# Patient Record
Sex: MALE | Race: WHITE | NOT HISPANIC OR LATINO
[De-identification: names, ages, dates, MRNs, and addresses within clinical notes are randomized per-mention and may not be internally consistent; named-entity substitution may affect disease eponyms.]

---

## 2017-01-26 ENCOUNTER — CHART COPY (OUTPATIENT)
Age: 42
End: 2017-01-26

## 2017-02-14 ENCOUNTER — CHART COPY (OUTPATIENT)
Age: 42
End: 2017-02-14

## 2017-02-23 ENCOUNTER — APPOINTMENT (OUTPATIENT)
Dept: PULMONOLOGY | Facility: CLINIC | Age: 42
End: 2017-02-23

## 2017-02-27 ENCOUNTER — CHART COPY (OUTPATIENT)
Age: 42
End: 2017-02-27

## 2017-04-04 ENCOUNTER — OTHER (OUTPATIENT)
Age: 42
End: 2017-04-04

## 2017-04-05 ENCOUNTER — MESSAGE (OUTPATIENT)
Age: 42
End: 2017-04-05

## 2017-04-10 ENCOUNTER — APPOINTMENT (OUTPATIENT)
Dept: PULMONOLOGY | Facility: CLINIC | Age: 42
End: 2017-04-10

## 2019-03-29 ENCOUNTER — TRANSCRIPTION ENCOUNTER (OUTPATIENT)
Age: 44
End: 2019-03-29

## 2019-03-29 ENCOUNTER — INPATIENT (INPATIENT)
Facility: HOSPITAL | Age: 44
LOS: 3 days | Discharge: HOME | DRG: 728 | End: 2019-04-02
Attending: INTERNAL MEDICINE | Admitting: INTERNAL MEDICINE
Payer: COMMERCIAL

## 2019-03-29 VITALS
HEART RATE: 100 BPM | HEIGHT: 70 IN | RESPIRATION RATE: 20 BRPM | WEIGHT: 175.05 LBS | DIASTOLIC BLOOD PRESSURE: 95 MMHG | TEMPERATURE: 99 F | SYSTOLIC BLOOD PRESSURE: 165 MMHG | OXYGEN SATURATION: 98 %

## 2019-03-29 LAB
ALBUMIN SERPL ELPH-MCNC: 4.5 G/DL — SIGNIFICANT CHANGE UP (ref 3.3–5.2)
ALP SERPL-CCNC: 88 U/L — SIGNIFICANT CHANGE UP (ref 40–120)
ALT FLD-CCNC: 39 U/L — SIGNIFICANT CHANGE UP
ANION GAP SERPL CALC-SCNC: 13 MMOL/L — SIGNIFICANT CHANGE UP (ref 5–17)
APPEARANCE UR: CLEAR — SIGNIFICANT CHANGE UP
AST SERPL-CCNC: 24 U/L — SIGNIFICANT CHANGE UP
BILIRUB SERPL-MCNC: 1.1 MG/DL — SIGNIFICANT CHANGE UP (ref 0.4–2)
BILIRUB UR-MCNC: NEGATIVE — SIGNIFICANT CHANGE UP
BUN SERPL-MCNC: 9 MG/DL — SIGNIFICANT CHANGE UP (ref 8–20)
CALCIUM SERPL-MCNC: 9.7 MG/DL — SIGNIFICANT CHANGE UP (ref 8.6–10.2)
CHLORIDE SERPL-SCNC: 97 MMOL/L — LOW (ref 98–107)
CO2 SERPL-SCNC: 28 MMOL/L — SIGNIFICANT CHANGE UP (ref 22–29)
COLOR SPEC: YELLOW — SIGNIFICANT CHANGE UP
CREAT SERPL-MCNC: 0.75 MG/DL — SIGNIFICANT CHANGE UP (ref 0.5–1.3)
DIFF PNL FLD: ABNORMAL
EPI CELLS # UR: SIGNIFICANT CHANGE UP
GLUCOSE SERPL-MCNC: 99 MG/DL — SIGNIFICANT CHANGE UP (ref 70–115)
GLUCOSE UR QL: NEGATIVE MG/DL — SIGNIFICANT CHANGE UP
HCT VFR BLD CALC: 47.1 % — SIGNIFICANT CHANGE UP (ref 42–52)
HGB BLD-MCNC: 16.8 G/DL — SIGNIFICANT CHANGE UP (ref 14–18)
KETONES UR-MCNC: ABNORMAL
LEUKOCYTE ESTERASE UR-ACNC: ABNORMAL
MCHC RBC-ENTMCNC: 34.4 PG — HIGH (ref 27–31)
MCHC RBC-ENTMCNC: 35.7 G/DL — SIGNIFICANT CHANGE UP (ref 32–36)
MCV RBC AUTO: 96.3 FL — HIGH (ref 80–94)
NITRITE UR-MCNC: NEGATIVE — SIGNIFICANT CHANGE UP
PH UR: 6 — SIGNIFICANT CHANGE UP (ref 5–8)
PLATELET # BLD AUTO: 206 K/UL — SIGNIFICANT CHANGE UP (ref 150–400)
POTASSIUM SERPL-MCNC: 3.1 MMOL/L — LOW (ref 3.5–5.3)
POTASSIUM SERPL-SCNC: 3.1 MMOL/L — LOW (ref 3.5–5.3)
PROT SERPL-MCNC: 7.9 G/DL — SIGNIFICANT CHANGE UP (ref 6.6–8.7)
PROT UR-MCNC: 15 MG/DL
RBC # BLD: 4.89 M/UL — SIGNIFICANT CHANGE UP (ref 4.6–6.2)
RBC # FLD: 13.1 % — SIGNIFICANT CHANGE UP (ref 11–15.6)
RBC CASTS # UR COMP ASSIST: SIGNIFICANT CHANGE UP /HPF (ref 0–4)
SODIUM SERPL-SCNC: 138 MMOL/L — SIGNIFICANT CHANGE UP (ref 135–145)
SP GR SPEC: 1.02 — SIGNIFICANT CHANGE UP (ref 1.01–1.02)
UROBILINOGEN FLD QL: 1 MG/DL
WBC # BLD: 16 K/UL — HIGH (ref 4.8–10.8)
WBC # FLD AUTO: 16 K/UL — HIGH (ref 4.8–10.8)
WBC UR QL: SIGNIFICANT CHANGE UP

## 2019-03-29 PROCEDURE — 76870 US EXAM SCROTUM: CPT | Mod: 26

## 2019-03-29 PROCEDURE — 99285 EMERGENCY DEPT VISIT HI MDM: CPT

## 2019-03-29 PROCEDURE — 74177 CT ABD & PELVIS W/CONTRAST: CPT | Mod: 26

## 2019-03-29 RX ORDER — POTASSIUM CHLORIDE 20 MEQ
40 PACKET (EA) ORAL ONCE
Qty: 0 | Refills: 0 | Status: COMPLETED | OUTPATIENT
Start: 2019-03-29 | End: 2019-03-29

## 2019-03-29 RX ORDER — MORPHINE SULFATE 50 MG/1
4 CAPSULE, EXTENDED RELEASE ORAL ONCE
Qty: 0 | Refills: 0 | Status: DISCONTINUED | OUTPATIENT
Start: 2019-03-29 | End: 2019-03-29

## 2019-03-29 RX ORDER — KETOROLAC TROMETHAMINE 30 MG/ML
15 SYRINGE (ML) INJECTION ONCE
Qty: 0 | Refills: 0 | Status: DISCONTINUED | OUTPATIENT
Start: 2019-03-29 | End: 2019-03-29

## 2019-03-29 RX ADMIN — Medication 40 MILLIEQUIVALENT(S): at 23:17

## 2019-03-29 RX ADMIN — MORPHINE SULFATE 4 MILLIGRAM(S): 50 CAPSULE, EXTENDED RELEASE ORAL at 22:43

## 2019-03-29 RX ADMIN — Medication 15 MILLIGRAM(S): at 20:14

## 2019-03-29 RX ADMIN — Medication 15 MILLIGRAM(S): at 22:25

## 2019-03-29 NOTE — ED ADULT NURSE NOTE - OBJECTIVE STATEMENT
pt a_+ox3, sent to ED from urgent care for abscess to testicle. pt reports noticing a bump on testicle yesterday, felt a pop but felt nothing come out. pt reports increase in pain and swelling today with fever.

## 2019-03-30 DIAGNOSIS — N49.2 INFLAMMATORY DISORDERS OF SCROTUM: ICD-10-CM

## 2019-03-30 LAB
ALBUMIN SERPL ELPH-MCNC: 3.7 G/DL — SIGNIFICANT CHANGE UP (ref 3.3–5.2)
ALP SERPL-CCNC: 92 U/L — SIGNIFICANT CHANGE UP (ref 40–120)
ALT FLD-CCNC: 59 U/L — HIGH
ANION GAP SERPL CALC-SCNC: 13 MMOL/L — SIGNIFICANT CHANGE UP (ref 5–17)
AST SERPL-CCNC: 88 U/L — HIGH
BILIRUB DIRECT SERPL-MCNC: 1.2 MG/DL — HIGH (ref 0–0.3)
BILIRUB INDIRECT FLD-MCNC: 1.3 MG/DL — HIGH (ref 0.2–1)
BILIRUB SERPL-MCNC: 2.5 MG/DL — HIGH (ref 0.4–2)
BUN SERPL-MCNC: 7 MG/DL — LOW (ref 8–20)
CALCIUM SERPL-MCNC: 8.5 MG/DL — LOW (ref 8.6–10.2)
CHLORIDE SERPL-SCNC: 99 MMOL/L — SIGNIFICANT CHANGE UP (ref 98–107)
CO2 SERPL-SCNC: 26 MMOL/L — SIGNIFICANT CHANGE UP (ref 22–29)
CREAT SERPL-MCNC: 0.75 MG/DL — SIGNIFICANT CHANGE UP (ref 0.5–1.3)
CRP SERPL-MCNC: 3.32 MG/DL — HIGH (ref 0–0.4)
GLUCOSE SERPL-MCNC: 109 MG/DL — SIGNIFICANT CHANGE UP (ref 70–115)
MAGNESIUM SERPL-MCNC: 1.3 MG/DL — LOW (ref 1.6–2.6)
PHOSPHATE SERPL-MCNC: 2.8 MG/DL — SIGNIFICANT CHANGE UP (ref 2.4–4.7)
POTASSIUM SERPL-MCNC: 3.1 MMOL/L — LOW (ref 3.5–5.3)
POTASSIUM SERPL-SCNC: 3.1 MMOL/L — LOW (ref 3.5–5.3)
PROT SERPL-MCNC: 6.6 G/DL — SIGNIFICANT CHANGE UP (ref 6.6–8.7)
SODIUM SERPL-SCNC: 138 MMOL/L — SIGNIFICANT CHANGE UP (ref 135–145)

## 2019-03-30 PROCEDURE — 99222 1ST HOSP IP/OBS MODERATE 55: CPT

## 2019-03-30 PROCEDURE — 99251: CPT

## 2019-03-30 PROCEDURE — 99223 1ST HOSP IP/OBS HIGH 75: CPT

## 2019-03-30 PROCEDURE — 12345: CPT | Mod: NC

## 2019-03-30 RX ORDER — NICOTINE POLACRILEX 2 MG
4 GUM BUCCAL
Qty: 0 | Refills: 0 | Status: DISCONTINUED | OUTPATIENT
Start: 2019-03-30 | End: 2019-04-02

## 2019-03-30 RX ORDER — INFLUENZA VIRUS VACCINE 15; 15; 15; 15 UG/.5ML; UG/.5ML; UG/.5ML; UG/.5ML
0.5 SUSPENSION INTRAMUSCULAR ONCE
Qty: 0 | Refills: 0 | Status: DISCONTINUED | OUTPATIENT
Start: 2019-03-30 | End: 2019-04-02

## 2019-03-30 RX ORDER — ACETAMINOPHEN 500 MG
650 TABLET ORAL EVERY 6 HOURS
Qty: 0 | Refills: 0 | Status: DISCONTINUED | OUTPATIENT
Start: 2019-03-30 | End: 2019-04-02

## 2019-03-30 RX ORDER — NICOTINE POLACRILEX 2 MG
1 GUM BUCCAL DAILY
Qty: 0 | Refills: 0 | Status: DISCONTINUED | OUTPATIENT
Start: 2019-03-30 | End: 2019-04-02

## 2019-03-30 RX ORDER — POTASSIUM CHLORIDE 20 MEQ
40 PACKET (EA) ORAL ONCE
Qty: 0 | Refills: 0 | Status: COMPLETED | OUTPATIENT
Start: 2019-03-30 | End: 2019-03-30

## 2019-03-30 RX ORDER — THIAMINE MONONITRATE (VIT B1) 100 MG
100 TABLET ORAL DAILY
Qty: 0 | Refills: 0 | Status: COMPLETED | OUTPATIENT
Start: 2019-03-30 | End: 2019-04-01

## 2019-03-30 RX ORDER — PIPERACILLIN AND TAZOBACTAM 4; .5 G/20ML; G/20ML
3.38 INJECTION, POWDER, LYOPHILIZED, FOR SOLUTION INTRAVENOUS EVERY 8 HOURS
Qty: 0 | Refills: 0 | Status: DISCONTINUED | OUTPATIENT
Start: 2019-03-30 | End: 2019-03-31

## 2019-03-30 RX ORDER — HEPARIN SODIUM 5000 [USP'U]/ML
5000 INJECTION INTRAVENOUS; SUBCUTANEOUS EVERY 8 HOURS
Qty: 0 | Refills: 0 | Status: DISCONTINUED | OUTPATIENT
Start: 2019-03-30 | End: 2019-04-02

## 2019-03-30 RX ORDER — FOLIC ACID 0.8 MG
1 TABLET ORAL DAILY
Qty: 0 | Refills: 0 | Status: DISCONTINUED | OUTPATIENT
Start: 2019-03-30 | End: 2019-04-02

## 2019-03-30 RX ORDER — MORPHINE SULFATE 50 MG/1
2 CAPSULE, EXTENDED RELEASE ORAL ONCE
Qty: 0 | Refills: 0 | Status: DISCONTINUED | OUTPATIENT
Start: 2019-03-30 | End: 2019-03-30

## 2019-03-30 RX ORDER — KETOROLAC TROMETHAMINE 30 MG/ML
30 SYRINGE (ML) INJECTION EVERY 4 HOURS
Qty: 0 | Refills: 0 | Status: DISCONTINUED | OUTPATIENT
Start: 2019-03-30 | End: 2019-04-02

## 2019-03-30 RX ORDER — MAGNESIUM SULFATE 500 MG/ML
2 VIAL (ML) INJECTION ONCE
Qty: 0 | Refills: 0 | Status: COMPLETED | OUTPATIENT
Start: 2019-03-30 | End: 2019-03-30

## 2019-03-30 RX ORDER — OXYCODONE HYDROCHLORIDE 5 MG/1
5 TABLET ORAL EVERY 6 HOURS
Qty: 0 | Refills: 0 | Status: DISCONTINUED | OUTPATIENT
Start: 2019-03-30 | End: 2019-04-02

## 2019-03-30 RX ADMIN — Medication 110 MILLIGRAM(S): at 14:53

## 2019-03-30 RX ADMIN — PIPERACILLIN AND TAZOBACTAM 25 GRAM(S): 4; .5 INJECTION, POWDER, LYOPHILIZED, FOR SOLUTION INTRAVENOUS at 16:43

## 2019-03-30 RX ADMIN — Medication 1 PATCH: at 11:23

## 2019-03-30 RX ADMIN — Medication 1 TABLET(S): at 11:23

## 2019-03-30 RX ADMIN — Medication 40 MILLIEQUIVALENT(S): at 16:42

## 2019-03-30 RX ADMIN — Medication 100 MILLIGRAM(S): at 01:47

## 2019-03-30 RX ADMIN — Medication 30 MILLIGRAM(S): at 15:31

## 2019-03-30 RX ADMIN — MORPHINE SULFATE 2 MILLIGRAM(S): 50 CAPSULE, EXTENDED RELEASE ORAL at 03:30

## 2019-03-30 RX ADMIN — OXYCODONE HYDROCHLORIDE 5 MILLIGRAM(S): 5 TABLET ORAL at 21:40

## 2019-03-30 RX ADMIN — OXYCODONE HYDROCHLORIDE 5 MILLIGRAM(S): 5 TABLET ORAL at 07:36

## 2019-03-30 RX ADMIN — Medication 50 GRAM(S): at 11:23

## 2019-03-30 RX ADMIN — PIPERACILLIN AND TAZOBACTAM 25 GRAM(S): 4; .5 INJECTION, POWDER, LYOPHILIZED, FOR SOLUTION INTRAVENOUS at 23:32

## 2019-03-30 RX ADMIN — Medication 1 MILLIGRAM(S): at 11:23

## 2019-03-30 RX ADMIN — Medication 100 MILLIGRAM(S): at 11:23

## 2019-03-30 RX ADMIN — PIPERACILLIN AND TAZOBACTAM 25 GRAM(S): 4; .5 INJECTION, POWDER, LYOPHILIZED, FOR SOLUTION INTRAVENOUS at 03:19

## 2019-03-30 RX ADMIN — Medication 30 MILLIGRAM(S): at 16:01

## 2019-03-30 RX ADMIN — Medication 900 MILLIGRAM(S): at 02:22

## 2019-03-30 RX ADMIN — MORPHINE SULFATE 2 MILLIGRAM(S): 50 CAPSULE, EXTENDED RELEASE ORAL at 03:18

## 2019-03-30 RX ADMIN — Medication 30 MILLIGRAM(S): at 23:32

## 2019-03-30 RX ADMIN — OXYCODONE HYDROCHLORIDE 5 MILLIGRAM(S): 5 TABLET ORAL at 22:40

## 2019-03-30 NOTE — CONSULT NOTE ADULT - ASSESSMENT
44 y/o M w/ PMH of MS (currently off treatment) and PSH of cholecystectomy presenting with a 1 day hx of scrotal swelling and pain 2/2 scrotal cellulitis and phlegmon.  -No drainable abscess at this time  -Continue IV Abx  -PRN Pain control  -Surgery will continue to monitor area, I&D if necessary  -Continued care per primary    Patient evaluated and plan discussed with attending physician Dr. Duenas

## 2019-03-30 NOTE — PROGRESS NOTE ADULT - SUBJECTIVE AND OBJECTIVE BOX
cc: scrotal cellulitis     interval hx: patient seen and eval. comfortable. in no acute distress. c/o mild chills at home. denies n/v/abd pain or diarrhea denies dysuria or hematuria     Vital Signs Last 24 Hrs  T(C): 37 (30 Mar 2019 15:54), Max: 37.2 (29 Mar 2019 18:22)  T(F): 98.6 (30 Mar 2019 15:54), Max: 99 (30 Mar 2019 03:01)  HR: 81 (30 Mar 2019 15:54) (81 - 100)  BP: 161/82 (30 Mar 2019 15:54) (144/89 - 165/95)  BP(mean): --  RR: 18 (30 Mar 2019 15:54) (18 - 20)  SpO2: 96% (30 Mar 2019 15:54) (94% - 98%)    Physical Exam:  · Constitutional Details	well-developed; well-groomed; well-nourished; distress due to pain  · Neck Details	supple; no JVD; normal thyroid gland  · Respiratory Details	airway patent; breath sounds equal; good air movement;   respirations non-labored; clear to auscultation bilaterally; no chest wall tenderness;   no intercostal retractions; no rales; no rhonchi; no wheezes  · Cardiovascular Details	regular rate and rhythm  no rub  no murmur  normal PMI   · GI Normal	soft; nontender; no distention; no masses palpable; bowel sounds normal; no bruit; no rebound tenderness; no guarding; no rigidity; no organomegaly  · Genitourinary Details Maleno hernia; no discharge; testicular mass L; Generalized tenderness; warm; with diffuse erythema  · Extremities Details	no clubbing; no cyanosis; no pedal edema  · Neurological	Alert & oriented; no sensory, motor or coordination deficits, normal reflexes  · Skin	No lesions; no rash  · Lymphatic Details	inguinal L; inguinal R  · Inguinal L	tender  · Inguinal R	tender  · Musculoskeletal	No joint pain, swelling or deformity; no limitation of movement                          16.8   16.0  )-----------( 206      ( 29 Mar 2019 20:32 )             47.1   03-30    138  |  99  |  7.0<L>  ----------------------------<  109  3.1<L>   |  26.0  |  0.75    Ca    8.5<L>      30 Mar 2019 07:33  Phos  2.8     03-30  Mg     1.3     03-30    TPro  6.6  /  Alb  3.7  /  TBili  2.5<H>  /  DBili  1.2<H>  /  AST  88<H>  /  ALT  59<H>  /  AlkPhos  92  03-30

## 2019-03-30 NOTE — ED PROVIDER NOTE - OBJECTIVE STATEMENT
43y male no pmhx presents to ED for testicular pain and swelling x 2 days. Pt states he first noticed a "pimple" or ingrown hair on the left side of his testicle yesterday morning. He states he picked at the area and that it "popped" but popped internally. Pt notes that when he awoke this AM the area was diffusely erythematous and swollen. Pt notes significant pain overlying the left testicle in the area of the ingrown hair. He also notes diffuse swelling to the penis and foreskin. Pt was seen at  this AM and instructed to go to ED as he would likely need IV abx. Pt had fever of 102 at . Pt denies n/v/d, abdominal pain, urethral discharge, trauma, drainage, bleeding, CP, SOB.

## 2019-03-30 NOTE — CONSULT NOTE ADULT - ASSESSMENT
This 43 y.o. man  here for fevers, WBC elevation  scrotal cellulitis    small fluid collection in scrotum on CT scan likely abscess    - suggest to continue zosyn  - add doxycycline for empiric [place]-MRSA coverage    - follow up on cultures    - will need serial Ultrasound of scrotum; if collection is larger, consider drainage.

## 2019-03-30 NOTE — CONSULT NOTE ADULT - REASON FOR ADMISSION
Scrotal cellulitis
Scrotal pain secondary to scrotal cellulitis with abscess
Scrotal pain secondary to scrotal cellulitis with abscess

## 2019-03-30 NOTE — CONSULT NOTE ADULT - SUBJECTIVE AND OBJECTIVE BOX
ACUTE CARE SURGERY CONSULT    HPI:  44 y/o M w/ PMH of MS (currently off treatment) and PSH of cholecystectomy presenting with a 1 day hx of scrotal swelling and pain. Patient states that he had an ingrown hair which he started to pick, which then awoke him from sleep last night with burning pain and swelling of his scrotum. The pain became severe, patient then presented to [facility] ED. +subjective chills and fever. Denies N/V/CP/SOB. Denies problems urinating, increased urinary frequency, or malodorous urine.       PAST MEDICAL HISTORY:  Multiple sclerosis - electively stopped receiving treatments due to side effects    PAST SURGICAL HISTORY:  Cholecystectomy    ALLERGIES:  No Known Allergies    FAMILY HISTORY: Noncontributory    SOCIAL HISTORY:  2 packs per day cigarette use w/ a 80 pack year hx, Daily EtOH use - vodka. Occasional marijuana use, denies injection drugs.     HOME MEDICATIONS: Denies    MEDICATIONS  (STANDING):  folic acid 1 milliGRAM(s) Oral daily  influenza   Vaccine 0.5 milliLiter(s) IntraMuscular once  multivitamin 1 Tablet(s) Oral daily  nicotine - 21 mG/24Hr(s) Patch 1 patch Transdermal daily  piperacillin/tazobactam IVPB. 3.375 Gram(s) IV Intermittent every 8 hours  thiamine 100 milliGRAM(s) Oral daily    MEDICATIONS  (PRN):  acetaminophen   Tablet .. 650 milliGRAM(s) Oral every 6 hours PRN Temp greater or equal to 38C (100.4F), Mild Pain (1 - 3)  ketorolac   Injectable 30 milliGRAM(s) IV Push every 4 hours PRN Moderate Pain (4 - 6)  LORazepam     Tablet 2 milliGRAM(s) Oral every 2 hours PRN Symptom-triggered 2 point increase in CIWA-Ar  LORazepam   Injectable 2 milliGRAM(s) IV Push every 1 hour PRN Symptom-triggered: each CIWA -Ar score 8 or GREATER  nicotine  Polacrilex Gum 4 milliGRAM(s) Oral every 1 hour PRN nicotine withdrawal  oxyCODONE    IR 5 milliGRAM(s) Oral every 6 hours PRN Severe Pain (7 - 10)      VITALS & I/Os:  Vital Signs Last 24 Hrs  T(C): 37.2 (30 Mar 2019 03:01), Max: 37.2 (29 Mar 2019 18:22)  T(F): 99 (30 Mar 2019 03:01), Max: 99 (30 Mar 2019 03:01)  HR: 91 (30 Mar 2019 03:01) (89 - 100)  BP: 146/82 (30 Mar 2019 03:01) (146/82 - 165/95)  BP(mean): --  RR: 18 (30 Mar 2019 03:01) (18 - 20)  SpO2: 95% (30 Mar 2019 03:01) (95% - 98%)  CAPILLARY BLOOD GLUCOSE          I&O's Summary      GENERAL: Alert, well developed, in no acute distress.  MENTAL STATUS: AAOx3. Appropriate affect.  HEENT: PERRLA. EOMI. MMM.  Trachea midline. No lymph node swelling or tenderness.  RESPIRATORY: CTAB. No wheezing, rales or rhonchi.  CARDIOVASCULAR: RRR. No audible murmurs, rubs or gallops.   GASTROINTESTINAL: Abdomen soft, +suprapubic TTP, ND, -R/-G.  No pulsatile mass, no flank tenderness or suprapubic tenderness. No hepatosplenomegaly.  GENITOURINARY: Diffuse scrotal edema with area of induration on left side at base of penis. No palpable fluctuance. +TTP.   NEUROLOGIC: Cranial nerves II-XII grossly intact. No focal neurological deficits. Moves all extremities spontaneously. Sensation intact bilaterally.  INTEGUMENTARY: No overt rashes or lesions, petechia or purpura. Good turgor. No edema.  MUSCULOSKELETAL: No cyanosis or clubbing. No gross deformities.   LYMPHATIC: Palpation of neck reveals no swelling or tenderness of neck nodes. Palpation of groin reveals no swelling or tenderness of groin nodes.    LABS:                        16.8   16.0  )-----------( 206      ( 29 Mar 2019 20:32 )             47.1     03    138  |  97<L>  |  9.0  ----------------------------<  99  3.1<L>   |  28.0  |  0.75    Ca    9.7      29 Mar 2019 20:32    TPro  7.9  /  Alb  4.5  /  TBili  1.1  /  DBili  x   /  AST  24  /  ALT  39  /  AlkPhos  88      Lactate: acetaminophen   Tablet .. 650 milliGRAM(s) Oral every 6 hours PRN  folic acid 1 milliGRAM(s) Oral daily  influenza   Vaccine 0.5 milliLiter(s) IntraMuscular once  ketorolac   Injectable 30 milliGRAM(s) IV Push every 4 hours PRN  LORazepam     Tablet 2 milliGRAM(s) Oral every 2 hours PRN  LORazepam   Injectable 2 milliGRAM(s) IV Push every 1 hour PRN  multivitamin 1 Tablet(s) Oral daily  nicotine  Polacrilex Gum 4 milliGRAM(s) Oral every 1 hour PRN  nicotine - 21 mG/24Hr(s) Patch 1 patch Transdermal daily  oxyCODONE    IR 5 milliGRAM(s) Oral every 6 hours PRN  piperacillin/tazobactam IVPB. 3.375 Gram(s) IV Intermittent every 8 hours  thiamine 100 milliGRAM(s) Oral daily       Urinalysis Basic - ( 29 Mar 2019 20:08 )    Color: Yellow / Appearance: Clear / S.020 / pH: x  Gluc: x / Ketone: Trace  / Bili: Negative / Urobili: 1 mg/dL   Blood: x / Protein: 15 mg/dL / Nitrite: Negative   Leuk Esterase: Trace / RBC: 0-2 /HPF / WBC 0-2   Sq Epi: x / Non Sq Epi: Occasional / Bacteria: x      IMAGING:     EXAM:  US SCROTUM AND CONTENTS                          PROCEDURE DATE:  2019          INTERPRETATION:  CLINICAL INFORMATION: Scrotal pain and swelling    COMPARISON: None available.    TECHNIQUE: Testicular ultrasound utilizing color and spectral Doppler.    FINDINGS:      RIGHT:    Right testis: 5.3 x 2.8 x 2.9 cm. Normal echogenicity and echotexture   with no masses or areas of architectural distortion. Normal blood flow   pattern.    Right epididymis: 4 mm epididymal head cyst.    Right hydrocele: None.    Right varicocele: None.      LEFT:     Left testis: 4.9 x 2.9 x 2.9 cm. Normal echogenicity and echotexture with   no masses or areas of architectural distortion. Normal blood flow pattern.    Left epididymis: Within normal limits.    Left hydrocele: None.    Left varicocele: None.    IMPRESSION:     Normal scrotal sonogram.           EXAM:  CT ABDOMEN AND PELVIS IC                          PROCEDURE DATE:  2019          INTERPRETATION:  CT ABDOMEN AND PELVIS WITH CONTRAST    INDICATION: Testicular and inguinal pain with associated cellulitis.     TECHNIQUE: Contrast enhanced CT of the abdomen and pelvis. Images are   reformatted in the sagittal and coronal planes.    96 mL of Omnipaque 300 contrast material was injected IV.    COMPARISON: Testicular ultrasound performed earlier today.    FINDINGS:    Lower Thorax: No consolidation or effusion.        Liver: Too small to characterize hepatic hypodensity near the dome.  Biliary: No significant dilatation. Status post cholecystectomy.  Spleen: Incompletely characterized splenic hypodensities measuring up to   2.4 cm.  Pancreas: No inflammatory changes or ductal dilatation.      Adrenals: Normal.      Kidneys: No hydronephrosis or solid mass.      Vessels: Normal caliber.        GI tract: There is wall thickening of colonic loops extending to the   rectum without significant pericolonic stranding. The findings are   nonspecific, this may be on the basis of inadequate distention or   represent pancolitis in appropriate clinical setting. Consider   nonemergent endoscopic evaluation if there is concern for neoplasm. No   evidence of small bowel obstruction. Normal appendix.    Peritoneum/retroperitoneum and mesentery: No free air. No organized fluid   collection. Mild prominent bilateral groin lymph nodes, likely reactive   in nature.    Pelvic organs/Bladder: No pelvic masses. Inadequately distended.    Abdominal wall: There is fluid induration and stranding within the   scrotal sac extending to the inguinal region, likely in keeping with   reported cellulitis. No air identified to suggest necrotizing fasciitis.   Recommend clinical correlation. There is diffuse scrotal wall thickening   with suggestion of small fluid collection measuring 1.4 x 0.7 cm in the   left medial scrotal sac image 161 of series 3, likely phlegmon or   developing abscess. There is bilateral hydroceles, left greater the   right.   Bones and soft tissues: Mild multilevel degenerative changes of the spine   are noted.        IMPRESSION:    Fluid induration and stranding within the scrotal sac extending to the   inguinal region, likely in keeping with reported cellulitis. No air   identified to suggest necrotizing fasciitis. Recommend clinical   correlation.  Diffuse scrotal wall thickening with suggestion of small   fluid collection measuring 1.4 x 0.7 cm in the left medial scrotal sac   (see key image), likely phlegmon or developing abscess. Bilateral   hydroceles, left greater the right.     Wall thickening of colonic loops extending to the rectum without   significant pericolonic stranding. The findings are nonspecific, this may   be on the basis of inadequate distention or represent pancolitis in   appropriate clinical setting. Consider nonemergent endoscopic evaluation   if there is concern for neoplasm. No evidence of small bowel obstruction.   No intraperitoneal fluid collection. Additional findings as mentioned   above.    These results were discussed via telephone at 3/30/2019 12:14 AM by Dr. Espinoza of radiology with ER AYAN Kang, institution read-back   verification policy was followed.

## 2019-03-30 NOTE — H&P ADULT - NSHPPHYSICALEXAM_GEN_ALL_CORE
Vital Signs Last 24 Hrs  T(C): 37.2 (30 Mar 2019 03:01), Max: 37.2 (29 Mar 2019 18:22)  T(F): 99 (30 Mar 2019 03:01), Max: 99 (30 Mar 2019 03:01)  HR: 91 (30 Mar 2019 03:01) (89 - 100)  BP: 146/82 (30 Mar 2019 03:01) (146/82 - 165/95)  BP(mean): --  RR: 18 (30 Mar 2019 03:01) (18 - 20)  SpO2: 95% (30 Mar 2019 03:01) (95% - 98%)

## 2019-03-30 NOTE — H&P ADULT - HISTORY OF PRESENT ILLNESS
ED HPI: This a 42 y/o M w/ PMH of MS, not currently on medication. Presenting with a 1 day hx of scrotal swelling and pain. Patient states that he had an ingrown hair which he started to pick, which then awoke him from sleep last night with burning pain and swelling of his scrotum. The pain became severe, patient then presented to [facility] ED. with associated subjective chills and fever. Denies N/V/CP/SOB. Denies problems urinating, increased urinary frequency, or malodorous urine.     Above HPI reviewed and noted: patient was seen and evaluated, found having rigors while at bedside. patient confirms above complaint and reports worsening pain and swelling of foreskin.

## 2019-03-30 NOTE — H&P ADULT - ASSESSMENT
Acute scrotal abscess with diffuse cellulitis, area demarcated   admit to medical unit  f/u blood culture  ID consult  Zosyn IV  Tylenol PRN for fever  Urology consulted by ED  - No clinical maddie's at this time  Oxycodone, ketorolac for optimization of pain control  Scrotal support    H/o MS - not currently on medication  H/o chronic diarrhea, ~20 episodes per day, intermittent, since childhood, last colonoscopy 10 yrs ago, no abn findings recalled.   - recommend repeat Colonoscopy in view of abnormal CT scan findings, patient agree with out patient follow up with Dr. Melendrez group.     H/o Active Nicotine use,   - nicotine patch, with PRN Gum    h/o ETOH use, no currently in withdrawal.   - [facility] protocol   - Folic, Thiamine and MVI    DVT prophylaxis amb at [place]

## 2019-03-30 NOTE — H&P ADULT - GASTROINTESTINAL DETAILS
no rebound tenderness/no rigidity/bowel sounds normal/no bruit/nontender/no organomegaly/no masses palpable/soft/no distention/no guarding

## 2019-03-30 NOTE — ED PROVIDER NOTE - CLINICAL SUMMARY MEDICAL DECISION MAKING FREE TEXT BOX
Pt with rapidly progressing cellulitis of scrotum. Will obtain labs, blood cultures, Testicular sono to eval flow, CT to assess for fourniers/ gas and likely obs/admit for IV abx

## 2019-03-30 NOTE — CONSULT NOTE ADULT - SUBJECTIVE AND OBJECTIVE BOX
[facility]  INFECTIOUS DISEASES AND INTERNAL MEDICINE at [place]  =======================================================  Rl Tapia MD  Diplomates American Board of Internal Medicine and Infectious Diseases  =======================================================    [id number]  AMARI FERREIRA   This a 44 y/o M w/ PMH of MS, not currently on medication. Presenting with a 1 day hx of scrotal swelling and pain. Patient states that he had an ingrown hair which he started to pick, which then awoke him from sleep last night with burning pain and swelling of his scrotum. The pain became severe, patient then presented to [facility] ED. with associated subjective chills and fever. Denies N/V/CP/SOB. Denies problems urinating, increased urinary frequency, or malodorous urine.     patient was placed on zosyn by admitting team  blood culture sent.   CT scan showed:  Fluid induration and stranding within the scrotal sac extending to the inguinal region, likely in keeping with reported cellulitis. No air identified to suggest necrotizing fasciitis. Recommend clinical correlation.  Diffuse scrotal wall thickening with suggestion of small fluid collection measuring 1.4 x 0.7 cm in the left medial scrotal sac (see key image), likely phlegmon or developing abscess. Bilateral hydroceles, left greater the right.     patient not recently hospitalized.  last hospitalization date back 5 years ago for a knee condition.         =======================================================  Past Medical & Surgical Hx:  =====================  PAST MEDICAL & SURGICAL HISTORY:  Chronic diarrhea  Multiple sclerosis  No significant past surgical history      Problem List:  ==========  HEALTH ISSUES - PROBLEM Dx:  Cellulitis of scrotum: Cellulitis of scrotum        Social Hx:  =======  no toxic habits currently    FAMILY HISTORY:  FHx: diabetes mellitus: father  no significant family history of immunosuppressive disorders in mother or father   =======================================================  REVIEW OF SYSTEMS:  as above  all other ROS negative  =======================================================  Allergies    No Known Allergies    Intolerances        Antibiotics:  doxycycline IVPB      piperacillin/tazobactam IVPB. 3.375 Gram(s) IV Intermittent every 8 hours    Other medications:  folic acid 1 milliGRAM(s) Oral daily  influenza   Vaccine 0.5 milliLiter(s) IntraMuscular once  multivitamin 1 Tablet(s) Oral daily  nicotine - 21 mG/24Hr(s) Patch 1 patch Transdermal daily  thiamine 100 milliGRAM(s) Oral daily     clindamycin IVPB   100 mL/Hr IV Intermittent (03-30-19 @ 01:47)    piperacillin/tazobactam IVPB.   25 mL/Hr IV Intermittent (03-30-19 @ 03:19)      ======================================================  Physical Exam:  ============  T(F): 97.4 (30 Mar 2019 11:31), Max: 99 (30 Mar 2019 03:01)  HR: 87 (30 Mar 2019 11:31)  BP: 155/87 (30 Mar 2019 11:31)  RR: 18 (30 Mar 2019 07:58)  SpO2: 94% (30 Mar 2019 07:58) (94% - 98%)  Height (cm): 177.8 (03-29-19 @ 18:22)  Weight (kg): 79.4 (03-29-19 @ 18:22)  BMI (kg/m2): 25.1 (03-29-19 @ 18:22)  BSA (m2): 1.97 (03-29-19 @ 18:22)  General:  No acute distress.  Eye: Pupils are equal, round and reactive to light, Extraocular movements are intact, Normal conjunctiva.  HENT: Normocephalic, Oral mucosa is moist, No pharyngeal erythema, No sinus tenderness.  Neck: Supple, No lymphadenopathy.  Respiratory: Lungs are clear to auscultation, Respirations are non-labored.  Cardiovascular: Normal rate, Regular rhythm, No murmur, Good pulses equal in all extremities, No edema.  Gastrointestinal: Soft, Non-tender, Non-distended, Normal bowel sounds.  Genitourinary: No costovertebral angle tenderness.  Lymphatics: No lymphadenopathy neck,   Musculoskeletal: Normal range of motion, Normal strength.  Integumentary: ERYTHEMA and induration of scrotal skin including penile base anteriorly.   Neurologic: Alert, Oriented, No focal deficits, Cranial Nerves II-XII are grossly intact.  Psychiatric: Appropriate mood & affect.    =======================================================  Labs:                        16.8   16.0  )-----------( 206      ( 29 Mar 2019 20:32 )             47.1     03-30    138  |  99  |  7.0<L>  ----------------------------<  109  3.1<L>   |  26.0  |  0.75    Ca    8.5<L>      30 Mar 2019 07:33  Phos  2.8     03-30  Mg     1.3     03-30    TPro  6.6  /  Alb  3.7  /  TBili  2.5<H>  /  DBili  1.2<H>  /  AST  88<H>  /  ALT  59<H>  /  AlkPhos  92  03-30      < from: CT Abdomen and Pelvis w/ IV Cont (03.29.19 @ 22:56) >     EXAM:  CT ABDOMEN AND PELVIS IC                          PROCEDURE DATE:  03/29/2019          INTERPRETATION:  CT ABDOMEN AND PELVIS WITH CONTRAST    INDICATION: Testicular and inguinal pain with associated cellulitis.     TECHNIQUE: Contrast enhanced CT of the abdomen and pelvis. Images are   reformatted in the sagittal and coronal planes.    96 mL of Omnipaque 300 contrast material was injected IV.    COMPARISON: Testicular ultrasound performed earlier today.    FINDINGS:    Lower Thorax: Noconsolidation or effusion.        Liver: Too small to characterize hepatic hypodensity near the dome.  Biliary: No significant dilatation. Status post cholecystectomy.  Spleen: Incompletely characterized splenic hypodensities measuring up to   2.4 cm.  Pancreas: No inflammatory changes or ductal dilatation.      Adrenals: Normal.      Kidneys: No hydronephrosis or solid mass.      Vessels: Normal caliber.        GI tract: There is wall thickening of colonic loops extending to the   rectum withoutsignificant pericolonic stranding. The findings are   nonspecific, this may be on the basis of inadequate distention or   represent pancolitis in appropriate clinical setting. Consider   nonemergent endoscopic evaluation if there is concern for neoplasm. No   evidence of small bowel obstruction. Normal appendix.    Peritoneum/retroperitoneum and mesentery: No free air. No organized fluid   collection. Mild prominent bilateral groin lymph nodes, likely reactive   in nature.    Pelvic organs/Bladder: No pelvic masses. Inadequately distended.    Abdominal wall: There is fluid induration and stranding within the   scrotal sac extending to the inguinal region, likely in keeping with   reported cellulitis. No air identified to suggest necrotizing fasciitis.   Recommend clinical correlation. There is diffuse scrotal wall thickening   with suggestion of small fluid collection measuring 1.4 x 0.7 cm in the   left medial scrotal sac image 161 of series 3, likely phlegmon or   developing abscess. There is bilateral hydroceles, left greater the   right.   Bones and soft tissues: Mild multilevel degenerative changes of the spine   are noted.        IMPRESSION:    Fluid induration and stranding within the scrotal sac extending to the inguinal region, likely in keeping with reported cellulitis. No air identified to suggest necrotizing fasciitis. Recommend clinical correlation.  Diffuse scrotal wall thickening with suggestion of small fluid collection measuring 1.4 x 0.7 cm in the left medial scrotal sac (see key image), likely phlegmon or developing abscess. Bilateral hydroceles, left greater the right.     Wall thickening of colonic loops extending to the rectum without significant pericolonic stranding. The findings are nonspecific, this may be on the basis of inadequate distention or represent pancolitis in appropriate clinical setting. Consider nonemergent endoscopic evaluation if there is concern for neoplasm. No evidence of small bowel obstruction. No intraperitoneal fluid collection. Additional findings as mentioned   above.    These results were discussed via telephone at 3/30/2019 12:14 AM by Dr. Joshi of radiology with ER AYAN Kang, institution read-back verification policy was followed.        DREAD JOSHI M.D., ATTENDING RADIOLOGIST  This document has been electronically signed. Mar 30 2019 12:19AM                  < end of copied text >

## 2019-03-30 NOTE — CONSULT NOTE ADULT - ASSESSMENT
43 year old male w/scrotal cellulitis and developing abscess .   -Recommend continue zosyn , unless concern for pesudomonas then unasyn  -Recommend repeat scrotal ultrasound Monday   -Continue pain control, recommend toradol and narcotics prn.   -Pt examined with and  plan of care discussed w/ Dr. Siegel

## 2019-03-30 NOTE — H&P ADULT - RS GEN PE MLT RESP DETAILS PC
breath sounds equal/no rales/no wheezes/respirations non-labored/good air movement/airway patent/no chest wall tenderness/no intercostal retractions/clear to auscultation bilaterally/no rhonchi

## 2019-03-30 NOTE — ED PROVIDER NOTE - SKIN, MLM
Skin normal color for race, warm, dry and intact. Diffusely erythematous discoloration of testicles and penis.

## 2019-03-30 NOTE — CONSULT NOTE ADULT - SUBJECTIVE AND OBJECTIVE BOX
43 year old male w/scrotal pain and swelling after noticing an ingrown hair last night and trying to remove the hair. Pt states that he tried to remove the hair and he thought it got smaller but then when he woke up today, it was swollen and painful.  Scrotal ultrasound normal.  CT shows -  diffuse scrotal wall thickening with  suggestion of small fluid collection measuring 1.4 x 0.7 cm in the left   medial scrotal sac image 161 of series 3, likely phlegmon or developing   abscess. There is bilateral hydroceles, left greater the right.   Urology service consulted for management .     PMH: MS  PSH: spine surgeries   Meds: none   Alleriges: NKDA  SH: current smoker 2ppd, social ETOH       Vital Signs Last 24 Hrs  T(C): 36.3 (30 Mar 2019 11:31), Max: 37.2 (29 Mar 2019 18:22)  T(F): 97.4 (30 Mar 2019 11:31), Max: 99 (30 Mar 2019 03:01)  HR: 87 (30 Mar 2019 11:31) (86 - 100)  BP: 155/87 (30 Mar 2019 11:31) (144/89 - 165/95)  BP(mean): --  RR: 18 (30 Mar 2019 07:58) (18 - 20)  SpO2: 94% (30 Mar 2019 07:58) (94% - 98%)    Gen: NAD, A&Ox3  CVS: S1, S2  Chest: BS Bl  Abd: non tender  : scrotal swelling, tenderness over inguinal rings  Ext; no edema 43 year old male w/scrotal pain and swelling after noticing an ingrown hair last night and trying to remove the hair. Pt states that he tried to remove the hair and he thought it got smaller but then when he woke up today, it was swollen and painful.  Scrotal ultrasound normal.  CT shows -  diffuse scrotal wall thickening with  suggestion of small fluid collection measuring 1.4 x 0.7 cm in the left   medial scrotal sac image 161 of series 3, likely phlegmon or developing   abscess. There is bilateral hydroceles, left greater the right.   Urology service consulted for management .     PMH: MS  PSH: spine surgeries   Meds: none   Alleriges: NKDA  SH: current smoker 2ppd, social ETOH       Vital Signs Last 24 Hrs  T(C): 36.3 (30 Mar 2019 11:31), Max: 37.2 (29 Mar 2019 18:22)  T(F): 97.4 (30 Mar 2019 11:31), Max: 99 (30 Mar 2019 03:01)  HR: 87 (30 Mar 2019 11:31) (86 - 100)  BP: 155/87 (30 Mar 2019 11:31) (144/89 - 165/95)  BP(mean): --  RR: 18 (30 Mar 2019 07:58) (18 - 20)  SpO2: 94% (30 Mar 2019 07:58) (94% - 98%)    Gen: NAD,   CVS: S1, S2  Chest: BS Bl  Abd: non tender. no plapable masses.  : scrotal wall swelling, tenderness over inguinal rings phallus normal. testes normal. urethral meatus normal. no crepitus.   Ext; no edema   alert and oriendted X 3  neck good ROM.

## 2019-03-30 NOTE — ED PROVIDER NOTE - ATTENDING CONTRIBUTION TO CARE
Radha: I performed a face to face bedside interview with patient regarding history of present illness, review of symptoms and past medical history. I completed an independent physical exam.  I have discussed patient's plan of care with advanced care provider.   I agree with note as stated above including HISTORY OF PRESENT ILLNESS, HIV, PAST MEDICAL/SURGICAL/FAMILY/SOCIAL HISTORY, ALLERGIES AND HOME MEDICATIONS, REVIEW OF SYSTEMS, PHYSICAL EXAM, MEDICAL DECISION MAKING and any PROGRESS NOTES during the time I functioned as the attending physician for this patient  unless otherwise noted. My brief assessment is as follows: "pimple" left testicle that popped several days before, with fever to 101 today, some redness/swelling, no rapid change, no skin blistering, no other systemic symptoms. minor pain/swelling to area, no sign nec fasc/maddie's but u/s and ct of area for further eval, labs, abx, admit.

## 2019-03-30 NOTE — ED ADULT NURSE REASSESSMENT NOTE - NS ED NURSE REASSESS COMMENT FT1
report received from off going RN, patient alert and oriented x4 resting comfortably on stretcher, does not appear to be in any apparent respiratory distress at this time. awaiting dispo will continue to monitor patient

## 2019-03-30 NOTE — ED PROVIDER NOTE - PHYSICAL EXAMINATION
: External genitalia appears erythematous and edematous. Small area of induration of overlying left testicle in area of ingrown hair. TTP over testicle L worse than R. Normal lie, Cremasteric reflex intact. No urethral discharge. Foreskin appears edematous. TTP inguinal b/l. No enlarged nodes noted

## 2019-03-30 NOTE — CONSULT NOTE ADULT - ATTENDING COMMENTS
scrotal induration and inflammation , started as infected hair follicle , no fever at home ,but tm in hospital  exam: scrotum has two testicles, erythema , +small area of induration at scrotal/penis junction,  Penis: +edema  labs / imaging reviewed  plan  scrotal infection, no clinical maddie's at this time  crp pending to give LRINEC score    will follow  primary team for abx coverage.
Agree with above. Note edited as needed.

## 2019-03-31 LAB
ANION GAP SERPL CALC-SCNC: 14 MMOL/L — SIGNIFICANT CHANGE UP (ref 5–17)
BUN SERPL-MCNC: 6 MG/DL — LOW (ref 8–20)
CALCIUM SERPL-MCNC: 9 MG/DL — SIGNIFICANT CHANGE UP (ref 8.6–10.2)
CHLORIDE SERPL-SCNC: 96 MMOL/L — LOW (ref 98–107)
CO2 SERPL-SCNC: 24 MMOL/L — SIGNIFICANT CHANGE UP (ref 22–29)
CREAT SERPL-MCNC: 0.67 MG/DL — SIGNIFICANT CHANGE UP (ref 0.5–1.3)
GLUCOSE SERPL-MCNC: 144 MG/DL — HIGH (ref 70–115)
HBA1C BLD-MCNC: 5 % — SIGNIFICANT CHANGE UP (ref 4–5.6)
HCT VFR BLD CALC: 43.9 % — SIGNIFICANT CHANGE UP (ref 42–52)
HGB BLD-MCNC: 15.7 G/DL — SIGNIFICANT CHANGE UP (ref 14–18)
MAGNESIUM SERPL-MCNC: 1.8 MG/DL — SIGNIFICANT CHANGE UP (ref 1.6–2.6)
MCHC RBC-ENTMCNC: 33.9 PG — HIGH (ref 27–31)
MCHC RBC-ENTMCNC: 35.8 G/DL — SIGNIFICANT CHANGE UP (ref 32–36)
MCV RBC AUTO: 94.8 FL — HIGH (ref 80–94)
PHOSPHATE SERPL-MCNC: 2.5 MG/DL — SIGNIFICANT CHANGE UP (ref 2.4–4.7)
PLATELET # BLD AUTO: 196 K/UL — SIGNIFICANT CHANGE UP (ref 150–400)
POTASSIUM SERPL-MCNC: 3.2 MMOL/L — LOW (ref 3.5–5.3)
POTASSIUM SERPL-SCNC: 3.2 MMOL/L — LOW (ref 3.5–5.3)
RBC # BLD: 4.63 M/UL — SIGNIFICANT CHANGE UP (ref 4.6–6.2)
RBC # FLD: 12.6 % — SIGNIFICANT CHANGE UP (ref 11–15.6)
SODIUM SERPL-SCNC: 134 MMOL/L — LOW (ref 135–145)
WBC # BLD: 9.9 K/UL — SIGNIFICANT CHANGE UP (ref 4.8–10.8)
WBC # FLD AUTO: 9.9 K/UL — SIGNIFICANT CHANGE UP (ref 4.8–10.8)

## 2019-03-31 PROCEDURE — 99232 SBSQ HOSP IP/OBS MODERATE 35: CPT

## 2019-03-31 RX ORDER — MORPHINE SULFATE 50 MG/1
2 CAPSULE, EXTENDED RELEASE ORAL ONCE
Qty: 0 | Refills: 0 | Status: DISCONTINUED | OUTPATIENT
Start: 2019-03-31 | End: 2019-03-31

## 2019-03-31 RX ORDER — POTASSIUM CHLORIDE 20 MEQ
40 PACKET (EA) ORAL ONCE
Qty: 0 | Refills: 0 | Status: COMPLETED | OUTPATIENT
Start: 2019-03-31 | End: 2019-03-31

## 2019-03-31 RX ADMIN — Medication 1 PATCH: at 11:27

## 2019-03-31 RX ADMIN — Medication 1 PATCH: at 17:28

## 2019-03-31 RX ADMIN — PIPERACILLIN AND TAZOBACTAM 25 GRAM(S): 4; .5 INJECTION, POWDER, LYOPHILIZED, FOR SOLUTION INTRAVENOUS at 06:21

## 2019-03-31 RX ADMIN — PIPERACILLIN AND TAZOBACTAM 25 GRAM(S): 4; .5 INJECTION, POWDER, LYOPHILIZED, FOR SOLUTION INTRAVENOUS at 11:17

## 2019-03-31 RX ADMIN — Medication 40 MILLIEQUIVALENT(S): at 17:31

## 2019-03-31 RX ADMIN — Medication 1 TABLET(S): at 17:31

## 2019-03-31 RX ADMIN — Medication 30 MILLIGRAM(S): at 01:00

## 2019-03-31 RX ADMIN — MORPHINE SULFATE 2 MILLIGRAM(S): 50 CAPSULE, EXTENDED RELEASE ORAL at 21:18

## 2019-03-31 RX ADMIN — Medication 110 MILLIGRAM(S): at 05:17

## 2019-03-31 RX ADMIN — Medication 1 PATCH: at 11:16

## 2019-03-31 RX ADMIN — Medication 1 MILLIGRAM(S): at 11:16

## 2019-03-31 RX ADMIN — Medication 1 TABLET(S): at 11:16

## 2019-03-31 RX ADMIN — MORPHINE SULFATE 2 MILLIGRAM(S): 50 CAPSULE, EXTENDED RELEASE ORAL at 22:00

## 2019-03-31 RX ADMIN — Medication 110 MILLIGRAM(S): at 17:31

## 2019-03-31 RX ADMIN — Medication 100 MILLIGRAM(S): at 11:16

## 2019-03-31 NOTE — PROGRESS NOTE ADULT - SUBJECTIVE AND OBJECTIVE BOX
cc: scrotal cellulitis     interval hx: patient seen and eval. no fever or chills.  denies n/v/abd pain or diarrhea,  denies dysuria or hematuria     Vital Signs Last 24 Hrs  T(C): 36.9 (31 Mar 2019 15:29), Max: 37.4 (30 Mar 2019 20:27)  T(F): 98.5 (31 Mar 2019 15:29), Max: 99.4 (30 Mar 2019 20:27)  HR: 74 (31 Mar 2019 15:29) (70 - 81)  BP: 152/89 (31 Mar 2019 15:29) (147/86 - 168/94)  BP(mean): --  RR: 18 (31 Mar 2019 15:29) (17 - 18)  SpO2: 96% (31 Mar 2019 15:29) (95% - 98%)    Physical Exam:  · Constitutional Details	well-developed; well-groomed; well-nourished; distress due to pain  · Respiratory Details no rales; no rhonchi; no wheezes  · Cardiovascular Details	regular rate and rhythm  no rub  no murmur  normal PMI   · GI Normal	soft; nontender; no distention; no masses palpable; bowel sounds normal; no bruit; no rebound tenderness; no guarding; no rigidity; no organomegaly  · Genitourinary Details Maleno hernia; no discharge; testicular mass L; Generalized tenderness; warm; with diffuse erythema  · Extremities Details	no clubbing; no cyanosis; no pedal edema  · Neurological	Alert & oriented; no sensory, motor or coordination deficits, normal reflexes  · Skin	No lesions; no rash  · · Musculoskeletal	No joint pain, swelling or deformity; no limitation of movement                                     15.7   9.9   )-----------( 196      ( 31 Mar 2019 09:54 )             43.9   03-31    134<L>  |  96<L>  |  6.0<L>  ----------------------------<  144<H>  3.2<L>   |  24.0  |  0.67    Ca    9.0      31 Mar 2019 09:49  Phos  2.5     03-31  Mg     1.8     03-31    TPro  6.6  /  Alb  3.7  /  TBili  2.5<H>  /  DBili  1.2<H>  /  AST  88<H>  /  ALT  59<H>  /  AlkPhos  92  03-30

## 2019-03-31 NOTE — PROGRESS NOTE ADULT - SUBJECTIVE AND OBJECTIVE BOX
[facility]  INFECTIOUS DISEASES AND INTERNAL MEDICINE at [place]  =======================================================  Rl Tapia MD  Diplomates American Board of Internal Medicine and Infectious Diseases  =======================================================    [facility]-[id number]  [facility]   follow up for:  scrotal cellulitis  patient seen and examined.     less pain today  no fevers    ===================================================  REVIEW OF SYSTEMS:  as above  all other ROS negative    =======================================================  Allergies    No Known Allergies         Antibiotics:  amoxicillin  875 milliGRAM(s)/clavulanate 1 Tablet(s) Oral every 12 hours  doxycycline IVPB      doxycycline IVPB 100 milliGRAM(s) IV Intermittent every 12 hours    Other medications:  folic acid 1 milliGRAM(s) Oral daily  heparin  Injectable 5000 Unit(s) SubCutaneous every 8 hours  influenza   Vaccine 0.5 milliLiter(s) IntraMuscular once  multivitamin 1 Tablet(s) Oral daily  nicotine - 21 mG/24Hr(s) Patch 1 patch Transdermal daily  thiamine 100 milliGRAM(s) Oral daily        ======================================================  Physical Exam:  ============  T(F): 98 (31 Mar 2019 12:28), Max: 99.4 (30 Mar 2019 20:27)  HR: 75 (31 Mar 2019 12:28)  BP: 168/94 (31 Mar 2019 12:28)  RR: 18 (31 Mar 2019 12:28)  SpO2: 98% (31 Mar 2019 12:28) (94% - 98%)    General:  No acute distress.  Eye: Pupils are equal, round and reactive to light, Extraocular movements are intact, Normal conjunctiva.  HENT: Normocephalic, Oral mucosa is moist, No pharyngeal erythema, No sinus tenderness.  Neck: Supple, No lymphadenopathy.  Respiratory: Lungs are clear to auscultation, Respirations are non-labored.  Cardiovascular: Normal rate, Regular rhythm, No murmur, Good pulses equal in all extremities, No edema.  Gastrointestinal: Soft, Non-tender, Non-distended, Normal bowel sounds.  Genitourinary: No costovertebral angle tenderness.  Lymphatics: No lymphadenopathy neck,   Musculoskeletal: Normal range of motion, Normal strength.  Integumentary: decreased ERYTHEMA and induration of scrotal skin; STILL with RESIDUAL induration at skin on penile base and ANTERIOR scrotum   Neurologic: Alert, Oriented, No focal deficits, Cranial Nerves II-XII are grossly intact.  Psychiatric: Appropriate mood & affect.      =======================================================  Labs:                        15.7   9.9   )-----------( 196      ( 31 Mar 2019 09:54 )             43.9     WBC Count: 9.9 K/uL (03-31-19 @ 09:54)  WBC Count: 16.0 K/uL (03-29-19 @ 20:32)      03-31    134<L>  |  96<L>  |  6.0<L>  ----------------------------<  144<H>  3.2<L>   |  24.0  |  0.67    Ca    9.0      31 Mar 2019 09:49  Phos  2.5     03-31  Mg     1.8     03-31    TPro  6.6  /  Alb  3.7  /  TBili  2.5<H>  /  DBili  1.2<H>  /  AST  88<H>  /  ALT  59<H>  /  AlkPhos  92  03-30

## 2019-03-31 NOTE — PROGRESS NOTE ADULT - SUBJECTIVE AND OBJECTIVE BOX
SURGICAL PA NOTE:     STATUS POST:      POST OPERATIVE DAY #:     Vital Signs Last 24 Hrs  T(C): 36.8 (31 Mar 2019 07:24), Max: 37.4 (30 Mar 2019 20:27)  T(F): 98.2 (31 Mar 2019 07:24), Max: 99.4 (30 Mar 2019 20:27)  HR: 75 (31 Mar 2019 07:24) (70 - 87)  BP: 147/86 (31 Mar 2019 07:24) (147/86 - 161/82)  BP(mean): --  RR: 18 (31 Mar 2019 07:24) (17 - 18)  SpO2: 97% (31 Mar 2019 07:24) (95% - 97%)    HPI:  ED HPI: This a 42 y/o M w/ PMH of MS, not currently on medication. Presenting with a 1 day hx of scrotal swelling and pain. Patient states that he had an ingrown hair which he started to pick, which then awoke him from sleep last night with burning pain and swelling of his scrotum. The pain became severe, patient then presented to [facility] ED. with associated subjective chills and fever. Denies N/V/CP/SOB. Denies problems urinating, increased urinary frequency, or malodorous urine.     Above HPI reviewed and noted: patient was seen and evaluated, found having rigors while at bedside. patient confirms above complaint and reports worsening pain and swelling of foreskin. (30 Mar 2019 02:33)      Inflammation of scrotum  FHx: diabetes mellitus  No pertinent family history in first degree relatives  MEWS Score  Chronic diarrhea  Multiple sclerosis  No pertinent past medical history  Cellulitis of scrotum  Cellulitis of scrotum  No significant past surgical history  SENT BY MD/HIGH BLOOD PRESSURE      SUBJECTIVE: Pt seen lying supine with HOB on ER stretcher, states less scrotal pain and swelling this am, denies fever/chills, + void    Diet: reg    Activity:     Fevers: [ ]Yes [ ]NO  Chills: [ ] Yes [ ] NO  SOB:  [ ] YES [ ] NO  Dyspnea: [ ]YES [ ]NO  Chest Discomfort: [ ] YES [ ] NO    Nausea: [ ] YES [ ] NO           Vomiting: [ ] YES [ ] NO  Flatus: [ ] YES [ ] NO             Bowel Movement: [ ] YES [ ] NO  Diarrhea: [ ] YES [ ] NO         Void: [ ]YES [ ]No  Constipation: [ ] YES [ ] NO       Pain (0-10):              Pain Control Adequate: [ ] YES [ ] NO    Dahl:    NGT:      I&O's Detail    I&O's Summary        MEDICATIONS  (STANDING):  doxycycline IVPB      doxycycline IVPB 100 milliGRAM(s) IV Intermittent every 12 hours  folic acid 1 milliGRAM(s) Oral daily  heparin  Injectable 5000 Unit(s) SubCutaneous every 8 hours  influenza   Vaccine 0.5 milliLiter(s) IntraMuscular once  multivitamin 1 Tablet(s) Oral daily  nicotine - 21 mG/24Hr(s) Patch 1 patch Transdermal daily  piperacillin/tazobactam IVPB. 3.375 Gram(s) IV Intermittent every 8 hours  thiamine 100 milliGRAM(s) Oral daily    MEDICATIONS  (PRN):  acetaminophen   Tablet .. 650 milliGRAM(s) Oral every 6 hours PRN Temp greater or equal to 38C (100.4F), Mild Pain (1 - 3)  ketorolac   Injectable 30 milliGRAM(s) IV Push every 4 hours PRN Moderate Pain (4 - 6)  LORazepam     Tablet 2 milliGRAM(s) Oral every 2 hours PRN Symptom-triggered 2 point increase in CIWA-Ar  LORazepam   Injectable 2 milliGRAM(s) IV Push every 1 hour PRN Symptom-triggered: each CIWA -Ar score 8 or GREATER  nicotine  Polacrilex Gum 4 milliGRAM(s) Oral every 1 hour PRN nicotine withdrawal  oxyCODONE    IR 5 milliGRAM(s) Oral every 6 hours PRN Severe Pain (7 - 10)      LABS:                        15.7   9.9   )-----------( 196      ( 31 Mar 2019 09:54 )             43.9         134<L>  |  96<L>  |  6.0<L>  ----------------------------<  144<H>  3.2<L>   |  24.0  |  0.67    Ca    9.0      31 Mar 2019 09:49  Phos  2.5       Mg     1.8         TPro  6.6  /  Alb  3.7  /  TBili  2.5<H>  /  DBili  1.2<H>  /  AST  88<H>  /  ALT  59<H>  /  AlkPhos  92        Urinalysis Basic - ( 29 Mar 2019 20:08 )    Color: Yellow / Appearance: Clear / S.020 / pH: x  Gluc: x / Ketone: Trace  / Bili: Negative / Urobili: 1 mg/dL   Blood: x / Protein: 15 mg/dL / Nitrite: Negative   Leuk Esterase: Trace / RBC: 0-2 /HPF / WBC 0-2   Sq Epi: x / Non Sq Epi: Occasional / Bacteria: x          RADIOLOGY & ADDITIONAL STUDIES:     EXAM:  US SCROTUM AND CONTENTS                          PROCEDURE DATE:  2019          INTERPRETATION:  CLINICAL INFORMATION: Scrotal pain and swelling    COMPARISON: None available.    TECHNIQUE: Testicular ultrasound utilizing color and spectral Doppler.    FINDINGS:      RIGHT:    Right testis: 5.3 x 2.8 x 2.9 cm. Normal echogenicity and echotexture   with no masses or areas of architectural distortion. Normal blood flow   pattern.    Right epididymis: 4 mm epididymal head cyst.    Right hydrocele: None.    Right varicocele: None.      LEFT:     Left testis: 4.9 x 2.9 x 2.9 cm. Normal echogenicity and echotexture with   no masses or areas of architectural distortion. Normal blood flow pattern.    Left epididymis: Within normal limits.    Left hydrocele: None.    Left varicocele: None.    IMPRESSION:     Normal scrotal sonogram.       EXAM:  CT ABDOMEN AND PELVIS IC                          PROCEDURE DATE:  2019          INTERPRETATION:  CT ABDOMEN AND PELVIS WITH CONTRAST    INDICATION: Testicular and inguinal pain with associated cellulitis.     TECHNIQUE: Contrast enhanced CT of the abdomen and pelvis. Images are   reformatted in the sagittal and coronal planes.    96 mL of Omnipaque 300 contrast material was injected IV.    COMPARISON: Testicular ultrasound performed earlier today.    FINDINGS:    Lower Thorax: Noconsolidation or effusion.        Liver: Too small to characterize hepatic hypodensity near the dome.  Biliary: No significant dilatation. Status post cholecystectomy.  Spleen: Incompletely characterized splenic hypodensities measuring up to   2.4 cm.  Pancreas: No inflammatory changes or ductal dilatation.      Adrenals: Normal.      Kidneys: No hydronephrosis or solid mass.      Vessels: Normal caliber.        GI tract: There is wall thickening of colonic loops extending to the   rectum withoutsignificant pericolonic stranding. The findings are   nonspecific, this may be on the basis of inadequate distention or   represent pancolitis in appropriate clinical setting. Consider   nonemergent endoscopic evaluation if there is concern for neoplasm. No   evidence of small bowel obstruction. Normal appendix.    Peritoneum/retroperitoneum and mesentery: No free air. No organized fluid   collection. Mild prominent bilateral groin lymph nodes, likely reactive   in nature.    Pelvic organs/Bladder: No pelvic masses. Inadequately distended.    Abdominal wall: There is fluid induration and stranding within the   scrotal sac extending to the inguinal region, likely in keeping with   reported cellulitis. No air identified to suggest necrotizing fasciitis.   Recommend clinical correlation. There is diffuse scrotal wall thickening   with suggestion of small fluid collection measuring 1.4 x 0.7 cm in the   left medial scrotal sac image 161 of series 3, likely phlegmon or   developing abscess. There is bilateral hydroceles, left greater the   right.   Bones and soft tissues: Mild multilevel degenerative changes of the spine   are noted.        IMPRESSION:    Fluid induration and stranding within the scrotal sac extending to the   inguinal region, likely in keeping with reported cellulitis. No air   identified to suggest necrotizing fasciitis. Recommend clinical   correlation.  Diffuse scrotal wall thickening with suggestion of small   fluid collection measuring 1.4 x 0.7 cm in the left medial scrotal sac   (see key image), likely phlegmon or developing abscess. Bilateral   hydroceles, left greater the right.     Wall thickening of colonic loops extending to the rectum without   significant pericolonic stranding. The findings are nonspecific, this may   be on the basis of inadequate distention or represent pancolitis in   appropriate clinical setting. Consider nonemergent endoscopic evaluation   if there is concern for neoplasm. No evidence of small bowel obstruction.   No intraperitoneal fluid collection. Additional findings as mentioned   above.    These results were discussed via telephone at 3/30/2019 12:14 AM by Dr. Espinoza of radiology with TRINO Kang, [facility] read-back   verification policy was followed.

## 2019-04-01 DIAGNOSIS — D72.823 LEUKEMOID REACTION: ICD-10-CM

## 2019-04-01 DIAGNOSIS — R50.9 FEVER, UNSPECIFIED: ICD-10-CM

## 2019-04-01 LAB
ANION GAP SERPL CALC-SCNC: 16 MMOL/L — SIGNIFICANT CHANGE UP (ref 5–17)
BUN SERPL-MCNC: 5 MG/DL — LOW (ref 8–20)
CALCIUM SERPL-MCNC: 8.8 MG/DL — SIGNIFICANT CHANGE UP (ref 8.6–10.2)
CHLORIDE SERPL-SCNC: 101 MMOL/L — SIGNIFICANT CHANGE UP (ref 98–107)
CO2 SERPL-SCNC: 23 MMOL/L — SIGNIFICANT CHANGE UP (ref 22–29)
CREAT SERPL-MCNC: 0.58 MG/DL — SIGNIFICANT CHANGE UP (ref 0.5–1.3)
GLUCOSE SERPL-MCNC: 91 MG/DL — SIGNIFICANT CHANGE UP (ref 70–115)
HCT VFR BLD CALC: 44.8 % — SIGNIFICANT CHANGE UP (ref 42–52)
HGB BLD-MCNC: 15.9 G/DL — SIGNIFICANT CHANGE UP (ref 14–18)
MCHC RBC-ENTMCNC: 34 PG — HIGH (ref 27–31)
MCHC RBC-ENTMCNC: 35.5 G/DL — SIGNIFICANT CHANGE UP (ref 32–36)
MCV RBC AUTO: 95.9 FL — HIGH (ref 80–94)
PLATELET # BLD AUTO: 199 K/UL — SIGNIFICANT CHANGE UP (ref 150–400)
POTASSIUM SERPL-MCNC: 2.9 MMOL/L — CRITICAL LOW (ref 3.5–5.3)
POTASSIUM SERPL-SCNC: 2.9 MMOL/L — CRITICAL LOW (ref 3.5–5.3)
RBC # BLD: 4.67 M/UL — SIGNIFICANT CHANGE UP (ref 4.6–6.2)
RBC # FLD: 12.6 % — SIGNIFICANT CHANGE UP (ref 11–15.6)
SODIUM SERPL-SCNC: 140 MMOL/L — SIGNIFICANT CHANGE UP (ref 135–145)
WBC # BLD: 8.8 K/UL — SIGNIFICANT CHANGE UP (ref 4.8–10.8)
WBC # FLD AUTO: 8.8 K/UL — SIGNIFICANT CHANGE UP (ref 4.8–10.8)

## 2019-04-01 PROCEDURE — 99233 SBSQ HOSP IP/OBS HIGH 50: CPT

## 2019-04-01 PROCEDURE — 76870 US EXAM SCROTUM: CPT | Mod: 26

## 2019-04-01 PROCEDURE — 99232 SBSQ HOSP IP/OBS MODERATE 35: CPT

## 2019-04-01 RX ORDER — POTASSIUM CHLORIDE 20 MEQ
10 PACKET (EA) ORAL ONCE
Qty: 0 | Refills: 0 | Status: DISCONTINUED | OUTPATIENT
Start: 2019-04-01 | End: 2019-04-01

## 2019-04-01 RX ORDER — SACCHAROMYCES BOULARDII 250 MG
250 POWDER IN PACKET (EA) ORAL
Qty: 0 | Refills: 0 | Status: DISCONTINUED | OUTPATIENT
Start: 2019-04-01 | End: 2019-04-02

## 2019-04-01 RX ORDER — POTASSIUM CHLORIDE 20 MEQ
40 PACKET (EA) ORAL EVERY 4 HOURS
Qty: 0 | Refills: 0 | Status: COMPLETED | OUTPATIENT
Start: 2019-04-01 | End: 2019-04-01

## 2019-04-01 RX ORDER — POTASSIUM CHLORIDE 20 MEQ
10 PACKET (EA) ORAL
Qty: 0 | Refills: 0 | Status: COMPLETED | OUTPATIENT
Start: 2019-04-01 | End: 2019-04-01

## 2019-04-01 RX ADMIN — Medication 110 MILLIGRAM(S): at 05:23

## 2019-04-01 RX ADMIN — Medication 1 MILLIGRAM(S): at 11:35

## 2019-04-01 RX ADMIN — Medication 1 PATCH: at 11:39

## 2019-04-01 RX ADMIN — Medication 1 PATCH: at 06:21

## 2019-04-01 RX ADMIN — Medication 1 TABLET(S): at 17:38

## 2019-04-01 RX ADMIN — Medication 40 MILLIEQUIVALENT(S): at 11:35

## 2019-04-01 RX ADMIN — Medication 250 MILLIGRAM(S): at 17:38

## 2019-04-01 RX ADMIN — Medication 1 TABLET(S): at 05:23

## 2019-04-01 RX ADMIN — Medication 1 MILLIGRAM(S): at 09:12

## 2019-04-01 RX ADMIN — Medication 1 PATCH: at 11:35

## 2019-04-01 RX ADMIN — Medication 40 MILLIEQUIVALENT(S): at 10:27

## 2019-04-01 RX ADMIN — Medication 1 TABLET(S): at 11:35

## 2019-04-01 RX ADMIN — Medication 100 MILLIEQUIVALENT(S): at 10:44

## 2019-04-01 RX ADMIN — Medication 110 MILLIGRAM(S): at 17:41

## 2019-04-01 RX ADMIN — Medication 40 MILLIEQUIVALENT(S): at 17:38

## 2019-04-01 RX ADMIN — Medication 100 MILLIGRAM(S): at 11:35

## 2019-04-01 NOTE — CDI QUERY NOTE - NSCDIOTHERTXTBX3_GEN_ALL_CORE_FT
Can you please provide significant clinical findings based on laboratory results and interventions provided?    A.	Hypomagnesemia, resolved  B.	Other Please specify  C.	Not clinically significant    Supporting Documentations:    Laboratory:   Magnesium, Serum:  3/30/19 @ 0733: 1.3L  3/31/19 @ 0949: 1.8    Medications:   Magnesium Sulfate 2grams IV X1 dose. Ordered 3/30/19

## 2019-04-01 NOTE — PROGRESS NOTE ADULT - GENITOURINARY MALE
left testicular pain, improved, small area of induration at base of penis left side, no erythema, no fluctuance, mild pain L epididymis

## 2019-04-01 NOTE — CDI QUERY NOTE - NSCDIOTHERTXTBX2_GEN_ALL_CORE_FT
Sodium level is being monitored daily, can you please provide significant clinical findings based on laboratory results and interventions provided?    A.	Hyponatremia, resolved.  B.	Other please specify  C.	Not clinically significant    Supporting Documentations:    Laboratory:  Sodium, Serum:  3/29/19 @ 2032: 138  3/30/19 @ 0733: 138  3/31/19 @ 0949: 134L  4/01/19 @ 0801: 140

## 2019-04-01 NOTE — CDI QUERY NOTE - NSCDIOTHERTXTBX_GEN_ALL_CORE_HH
Can you please provide significant clinical findings based on laboratory results and interventions provided?    A.	Hypokalemia  B.	Other, please specify  C.	Not clinically significant    Supporting Documentations:    Serum, Potassium:   3/29/19 @ 2032: 3.1L  3/30/19 @ 0733: 3.1L  3/31/19 @ 0949:  3.2L  4/1/19 @ 0801: 2.9L    Medications:   Potassium Chloride Tablet ER 40 meq oral, x1 dose. Ordered 3/29/19   Potassium Chloride Powder (KLOR-CON POWDER) 40 meq Oral, x1 dose. Ordered 3/30/19  Potassium Chloride Powder (KLOR-CON POWDER) 40 meq Oral, x1 dose. Ordered 3/31/19  Potassium Chloride Tablet ER 40 meq oral, x3 doses. Ordered 4/1/19  Potassium Chloride 10 meq/100 ml IVPB x3 doses. Ordered 4/1/19

## 2019-04-01 NOTE — PROGRESS NOTE ADULT - SUBJECTIVE AND OBJECTIVE BOX
Subjective:43yMale presented with scrotal swelling and pain.  CT showed small collection and induration.  pt states he feels better this am, swelling and pain have improved.  Pt had received 1 day of zosyn and doxy. Pt now on doxycycline and augmentin, Pt remains afebrile, hypertensive. No dysuria, no hematuria, no frequency.    Vital Signs Last 24 Hrs  T(C): 36.8 (01 Apr 2019 08:10), Max: 36.9 (31 Mar 2019 15:29)  T(F): 98.2 (01 Apr 2019 08:10), Max: 98.5 (31 Mar 2019 15:29)  HR: 92 (01 Apr 2019 08:10) (74 - 96)  BP: 192/110 (01 Apr 2019 08:10) (152/89 - 192/110)  BP(mean): 137 (01 Apr 2019 08:10) (137 - 137)  RR: 18 (01 Apr 2019 08:10) (18 - 18)  SpO2: 98% (01 Apr 2019 08:10) (95% - 98%)  I&O's Detail      Labs:                        15.9   8.8   )-----------( 199      ( 01 Apr 2019 08:01 )             44.8     04-01    140  |  101  |  5.0<L>  ----------------------------<  91  2.9<LL>   |  23.0  |  0.58    Ca    8.8      01 Apr 2019 08:01  Phos  2.5     03-31  Mg     1.8     03-31            Culture - Blood (collected 29 Mar 2019 20:56)  Source: .Blood  Preliminary Report (31 Mar 2019 21:00):    No growth at 48 hours    Culture - Blood (collected 29 Mar 2019 20:55)  Source: .Blood  Preliminary Report (31 Mar 2019 21:00):    No growth at 48 hours

## 2019-04-01 NOTE — PROGRESS NOTE ADULT - SUBJECTIVE AND OBJECTIVE BOX
cc: scrotal cellulitis     interval hx: patient seen and eval. patient was feeling littile agitated this am . no fever or chills.  denies n/v/abd pain or diarrhea,  denies dysuria or hematuria     Vital Signs Last 24 Hrs  T(C): 36.8 (01 Apr 2019 15:41), Max: 36.8 (31 Mar 2019 19:26)  T(F): 98.3 (01 Apr 2019 15:41), Max: 98.3 (01 Apr 2019 15:41)  HR: 91 (01 Apr 2019 15:41) (63 - 96)  BP: 153/95 (01 Apr 2019 15:41) (150/95 - 192/110)  BP(mean): 137 (01 Apr 2019 08:10) (137 - 137)  RR: 18 (01 Apr 2019 15:41) (18 - 18)  SpO2: 97% (01 Apr 2019 15:41) (95% - 98%)    Physical Exam:  · Constitutional Details	well-developed; well-groomed; well-nourished; distress due to pain  · Respiratory Details no rales; no rhonchi; no wheezes  · Cardiovascular Details	regular rate and rhythm  no rub  no murmur  normal PMI   · GI Normal	soft; nontender; no distention; no masses palpable; bowel sounds normal; no bruit; no rebound tenderness; no guarding; no rigidity; no organomegaly  · Genitourinary Details Maleno hernia; no discharge; testicular mass L; Generalized tenderness; warm; with diffuse erythema improving   · Extremities Details	no clubbing; no cyanosis; no pedal edema  · Neurological	Alert & oriented; no sensory, motor or coordination deficits, normal reflexes  · · Musculoskeletal	No joint pain, swelling or deformity; no limitation of movement                                      15.9   8.8   )-----------( 199      ( 01 Apr 2019 08:01 )             44.8   04-01    140  |  101  |  5.0<L>  ----------------------------<  91  2.9<LL>   |  23.0  |  0.58    Ca    8.8      01 Apr 2019 08:01  Phos  2.5     03-31  Mg     1.8     03-31

## 2019-04-01 NOTE — PROGRESS NOTE ADULT - SUBJECTIVE AND OBJECTIVE BOX
[facility]  INFECTIOUS DISEASES AND INTERNAL MEDICINE at [place]  =======================================================  Rl Colindres MD [facility] Center[facility]ALE Tapia MD  Diplomates American Board of Internal Medicine and Infectious Diseases  =======================================================    [facility]-[id number]  [facility]   follow up for:  scrotal cellulitis  patient seen and examined.     improvement in pain  no fevers noted.    reports of ETOH withdrawal per medical team,.     ===================================================  REVIEW OF SYSTEMS:  as above  all other ROS negative    =======================================================  Allergies    No Known Allergies     Antibiotics:  amoxicillin  875 milliGRAM(s)/clavulanate 1 Tablet(s) Oral every 12 hours  doxycycline IVPB      doxycycline IVPB 100 milliGRAM(s) IV Intermittent every 12 hours      ======================================================  Physical Exam:  ============  T(F): 98.2 (01 Apr 2019 08:10), Max: 99.4 (30 Mar 2019 20:27)  HR: 63 (01 Apr 2019 10:28)  BP: 150/95 (01 Apr 2019 10:28)  RR: 18 (01 Apr 2019 08:10)  SpO2: 98% (01 Apr 2019 08:10) (95% - 98%)    General:  No acute distress.  Eye: Pupils are equal, round and reactive to light, Extraocular movements are intact, Normal conjunctiva.  HENT: Normocephalic, Oral mucosa is moist, No pharyngeal erythema, No sinus tenderness.  Neck: Supple, No lymphadenopathy.  Respiratory: Lungs are clear to auscultation, Respirations are non-labored.  Cardiovascular: Normal rate, Regular rhythm, No murmur, Good pulses equal in all extremities, No edema.  Gastrointestinal: Soft, Non-tender, Non-distended, Normal bowel sounds.  Genitourinary: No costovertebral angle tenderness.  Lymphatics: No lymphadenopathy neck,   Musculoskeletal: Normal range of motion, Normal strength.  Integumentary: decreased ERYTHEMA and induration of scrotal skin; STILL with RESIDUAL induration at skin on penile base and ANTERIOR scrotum; OVERALL less tender  Neurologic: Alert, Oriented, No focal deficits, Cranial Nerves II-XII are grossly intact.  Psychiatric: Appropriate mood & affect.      =======================================================  Labs:                        15.9   8.8   )-----------( 199      ( 01 Apr 2019 08:01 )             44.8     04-01    140  |  101  |  5.0<L>  ----------------------------<  91  2.9<LL>   |  23.0  |  0.58    Ca    8.8      01 Apr 2019 08:01  Phos  2.5     03-31  Mg     1.8     03-31        Culture - Blood (collected 03-29-19 @ 20:56)  Source: .Blood    Culture - Blood (collected 03-29-19 @ 20:55)  Source: .Blood [facility]  INFECTIOUS DISEASES AND INTERNAL MEDICINE at [place]  =======================================================  Rl Tapia MD  Diplomates American Board of Internal Medicine and Infectious Diseases  =======================================================    [facility]-[id number]  [facility]   follow up for:  scrotal cellulitis  patient seen and examined.     improvement in pain  no fevers noted.    reports of ETOH withdrawal per medical team,.     ===================================================  REVIEW OF SYSTEMS:  as above  all other ROS negative    =======================================================  Allergies    No Known Allergies        Antibiotics:  amoxicillin  875 milliGRAM(s)/clavulanate 1 Tablet(s) Oral every 12 hours  doxycycline IVPB      doxycycline IVPB 100 milliGRAM(s) IV Intermittent every 12 hours    Other medications:  folic acid 1 milliGRAM(s) Oral daily  heparin  Injectable 5000 Unit(s) SubCutaneous every 8 hours  influenza   Vaccine 0.5 milliLiter(s) IntraMuscular once  multivitamin 1 Tablet(s) Oral daily  nicotine - 21 mG/24Hr(s) Patch 1 patch Transdermal daily  potassium chloride    Tablet ER 40 milliEquivalent(s) Oral every 4 hours  saccharomyces boulardii 250 milliGRAM(s) Oral two times a day      ======================================================  Physical Exam:  ============  T(F): 98.2 (01 Apr 2019 08:10), Max: 99.4 (30 Mar 2019 20:27)  HR: 63 (01 Apr 2019 10:28)  BP: 150/95 (01 Apr 2019 10:28)  RR: 18 (01 Apr 2019 08:10)  SpO2: 98% (01 Apr 2019 08:10) (95% - 98%)    General:  No acute distress.  Eye: Pupils are equal, round and reactive to light, Extraocular movements are intact, Normal conjunctiva.  HENT: Normocephalic, Oral mucosa is moist, No pharyngeal erythema, No sinus tenderness.  Neck: Supple, No lymphadenopathy.  Respiratory: Lungs are clear to auscultation, Respirations are non-labored.  Cardiovascular: Normal rate, Regular rhythm, No murmur, Good pulses equal in all extremities, No edema.  Gastrointestinal: Soft, Non-tender, Non-distended, Normal bowel sounds.  Genitourinary: No costovertebral angle tenderness.  Lymphatics: No lymphadenopathy neck,   Musculoskeletal: Normal range of motion, Normal strength.  Integumentary: decreased ERYTHEMA and induration of scrotal skin; STILL with RESIDUAL induration at skin on penile base and ANTERIOR scrotum; OVERALL less tender  Neurologic: Alert, Oriented, No focal deficits, Cranial Nerves II-XII are grossly intact.  Psychiatric: Appropriate mood & affect.      =======================================================  Labs:                        15.9   8.8   )-----------( 199      ( 01 Apr 2019 08:01 )             44.8     04-01    140  |  101  |  5.0<L>  ----------------------------<  91  2.9<LL>   |  23.0  |  0.58    Ca    8.8      01 Apr 2019 08:01  Phos  2.5     03-31  Mg     1.8     03-31        Culture - Blood (collected 03-29-19 @ 20:56)  Source: .Blood    Culture - Blood (collected 03-29-19 @ 20:55)  Source: .Blood

## 2019-04-02 ENCOUNTER — TRANSCRIPTION ENCOUNTER (OUTPATIENT)
Age: 44
End: 2019-04-02

## 2019-04-02 VITALS
SYSTOLIC BLOOD PRESSURE: 147 MMHG | DIASTOLIC BLOOD PRESSURE: 92 MMHG | OXYGEN SATURATION: 97 % | RESPIRATION RATE: 18 BRPM | HEART RATE: 82 BPM | TEMPERATURE: 98 F

## 2019-04-02 LAB
ANION GAP SERPL CALC-SCNC: 14 MMOL/L — SIGNIFICANT CHANGE UP (ref 5–17)
BUN SERPL-MCNC: 6 MG/DL — LOW (ref 8–20)
CALCIUM SERPL-MCNC: 9 MG/DL — SIGNIFICANT CHANGE UP (ref 8.6–10.2)
CHLORIDE SERPL-SCNC: 104 MMOL/L — SIGNIFICANT CHANGE UP (ref 98–107)
CO2 SERPL-SCNC: 21 MMOL/L — LOW (ref 22–29)
CREAT SERPL-MCNC: 0.6 MG/DL — SIGNIFICANT CHANGE UP (ref 0.5–1.3)
GLUCOSE SERPL-MCNC: 97 MG/DL — SIGNIFICANT CHANGE UP (ref 70–115)
HCT VFR BLD CALC: 44.4 % — SIGNIFICANT CHANGE UP (ref 42–52)
HGB BLD-MCNC: 15.6 G/DL — SIGNIFICANT CHANGE UP (ref 14–18)
MCHC RBC-ENTMCNC: 33.8 PG — HIGH (ref 27–31)
MCHC RBC-ENTMCNC: 35.1 G/DL — SIGNIFICANT CHANGE UP (ref 32–36)
MCV RBC AUTO: 96.1 FL — HIGH (ref 80–94)
PLATELET # BLD AUTO: 216 K/UL — SIGNIFICANT CHANGE UP (ref 150–400)
POTASSIUM SERPL-MCNC: 3.1 MMOL/L — LOW (ref 3.5–5.3)
POTASSIUM SERPL-SCNC: 3.1 MMOL/L — LOW (ref 3.5–5.3)
RBC # BLD: 4.62 M/UL — SIGNIFICANT CHANGE UP (ref 4.6–6.2)
RBC # FLD: 12.7 % — SIGNIFICANT CHANGE UP (ref 11–15.6)
SODIUM SERPL-SCNC: 139 MMOL/L — SIGNIFICANT CHANGE UP (ref 135–145)
WBC # BLD: 7.6 K/UL — SIGNIFICANT CHANGE UP (ref 4.8–10.8)
WBC # FLD AUTO: 7.6 K/UL — SIGNIFICANT CHANGE UP (ref 4.8–10.8)

## 2019-04-02 PROCEDURE — 86140 C-REACTIVE PROTEIN: CPT

## 2019-04-02 PROCEDURE — 76870 US EXAM SCROTUM: CPT

## 2019-04-02 PROCEDURE — 80048 BASIC METABOLIC PNL TOTAL CA: CPT

## 2019-04-02 PROCEDURE — 99239 HOSP IP/OBS DSCHRG MGMT >30: CPT

## 2019-04-02 PROCEDURE — 85027 COMPLETE CBC AUTOMATED: CPT

## 2019-04-02 PROCEDURE — 83036 HEMOGLOBIN GLYCOSYLATED A1C: CPT

## 2019-04-02 PROCEDURE — 81001 URINALYSIS AUTO W/SCOPE: CPT

## 2019-04-02 PROCEDURE — 96375 TX/PRO/DX INJ NEW DRUG ADDON: CPT

## 2019-04-02 PROCEDURE — 84100 ASSAY OF PHOSPHORUS: CPT

## 2019-04-02 PROCEDURE — 80053 COMPREHEN METABOLIC PANEL: CPT

## 2019-04-02 PROCEDURE — 36415 COLL VENOUS BLD VENIPUNCTURE: CPT

## 2019-04-02 PROCEDURE — 83735 ASSAY OF MAGNESIUM: CPT

## 2019-04-02 PROCEDURE — 74177 CT ABD & PELVIS W/CONTRAST: CPT

## 2019-04-02 PROCEDURE — 80076 HEPATIC FUNCTION PANEL: CPT

## 2019-04-02 PROCEDURE — 96365 THER/PROPH/DIAG IV INF INIT: CPT | Mod: XU

## 2019-04-02 PROCEDURE — 99285 EMERGENCY DEPT VISIT HI MDM: CPT | Mod: 25

## 2019-04-02 PROCEDURE — 87040 BLOOD CULTURE FOR BACTERIA: CPT

## 2019-04-02 RX ORDER — POTASSIUM CHLORIDE 20 MEQ
60 PACKET (EA) ORAL ONCE
Qty: 0 | Refills: 0 | Status: DISCONTINUED | OUTPATIENT
Start: 2019-04-02 | End: 2019-04-02

## 2019-04-02 RX ORDER — ACETAMINOPHEN 500 MG
2 TABLET ORAL
Qty: 0 | Refills: 0 | DISCHARGE
Start: 2019-04-02

## 2019-04-02 RX ORDER — POTASSIUM CHLORIDE 20 MEQ
1 PACKET (EA) ORAL
Qty: 4 | Refills: 0
Start: 2019-04-02 | End: 2019-04-03

## 2019-04-02 RX ORDER — SACCHAROMYCES BOULARDII 250 MG
1 POWDER IN PACKET (EA) ORAL
Qty: 14 | Refills: 0
Start: 2019-04-02 | End: 2019-04-08

## 2019-04-02 RX ORDER — POTASSIUM CHLORIDE 20 MEQ
40 PACKET (EA) ORAL ONCE
Qty: 0 | Refills: 0 | Status: COMPLETED | OUTPATIENT
Start: 2019-04-02 | End: 2019-04-02

## 2019-04-02 RX ADMIN — Medication 1 TABLET(S): at 09:09

## 2019-04-02 RX ADMIN — Medication 1 MILLIGRAM(S): at 09:08

## 2019-04-02 RX ADMIN — Medication 1 TABLET(S): at 05:08

## 2019-04-02 RX ADMIN — Medication 250 MILLIGRAM(S): at 05:08

## 2019-04-02 RX ADMIN — Medication 1 MILLIGRAM(S): at 05:09

## 2019-04-02 RX ADMIN — Medication 100 MILLIGRAM(S): at 05:08

## 2019-04-02 RX ADMIN — Medication 1 PATCH: at 09:09

## 2019-04-02 RX ADMIN — Medication 1 PATCH: at 09:04

## 2019-04-02 RX ADMIN — Medication 40 MILLIEQUIVALENT(S): at 09:08

## 2019-04-02 NOTE — DISCHARGE NOTE PROVIDER - NSDCCPCAREPLAN_GEN_ALL_CORE_FT
PRINCIPAL DISCHARGE DIAGNOSIS  Diagnosis: Cellulitis of scrotum  Assessment and Plan of Treatment: complete antibiotics course.   follow up with urology in 1 week.      SECONDARY DISCHARGE DIAGNOSES  Diagnosis: Hypokalemia  Assessment and Plan of Treatment: take po supplements as advised.   follow up with primary care in 1 week to have follwo up blood work.   eat banans, potatoes and tomatoes    Diagnosis: Chronic diarrhea  Assessment and Plan of Treatment: follwo up with doctor santana to have colonoscopy and further work up done as out patient,

## 2019-04-02 NOTE — PROGRESS NOTE ADULT - SUBJECTIVE AND OBJECTIVE BOX
Subjective:43yMale with scrotal swelling, pain.  Pt improved, resting comfortably this am. Pt voiding.      Vital Signs Last 24 Hrs  T(C): 36.8 (02 Apr 2019 08:01), Max: 37.2 (02 Apr 2019 00:09)  T(F): 98.3 (02 Apr 2019 08:01), Max: 99 (02 Apr 2019 00:09)  HR: 82 (02 Apr 2019 08:01) (63 - 91)  BP: 147/92 (02 Apr 2019 08:01) (147/92 - 159/82)  BP(mean): --  RR: 18 (02 Apr 2019 08:01) (18 - 18)  SpO2: 97% (02 Apr 2019 08:01) (95% - 97%)  I&O's Detail      Labs:                        15.6   7.6   )-----------( 216      ( 02 Apr 2019 07:43 )             44.4     04-02    139  |  104  |  6.0<L>  ----------------------------<  97  3.1<L>   |  21.0<L>  |  0.60    Ca    9.0      02 Apr 2019 07:43  Phos  2.5     03-31  Mg     1.8     03-31      Radiology:  < from: US Testicles (04.01.19 @ 12:26) >   EXAM:  US SCROTUM AND CONTENTS                          PROCEDURE DATE:  04/01/2019          INTERPRETATION:  CLINICAL INFORMATION: Scrotal cellulitis.    COMPARISON: Scrotal ultrasound 3/29/2019 and CT abdomen/pelvis    TECHNIQUE: Testicular ultrasound utilizing color and spectral Doppler.    FINDINGS:      RIGHT:    Right testis:     cm. Normal echogenicity and echotexture with no masses   or areas of architectural distortion. Normal blood flow pattern.    Right epididymis: Right epididymal head cyst measuring 4 mm.    Right hydrocele: Physiologic fluid with no significant hydrocele.    Right varicocele: None.      LEFT:     Left testis:     cm. Normal echogenicity and echotexture with no masses   or areas of architectural distortion. Normal blood flow pattern.    Left epididymis: Within normal limits.    Left hydrocele: Physiologic fluid with no significant hydrocele.    Left varicocele: None.    Other: The scrotal wall thickening with increased vascularity consistent   with cellulitis given the history. There is a heterogeneous collection   adjacent to the base of the penis measuring 1.9 x 1.9 x 1.6 cm. There is   heterogeneity of the adjacent soft tissues which are increased in   vascularity consistent with underlying inflammation.    IMPRESSION:     Unremarkable testicles.    Scrotal wall thickening with increased vascularity consistent with   cellulitis.    Heterogeneous collection adjacent to the base of the penis measuring 1.9   cm, likely an abscess and likely corresponds with the collection on the   prior CT with increased vascularity/inflammatory change in the adjacent   soft tissues.

## 2019-04-02 NOTE — DISCHARGE NOTE PROVIDER - CARE PROVIDERS DIRECT ADDRESSES
[email or web address],DirectAddress_Unknown,DirectAddress_Unknown [email or web address],DirectAddress_Unknown,DirectAddress_Unknown,[email or web address]

## 2019-04-02 NOTE — DISCHARGE NOTE PROVIDER - CARE PROVIDER_API CALL
Reymundo Siegel)  Urology  [address], [address]  [place] [address]  Phone: [phone]  Fax: [phone]  Follow Up Time: 1 week    Srikanth Disla)  Infectious Disease; Internal Medicine  [address], [address]  [place] [address]  Phone: [phone]  Fax: [phone]  Follow Up Time: 1 week    primary care,   Phone: (   )    -  Fax: (   )    -  Follow Up Time: 1-3 days Reymundo Siegel)  Urology  [address], [address]  [place] [address]  Phone: [phone]  Fax: [phone]  Follow Up Time: 1 week    Srikanth Disla)  Infectious Disease; Internal Medicine  [address], [address]  [place] [address]  Phone: [phone]  Fax: [phone]  Follow Up Time: 1 week    primary care,   Phone: (   )    -  Fax: (   )    -  Follow Up Time: 1-3 days    Lida Melendrez (DO)  Gastroenterology  [address], [address]  [place] [address]  Phone: [phone]  Fax: [phone]  Follow Up Time: 1 week

## 2019-04-02 NOTE — DISCHARGE NOTE PROVIDER - HOSPITAL COURSE
This a 44 y/o M w/ PMH of MS, not currently on medication. Presenting with a 1 day hx of scrotal swelling and pain. Patient states that he had an ingrown hair which he started to pick, which then awoke him from sleep last night with burning pain and swelling of his scrotum.  found to have L scrotal diffuse cellulitis, started on iv abx s initially now switched to po to complete course. blood cxs neg . seen by ID, urology and sx. repeat scrotal US shows small collection on , but urology recommend to cont PO abx and f/u as OP with Dr. Siegel in 1 week [phone]. patient to wear Scrotal support. Patient was also started on ciwa protocol for etoh use but has not had any significant withdrawal sxs. Electrolytes repleted.     Patient also has H/o chronic diarrhea, ~20 episodes per day, intermittent, since childhood, last colonoscopy 10 yrs ago, no abn findings recalled.     - recommend repeat Colonoscopy in view of abnormal CT scan findings, patient agree with out patient follow up with Dr. Melendrez group.         today patient is doing well. in no acute distress no fever or chills.         Vital Signs Last 24 Hrs    T(C): 36.8 (02 Apr 2019 08:01), Max: 37.2 (02 Apr 2019 00:09)    T(F): 98.3 (02 Apr 2019 08:01), Max: 99 (02 Apr 2019 00:09)    HR: 82 (02 Apr 2019 08:01) (63 - 91)    BP: 147/92 (02 Apr 2019 08:01) (147/92 - 159/82)    BP(mean): --    RR: 18 (02 Apr 2019 08:01) (18 - 18)    SpO2: 97% (02 Apr 2019 08:01) (95% - 97%)        Physical Exam:    · Constitutional Details	well-developed; well-groomed; well-nourished; distress due to pain    · Respiratory Details no rales; no rhonchi; no wheezes    · Cardiovascular Details	regular rate and rhythm  no rub  no murmur  normal PMI     · GI Normal	soft; nontender; no distention; no masses palpable; bowel sounds normal; no bruit; no rebound tenderness; no guarding; no rigidity; no organomegaly    · Genitourinary Details Maleno hernia; no discharge; testicular mass L; Generalized tenderness; warm; with diffuse erythema improving     · Extremities Details	no clubbing; no cyanosis; no pedal edema    · Neurological	Alert & oriented; no sensory, motor or coordination deficits, normal reflexes    · · Musculoskeletal	No joint pain, swelling or deformity; no limitation of movement            time spent for this dc 38 mins. Rx sent to [facility] . patient aware

## 2019-04-02 NOTE — PROGRESS NOTE ADULT - REASON FOR ADMISSION
Scrotal pain secondary to scrotal cellulitis with abscess

## 2019-04-02 NOTE — DISCHARGE NOTE PROVIDER - PROVIDER TOKENS
PROVIDER:[TOKEN:[[id number]],FOLLOWUP:[1 week]],PROVIDER:[TOKEN:[[id number]],FOLLOWUP:[1 week]],FREE:[LAST:[primary care],PHONE:[(   )    -],FAX:[(   )    -],FOLLOWUP:[1-3 days]] PROVIDER:[TOKEN:[[id number]],FOLLOWUP:[1 week]],PROVIDER:[TOKEN:[[id number]],FOLLOWUP:[1 week]],FREE:[LAST:[primary care],PHONE:[(   )    -],FAX:[(   )    -],FOLLOWUP:[1-3 days]],PROVIDER:[TOKEN:[[id number]],FOLLOWUP:[1 week]]

## 2019-04-02 NOTE — PROGRESS NOTE ADULT - ASSESSMENT
>L scrotal abscess with diffuse cellulitis,   - Scrotal support  -ID and urology on baord   -f/u cxs   -adjust abxs as per ID   -repeat scrotal us in am     >H/o MS - not currently on medication    >H/o chronic diarrhea, ~20 episodes per day, intermittent, since childhood, last colonoscopy 10 yrs ago, no abn findings recalled.   - recommend repeat Colonoscopy in view of abnormal CT scan findings, patient agree with out patient follow up with Dr. Melendrez group.     >H/o Active Nicotine use,   - nicotine patch, with PRN Gum    >h/o ETOH use, without withdrawal   - Folic, Thiamine and MVI    >dvt ppx: sq heparin
>L scrotal abscess with diffuse cellulitis,   - Scrotal support  -ID and urology on baord   -adjust abxs as per ID , switched to po to complete 7 more days   -repeat scrotal us in am shows small collection / reviewed with urology team. No indication for drainage/ I&d at this tiem as per urology     >H/o MS - not currently on medication    >H/o chronic diarrhea, ~20 episodes per day, intermittent, since childhood, last colonoscopy 10 yrs ago, no abn findings recalled.   - recommend repeat Colonoscopy in view of abnormal CT scan findings, patient agree with out patient follow up with Dr. Melendrez group.     >H/o Active Nicotine use,   - nicotine patch, with PRN Gum    >h/o ETOH use, without withdrawal   - Folic, Thiamine and MVI    >electrolyte abnormalities, hypomagnesemia hypokalemia / hyponatremia  likely hypovolemic   -replete / monitor     >dvt ppx: sq heparin
>L scrotal abscess with diffuse cellulitis,   -c/w Zosyn IV  - Scrotal support  -ID and sx consult appreciated.   -monitor for any fluid collection/ will need I&D   -f/u cxs     >H/o MS - not currently on medication    >H/o chronic diarrhea, ~20 episodes per day, intermittent, since childhood, last colonoscopy 10 yrs ago, no abn findings recalled.   - recommend repeat Colonoscopy in view of abnormal CT scan findings, patient agree with out patient follow up with Dr. Melendrez group.     >H/o Active Nicotine use,   - nicotine patch, with PRN Gum    >h/o ETOH use, no currently in withdrawal.   - CIWA protocol   - Folic, Thiamine and MVI    >dvt ppx: sq heparin
This 43 y.o. man  here for fevers, WBC elevation  scrotal cellulitis    small fluid collection in scrotum on CT scan likely early abscess; not clinically worsening    improving   - continue Augmentin.   - continue doxycycline for empiric CA-MRSA coverage    - can change both to oral regimen for 7 more days.
This 43 y.o. man  here for fevers, WBC elevation  scrotal cellulitis    small fluid collection in scrotum on CT scan likely abscess    - on Zosyn  - will stop zoyn, start Augmentin.     - continue doxycycline for empiric CA-MRSA coverage  - should watch and determine if indurated area  becomes abscess, which then may need to be drained.       - follow up on cultures    - will need serial Ultrasound of scrotum; if collection is larger, consider drainage.
stable
42 yo male with scrotal cellulitis, small collection on US, hypokalemia  - cont PO abx  - pt may be d/c'd with f/u as OP with Dr. Siegel in 1 week [phone]
44yo male with left scrotal cellulitis, small collection, improving  - cont abx  - repeat scrotal US this am

## 2019-04-02 NOTE — PROGRESS NOTE ADULT - GENITOURINARY COMMENTS
mild swelling and erythema of upper left scrotal sac, nontender, no fluctuance/abscess palpated
pain at base of left side of penis/scrotum, no erythema

## 2019-04-03 LAB
CULTURE RESULTS: SIGNIFICANT CHANGE UP
CULTURE RESULTS: SIGNIFICANT CHANGE UP
SPECIMEN SOURCE: SIGNIFICANT CHANGE UP
SPECIMEN SOURCE: SIGNIFICANT CHANGE UP

## 2019-04-15 PROBLEM — G35 MULTIPLE SCLEROSIS: Chronic | Status: ACTIVE | Noted: 2019-03-30

## 2019-04-15 PROBLEM — K52.9 NONINFECTIVE GASTROENTERITIS AND COLITIS, UNSPECIFIED: Chronic | Status: ACTIVE | Noted: 2019-03-30

## 2019-04-22 ENCOUNTER — APPOINTMENT (OUTPATIENT)
Dept: UROLOGY | Facility: CLINIC | Age: 44
End: 2019-04-22
Payer: COMMERCIAL

## 2019-04-22 VITALS
DIASTOLIC BLOOD PRESSURE: 97 MMHG | WEIGHT: 175 LBS | HEART RATE: 78 BPM | SYSTOLIC BLOOD PRESSURE: 172 MMHG | BODY MASS INDEX: 25.05 KG/M2 | HEIGHT: 70 IN

## 2019-04-22 DIAGNOSIS — Z82.49 FAMILY HISTORY OF ISCHEMIC HEART DISEASE AND OTHER DISEASES OF THE CIRCULATORY SYSTEM: ICD-10-CM

## 2019-04-22 LAB
BILIRUB UR QL STRIP: NORMAL
CLARITY UR: CLEAR
COLLECTION METHOD: NORMAL
GLUCOSE UR-MCNC: NORMAL
HCG UR QL: 0.2 EU/DL
HGB UR QL STRIP.AUTO: NORMAL
KETONES UR-MCNC: NORMAL
LEUKOCYTE ESTERASE UR QL STRIP: NORMAL
NITRITE UR QL STRIP: NORMAL
PH UR STRIP: 6
PROT UR STRIP-MCNC: NORMAL
SP GR UR STRIP: 1.02

## 2019-04-22 PROCEDURE — 99212 OFFICE O/P EST SF 10 MIN: CPT | Mod: 25

## 2019-04-22 PROCEDURE — 81003 URINALYSIS AUTO W/O SCOPE: CPT | Mod: NC,QW

## 2019-04-22 NOTE — ASSESSMENT
[FreeTextEntry1] : Impression:\par infected sebaceous, drained spontaneously.\par \par Plan:  \par \par follow up PRN

## 2019-04-22 NOTE — HISTORY OF PRESENT ILLNESS
[FreeTextEntry1] : spontaneous drainage of ingrown hair. no dysuria or urgency. no feeling of fullness. no swelling for the most part. no fevers or chills .

## 2019-04-22 NOTE — PHYSICAL EXAM
[General Appearance - Well Nourished] : well nourished [General Appearance - Well Developed] : well developed [Normal Appearance] : normal appearance [Well Groomed] : well groomed [Edema] : no peripheral edema [General Appearance - In No Acute Distress] : no acute distress [] : no respiratory distress [Respiration, Rhythm And Depth] : normal respiratory rhythm and effort [Exaggerated Use Of Accessory Muscles For Inspiration] : no accessory muscle use [Scrotum] : the scrotum was normal [Penis Abnormality] : normal circumcised penis [Rectal Exam - Seminal Vesicles] : the seminal vesicles were normal [Normal Station and Gait] : the gait and station were normal for the patient's age [No Focal Deficits] : no focal deficits [Oriented To Time, Place, And Person] : oriented to person, place, and time [Affect] : the affect was normal [Mood] : the mood was normal [Not Anxious] : not anxious

## 2019-12-30 ENCOUNTER — EMERGENCY (EMERGENCY)
Facility: HOSPITAL | Age: 44
LOS: 1 days | Discharge: HOME | End: 2019-12-30
Attending: EMERGENCY MEDICINE
Payer: COMMERCIAL

## 2019-12-30 VITALS — WEIGHT: 149.91 LBS | HEIGHT: 71 IN

## 2019-12-30 PROCEDURE — 96372 THER/PROPH/DIAG INJ SC/IM: CPT

## 2019-12-30 PROCEDURE — 99285 EMERGENCY DEPT VISIT HI MDM: CPT | Mod: 25

## 2019-12-30 PROCEDURE — 99284 EMERGENCY DEPT VISIT MOD MDM: CPT

## 2019-12-30 PROCEDURE — 82962 GLUCOSE BLOOD TEST: CPT

## 2019-12-30 PROCEDURE — 70450 CT HEAD/BRAIN W/O DYE: CPT

## 2019-12-30 RX ORDER — HALOPERIDOL DECANOATE 100 MG/ML
5 INJECTION INTRAMUSCULAR ONCE
Refills: 0 | Status: COMPLETED | OUTPATIENT
Start: 2019-12-30 | End: 2019-12-30

## 2019-12-30 RX ADMIN — HALOPERIDOL DECANOATE 5 MILLIGRAM(S): 100 INJECTION INTRAMUSCULAR at 23:33

## 2019-12-30 RX ADMIN — Medication 2 MILLIGRAM(S): at 23:33

## 2019-12-30 NOTE — ED PROVIDER NOTE - PATIENT PORTAL LINK FT
You can access the FollowMyHealth Patient Portal offered by [facility] by registering at the following website: [email or web address]. By joining [facility]’s FollowMyHealth portal, you will also be able to view your health information using other applications (apps) compatible with our system.

## 2019-12-30 NOTE — ED PROVIDER NOTE - PHYSICAL EXAMINATION
Gen: Well appearing in NAD  Head: NC/AT. Small amount of dried blood below R nare, no nasal tenderness, no orbital tenderness. EOMI.  Neck: trachea midline  Resp:  No distress. Clear to ascultation b/l.  Cardiac: Regular rate and rhythm.   Ext: no deformities  Neuro:  A&O appears non focal  Skin:  Warm and dry as visualized  Psych:  Normal affect and mood

## 2019-12-30 NOTE — ED PROVIDER NOTE - CLINICAL SUMMARY MEDICAL DECISION MAKING FREE TEXT BOX
Patient appears intoxicated.   - Dried blood to DANIELLE olivares.  - Will clear when clinically sober.

## 2019-12-30 NOTE — ED PROVIDER NOTE - OBJECTIVE STATEMENT
45 y/o pt presents to the ED c/o intoxication.  Pt admits to EtOH intake today.  Pt is sleeping comfortably in bed, but easily arousable.  Affect consistent with alcohol intoxication.  No signs of respiratory distress.  Pt is unable to provide a reliable history at this time.  Denies chest pain, SOB, drug use.  No further acute complaints at this time.

## 2019-12-30 NOTE — ED ADULT TRIAGE NOTE - CHIEF COMPLAINT QUOTE
Patient A&Ox4, denies any pain, cursing at nurse during triage, stated "I had a few drinks, I just want to fucking go home." Patient placed in yellow gown, belongings secured.

## 2019-12-30 NOTE — ED PROVIDER NOTE - CARE PROVIDER_API CALL
John Polanco (MD)  Surgery  [facility] [facility], [address] [place] [address]  Phone: [phone]  Fax: [phone]  Follow Up Time: 4-6 Days

## 2019-12-30 NOTE — ED PROVIDER NOTE - NSFOLLOWUPINSTRUCTIONS_ED_ALL_ED_FT
- Follow up with your doctor within 2-3 days.   - Follow up with an ENT, see information above.   - Bring results with you to the appointment.   - Return to the ED for any new or worsening symptoms.    Alcohol Abuse    Alcohol intoxication occurs when the amount of alcohol that a person has consumed impairs his or her ability to mentally and physically function. Chronic alcohol consumption can also lead to a variety of health issues including neurological disease, stomach disease, heart disease, liver disease, etc. Do not drive after drinking alcohol. Drinking enough alcohol to end up in an Emergency Room suggests you may have an alcohol abuse problem. Seek help at a drug addiction center.    SEEK IMMEDIATE MEDICAL CARE IF YOU HAVE ANY OF THE FOLLOWING SYMPTOMS: seizures, vomiting blood, blood in your stool, lightheadedness/dizziness, or becoming shaky to tremulous when you stop drinking.

## 2019-12-30 NOTE — ED PROVIDER NOTE - PROGRESS NOTE DETAILS
Clover SHETTY: Patient reassesed--no complaints.  Vital signs normal.  Resting comfortably.  A&Ox3.  Speech clear. Gait normal.  Clinically sober. Discussed CT findings, will follow with ENT

## 2019-12-31 VITALS
SYSTOLIC BLOOD PRESSURE: 124 MMHG | OXYGEN SATURATION: 96 % | DIASTOLIC BLOOD PRESSURE: 69 MMHG | RESPIRATION RATE: 16 BRPM | HEART RATE: 72 BPM | TEMPERATURE: 99 F

## 2019-12-31 PROCEDURE — 70450 CT HEAD/BRAIN W/O DYE: CPT | Mod: 26

## 2019-12-31 NOTE — ED ADULT NURSE NOTE - NSIMPLEMENTINTERV_GEN_ALL_ED
Implemented All Fall with Harm Risk Interventions:  Damon to call system. Call bell, personal items and telephone within reach. Instruct patient to call for assistance. Room bathroom lighting operational. Non-slip footwear when patient is off stretcher. Physically safe environment: no spills, clutter or unnecessary equipment. Stretcher in lowest position, wheels locked, appropriate side rails in place. Provide visual cue, wrist band, yellow gown, etc. Monitor gait and stability. Monitor for mental status changes and reorient to person, place, and time. Review medications for side effects contributing to fall risk. Reinforce activity limits and safety measures with patient and family. Provide visual clues: red socks.

## 2019-12-31 NOTE — ED ADULT NURSE REASSESSMENT NOTE - NS ED NURSE REASSESS COMMENT FT1
Assumed pt care at 0000 from off-going RN. Pt had become combative and started cursing at staff stating "why am I here". Pt taking off his gown and walking around naked, MD made aware. Pt medicated now, and sleeping on portable monitor. Vitals taken, o2 90% - placed pt on 2 L NC - now 02 sat at 92%. Pt in yellow gown and red socks. Awaiting CT results. Plan for pt to MTF.

## 2019-12-31 NOTE — ED ADULT NURSE NOTE - OBJECTIVE STATEMENT
Patient A&Ox4, denies any pain, cursing at nurse during triage, stated "I had a few drinks, I just want to fucking go home." Patient placed in yellow gown, belongings secured.     Assumed pt care at 0000. Pt BIBA for ETOH intox. A/Ox4 but combative to staff. Pt states "I don't know how I got here" RN explained to him that he was brought in for drinking too much and possible hitting his head on something due to some ecchymosis around nose area. MD aware of pt behavior towards staff, medicated and placed on portable monitor. Yellow gown and red socks applied.

## 2020-06-05 NOTE — ED PROVIDER NOTE - FAMILY HISTORY
claritin D was sent in at last appointment.  Can't use both!   No pertinent family history in first degree relatives

## 2020-09-11 NOTE — LETTER BODY
Addended by: YAMIL REYES on: 9/11/2020 02:43 PM     Modules accepted: Orders     [Dear  ___] : Dear  [unfilled], [Courtesy Letter:] : I had the pleasure of seeing your patient, [unfilled], in my office today. [Sincerely,] : Sincerely, [Please see my note below.] : Please see my note below. [FreeTextEntry3] : Ed\par \par Reymundo Siegel MD\par [facility]\par  of Urology\par [employer] [facility]\par

## 2020-11-01 ENCOUNTER — INPATIENT (INPATIENT)
Facility: HOSPITAL | Age: 45
LOS: 4 days | Discharge: HOME | DRG: 439 | End: 2020-11-06
Attending: HOSPITALIST | Admitting: HOSPITALIST
Payer: MEDICAID

## 2020-11-01 VITALS
WEIGHT: 169.98 LBS | SYSTOLIC BLOOD PRESSURE: 157 MMHG | OXYGEN SATURATION: 96 % | HEIGHT: 71 IN | DIASTOLIC BLOOD PRESSURE: 98 MMHG | RESPIRATION RATE: 18 BRPM | TEMPERATURE: 98 F | HEART RATE: 84 BPM

## 2020-11-01 PROCEDURE — 99285 EMERGENCY DEPT VISIT HI MDM: CPT

## 2020-11-01 RX ORDER — KETOROLAC TROMETHAMINE 30 MG/ML
15 SYRINGE (ML) INJECTION ONCE
Refills: 0 | Status: DISCONTINUED | OUTPATIENT
Start: 2020-11-01 | End: 2020-11-01

## 2020-11-01 RX ORDER — ONDANSETRON 8 MG/1
4 TABLET, FILM COATED ORAL ONCE
Refills: 0 | Status: COMPLETED | OUTPATIENT
Start: 2020-11-01 | End: 2020-11-01

## 2020-11-01 RX ORDER — SODIUM CHLORIDE 9 MG/ML
1000 INJECTION INTRAMUSCULAR; INTRAVENOUS; SUBCUTANEOUS ONCE
Refills: 0 | Status: COMPLETED | OUTPATIENT
Start: 2020-11-01 | End: 2020-11-01

## 2020-11-01 RX ADMIN — ONDANSETRON 4 MILLIGRAM(S): 8 TABLET, FILM COATED ORAL at 23:54

## 2020-11-01 RX ADMIN — SODIUM CHLORIDE 1000 MILLILITER(S): 9 INJECTION INTRAMUSCULAR; INTRAVENOUS; SUBCUTANEOUS at 23:54

## 2020-11-01 RX ADMIN — Medication 15 MILLIGRAM(S): at 23:54

## 2020-11-01 NOTE — ED PROVIDER NOTE - ATTENDING CONTRIBUTION TO CARE
46 yo male with recent hx of alcohol abuse presents for evaluation of acute epigastric abdominal pain. I personally saw the patient with the PA, and completed the key components of the history and physical exam. I then discussed the management plan with the PA.

## 2020-11-01 NOTE — ED ADULT TRIAGE NOTE - CHIEF COMPLAINT QUOTE
c/o LUQ abdominal pain x3 hours, no radiation, states diarreha, PMHx gastroenteritis. Pt denies any n/v/c. Pt denies any sob, cough, fever, or chest pain.

## 2020-11-01 NOTE — ED PROVIDER NOTE - OBJECTIVE STATEMENT
pt is a 44 y/o male presenting to the ed for LUQ abdominal pain that started this morning. pt with every day alcohol use since covid, lost his job due to covid. pt with nausea, no vomiting. pt with gallbladder out. pt denies cp, sob, fever cough diarrhea rectal bleeding melena back pain hematemesis

## 2020-11-01 NOTE — ED PROVIDER NOTE - PHYSICAL EXAMINATION
Const: Awake, alert and oriented. In no acute distress. Well appearing.  HEENT: NC/AT. Moist mucous membranes.  Eyes: No scleral icterus. EOMI.  Neck:. Soft and supple. Full ROM without pain.  Cardiac:  +S1/S2. No murmurs. Peripheral pulses 2+ and symmetric. No LE edema.  Resp: Speaking in full sentences. No evidence of respiratory distress. No wheezes, rales or rhonchi.  Abd: Soft, LUQ tenderness on palpation, non-distended. Normal bowel sounds in all 4 quadrants. No guarding or rebound.  Back: Spine midline and non-tender. No CVAT.  Skin: No rashes, abrasions or lacerations.  Lymph: No cervical lymphadenopathy.  Neuro: Awake, alert & oriented x 3. Moves all extremities symmetrically.

## 2020-11-02 DIAGNOSIS — K85.20 ALCOHOL INDUCED ACUTE PANCREATITIS WITHOUT NECROSIS OR INFECTION: ICD-10-CM

## 2020-11-02 DIAGNOSIS — Z90.49 ACQUIRED ABSENCE OF OTHER SPECIFIED PARTS OF DIGESTIVE TRACT: Chronic | ICD-10-CM

## 2020-11-02 DIAGNOSIS — K52.9 NONINFECTIVE GASTROENTERITIS AND COLITIS, UNSPECIFIED: ICD-10-CM

## 2020-11-02 LAB
ALBUMIN SERPL ELPH-MCNC: 3.6 G/DL — SIGNIFICANT CHANGE UP (ref 3.3–5.2)
ALBUMIN SERPL ELPH-MCNC: 3.6 G/DL — SIGNIFICANT CHANGE UP (ref 3.3–5.2)
ALBUMIN SERPL ELPH-MCNC: 4.2 G/DL — SIGNIFICANT CHANGE UP (ref 3.3–5.2)
ALP SERPL-CCNC: 189 U/L — HIGH (ref 40–120)
ALP SERPL-CCNC: 189 U/L — HIGH (ref 40–120)
ALP SERPL-CCNC: 200 U/L — HIGH (ref 40–120)
ALT FLD-CCNC: 164 U/L — HIGH
ALT FLD-CCNC: 164 U/L — HIGH
ALT FLD-CCNC: 191 U/L — HIGH
AMYLASE P1 CFR SERPL: 40 U/L — SIGNIFICANT CHANGE UP (ref 36–128)
ANION GAP SERPL CALC-SCNC: 19 MMOL/L — HIGH (ref 5–17)
ANION GAP SERPL CALC-SCNC: 19 MMOL/L — HIGH (ref 5–17)
APPEARANCE UR: CLEAR — SIGNIFICANT CHANGE UP
AST SERPL-CCNC: 386 U/L — HIGH
AST SERPL-CCNC: 386 U/L — HIGH
AST SERPL-CCNC: 545 U/L — HIGH
BASOPHILS # BLD AUTO: 0.04 K/UL — SIGNIFICANT CHANGE UP (ref 0–0.2)
BASOPHILS # BLD AUTO: 0.05 K/UL — SIGNIFICANT CHANGE UP (ref 0–0.2)
BASOPHILS NFR BLD AUTO: 0.5 % — SIGNIFICANT CHANGE UP (ref 0–2)
BASOPHILS NFR BLD AUTO: 0.7 % — SIGNIFICANT CHANGE UP (ref 0–2)
BILIRUB DIRECT SERPL-MCNC: 1.1 MG/DL — HIGH (ref 0–0.3)
BILIRUB INDIRECT FLD-MCNC: 1.2 MG/DL — HIGH (ref 0.2–1)
BILIRUB SERPL-MCNC: 2 MG/DL — SIGNIFICANT CHANGE UP (ref 0.4–2)
BILIRUB SERPL-MCNC: 2.3 MG/DL — HIGH (ref 0.4–2)
BILIRUB SERPL-MCNC: 2.3 MG/DL — HIGH (ref 0.4–2)
BILIRUB UR-MCNC: NEGATIVE — SIGNIFICANT CHANGE UP
BUN SERPL-MCNC: 6 MG/DL — LOW (ref 8–20)
BUN SERPL-MCNC: 7 MG/DL — LOW (ref 8–20)
CALCIUM SERPL-MCNC: 8 MG/DL — LOW (ref 8.6–10.2)
CALCIUM SERPL-MCNC: 8.6 MG/DL — SIGNIFICANT CHANGE UP (ref 8.6–10.2)
CHLORIDE SERPL-SCNC: 100 MMOL/L — SIGNIFICANT CHANGE UP (ref 98–107)
CHLORIDE SERPL-SCNC: 94 MMOL/L — LOW (ref 98–107)
CO2 SERPL-SCNC: 22 MMOL/L — SIGNIFICANT CHANGE UP (ref 22–29)
CO2 SERPL-SCNC: 24 MMOL/L — SIGNIFICANT CHANGE UP (ref 22–29)
COLOR SPEC: YELLOW — SIGNIFICANT CHANGE UP
CREAT SERPL-MCNC: 0.45 MG/DL — LOW (ref 0.5–1.3)
CREAT SERPL-MCNC: 0.46 MG/DL — LOW (ref 0.5–1.3)
DIFF PNL FLD: NEGATIVE — SIGNIFICANT CHANGE UP
EOSINOPHIL # BLD AUTO: 0.01 K/UL — SIGNIFICANT CHANGE UP (ref 0–0.5)
EOSINOPHIL # BLD AUTO: 0.02 K/UL — SIGNIFICANT CHANGE UP (ref 0–0.5)
EOSINOPHIL NFR BLD AUTO: 0.1 % — SIGNIFICANT CHANGE UP (ref 0–6)
EOSINOPHIL NFR BLD AUTO: 0.3 % — SIGNIFICANT CHANGE UP (ref 0–6)
ETHANOL SERPL-MCNC: 246 MG/DL — HIGH (ref 0–9)
GLUCOSE SERPL-MCNC: 111 MG/DL — HIGH (ref 70–99)
GLUCOSE SERPL-MCNC: 84 MG/DL — SIGNIFICANT CHANGE UP (ref 70–99)
GLUCOSE UR QL: NEGATIVE MG/DL — SIGNIFICANT CHANGE UP
HAV IGM SER-ACNC: SIGNIFICANT CHANGE UP
HBV CORE IGM SER-ACNC: SIGNIFICANT CHANGE UP
HBV SURFACE AG SER-ACNC: SIGNIFICANT CHANGE UP
HCT VFR BLD CALC: 43.7 % — SIGNIFICANT CHANGE UP (ref 39–50)
HCT VFR BLD CALC: 47.2 % — SIGNIFICANT CHANGE UP (ref 39–50)
HCV AB S/CO SERPL IA: 0.09 S/CO — SIGNIFICANT CHANGE UP (ref 0–0.99)
HCV AB SERPL-IMP: SIGNIFICANT CHANGE UP
HGB BLD-MCNC: 15.9 G/DL — SIGNIFICANT CHANGE UP (ref 13–17)
HGB BLD-MCNC: 17.2 G/DL — HIGH (ref 13–17)
IMM GRANULOCYTES NFR BLD AUTO: 0.1 % — SIGNIFICANT CHANGE UP (ref 0–1.5)
IMM GRANULOCYTES NFR BLD AUTO: 0.3 % — SIGNIFICANT CHANGE UP (ref 0–1.5)
KETONES UR-MCNC: NEGATIVE — SIGNIFICANT CHANGE UP
LDH SERPL L TO P-CCNC: 317 U/L — HIGH (ref 98–192)
LDH SERPL L TO P-CCNC: 346 U/L — HIGH (ref 98–192)
LEUKOCYTE ESTERASE UR-ACNC: NEGATIVE — SIGNIFICANT CHANGE UP
LIDOCAIN IGE QN: 24 U/L — SIGNIFICANT CHANGE UP (ref 22–51)
LIDOCAIN IGE QN: 30 U/L — SIGNIFICANT CHANGE UP (ref 22–51)
LYMPHOCYTES # BLD AUTO: 2.1 K/UL — SIGNIFICANT CHANGE UP (ref 1–3.3)
LYMPHOCYTES # BLD AUTO: 2.54 K/UL — SIGNIFICANT CHANGE UP (ref 1–3.3)
LYMPHOCYTES # BLD AUTO: 27.5 % — SIGNIFICANT CHANGE UP (ref 13–44)
LYMPHOCYTES # BLD AUTO: 31.6 % — SIGNIFICANT CHANGE UP (ref 13–44)
MAGNESIUM SERPL-MCNC: 1.6 MG/DL — SIGNIFICANT CHANGE UP (ref 1.6–2.6)
MCHC RBC-ENTMCNC: 33.7 PG — SIGNIFICANT CHANGE UP (ref 27–34)
MCHC RBC-ENTMCNC: 34 PG — SIGNIFICANT CHANGE UP (ref 27–34)
MCHC RBC-ENTMCNC: 36.4 GM/DL — HIGH (ref 32–36)
MCHC RBC-ENTMCNC: 36.4 GM/DL — HIGH (ref 32–36)
MCV RBC AUTO: 92.4 FL — SIGNIFICANT CHANGE UP (ref 80–100)
MCV RBC AUTO: 93.4 FL — SIGNIFICANT CHANGE UP (ref 80–100)
MONOCYTES # BLD AUTO: 0.41 K/UL — SIGNIFICANT CHANGE UP (ref 0–0.9)
MONOCYTES # BLD AUTO: 0.53 K/UL — SIGNIFICANT CHANGE UP (ref 0–0.9)
MONOCYTES NFR BLD AUTO: 5.1 % — SIGNIFICANT CHANGE UP (ref 2–14)
MONOCYTES NFR BLD AUTO: 6.9 % — SIGNIFICANT CHANGE UP (ref 2–14)
NEUTROPHILS # BLD AUTO: 4.92 K/UL — SIGNIFICANT CHANGE UP (ref 1.8–7.4)
NEUTROPHILS # BLD AUTO: 5.02 K/UL — SIGNIFICANT CHANGE UP (ref 1.8–7.4)
NEUTROPHILS NFR BLD AUTO: 62.6 % — SIGNIFICANT CHANGE UP (ref 43–77)
NEUTROPHILS NFR BLD AUTO: 64.3 % — SIGNIFICANT CHANGE UP (ref 43–77)
NITRITE UR-MCNC: NEGATIVE — SIGNIFICANT CHANGE UP
PH UR: 6.5 — SIGNIFICANT CHANGE UP (ref 5–8)
PHOSPHATE SERPL-MCNC: 2.5 MG/DL — SIGNIFICANT CHANGE UP (ref 2.4–4.7)
PLATELET # BLD AUTO: 124 K/UL — LOW (ref 150–400)
PLATELET # BLD AUTO: 162 K/UL — SIGNIFICANT CHANGE UP (ref 150–400)
POTASSIUM SERPL-MCNC: 3.2 MMOL/L — LOW (ref 3.5–5.3)
POTASSIUM SERPL-MCNC: 3.2 MMOL/L — LOW (ref 3.5–5.3)
POTASSIUM SERPL-SCNC: 3.2 MMOL/L — LOW (ref 3.5–5.3)
POTASSIUM SERPL-SCNC: 3.2 MMOL/L — LOW (ref 3.5–5.3)
PROT SERPL-MCNC: 6.5 G/DL — LOW (ref 6.6–8.7)
PROT SERPL-MCNC: 6.5 G/DL — LOW (ref 6.6–8.7)
PROT SERPL-MCNC: 7.3 G/DL — SIGNIFICANT CHANGE UP (ref 6.6–8.7)
PROT UR-MCNC: NEGATIVE MG/DL — SIGNIFICANT CHANGE UP
RBC # BLD: 4.68 M/UL — SIGNIFICANT CHANGE UP (ref 4.2–5.8)
RBC # BLD: 5.11 M/UL — SIGNIFICANT CHANGE UP (ref 4.2–5.8)
RBC # FLD: 13.2 % — SIGNIFICANT CHANGE UP (ref 10.3–14.5)
RBC # FLD: 13.4 % — SIGNIFICANT CHANGE UP (ref 10.3–14.5)
SARS-COV-2 RNA SPEC QL NAA+PROBE: SIGNIFICANT CHANGE UP
SODIUM SERPL-SCNC: 136 MMOL/L — SIGNIFICANT CHANGE UP (ref 135–145)
SODIUM SERPL-SCNC: 140 MMOL/L — SIGNIFICANT CHANGE UP (ref 135–145)
SP GR SPEC: 1.01 — SIGNIFICANT CHANGE UP (ref 1.01–1.02)
TROPONIN T SERPL-MCNC: <0.01 NG/ML — SIGNIFICANT CHANGE UP (ref 0–0.06)
UROBILINOGEN FLD QL: 4 MG/DL
WBC # BLD: 7.64 K/UL — SIGNIFICANT CHANGE UP (ref 3.8–10.5)
WBC # BLD: 8.03 K/UL — SIGNIFICANT CHANGE UP (ref 3.8–10.5)
WBC # FLD AUTO: 7.64 K/UL — SIGNIFICANT CHANGE UP (ref 3.8–10.5)
WBC # FLD AUTO: 8.03 K/UL — SIGNIFICANT CHANGE UP (ref 3.8–10.5)

## 2020-11-02 PROCEDURE — 74177 CT ABD & PELVIS W/CONTRAST: CPT | Mod: 26

## 2020-11-02 PROCEDURE — 99223 1ST HOSP IP/OBS HIGH 75: CPT

## 2020-11-02 PROCEDURE — 99218: CPT

## 2020-11-02 RX ORDER — LACTOBACILLUS ACIDOPHILUS 100MM CELL
1 CAPSULE ORAL
Refills: 0 | Status: DISCONTINUED | OUTPATIENT
Start: 2020-11-02 | End: 2020-11-06

## 2020-11-02 RX ORDER — FAMOTIDINE 10 MG/ML
20 INJECTION INTRAVENOUS ONCE
Refills: 0 | Status: COMPLETED | OUTPATIENT
Start: 2020-11-02 | End: 2020-11-02

## 2020-11-02 RX ORDER — MAGNESIUM SULFATE 500 MG/ML
2 VIAL (ML) INJECTION ONCE
Refills: 0 | Status: COMPLETED | OUTPATIENT
Start: 2020-11-02 | End: 2020-11-02

## 2020-11-02 RX ORDER — HEPARIN SODIUM 5000 [USP'U]/ML
5000 INJECTION INTRAVENOUS; SUBCUTANEOUS EVERY 12 HOURS
Refills: 0 | Status: DISCONTINUED | OUTPATIENT
Start: 2020-11-02 | End: 2020-11-06

## 2020-11-02 RX ORDER — THIAMINE MONONITRATE (VIT B1) 100 MG
100 TABLET ORAL DAILY
Refills: 0 | Status: COMPLETED | OUTPATIENT
Start: 2020-11-02 | End: 2020-11-04

## 2020-11-02 RX ORDER — MORPHINE SULFATE 50 MG/1
2 CAPSULE, EXTENDED RELEASE ORAL ONCE
Refills: 0 | Status: DISCONTINUED | OUTPATIENT
Start: 2020-11-02 | End: 2020-11-02

## 2020-11-02 RX ORDER — PANTOPRAZOLE SODIUM 20 MG/1
40 TABLET, DELAYED RELEASE ORAL DAILY
Refills: 0 | Status: DISCONTINUED | OUTPATIENT
Start: 2020-11-02 | End: 2020-11-04

## 2020-11-02 RX ORDER — POTASSIUM CHLORIDE 20 MEQ
40 PACKET (EA) ORAL ONCE
Refills: 0 | Status: COMPLETED | OUTPATIENT
Start: 2020-11-02 | End: 2020-11-02

## 2020-11-02 RX ORDER — SODIUM CHLORIDE 9 MG/ML
1000 INJECTION INTRAMUSCULAR; INTRAVENOUS; SUBCUTANEOUS ONCE
Refills: 0 | Status: COMPLETED | OUTPATIENT
Start: 2020-11-02 | End: 2020-11-02

## 2020-11-02 RX ORDER — MORPHINE SULFATE 50 MG/1
6 CAPSULE, EXTENDED RELEASE ORAL EVERY 6 HOURS
Refills: 0 | Status: DISCONTINUED | OUTPATIENT
Start: 2020-11-02 | End: 2020-11-03

## 2020-11-02 RX ORDER — SODIUM CHLORIDE 9 MG/ML
1000 INJECTION INTRAMUSCULAR; INTRAVENOUS; SUBCUTANEOUS
Refills: 0 | Status: DISCONTINUED | OUTPATIENT
Start: 2020-11-02 | End: 2020-11-02

## 2020-11-02 RX ORDER — NICOTINE POLACRILEX 2 MG
1 GUM BUCCAL DAILY
Refills: 0 | Status: DISCONTINUED | OUTPATIENT
Start: 2020-11-02 | End: 2020-11-06

## 2020-11-02 RX ORDER — FOLIC ACID 0.8 MG
1 TABLET ORAL DAILY
Refills: 0 | Status: DISCONTINUED | OUTPATIENT
Start: 2020-11-02 | End: 2020-11-06

## 2020-11-02 RX ORDER — SODIUM CHLORIDE 9 MG/ML
1000 INJECTION, SOLUTION INTRAVENOUS
Refills: 0 | Status: DISCONTINUED | OUTPATIENT
Start: 2020-11-02 | End: 2020-11-03

## 2020-11-02 RX ORDER — ONDANSETRON 8 MG/1
8 TABLET, FILM COATED ORAL EVERY 8 HOURS
Refills: 0 | Status: DISCONTINUED | OUTPATIENT
Start: 2020-11-02 | End: 2020-11-03

## 2020-11-02 RX ORDER — MORPHINE SULFATE 50 MG/1
2 CAPSULE, EXTENDED RELEASE ORAL EVERY 6 HOURS
Refills: 0 | Status: DISCONTINUED | OUTPATIENT
Start: 2020-11-02 | End: 2020-11-03

## 2020-11-02 RX ADMIN — Medication 2 MILLIGRAM(S): at 18:25

## 2020-11-02 RX ADMIN — SODIUM CHLORIDE 1000 MILLILITER(S): 9 INJECTION INTRAMUSCULAR; INTRAVENOUS; SUBCUTANEOUS at 01:00

## 2020-11-02 RX ADMIN — Medication 15 MILLIGRAM(S): at 00:15

## 2020-11-02 RX ADMIN — MORPHINE SULFATE 6 MILLIGRAM(S): 50 CAPSULE, EXTENDED RELEASE ORAL at 08:30

## 2020-11-02 RX ADMIN — Medication 2 MILLIGRAM(S): at 15:42

## 2020-11-02 RX ADMIN — MORPHINE SULFATE 6 MILLIGRAM(S): 50 CAPSULE, EXTENDED RELEASE ORAL at 20:00

## 2020-11-02 RX ADMIN — FAMOTIDINE 20 MILLIGRAM(S): 10 INJECTION INTRAVENOUS at 00:57

## 2020-11-02 RX ADMIN — MORPHINE SULFATE 2 MILLIGRAM(S): 50 CAPSULE, EXTENDED RELEASE ORAL at 03:04

## 2020-11-02 RX ADMIN — Medication 2 MILLIGRAM(S): at 21:42

## 2020-11-02 RX ADMIN — ONDANSETRON 8 MILLIGRAM(S): 8 TABLET, FILM COATED ORAL at 15:41

## 2020-11-02 RX ADMIN — SODIUM CHLORIDE 1000 MILLILITER(S): 9 INJECTION INTRAMUSCULAR; INTRAVENOUS; SUBCUTANEOUS at 02:00

## 2020-11-02 RX ADMIN — Medication 1 TABLET(S): at 18:25

## 2020-11-02 RX ADMIN — SODIUM CHLORIDE 1000 MILLILITER(S): 9 INJECTION INTRAMUSCULAR; INTRAVENOUS; SUBCUTANEOUS at 08:06

## 2020-11-02 RX ADMIN — ONDANSETRON 8 MILLIGRAM(S): 8 TABLET, FILM COATED ORAL at 21:41

## 2020-11-02 RX ADMIN — Medication 1 PATCH: at 11:18

## 2020-11-02 RX ADMIN — Medication 30 MILLILITER(S): at 00:57

## 2020-11-02 RX ADMIN — ONDANSETRON 8 MILLIGRAM(S): 8 TABLET, FILM COATED ORAL at 08:12

## 2020-11-02 RX ADMIN — MORPHINE SULFATE 2 MILLIGRAM(S): 50 CAPSULE, EXTENDED RELEASE ORAL at 03:45

## 2020-11-02 RX ADMIN — HEPARIN SODIUM 5000 UNIT(S): 5000 INJECTION INTRAVENOUS; SUBCUTANEOUS at 15:42

## 2020-11-02 RX ADMIN — MORPHINE SULFATE 6 MILLIGRAM(S): 50 CAPSULE, EXTENDED RELEASE ORAL at 19:49

## 2020-11-02 RX ADMIN — Medication 1 TABLET(S): at 11:18

## 2020-11-02 RX ADMIN — SODIUM CHLORIDE 150 MILLILITER(S): 9 INJECTION, SOLUTION INTRAVENOUS at 08:11

## 2020-11-02 RX ADMIN — MORPHINE SULFATE 6 MILLIGRAM(S): 50 CAPSULE, EXTENDED RELEASE ORAL at 15:41

## 2020-11-02 RX ADMIN — Medication 1 MILLIGRAM(S): at 11:18

## 2020-11-02 RX ADMIN — MORPHINE SULFATE 6 MILLIGRAM(S): 50 CAPSULE, EXTENDED RELEASE ORAL at 08:11

## 2020-11-02 RX ADMIN — Medication 50 GRAM(S): at 11:17

## 2020-11-02 RX ADMIN — Medication 100 MILLIGRAM(S): at 11:17

## 2020-11-02 RX ADMIN — SODIUM CHLORIDE 100 MILLILITER(S): 9 INJECTION INTRAMUSCULAR; INTRAVENOUS; SUBCUTANEOUS at 03:04

## 2020-11-02 RX ADMIN — Medication 100 MILLIGRAM(S): at 03:04

## 2020-11-02 RX ADMIN — MORPHINE SULFATE 6 MILLIGRAM(S): 50 CAPSULE, EXTENDED RELEASE ORAL at 15:54

## 2020-11-02 RX ADMIN — PANTOPRAZOLE SODIUM 40 MILLIGRAM(S): 20 TABLET, DELAYED RELEASE ORAL at 11:18

## 2020-11-02 RX ADMIN — Medication 2 MILLIGRAM(S): at 09:39

## 2020-11-02 RX ADMIN — Medication 1 PATCH: at 20:00

## 2020-11-02 RX ADMIN — Medication 40 MILLIEQUIVALENT(S): at 00:58

## 2020-11-02 NOTE — ED CDU PROVIDER INITIAL DAY NOTE - DETAILS
Patent with mild pancreatitis but significantly elevated LFT's. Will obtain GI consultation to discuss management.

## 2020-11-02 NOTE — H&P ADULT - NSHPPHYSICALEXAM_GEN_ALL_CORE
Vital Signs Last 24 Hrs  T(C): 36.9 (02 Nov 2020 07:40), Max: 36.9 (02 Nov 2020 07:40)  T(F): 98.5 (02 Nov 2020 07:40), Max: 98.5 (02 Nov 2020 07:40)  HR: 75 (02 Nov 2020 07:40) (72 - 84)  BP: 149/86 (02 Nov 2020 07:40) (130/75 - 157/98)  BP(mean): --  RR: 18 (02 Nov 2020 07:40) (18 - 20)  SpO2: 97% (02 Nov 2020 07:40) (96% - 97%)    Pt was interviewed via tele cart. Pt looks comfortable on examination. No tremors of bilateral hand. Point epigastric area for pain. Answers all questions appropriately

## 2020-11-02 NOTE — H&P ADULT - HISTORY OF PRESENT ILLNESS
Pt is a 46yo M w PMH of EtOH, chronic diarrhea, MS not on medication present to ED with 1 day history of sever epigastric pain. Pt states since Covid pandemic he got laid off from work and has been drinking half bottle of Whiskey almost daily, last drink was yesterday at noon. Pt states he started having 8/10 sharp and dull epigastric pain that occasional radiating to the back. Pt had nausea, no vomiting. No changes in chronic diarrhea. Pt states he also has hx of fatty liver disease since childhood. Pt denies of any fever, chills, recent sickness or travel.     At ED,   CT abd showed Nonspecific peripancreatic stranding near the pancreatic tail, diffuse colon wall thickening correlate to enterocolitis.   s/p 2L NS hydration, Toradol, Morphine, Zofran, Famotidine, KCL

## 2020-11-02 NOTE — H&P ADULT - ATTENDING COMMENTS
46yo M w PMH of EtOH, chronic diarrhea, MS not on medication present to ED with 1 day history of sever epigastric pain. He is admitted for pancreatitis due to alcohol.   Patient seen and examined still with abdominal pain, no n/v or tremors.  PHYSICAL EXAM:  Constitutional: No acute distress, alert and oriented by 3  HEENT: AT/NC, EOMI, PERRLA, Normal conjunctiva, no pharyngeal erythema, moist oral mucosa  Respiratory: CTA BL, equal breath sounds, no crackles or wheezing  Cardiovascular: RRR, no edema  Gastrointestinal: soft, tender in epigastrium, Non-distended + Bowel sounds, no rebound or guarding  Musculoskeletal: No joint edema  Neurological: CN 2-12 grossly intact, no focal deficits  Skin: warm, dry and intact  Psychiatric: normal mood and affect    discussed with Dr. Fernandez and agree with above  will replace magnesium. CLD, pain control, IVFs and CIWA protocol.

## 2020-11-02 NOTE — ED ADULT NURSE REASSESSMENT NOTE - NS ED NURSE REASSESS COMMENT FT1
Patient A&Ox3 in no apparent distress. Remains in yellow gown. Denies any pain or discomfort at this time. No signs of withdrawals noted. CIWA 0. Patient states "I am just tired". Vital signs stable at this time. No signs of difficulty breathing. Respirations even, spontaneous, and unlabored. Skin warm and dry. Awaiting GI consult. Plan of care explained. Verbalized understanding. Safety maintained. Call bell within reach.

## 2020-11-02 NOTE — ED ADULT NURSE REASSESSMENT NOTE - NS ED NURSE REASSESS COMMENT FT1
Patient sleeping comfortably in stretcher. No signs of pain or discomfort noted. Respirations even, spontaneous, and unlabored. Safety maintained. Call bell within reach.

## 2020-11-02 NOTE — ED CDU PROVIDER DISPOSITION NOTE - CLINICAL COURSE
etoh abuse pancreatitis etoh abuse pancreatitis; pt with history of alcohol abuse, pancreatitis  with 1 day history of abdominal pain; pt with continued pain while in observation; pt requiring iv pain meds; ct scan of abd early pancreatits pt to be admitted iv fluids , pain management and serial lipase.

## 2020-11-02 NOTE — SBIRT NOTE ADULT - NSSBIRTALCPASSREFTXDET_GEN_A_CORE
Pt was open about current ETOH since the start of the pandemic. Pt reports drinking half a bottle of Whiskey daily. Pt has no hx of formal substance abuse treatment. Worker offered pt information or referral to substance abuse treatment if qualifies. Pt declined, reported he is not interested in treatment at this time.

## 2020-11-02 NOTE — ED CDU PROVIDER INITIAL DAY NOTE - ATTENDING CONTRIBUTION TO CARE
44 yo male with mild acute pancreatitis secondary to acute alcohol abuse. Will observe given elevated LFT's and obtain GI consultation. I personally saw the patient with the PA, and completed the key components of the history and physical exam. I then discussed the management plan with the PA.

## 2020-11-02 NOTE — ED ADULT NURSE NOTE - OBJECTIVE STATEMENT
Patient is a 45 year old male, A&Ox3 in no apparent distress. c/o abdominal pain. Patient is a 45 year old male, A&Ox3 in no apparent distress. c/o abdominal pain. Patient complaining of LUQ and epigastric pain that started this morning. Denies N/V/D. Abdomen soft, tender. Patient states has been drinking alcohol every day since COVID/ losing job. Patient states last drink was about an hour prior to arrival. States drank "maybe 5 or 6 mixed drinks". Airway patent. No signs of difficulty breathing. Respirations even, spontaneous, and unlabored. Skin warm and dry. Ambulating with steady gait. Safety maintained. Call bell within reach.

## 2020-11-02 NOTE — ED ADULT NURSE REASSESSMENT NOTE - NS ED NURSE REASSESS COMMENT FT1
pt. received from night RN. pt. sleeping, easy to arouse. pt. in yellow gown. pt. states his last drink was not too long before he ended up here, pt. states he drinks every day for the most part, states he drinks a lot since COVID, more than before. pt. c/o stomach pain 8/10. pt. denies chest pain, n/v/d.

## 2020-11-02 NOTE — CONSULT NOTE ADULT - SUBJECTIVE AND OBJECTIVE BOX
Patient is a 45y old  Male who presents with a chief complaint of abdominal pain.    HPI: 44 y/o male presents with sudden onset severe upper abdominal pain that started yesterday w/o N/V. Pt drinks heavily and his last drink was sometime yesterday afternoon. He has had no prior episodes of pancreatitis and has h/o fatty liver disease since childhood but has no known h/o cirrhosis. No associated drug use. He states that he started drinking heavily in April afte being laid off from work secondary to the COVID pandemic. He denies heavy alcohol use prior to April of this year. He had a CT of his abdomen and pelvis yesterday that showed fat stranding aroung the tail of his pancreas with hepatic steatosis and possible enterocolitis. and small bowel. He does c/o chronic diarrhea and takes no medications at home. No precedent foreign travel or antibiotic use.      REVIEW OF SYSTEMS:  Constitutional: No fever, weight loss or fatigue  ENMT:  No difficulty hearing, tinnitus, vertigo; No sinus or throat pain  Respiratory: No cough, wheezing, chills or hemoptysis  Cardiovascular: No chest pain, palpitations, dizziness or leg swelling  Gastrointestinal: As per HPI.  Skin: No itching, burning, rashes or lesions   Musculoskeletal: No joint pain or swelling; No muscle, back or extremity pain  Patient has no cardiopulmonary, peripheral vascular, musculoskeletal, dermatological, neurological, or psychological symptoms or complaints at this time.    PAST MEDICAL & SURGICAL HISTORY:    Chronic diarrhea    Multiple sclerosis    Status post cholecystectomy    ETOH abuse      FAMILY HISTORY:    FHx: diabetes mellitus  father        SOCIAL HISTORY:  Smoking Status: [ x] Current, [ ] Former, [ ] Never  Pack Years: > 50. He smokes 2 to 3 ppd since age 15    MEDICATIONS:  MEDICATIONS  (STANDING):  sodium chloride 0.9%. 1000 milliLiter(s) (100 mL/Hr) IV Continuous <Continuous>    MEDICATIONS  (PRN):      Allergies    No Known Allergies    Intolerances        Vital Signs Last 24 Hrs  T(C): 36.8 (01 Nov 2020 23:09), Max: 36.8 (01 Nov 2020 23:09)  T(F): 98.3 (01 Nov 2020 23:09), Max: 98.3 (01 Nov 2020 23:09)  HR: 73 (02 Nov 2020 06:02) (72 - 84)  BP: 130/75 (02 Nov 2020 06:02) (130/75 - 157/98)  BP(mean): --  RR: 18 (02 Nov 2020 06:02) (18 - 20)  SpO2: 97% (02 Nov 2020 06:02) (96% - 97%)        PHYSICAL EXAM:    General: Well developed; well nourished; in no acute distress  HEENT: MMM, conjunctiva pink and sclera anicteric.  Lungs: Clear bilaterally.  Cor: RRR S1, S2 only  Gastrointestinal: Abdomen: Soft, diffuse upper abdominal tenderness non-distended; Normal bowel sounds; No rebound or guarding or HSM,  YANIV: Pt. refused.  Extremities: Normal range of motion, No clubbing, cyanosis or edema  Neurological: Alert and oriented x3  Skin: Warm and dry. No obvious rash      LABS:                        17.2   8.03  )-----------( 162      ( 01 Nov 2020 23:57 )             47.2     11-01    136  |  94<L>  |  7.0<L>  ----------------------------<  111<H>  3.2<L>   |  24.0  |  0.45<L>    Ca    8.6      01 Nov 2020 23:57    TPro  7.3  /  Alb  4.2  /  TBili  2.0  /  DBili  x   /  AST  545<H>  /  ALT  191<H>  /  AlkPhos  200<H>  11-01          RADIOLOGY & ADDITIONAL STUDIES:   CT results noted above.

## 2020-11-02 NOTE — ED CDU PROVIDER DISPOSITION NOTE - ATTENDING CONTRIBUTION TO CARE
I, John Cox, performed a face to face bedside interview with this patient regarding history of present illness, review of symptoms and relevant past medical, social and family history.  I completed an independent physical examination. I have communicated the patient’s plan of care and disposition with the ACP.      pt with history of alcohol abuse, pancreatitis  with 1 day history of abdominal pain; pt with continued pain while in observation; pt requiring iv pain meds; ct scan of abd early pancreatits pt to be admitted iv fluids , pain management and serial lipase;

## 2020-11-02 NOTE — CONSULT NOTE ADULT - PROBLEM SELECTOR RECOMMENDATION 9
No evidence of necrosis but pt. is significantly symptomatic and should be admitted for IVF and analgesia. CLD. CIWA protocol for ETOH withdrawal. Repeat labs ordered for the AM

## 2020-11-03 LAB
ALBUMIN SERPL ELPH-MCNC: 3.5 G/DL — SIGNIFICANT CHANGE UP (ref 3.3–5.2)
ALP SERPL-CCNC: 179 U/L — HIGH (ref 40–120)
ALT FLD-CCNC: 120 U/L — HIGH
AMYLASE P1 CFR SERPL: 37 U/L — SIGNIFICANT CHANGE UP (ref 36–128)
ANION GAP SERPL CALC-SCNC: 11 MMOL/L — SIGNIFICANT CHANGE UP (ref 5–17)
ANION GAP SERPL CALC-SCNC: 14 MMOL/L — SIGNIFICANT CHANGE UP (ref 5–17)
AST SERPL-CCNC: 193 U/L — HIGH
BASOPHILS # BLD AUTO: 0 K/UL — SIGNIFICANT CHANGE UP (ref 0–0.2)
BASOPHILS NFR BLD AUTO: 0 % — SIGNIFICANT CHANGE UP (ref 0–2)
BILIRUB SERPL-MCNC: 4 MG/DL — HIGH (ref 0.4–2)
BUN SERPL-MCNC: 4 MG/DL — LOW (ref 8–20)
BUN SERPL-MCNC: 5 MG/DL — LOW (ref 8–20)
CALCIUM SERPL-MCNC: 8.6 MG/DL — SIGNIFICANT CHANGE UP (ref 8.6–10.2)
CALCIUM SERPL-MCNC: 9.2 MG/DL — SIGNIFICANT CHANGE UP (ref 8.6–10.2)
CHLORIDE SERPL-SCNC: 93 MMOL/L — LOW (ref 98–107)
CHLORIDE SERPL-SCNC: 96 MMOL/L — LOW (ref 98–107)
CHOLEST SERPL-MCNC: 147 MG/DL — SIGNIFICANT CHANGE UP
CO2 SERPL-SCNC: 29 MMOL/L — SIGNIFICANT CHANGE UP (ref 22–29)
CO2 SERPL-SCNC: 30 MMOL/L — HIGH (ref 22–29)
CREAT SERPL-MCNC: 0.48 MG/DL — LOW (ref 0.5–1.3)
CREAT SERPL-MCNC: 0.5 MG/DL — SIGNIFICANT CHANGE UP (ref 0.5–1.3)
CULTURE RESULTS: NO GROWTH — SIGNIFICANT CHANGE UP
EOSINOPHIL # BLD AUTO: 0 K/UL — SIGNIFICANT CHANGE UP (ref 0–0.5)
EOSINOPHIL NFR BLD AUTO: 0 % — SIGNIFICANT CHANGE UP (ref 0–6)
GLUCOSE SERPL-MCNC: 108 MG/DL — HIGH (ref 70–99)
GLUCOSE SERPL-MCNC: 116 MG/DL — HIGH (ref 70–99)
HAV IGM SER-ACNC: SIGNIFICANT CHANGE UP
HBV CORE IGM SER-ACNC: SIGNIFICANT CHANGE UP
HBV SURFACE AG SER-ACNC: SIGNIFICANT CHANGE UP
HCT VFR BLD CALC: 40.8 % — SIGNIFICANT CHANGE UP (ref 39–50)
HCV AB S/CO SERPL IA: 0.08 S/CO — SIGNIFICANT CHANGE UP (ref 0–0.99)
HCV AB SERPL-IMP: SIGNIFICANT CHANGE UP
HDLC SERPL-MCNC: 36 MG/DL — LOW
HGB BLD-MCNC: 14.6 G/DL — SIGNIFICANT CHANGE UP (ref 13–17)
LIDOCAIN IGE QN: 13 U/L — LOW (ref 22–51)
LIPID PNL WITH DIRECT LDL SERPL: 39 MG/DL — SIGNIFICANT CHANGE UP
LYMPHOCYTES # BLD AUTO: 1 K/UL — SIGNIFICANT CHANGE UP (ref 1–3.3)
LYMPHOCYTES # BLD AUTO: 15.8 % — SIGNIFICANT CHANGE UP (ref 13–44)
MAGNESIUM SERPL-MCNC: 1.7 MG/DL — LOW (ref 1.8–2.6)
MAGNESIUM SERPL-MCNC: 2.1 MG/DL — SIGNIFICANT CHANGE UP (ref 1.6–2.6)
MANUAL SMEAR VERIFICATION: SIGNIFICANT CHANGE UP
MCHC RBC-ENTMCNC: 33.6 PG — SIGNIFICANT CHANGE UP (ref 27–34)
MCHC RBC-ENTMCNC: 35.8 GM/DL — SIGNIFICANT CHANGE UP (ref 32–36)
MCV RBC AUTO: 93.8 FL — SIGNIFICANT CHANGE UP (ref 80–100)
MONOCYTES # BLD AUTO: 0.33 K/UL — SIGNIFICANT CHANGE UP (ref 0–0.9)
MONOCYTES NFR BLD AUTO: 5.3 % — SIGNIFICANT CHANGE UP (ref 2–14)
NEUTROPHILS # BLD AUTO: 4.77 K/UL — SIGNIFICANT CHANGE UP (ref 1.8–7.4)
NEUTROPHILS NFR BLD AUTO: 75.4 % — SIGNIFICANT CHANGE UP (ref 43–77)
NON HDL CHOLESTEROL: 111 MG/DL — SIGNIFICANT CHANGE UP
PHOSPHATE SERPL-MCNC: 1.8 MG/DL — LOW (ref 2.4–4.7)
PHOSPHATE SERPL-MCNC: 2.1 MG/DL — LOW (ref 2.4–4.7)
PLAT MORPH BLD: NORMAL — SIGNIFICANT CHANGE UP
PLATELET # BLD AUTO: 92 K/UL — LOW (ref 150–400)
POTASSIUM SERPL-MCNC: 2.8 MMOL/L — CRITICAL LOW (ref 3.5–5.3)
POTASSIUM SERPL-MCNC: 3.3 MMOL/L — LOW (ref 3.5–5.3)
POTASSIUM SERPL-SCNC: 2.8 MMOL/L — CRITICAL LOW (ref 3.5–5.3)
POTASSIUM SERPL-SCNC: 3.3 MMOL/L — LOW (ref 3.5–5.3)
PROT SERPL-MCNC: 6.7 G/DL — SIGNIFICANT CHANGE UP (ref 6.6–8.7)
RBC # BLD: 4.35 M/UL — SIGNIFICANT CHANGE UP (ref 4.2–5.8)
RBC # FLD: 13.1 % — SIGNIFICANT CHANGE UP (ref 10.3–14.5)
RBC BLD AUTO: NORMAL — SIGNIFICANT CHANGE UP
SARS-COV-2 IGG SERPL QL IA: NEGATIVE — SIGNIFICANT CHANGE UP
SARS-COV-2 IGM SERPL IA-ACNC: 0.08 INDEX — SIGNIFICANT CHANGE UP
SARS-COV-2 RNA SPEC QL NAA+PROBE: SIGNIFICANT CHANGE UP
SMUDGE CELLS # BLD: PRESENT — SIGNIFICANT CHANGE UP
SODIUM SERPL-SCNC: 136 MMOL/L — SIGNIFICANT CHANGE UP (ref 135–145)
SODIUM SERPL-SCNC: 137 MMOL/L — SIGNIFICANT CHANGE UP (ref 135–145)
SPECIMEN SOURCE: SIGNIFICANT CHANGE UP
TRIGL SERPL-MCNC: 361 MG/DL — HIGH
VARIANT LYMPHS # BLD: 3.5 % — SIGNIFICANT CHANGE UP (ref 0–6)
WBC # BLD: 6.32 K/UL — SIGNIFICANT CHANGE UP (ref 3.8–10.5)
WBC # FLD AUTO: 6.32 K/UL — SIGNIFICANT CHANGE UP (ref 3.8–10.5)

## 2020-11-03 PROCEDURE — 99233 SBSQ HOSP IP/OBS HIGH 50: CPT

## 2020-11-03 RX ORDER — MORPHINE SULFATE 50 MG/1
2 CAPSULE, EXTENDED RELEASE ORAL EVERY 6 HOURS
Refills: 0 | Status: DISCONTINUED | OUTPATIENT
Start: 2020-11-03 | End: 2020-11-06

## 2020-11-03 RX ORDER — POTASSIUM PHOSPHATE, MONOBASIC POTASSIUM PHOSPHATE, DIBASIC 236; 224 MG/ML; MG/ML
30 INJECTION, SOLUTION INTRAVENOUS ONCE
Refills: 0 | Status: COMPLETED | OUTPATIENT
Start: 2020-11-03 | End: 2020-11-03

## 2020-11-03 RX ORDER — SODIUM,POTASSIUM PHOSPHATES 278-250MG
1 POWDER IN PACKET (EA) ORAL
Refills: 0 | Status: COMPLETED | OUTPATIENT
Start: 2020-11-03 | End: 2020-11-04

## 2020-11-03 RX ORDER — SODIUM CHLORIDE 9 MG/ML
1000 INJECTION, SOLUTION INTRAVENOUS
Refills: 0 | Status: DISCONTINUED | OUTPATIENT
Start: 2020-11-03 | End: 2020-11-05

## 2020-11-03 RX ORDER — POTASSIUM CHLORIDE 20 MEQ
20 PACKET (EA) ORAL
Refills: 0 | Status: COMPLETED | OUTPATIENT
Start: 2020-11-03 | End: 2020-11-03

## 2020-11-03 RX ORDER — ONDANSETRON 8 MG/1
4 TABLET, FILM COATED ORAL ONCE
Refills: 0 | Status: DISCONTINUED | OUTPATIENT
Start: 2020-11-03 | End: 2020-11-06

## 2020-11-03 RX ORDER — POTASSIUM CHLORIDE 20 MEQ
40 PACKET (EA) ORAL ONCE
Refills: 0 | Status: COMPLETED | OUTPATIENT
Start: 2020-11-03 | End: 2020-11-03

## 2020-11-03 RX ORDER — POTASSIUM CHLORIDE 20 MEQ
20 PACKET (EA) ORAL ONCE
Refills: 0 | Status: COMPLETED | OUTPATIENT
Start: 2020-11-03 | End: 2020-11-03

## 2020-11-03 RX ORDER — MAGNESIUM SULFATE 500 MG/ML
2 VIAL (ML) INJECTION ONCE
Refills: 0 | Status: COMPLETED | OUTPATIENT
Start: 2020-11-03 | End: 2020-11-03

## 2020-11-03 RX ORDER — INFLUENZA VIRUS VACCINE 15; 15; 15; 15 UG/.5ML; UG/.5ML; UG/.5ML; UG/.5ML
0.5 SUSPENSION INTRAMUSCULAR ONCE
Refills: 0 | Status: COMPLETED | OUTPATIENT
Start: 2020-11-03 | End: 2020-11-06

## 2020-11-03 RX ADMIN — ONDANSETRON 8 MILLIGRAM(S): 8 TABLET, FILM COATED ORAL at 05:41

## 2020-11-03 RX ADMIN — Medication 1 PATCH: at 12:43

## 2020-11-03 RX ADMIN — Medication 50 GRAM(S): at 10:44

## 2020-11-03 RX ADMIN — PANTOPRAZOLE SODIUM 40 MILLIGRAM(S): 20 TABLET, DELAYED RELEASE ORAL at 08:29

## 2020-11-03 RX ADMIN — MORPHINE SULFATE 2 MILLIGRAM(S): 50 CAPSULE, EXTENDED RELEASE ORAL at 21:18

## 2020-11-03 RX ADMIN — HEPARIN SODIUM 5000 UNIT(S): 5000 INJECTION INTRAVENOUS; SUBCUTANEOUS at 17:27

## 2020-11-03 RX ADMIN — Medication 2 MILLIGRAM(S): at 01:50

## 2020-11-03 RX ADMIN — Medication 100 MILLIGRAM(S): at 08:29

## 2020-11-03 RX ADMIN — Medication 40 MILLIEQUIVALENT(S): at 17:53

## 2020-11-03 RX ADMIN — Medication 50 MILLIEQUIVALENT(S): at 10:44

## 2020-11-03 RX ADMIN — Medication 1 MILLIGRAM(S): at 08:29

## 2020-11-03 RX ADMIN — Medication 2 MILLIGRAM(S): at 05:42

## 2020-11-03 RX ADMIN — SODIUM CHLORIDE 150 MILLILITER(S): 9 INJECTION, SOLUTION INTRAVENOUS at 00:00

## 2020-11-03 RX ADMIN — Medication 1 PACKET(S): at 17:58

## 2020-11-03 RX ADMIN — Medication 1 TABLET(S): at 17:28

## 2020-11-03 RX ADMIN — Medication 20 MILLIEQUIVALENT(S): at 12:46

## 2020-11-03 RX ADMIN — Medication 1.5 MILLIGRAM(S): at 10:44

## 2020-11-03 RX ADMIN — ONDANSETRON 8 MILLIGRAM(S): 8 TABLET, FILM COATED ORAL at 12:45

## 2020-11-03 RX ADMIN — HEPARIN SODIUM 5000 UNIT(S): 5000 INJECTION INTRAVENOUS; SUBCUTANEOUS at 05:42

## 2020-11-03 RX ADMIN — Medication 1 TABLET(S): at 05:42

## 2020-11-03 RX ADMIN — SODIUM CHLORIDE 75 MILLILITER(S): 9 INJECTION, SOLUTION INTRAVENOUS at 21:47

## 2020-11-03 RX ADMIN — Medication 1.5 MILLIGRAM(S): at 17:28

## 2020-11-03 RX ADMIN — SODIUM CHLORIDE 150 MILLILITER(S): 9 INJECTION, SOLUTION INTRAVENOUS at 05:41

## 2020-11-03 RX ADMIN — Medication 1 TABLET(S): at 08:29

## 2020-11-03 RX ADMIN — POTASSIUM PHOSPHATE, MONOBASIC POTASSIUM PHOSPHATE, DIBASIC 83.33 MILLIMOLE(S): 236; 224 INJECTION, SOLUTION INTRAVENOUS at 17:27

## 2020-11-03 RX ADMIN — Medication 20 MILLIEQUIVALENT(S): at 10:43

## 2020-11-03 RX ADMIN — Medication 1 PATCH: at 08:29

## 2020-11-03 NOTE — PROGRESS NOTE ADULT - ASSESSMENT
Pt is a 44yo M w PMH of EtOH, chronic diarrhea, MS not on medication present to ED with 1 day history of sever epigastric pain. Pt states since Covid pandemic he got laid off from work and has been drinking half bottle of Whiskey almost daily, last drink was yesterday (11/2) at noon. Pt states he started having 8/10 sharp and dull epigastric pain that occasional radiating to the back. Pt had nausea, no vomiting. No changes in chronic diarrhea. Pt states he also has hx of fatty liver disease since childhood. Pt denies of any fever, chills, recent sickness or travel.     *Acute alcohol induced pancreatitis   CT showed stranding around tail of pancrease, no evidence of necrosis  s/p Famotidine, Zofran, Morphine and Toradol at ED   s/p 2L NS at ED  GI consult appreciated   Cont clear liquid diet   Morphine PRN for pain   cont Zofran for nausea  cont  cc/hr    *ETOH  CIWA protocol with ativan felicia  Cont Folic acid, Thiamine, Multivitamin   Seizure and fall precaution     *Elevated LFT   f/u CMP in the am   f/u acute hepatic panel     *Enterocolitis/chronic diarrhea   Cont Protonix   f/u GI stool PCR   acidophilus     *smoking   Nicotine patch     *Hypokalemia  s/p repletion   f/u in the am     *DVT ppx   Heparin SQ     *Code status   Full code        Pt is a 46yo M w PMH of EtOH, chronic diarrhea, MS not on medication present to ED with 1 day history of sever epigastric pain. Pt states since Covid pandemic he got laid off from work and has been drinking half bottle of Whiskey almost daily, last drink was yesterday (11/2) at noon. Pt states he started having 8/10 sharp and dull epigastric pain that occasional radiating to the back. Pt had nausea, no vomiting. No changes in chronic diarrhea. Pt states he also has hx of fatty liver disease since childhood. Pt denies of any fever, chills, recent sickness or travel.     *Acute alcohol induced pancreatitis   CT showed stranding around tail of pancrease, no evidence of necrosis  s/p Famotidine, Zofran, Morphine and Toradol at ED   s/p 2L NS at ED  GI consult appreciated   Cont clear liquid diet   Morphine PRN for pain   cont Zofran for nausea  cont  cc/hr    *ETOH  CIWA protocol with ativan felicia  Cont Folic acid, Thiamine, Multivitamin   Seizure and fall precaution     *Elevated LFTs, improving  negative hep panel   - LFTs daily    *Enterocolitis/chronic diarrhea   Cont Protonix   f/u GI stool PCR   acidophilus     *smoking   Nicotine patch     *Hypokalemia, hypomagnesemia and hypophosphatemia  s/p repletion again today will recheck this afternoon    *DVT ppx   Heparin SQ     *Code status   Full code        Pt is a 44yo M w PMH of EtOH, chronic diarrhea, MS not on medication present to ED with 1 day history of sever epigastric pain. Pt states since Covid pandemic he got laid off from work and has been drinking half bottle of Whiskey almost daily, last drink was yesterday (11/2) at noon. Pt states he started having 8/10 sharp and dull epigastric pain that occasional radiating to the back. Pt had nausea, no vomiting. No changes in chronic diarrhea. Pt states he also has hx of fatty liver disease since childhood. Pt denies of any fever, chills, recent sickness or travel.     *Acute alcohol induced pancreatitis   CT showed stranding around tail of pancrease, no evidence of necrosis  s/p Famotidine, Zofran, Morphine and Toradol at ED   s/p 2L NS at ED  Advance to regular diet  AST/ALT, amylase/lipase improving  Morphine PRN for pain   cont Zofran for nausea  cont LR 75 cc/hr    *ETOH  Latest CIWA score 5  CIWA protocol with ativan taper  Cont Folic acid, Thiamine, Multivitamin   Seizure and fall precaution     *Elevated LFTs, improving  negative hep panel   - LFTs daily, improving    *Enterocolitis/chronic diarrhea   Cont Protonix   f/u GI stool PCR   acidophilus     *smoking   Nicotine patch     *Hypokalemia, hypomagnesemia and hypophosphatemia  s/p repletion again today will recheck this afternoon    *DVT ppx   Heparin SQ     *Code status   Full code

## 2020-11-03 NOTE — PROGRESS NOTE ADULT - SUBJECTIVE AND OBJECTIVE BOX
Chief Complaint: This is a 45y old man patient being seen in follow-up consultation for abdominal pain.    HPI / 24H events:  Patient still complaining of diffuse abdominal pain.      ROS: A 14-point review of systems was reviewed and was otherwise negative save what was reported in the HPI.    PAST MEDICAL/SURGICAL HISTORY:  Chronic diarrhea  Multiple sclerosis  S/P cholecystectomy    MEDICATIONS  (STANDING):  folic acid 1 milliGRAM(s) Oral daily  heparin   Injectable 5000 Unit(s) SubCutaneous every 12 hours  influenza   Vaccine 0.5 milliLiter(s) IntraMuscular once  lactated ringers. 1000 milliLiter(s) (150 mL/Hr) IV Continuous <Continuous>  lactobacillus acidophilus 1 Tablet(s) Oral two times a day  LORazepam     Tablet 1.5 milliGRAM(s) Oral every 4 hours  LORazepam     Tablet   Oral   multivitamin 1 Tablet(s) Oral daily  nicotine -  14 mG/24Hr(s) Patch 1 patch Transdermal daily  pantoprazole  Injectable 40 milliGRAM(s) IV Push daily  potassium phosphate IVPB 30 milliMole(s) IV Intermittent once  thiamine 100 milliGRAM(s) Oral daily    MEDICATIONS  (PRN):  morphine  - Injectable 2 milliGRAM(s) IV Push every 6 hours PRN Severe Pain (7 - 10)  ondansetron Injectable 4 milliGRAM(s) IV Push once PRN Nausea and/or Vomiting    No Known Allergies    T(C): 36.7 (11-03-20 @ 11:15), Max: 37.7 (11-03-20 @ 04:00)  HR: 93 (11-03-20 @ 11:15) (89 - 106)  BP: 125/74 (11-03-20 @ 11:15) (125/74 - 158/90)  RR: 18 (11-03-20 @ 11:15) (18 - 19)  SpO2: 96% (11-03-20 @ 11:15) (95% - 98%)    I&O's Summary    02 Nov 2020 07:01  -  03 Nov 2020 07:00  --------------------------------------------------------  IN: 1680 mL / OUT: 0 mL / NET: 1680 mL    03 Nov 2020 07:01 - 03 Nov 2020 16:01  --------------------------------------------------------  IN: 0 mL / OUT: 700 mL / NET: -700 mL      PHYSICAL EXAM:  Constitutional: Well-developed, well-nourished, in no apparent distress  Eyes: Sclerae icteric, conjunctivae normal  ENMT: Mucus membranes moist, no oropharyngeal thrush noted  Neck: No thyroid nodules appreciated, no significant cervical or supraclavicular lymphadenopathy  Respiratory: Breathing nonlabored; clear to auscultation  Cardiovascular: Regular rate and rhythm  Gastrointestinal: Epigastric tenderness, nondistended, normoactive bowel sounds; no hepatosplenomegaly appreciated; no rebound tenderness or involuntary guarding  Rectal: Perianal exam within normal limits; normal sphincter tone; brown stool on glove  Extremities: No clubbing, cyanosis or edema  Neurological: Alert and oriented to person, place and time; no asterixis  Skin: No jaundice  Lymph Nodes: No significant lymphadenopathy  Musculoskeletal: No significant peripheral atrophy  Psychiatric: Affect and mood appropriate                   14.6   6.32  )-----------( 92       ( 11-03 @ 08:18 )             40.8                15.9   7.64  )-----------( 124      ( 11-02 @ 08:42 )             43.7                17.2   8.03  )-----------( 162      ( 11-01 @ 23:57 )             47.2       11-03    136  |  93<L>  |  5.0<L>  ----------------------------<  116<H>  2.8<LL>   |  29.0  |  0.50    Ca    8.6      03 Nov 2020 08:18  Phos  1.8     11-03  Mg     1.7     11-03    TPro  6.7  /  Alb  3.5  /  TBili  4.0<H>  /  DBili  x   /  AST  193<H>  /  ALT  120<H>  /  AlkPhos  179<H>  11-03    LIVER FUNCTIONS - ( 03 Nov 2020 08:18 )  Alb: 3.5 g/dL / Pro: 6.7 g/dL / ALK PHOS: 179 U/L / ALT: 120 U/L / AST: 193 U/L / GGT: x           Amylase, Serum Total: 37 U/L (11-03-20 @ 08:18)  Amylase, Serum Total: 40 U/L (11-02-20 @ 02:59)    Lipase, Serum: 13 U/L (11-03-20 @ 08:18)  Lipase, Serum: 24 U/L (11-02-20 @ 08:42)  Lipase, Serum: 30 U/L (11-01-20 @ 23:57)        Culture - Urine (collected 02 Nov 2020 06:55)  Source: .Urine Clean Catch (Midstream)  Final Report (03 Nov 2020 10:43):    No growth      IMAGING: I personally reviewed the [XXXXXX], and I agree with the radiologist's interpretation as described below:   Chief Complaint: This is a 45y old man patient being seen in follow-up consultation for abdominal pain.    HPI / 24H events:  Patient still complaining of diffuse abdominal pain.      ROS: A 14-point review of systems was reviewed and was otherwise negative save what was reported in the HPI.    PAST MEDICAL/SURGICAL HISTORY:  Chronic diarrhea  Multiple sclerosis  S/P cholecystectomy    MEDICATIONS  (STANDING):  folic acid 1 milliGRAM(s) Oral daily  heparin   Injectable 5000 Unit(s) SubCutaneous every 12 hours  influenza   Vaccine 0.5 milliLiter(s) IntraMuscular once  lactated ringers. 1000 milliLiter(s) (150 mL/Hr) IV Continuous <Continuous>  lactobacillus acidophilus 1 Tablet(s) Oral two times a day  LORazepam     Tablet 1.5 milliGRAM(s) Oral every 4 hours  LORazepam     Tablet   Oral   multivitamin 1 Tablet(s) Oral daily  nicotine -  14 mG/24Hr(s) Patch 1 patch Transdermal daily  pantoprazole  Injectable 40 milliGRAM(s) IV Push daily  potassium phosphate IVPB 30 milliMole(s) IV Intermittent once  thiamine 100 milliGRAM(s) Oral daily    MEDICATIONS  (PRN):  morphine  - Injectable 2 milliGRAM(s) IV Push every 6 hours PRN Severe Pain (7 - 10)  ondansetron Injectable 4 milliGRAM(s) IV Push once PRN Nausea and/or Vomiting    No Known Allergies    T(C): 36.7 (11-03-20 @ 11:15), Max: 37.7 (11-03-20 @ 04:00)  HR: 93 (11-03-20 @ 11:15) (89 - 106)  BP: 125/74 (11-03-20 @ 11:15) (125/74 - 158/90)  RR: 18 (11-03-20 @ 11:15) (18 - 19)  SpO2: 96% (11-03-20 @ 11:15) (95% - 98%)    I&O's Summary    02 Nov 2020 07:01  -  03 Nov 2020 07:00  --------------------------------------------------------  IN: 1680 mL / OUT: 0 mL / NET: 1680 mL    03 Nov 2020 07:01 - 03 Nov 2020 16:01  --------------------------------------------------------  IN: 0 mL / OUT: 700 mL / NET: -700 mL      PHYSICAL EXAM:  Constitutional: Well-developed, well-nourished, in no apparent distress  Eyes: Sclerae icteric, conjunctivae normal  ENMT: Mucus membranes moist, no oropharyngeal thrush noted  Neck: No thyroid nodules appreciated, no significant cervical or supraclavicular lymphadenopathy  Respiratory: Breathing nonlabored; clear to auscultation  Cardiovascular: Regular rate and rhythm  Gastrointestinal: Epigastric tenderness, nondistended, normoactive bowel sounds; no hepatosplenomegaly appreciated; no rebound tenderness or involuntary guarding  Extremities: No clubbing, cyanosis or edema  Neurological: Alert and oriented to person, place and time; no asterixis  Skin: No jaundice  Lymph Nodes: No significant lymphadenopathy  Musculoskeletal: No significant peripheral atrophy  Psychiatric: Affect and mood appropriate                   14.6   6.32  )-----------( 92       ( 11-03 @ 08:18 )             40.8                15.9   7.64  )-----------( 124      ( 11-02 @ 08:42 )             43.7                17.2   8.03  )-----------( 162      ( 11-01 @ 23:57 )             47.2       11-03    136  |  93<L>  |  5.0<L>  ----------------------------<  116<H>  2.8<LL>   |  29.0  |  0.50    Ca    8.6      03 Nov 2020 08:18  Phos  1.8     11-03  Mg     1.7     11-03    TPro  6.7  /  Alb  3.5  /  TBili  4.0<H>  /  DBili  x   /  AST  193<H>  /  ALT  120<H>  /  AlkPhos  179<H>  11-03    LIVER FUNCTIONS - ( 03 Nov 2020 08:18 )  Alb: 3.5 g/dL / Pro: 6.7 g/dL / ALK PHOS: 179 U/L / ALT: 120 U/L / AST: 193 U/L / GGT: x           Amylase, Serum Total: 37 U/L (11-03-20 @ 08:18)  Amylase, Serum Total: 40 U/L (11-02-20 @ 02:59)    Lipase, Serum: 13 U/L (11-03-20 @ 08:18)  Lipase, Serum: 24 U/L (11-02-20 @ 08:42)  Lipase, Serum: 30 U/L (11-01-20 @ 23:57)        Culture - Urine (collected 02 Nov 2020 06:55)  Source: .Urine Clean Catch (Midstream)  Final Report (03 Nov 2020 10:43):    No growth      IMAGING: I personally reviewed the CT, and I agree with the radiologist's interpretation as described below:    < from: CT Abdomen and Pelvis w/ IV Cont (11.02.20 @ 01:23) >   EXAM:  CT ABDOMEN AND PELVIS IC                          PROCEDURE DATE:  11/02/2020          INTERPRETATION:  CLINICAL INFORMATION: Abdominal pain.    PROCEDURE:  Helical axial images were obtained from the domes of the diaphragm through the pubic symphysis following the administration of intravenous contrast. 92 mls of Omnipaque-300 administered without complication. Coronal and sagittal reformats were also obtained.    COMPARISON: 3/29/2019.    FINDINGS:    LOWER CHEST: Unremarkable.    LIVER: Low-attenuation, likely fatty infiltration. Subcentimeter hypodense foci, too small to characterize.  GALLBLADDER/BILE DUCTS: No intrahepatic or extrahepatic biliary dilatation. Cholecystectomy.  PANCREAS: Nonspecific stranding surrounding the tail.  SPLEEN: Decreased size of the splenic lesions since prior study. Contour deformity, which may be due to old infarct/trauma.    ADRENALS: Unremarkable right adrenal gland. Stable mild left adrenal thickening.  KIDNEYS/URETERS: No hydronephrosis, hydroureter or significant perinephric stranding.  BLADDER: Partially distended. Diffusely prominent wall.  REPRODUCTIVE ORGANS: Enlarged prostate.    BOWEL: No bowel obstruction. Unremarkable appendix. Persistent diffuse colon wall thickening, especially sigmoid colon and rectum. Submucosal fat scattered throughout the colon and rectum. Prominent wall of the proximal small bowel loops.  PERITONEUM: No drainable fluid collection or free air.  VESSELS: Atherosclerosis. Normal caliber of the abdominalaorta.  RETROPERITONEUM: No lymphadenopathy.  ABDOMINAL WALL/SOFT TISSUES: Tiny fat-containing umbilical hernia.  BONES: Degenerative changes of the spine.    IMPRESSION:    Nonspecific peripancreatic stranding near the pancreatic tail, which can be seen in pancreatitis. Recommend clinical correlation for further evaluation.    Persistent diffuse colon wall thickening, especially sigmoid colon and rectum. Prominent wall of the proximal small bowel loops. These findings may be due to partial distention. Recommend clinical correlation to assess enterocolitis (infectious or inflammatory).    Diffusely prominent bladder wall, which may be due to partial distention. Recommend clinical correlation to assess urinary tract infection.    Additional findings as described.    < end of copied text >

## 2020-11-03 NOTE — PROGRESS NOTE ADULT - ASSESSMENT
It is not clear to me that this presentation is secondary to acute pancreatitis.  Fairly innocent CT findings and without serologic lipase elevation.  This may be alcoholic hepatitis.  Recommend advancing diet as tolerated and monitoring liver chemistries.  Patient needs alcohol abstinence counseling.  Will continue to follow.

## 2020-11-03 NOTE — PROGRESS NOTE ADULT - SUBJECTIVE AND OBJECTIVE BOX
Incidents and Overnight Events     CONSTITUTIONAL: No fever, chills, fatigue.  RESPIRATORY: No cough, SOB, phlegm  CARDIOVASCULAR: No chest pain, palpitations  GASTROINTESTINAL: No abdominal pain, No nausea or vomiting.  NEUROLOGICAL: No headaches,  loss of strength.  MISCELLANEOUS: No joint swelling or pain.    PHYSICAL EXAM:    Vital Signs Last 24 Hrs  T(C): 36.7 (2020 11:15), Max: 37.7 (2020 04:00)  T(F): 98.1 (2020 11:15), Max: 99.9 (2020 04:00)  HR: 93 (2020 11:15) (89 - 106)  BP: 125/74 (2020 11:15) (125/74 - 161/91)  BP(mean): --  RR: 18 (:15) (18 - 19)  SpO2: 96% (:15) (94% - 98%)    GENERAL: Pt lying comfortably, NAD.  ENMT: PERRL, +EOMI.  NECK: soft, Supple, No JVD,   CHEST/LUNG: Clear to auscultatation bilaterally; No wheezing.  HEART: S1S2+, Regular rate and rhythm; No murmurs.  ABDOMEN: Soft, Nontender, Nondistended; Bowel sounds present.  MUSCULOSKELETAL: Normal range of motion.  SKIN: No rashes or lesions.  NEURO: AAOX3, no focal deficits, no motor r sensory loss.  PSYCH: normal mood.    LABS:                        14.6   6.32  )-----------( 92       ( 2020 08:18 )             40.8         136  |  93<L>  |  5.0<L>  ----------------------------<  116<H>  2.8<LL>   |  29.0  |  0.50    Ca    8.6      2020 08:18  Phos  1.8       Mg     1.7         TPro  6.7  /  Alb  3.5  /  TBili  4.0<H>  /  DBili  x   /  AST  193<H>  /  ALT  120<H>  /  AlkPhos  179<H>      LIVER FUNCTIONS - ( 2020 08:18 )  Alb: 3.5 g/dL / Pro: 6.7 g/dL / ALK PHOS: 179 U/L / ALT: 120 U/L / AST: 193 U/L / GGT: x           Urinalysis Basic - ( 2020 02:25 )    Color: Yellow / Appearance: Clear / S.010 / pH: x  Gluc: x / Ketone: Negative  / Bili: Negative / Urobili: 4 mg/dL   Blood: x / Protein: Negative mg/dL / Nitrite: Negative   Leuk Esterase: Negative / RBC: x / WBC x   Sq Epi: x / Non Sq Epi: x / Bacteria: x      CARDIAC MARKERS ( 2020 23:57 )  x     / <0.01 ng/mL / x     / x     / x        < from: CT Abdomen and Pelvis w/ IV Cont (20 @ 01:23) >  IMPRESSION:    Nonspecific peripancreatic stranding near the pancreatic tail, which can be seen in pancreatitis. Recommend clinical correlation for further evaluation.    Persistent diffuse colon wall thickening, especially sigmoid colon and rectum. Prominent wall of the proximal small bowel loops. These findings may be due to partial distention. Recommend clinical correlation to assess enterocolitis (infectious or inflammatory).    Diffusely prominent bladder wall, which may be due to partial distention. Recommend clinical correlation to assess urinary tract infection.    Additional findings as described.    < end of copied text >    MEDICATIONS  (STANDING):  folic acid 1 milliGRAM(s) Oral daily  heparin   Injectable 5000 Unit(s) SubCutaneous every 12 hours  influenza   Vaccine 0.5 milliLiter(s) IntraMuscular once  lactated ringers. 1000 milliLiter(s) (150 mL/Hr) IV Continuous <Continuous>  lactobacillus acidophilus 1 Tablet(s) Oral two times a day  LORazepam     Tablet 1.5 milliGRAM(s) Oral every 4 hours  LORazepam     Tablet   Oral   multivitamin 1 Tablet(s) Oral daily  nicotine -  14 mG/24Hr(s) Patch 1 patch Transdermal daily  ondansetron Injectable 8 milliGRAM(s) IV Push every 8 hours  pantoprazole  Injectable 40 milliGRAM(s) IV Push daily  potassium phosphate IVPB 30 milliMole(s) IV Intermittent once  thiamine 100 milliGRAM(s) Oral daily    MEDICATIONS  (PRN):  morphine  - Injectable 2 milliGRAM(s) IV Push every 6 hours PRN Severe Pain (7 - 10)       Incidents and Overnight Events    No overnight events reported. Patient denies headache, dizziness, vision changes, tremors, N/V at this time. Main c/o at this time is fatigue.     CONSTITUTIONAL: No fever, chills, fatigue.  RESPIRATORY: + cough, min productive white, no change from baseline, no SOB  CARDIOVASCULAR: No chest pain, palpitations  GASTROINTESTINAL: No abdominal pain, No nausea or vomiting.  NEUROLOGICAL: No headaches,  loss of strength.  MISCELLANEOUS: No joint swelling or pain.    PHYSICAL EXAM:    Vital Signs Last 24 Hrs  T(C): 36.7 (03 Nov 2020 11:15), Max: 37.7 (03 Nov 2020 04:00)  T(F): 98.1 (03 Nov 2020 11:15), Max: 99.9 (03 Nov 2020 04:00)  HR: 93 (03 Nov 2020 11:15) (89 - 106)  BP: 125/74 (03 Nov 2020 11:15) (125/74 - 161/91)  BP(mean): --  RR: 18 (03 Nov 2020 11:15) (18 - 19)  SpO2: 96% (03 Nov 2020 11:15) (94% - 98%)    GENERAL: Pt lying comfortably, NAD. Sleeping but easily aroused.   ENMT: PERRL, +EOMI.  NECK: soft, Supple, No JVD,   CHEST/LUNG: Clear to auscultation bilaterally; No wheezing.  HEART: S1S2+, Regular rate and rhythm; No murmurs.  ABDOMEN: Soft, Nontender, Nondistended; Bowel sounds present.  MUSCULOSKELETAL: Normal range of motion.  SKIN: warm, dry  NEURO: AAOX3, no focal deficits  PSYCH: flat affect    LABS:                        14.6   6.32  )-----------( 92       ( 03 Nov 2020 08:18 )             40.8     11-03    136  |  93<L>  |  5.0<L>  ----------------------------<  116<H>  2.8<LL>   |  29.0  |  0.50    Ca    8.6      03 Nov 2020 08:18  Phos  1.8     11-03  Mg     1.7     11-03    TPro  6.7  /  Alb  3.5  /  TBili  4.0<H>  /  DBili  x   /  AST  193<H>  /  ALT  120<H>  /  AlkPhos  179<H>  11-03    LIVER FUNCTIONS - ( 03 Nov 2020 08:18 )  Alb: 3.5 g/dL  Amylase, Serum Total: 37 U/L (11.03.20 @ 08:18)   Amylase, Serum Total: 40 U/L (11.02.20 @ 02:59)   Lipase, Serum: 13 U/L (11.03.20 @ 08:18)   Lipase, Serum: 24 U/L (11.02.20 @ 08:42)   Lipase, Serum: 30 U/L (11.01.20 @ 23:57)   Liver Biochemical Testing Trend:  Alanine Aminotransferase (ALT/SGPT): 120 (11-03-20 @ 08:18)  Alanine Aminotransferase (ALT/SGPT): 164 (11-02-20 @ 08:42)  Alanine Aminotransferase (ALT/SGPT): 164 (11-02-20 @ 08:42)  Alanine Aminotransferase (ALT/SGPT): 191 (11-01-20 @ 23:57)    Aspartate Aminotransferase (AST/SGOT): 193 (11-03-20 @ 08:18)  Aspartate Aminotransferase (AST/SGOT): 386 (11-02-20 @ 08:42)  Aspartate Aminotransferase (AST/SGOT): 386 (11-02-20 @ 08:42)  Aspartate Aminotransferase (AST/SGOT): 545 (11-01-20 @ 23:57)    Bilirubin Direct, Serum: 1.1 (11-02-20 @ 08:42)  Bilirubin: Negative (11-02-20 @ 02:25)    Culture - Urine (11.02.20 @ 06:55)   Culture Results: No growth   COVID-19 PCR: NotDetec (02 Nov 2020 23:02)  COVID-19 PCR: NotDetec (02 Nov 2020 18:42)    MEDICATIONS  (STANDING):  folic acid 1 milliGRAM(s) Oral daily  heparin   Injectable 5000 Unit(s) SubCutaneous every 12 hours  influenza   Vaccine 0.5 milliLiter(s) IntraMuscular once  lactated ringers. 1000 milliLiter(s) (150 mL/Hr) IV Continuous <Continuous>  lactobacillus acidophilus 1 Tablet(s) Oral two times a day  LORazepam     Tablet 1.5 milliGRAM(s) Oral every 4 hours  LORazepam     Tablet   Oral   multivitamin 1 Tablet(s) Oral daily  nicotine -  14 mG/24Hr(s) Patch 1 patch Transdermal daily  ondansetron Injectable 8 milliGRAM(s) IV Push every 8 hours  pantoprazole  Injectable 40 milliGRAM(s) IV Push daily  potassium phosphate IVPB 30 milliMole(s) IV Intermittent once  thiamine 100 milliGRAM(s) Oral daily    MEDICATIONS  (PRN):  morphine  - Injectable 2 milliGRAM(s) IV Push every 6 hours PRN Severe Pain (7 - 10)

## 2020-11-04 LAB
ALBUMIN SERPL ELPH-MCNC: 3.3 G/DL — SIGNIFICANT CHANGE UP (ref 3.3–5.2)
ALP SERPL-CCNC: 149 U/L — HIGH (ref 40–120)
ALT FLD-CCNC: 80 U/L — HIGH
ANION GAP SERPL CALC-SCNC: 11 MMOL/L — SIGNIFICANT CHANGE UP (ref 5–17)
ANION GAP SERPL CALC-SCNC: 13 MMOL/L — SIGNIFICANT CHANGE UP (ref 5–17)
AST SERPL-CCNC: 93 U/L — HIGH
BILIRUB SERPL-MCNC: 2.4 MG/DL — HIGH (ref 0.4–2)
BUN SERPL-MCNC: 4 MG/DL — LOW (ref 8–20)
BUN SERPL-MCNC: <3 MG/DL — LOW (ref 8–20)
CALCIUM SERPL-MCNC: 8.7 MG/DL — SIGNIFICANT CHANGE UP (ref 8.6–10.2)
CALCIUM SERPL-MCNC: 9.1 MG/DL — SIGNIFICANT CHANGE UP (ref 8.6–10.2)
CHLORIDE SERPL-SCNC: 101 MMOL/L — SIGNIFICANT CHANGE UP (ref 98–107)
CHLORIDE SERPL-SCNC: 97 MMOL/L — LOW (ref 98–107)
CO2 SERPL-SCNC: 26 MMOL/L — SIGNIFICANT CHANGE UP (ref 22–29)
CO2 SERPL-SCNC: 28 MMOL/L — SIGNIFICANT CHANGE UP (ref 22–29)
CREAT SERPL-MCNC: 0.58 MG/DL — SIGNIFICANT CHANGE UP (ref 0.5–1.3)
CREAT SERPL-MCNC: 0.58 MG/DL — SIGNIFICANT CHANGE UP (ref 0.5–1.3)
GLUCOSE SERPL-MCNC: 100 MG/DL — HIGH (ref 70–99)
GLUCOSE SERPL-MCNC: 116 MG/DL — HIGH (ref 70–99)
HCT VFR BLD CALC: 37 % — LOW (ref 39–50)
HGB BLD-MCNC: 13.1 G/DL — SIGNIFICANT CHANGE UP (ref 13–17)
MAGNESIUM SERPL-MCNC: 1.8 MG/DL — SIGNIFICANT CHANGE UP (ref 1.8–2.6)
MCHC RBC-ENTMCNC: 34 PG — SIGNIFICANT CHANGE UP (ref 27–34)
MCHC RBC-ENTMCNC: 35.4 GM/DL — SIGNIFICANT CHANGE UP (ref 32–36)
MCV RBC AUTO: 96.1 FL — SIGNIFICANT CHANGE UP (ref 80–100)
PHOSPHATE SERPL-MCNC: 2.5 MG/DL — SIGNIFICANT CHANGE UP (ref 2.4–4.7)
PLATELET # BLD AUTO: 93 K/UL — LOW (ref 150–400)
POTASSIUM SERPL-MCNC: 2.9 MMOL/L — CRITICAL LOW (ref 3.5–5.3)
POTASSIUM SERPL-MCNC: 3.8 MMOL/L — SIGNIFICANT CHANGE UP (ref 3.5–5.3)
POTASSIUM SERPL-SCNC: 2.9 MMOL/L — CRITICAL LOW (ref 3.5–5.3)
POTASSIUM SERPL-SCNC: 3.8 MMOL/L — SIGNIFICANT CHANGE UP (ref 3.5–5.3)
PROT SERPL-MCNC: 6.4 G/DL — LOW (ref 6.6–8.7)
RBC # BLD: 3.85 M/UL — LOW (ref 4.2–5.8)
RBC # FLD: 13.3 % — SIGNIFICANT CHANGE UP (ref 10.3–14.5)
SODIUM SERPL-SCNC: 138 MMOL/L — SIGNIFICANT CHANGE UP (ref 135–145)
SODIUM SERPL-SCNC: 138 MMOL/L — SIGNIFICANT CHANGE UP (ref 135–145)
WBC # BLD: 6.21 K/UL — SIGNIFICANT CHANGE UP (ref 3.8–10.5)
WBC # FLD AUTO: 6.21 K/UL — SIGNIFICANT CHANGE UP (ref 3.8–10.5)

## 2020-11-04 PROCEDURE — 99233 SBSQ HOSP IP/OBS HIGH 50: CPT

## 2020-11-04 RX ORDER — POTASSIUM CHLORIDE 20 MEQ
10 PACKET (EA) ORAL
Refills: 0 | Status: COMPLETED | OUTPATIENT
Start: 2020-11-04 | End: 2020-11-04

## 2020-11-04 RX ORDER — POTASSIUM CHLORIDE 20 MEQ
40 PACKET (EA) ORAL ONCE
Refills: 0 | Status: COMPLETED | OUTPATIENT
Start: 2020-11-04 | End: 2020-11-04

## 2020-11-04 RX ORDER — PANTOPRAZOLE SODIUM 20 MG/1
40 TABLET, DELAYED RELEASE ORAL
Refills: 0 | Status: DISCONTINUED | OUTPATIENT
Start: 2020-11-04 | End: 2020-11-06

## 2020-11-04 RX ADMIN — Medication 1 PATCH: at 11:32

## 2020-11-04 RX ADMIN — Medication 1 TABLET(S): at 17:24

## 2020-11-04 RX ADMIN — Medication 1 MILLIGRAM(S): at 10:02

## 2020-11-04 RX ADMIN — Medication 100 MILLIEQUIVALENT(S): at 11:32

## 2020-11-04 RX ADMIN — Medication 100 MILLIEQUIVALENT(S): at 08:21

## 2020-11-04 RX ADMIN — HEPARIN SODIUM 5000 UNIT(S): 5000 INJECTION INTRAVENOUS; SUBCUTANEOUS at 17:24

## 2020-11-04 RX ADMIN — Medication 1 MILLIGRAM(S): at 09:38

## 2020-11-04 RX ADMIN — PANTOPRAZOLE SODIUM 40 MILLIGRAM(S): 20 TABLET, DELAYED RELEASE ORAL at 10:02

## 2020-11-04 RX ADMIN — MORPHINE SULFATE 2 MILLIGRAM(S): 50 CAPSULE, EXTENDED RELEASE ORAL at 18:09

## 2020-11-04 RX ADMIN — SODIUM CHLORIDE 75 MILLILITER(S): 9 INJECTION, SOLUTION INTRAVENOUS at 08:21

## 2020-11-04 RX ADMIN — Medication 1.5 MILLIGRAM(S): at 02:58

## 2020-11-04 RX ADMIN — Medication 1 PATCH: at 08:10

## 2020-11-04 RX ADMIN — Medication 40 MILLIEQUIVALENT(S): at 09:38

## 2020-11-04 RX ADMIN — Medication 1 PACKET(S): at 06:51

## 2020-11-04 RX ADMIN — Medication 100 MILLIGRAM(S): at 10:01

## 2020-11-04 RX ADMIN — Medication 1.5 MILLIGRAM(S): at 06:50

## 2020-11-04 RX ADMIN — MORPHINE SULFATE 2 MILLIGRAM(S): 50 CAPSULE, EXTENDED RELEASE ORAL at 23:11

## 2020-11-04 RX ADMIN — Medication 1 TABLET(S): at 06:51

## 2020-11-04 RX ADMIN — Medication 1 TABLET(S): at 10:02

## 2020-11-04 RX ADMIN — Medication 100 MILLIEQUIVALENT(S): at 09:38

## 2020-11-04 RX ADMIN — Medication 1 PATCH: at 08:11

## 2020-11-04 RX ADMIN — Medication 1 MILLIGRAM(S): at 22:21

## 2020-11-04 RX ADMIN — Medication 1 MILLIGRAM(S): at 14:39

## 2020-11-04 RX ADMIN — Medication 1 MILLIGRAM(S): at 17:23

## 2020-11-04 NOTE — PROGRESS NOTE ADULT - SUBJECTIVE AND OBJECTIVE BOX
Chief Complaint: This is a 45y old man patient being seen in follow-up consultation for pancreatitis.    HPI / 24H events:  Patient still experiencing LUQ pain.  Tolerated breakfast without issue - no nausea or abdominal pain.      ROS: A 14-point review of systems was reviewed and was otherwise negative save what was reported in the HPI.    PAST MEDICAL/SURGICAL HISTORY:  Chronic diarrhea  Multiple sclerosis  S/P cholecystectomy    MEDICATIONS  (STANDING):  folic acid 1 milliGRAM(s) Oral daily  heparin   Injectable 5000 Unit(s) SubCutaneous every 12 hours  influenza   Vaccine 0.5 milliLiter(s) IntraMuscular once  lactated ringers. 1000 milliLiter(s) (75 mL/Hr) IV Continuous <Continuous>  lactobacillus acidophilus 1 Tablet(s) Oral two times a day  LORazepam     Tablet 1 milliGRAM(s) Oral every 4 hours  LORazepam     Tablet   Oral   multivitamin 1 Tablet(s) Oral daily  nicotine -  14 mG/24Hr(s) Patch 1 patch Transdermal daily  pantoprazole    Tablet 40 milliGRAM(s) Oral before breakfast    MEDICATIONS  (PRN):  morphine  - Injectable 2 milliGRAM(s) IV Push every 6 hours PRN Severe Pain (7 - 10)  ondansetron Injectable 4 milliGRAM(s) IV Push once PRN Nausea and/or Vomiting    No Known Allergies    T(C): 36.8 (11-04-20 @ 16:13), Max: 37.2 (11-03-20 @ 16:32)  HR: 87 (11-04-20 @ 16:13) (74 - 97)  BP: 150/95 (11-04-20 @ 16:13) (137/88 - 158/97)  RR: 18 (11-04-20 @ 16:13) (18 - 18)  SpO2: 96% (11-04-20 @ 16:13) (94% - 96%)    I&O's Summary    03 Nov 2020 07:01  -  04 Nov 2020 07:00  --------------------------------------------------------  IN: 0 mL / OUT: 700 mL / NET: -700 mL      PHYSICAL EXAM:  Constitutional: Well-developed, well-nourished, in no apparent distress  Eyes: Sclerae mildly icteric, conjunctivae normal  ENMT: Mucus membranes moist, no oropharyngeal thrush noted  Neck: No thyroid nodules appreciated, no significant cervical or supraclavicular lymphadenopathy  Respiratory: Breathing nonlabored; clear to auscultation  Cardiovascular: Regular rate and rhythm  Gastrointestinal: Soft, epigastric tenderness, nondistended, normoactive bowel sounds; no hepatosplenomegaly appreciated; no rebound tenderness or involuntary guarding  Extremities: No clubbing, cyanosis or edema  Neurological: Alert and oriented to person, place and time; no asterixis  Skin: No jaundice  Lymph Nodes: No significant lymphadenopathy  Musculoskeletal: No significant peripheral atrophy  Psychiatric: Affect and mood appropriate                   13.1   6.21  )-----------( 93       ( 11-04 @ 06:59 )             37.0                14.6   6.32  )-----------( 92       ( 11-03 @ 08:18 )             40.8                15.9   7.64  )-----------( 124      ( 11-02 @ 08:42 )             43.7                17.2   8.03  )-----------( 162      ( 11-01 @ 23:57 )             47.2       11-04    138  |  97<L>  |  4.0<L>  ----------------------------<  100<H>  2.9<LL>   |  28.0  |  0.58    Ca    8.7      04 Nov 2020 06:59  Phos  2.5     11-04  Mg     1.8     11-04    TPro  6.4<L>  /  Alb  3.3  /  TBili  2.4<H>  /  DBili  x   /  AST  93<H>  /  ALT  80<H>  /  AlkPhos  149<H>  11-04    LIVER FUNCTIONS - ( 04 Nov 2020 06:59 )  Alb: 3.3 g/dL / Pro: 6.4 g/dL / ALK PHOS: 149 U/L / ALT: 80 U/L / AST: 93 U/L / GGT: x           Amylase, Serum Total: 37 U/L (11-03-20 @ 08:18)  Amylase, Serum Total: 40 U/L (11-02-20 @ 02:59)    Lipase, Serum: 13 U/L (11-03-20 @ 08:18)  Lipase, Serum: 24 U/L (11-02-20 @ 08:42)  Lipase, Serum: 30 U/L (11-01-20 @ 23:57)        Culture - Urine (collected 02 Nov 2020 06:55)  Source: .Urine Clean Catch (Midstream)  Final Report (03 Nov 2020 10:43):    No growth

## 2020-11-04 NOTE — PROGRESS NOTE ADULT - ASSESSMENT
44yo M w PMH of ETOH abuse, chronic diarrhea, MS not on medication present to ED with 1 day history of sever epigastric pain. Pt admitted with acute alcoholic hepatitis, improving on IVF.     1. Acute alcohol hepatitis   CT showed stranding around tail of pancrease, no evidence of pancreatitis or necrosis   AST/ALT, amylase/lipase improving  C/w Famotidine, Zofran, Morphine and Toradol    S/p 2L NS at ED  Cont LR 75 cc/hr  Tolerating regular diet    2. ETOH  Latest CIWA score 4  CIWA protocol with ativan taper  Cont Folic acid, Thiamine, Multivitamin   Seizure and fall precaution     3. Elevated LFTs, improving  Negative hep panel   LFTs daily, improving w/ IVF    4. Enterocolitis/chronic diarrhea   Cont Protonix   GI stool PCR not collected  No active complaints of diarrhea currently, monitor     5. Smoking   Nicotine patch     6. Hypokalemia, hypomagnesemia   s/p repletion again today will recheck this afternoon 3pm    7. Thrombocytopenia  Likely secondary to chronic alcoholism  Plt 93K, monitor    *DVT ppx   Heparin SQ     *Code status   Full code     DISPO: likely dc in 24-48 hrs pending clinical improvement

## 2020-11-04 NOTE — PROGRESS NOTE ADULT - SUBJECTIVE AND OBJECTIVE BOX
OVERNIGHT: no overnight events    Pt seen and examined at bedside  Pt sitting in bed eating breakfast, alert and oriented x 3 but states earlier he thought he was home and his wife was cooking breakfast  Pt complains of headache otherwise no specific complaints  Denies dizziness, vision changes, tremors, N/V, abdominal pain at this time   VSS at bedside    Vital Signs Last 24 Hrs  T(C): 36.6 (04 Nov 2020 07:50), Max: 37.2 (03 Nov 2020 16:32)  T(F): 97.9 (04 Nov 2020 07:50), Max: 99 (03 Nov 2020 16:32)  HR: 88 (04 Nov 2020 07:50) (74 - 97)  BP: 147/96 (04 Nov 2020 07:50) (137/88 - 158/97)  BP(mean): 117 (04 Nov 2020 05:04) (117 - 117)  RR: 18 (04 Nov 2020 07:50) (18 - 18)  SpO2: 96% (04 Nov 2020 07:50) (95% - 96%) on RA    PHYSICAL EXAM:  GENERAL: Pt sitting in bed, eating breakfast, calm  ENMT: PERRL, +EOMI.  NECK: Soft, Supple, No JVD,   CHEST/LUNG: Clear to auscultation bilaterally; No wheezing.  HEART: S1S2+, Regular rate and rhythm; No murmurs.  ABDOMEN: Soft, Nontender, Nondistended; Bowel sounds present.  MUSCULOSKELETAL: Normal range of motion.  SKIN: warm, dry  NEURO: AAOX3, no focal deficits  PSYCH: flat affect    LABS/IMAGING: REVIEWED

## 2020-11-04 NOTE — CHART NOTE - NSCHARTNOTEFT_GEN_A_CORE
Pt + pancreatitis, ETOH abuse, on CIWA protocol  VS B/P 180/110 P 94 T 98.4 R 18 PO 98%  NAD , Pt no h/o HTN. Pt c/o pain  Medicate with Morphine 2 mg as ordered for pain  Reassess B/P in 1 hr Pt + pancreatitis, ETOH abuse, on CIWA protocol  VS B/P 180/110 P 94 T 98.4 R 18 PO 98%  NAD , Pt no h/o HTN. Pt c/o pain  Medicate with Morphine 2 mg as ordered for pain  Reassess B/P in 1 hr    00:00 B/P 180/100 P 64  Pt states his B/P has been this high in the past? not on B/P meds?  Hydralazine 5 mg IVP x 1   Continue to monitor Pt + pancreatitis, ETOH abuse, on CIWA protocol  VS B/P 180/110 P 94 T 98.4 R 18 PO 98%  NAD , Pt no h/o HTN. Pt c/o pain  Medicate with Morphine 2 mg as ordered for pain  Reassess B/P in 1 hr    00:00 B/P 180/100 (manual by myself) P 64  Pt states his B/P has been this high in the past? not on B/P meds?  pt without complaints, denies h/a  Hydralazine 5 mg IVP x 1   Continue to monitor Pt + pancreatitis, ETOH abuse, on CIWA protocol  VS B/P 180/110 P 94 T 98.4 R 18 PO 98%  NAD , Pt no h/o HTN. Pt c/o pain  Medicate with Morphine 2 mg as ordered for pain  Reassess B/P in 1 hr    00:00 B/P 180/100 (manual by myself) P 64  Pt states he has been told by MD in the past that is B/P is high ? not on B/P meds?  pt without complaints, denies h/a  Hydralazine 5 mg IVP x 1   Continue to monitor Pt + pancreatitis, ETOH abuse, on CIWA protocol  VS B/P 180/110 P 94 T 98.4 R 18 PO 98%  NAD , Pt no h/o HTN. Pt c/o pain  Medicate with Morphine 2 mg as ordered for pain  Reassess B/P in 1 hr    00:00 B/P 180/100 (manual by myself) P 64  Pt states he has been told by MD in the past that is B/P is high ? not on B/P meds?  pt without complaints, denies h/a, CIWA 3  Hydralazine 5 mg IVP x 1   Continue to monitor

## 2020-11-04 NOTE — PROGRESS NOTE ADULT - ASSESSMENT
Patient with mild alcoholic pancreatitis and hepatitis.  Tolerating diet.  No indication for steroids, labs improving.  Patient needs alcohol abstinence rehab.  Can likely be discharged tomorrow.

## 2020-11-05 ENCOUNTER — TRANSCRIPTION ENCOUNTER (OUTPATIENT)
Age: 45
End: 2020-11-05

## 2020-11-05 DIAGNOSIS — K52.9 NONINFECTIVE GASTROENTERITIS AND COLITIS, UNSPECIFIED: ICD-10-CM

## 2020-11-05 DIAGNOSIS — F10.230 ALCOHOL DEPENDENCE WITH WITHDRAWAL, UNCOMPLICATED: ICD-10-CM

## 2020-11-05 DIAGNOSIS — R74.01 ELEVATION OF LEVELS OF LIVER TRANSAMINASE LEVELS: ICD-10-CM

## 2020-11-05 LAB
ALBUMIN SERPL ELPH-MCNC: 3.3 G/DL — SIGNIFICANT CHANGE UP (ref 3.3–5.2)
ALP SERPL-CCNC: 155 U/L — HIGH (ref 40–120)
ALT FLD-CCNC: 68 U/L — HIGH
ANION GAP SERPL CALC-SCNC: 10 MMOL/L — SIGNIFICANT CHANGE UP (ref 5–17)
AST SERPL-CCNC: 72 U/L — HIGH
BILIRUB SERPL-MCNC: 1.4 MG/DL — SIGNIFICANT CHANGE UP (ref 0.4–2)
BUN SERPL-MCNC: 3 MG/DL — LOW (ref 8–20)
CALCIUM SERPL-MCNC: 9.2 MG/DL — SIGNIFICANT CHANGE UP (ref 8.6–10.2)
CHLORIDE SERPL-SCNC: 97 MMOL/L — LOW (ref 98–107)
CO2 SERPL-SCNC: 28 MMOL/L — SIGNIFICANT CHANGE UP (ref 22–29)
CREAT SERPL-MCNC: 0.56 MG/DL — SIGNIFICANT CHANGE UP (ref 0.5–1.3)
GLUCOSE SERPL-MCNC: 104 MG/DL — HIGH (ref 70–99)
HCT VFR BLD CALC: 37.7 % — LOW (ref 39–50)
HGB BLD-MCNC: 13 G/DL — SIGNIFICANT CHANGE UP (ref 13–17)
MAGNESIUM SERPL-MCNC: 1.6 MG/DL — SIGNIFICANT CHANGE UP (ref 1.6–2.6)
MCHC RBC-ENTMCNC: 33.8 PG — SIGNIFICANT CHANGE UP (ref 27–34)
MCHC RBC-ENTMCNC: 34.5 GM/DL — SIGNIFICANT CHANGE UP (ref 32–36)
MCV RBC AUTO: 97.9 FL — SIGNIFICANT CHANGE UP (ref 80–100)
PHOSPHATE SERPL-MCNC: 2.5 MG/DL — SIGNIFICANT CHANGE UP (ref 2.4–4.7)
PLATELET # BLD AUTO: 116 K/UL — LOW (ref 150–400)
POTASSIUM SERPL-MCNC: 3.2 MMOL/L — LOW (ref 3.5–5.3)
POTASSIUM SERPL-SCNC: 3.2 MMOL/L — LOW (ref 3.5–5.3)
PROT SERPL-MCNC: 6.6 G/DL — SIGNIFICANT CHANGE UP (ref 6.6–8.7)
RBC # BLD: 3.85 M/UL — LOW (ref 4.2–5.8)
RBC # FLD: 13.3 % — SIGNIFICANT CHANGE UP (ref 10.3–14.5)
SODIUM SERPL-SCNC: 135 MMOL/L — SIGNIFICANT CHANGE UP (ref 135–145)
WBC # BLD: 6.48 K/UL — SIGNIFICANT CHANGE UP (ref 3.8–10.5)
WBC # FLD AUTO: 6.48 K/UL — SIGNIFICANT CHANGE UP (ref 3.8–10.5)

## 2020-11-05 PROCEDURE — 99233 SBSQ HOSP IP/OBS HIGH 50: CPT

## 2020-11-05 PROCEDURE — 99232 SBSQ HOSP IP/OBS MODERATE 35: CPT

## 2020-11-05 RX ORDER — MAGNESIUM SULFATE 500 MG/ML
2 VIAL (ML) INJECTION ONCE
Refills: 0 | Status: COMPLETED | OUTPATIENT
Start: 2020-11-05 | End: 2020-11-05

## 2020-11-05 RX ORDER — MAGNESIUM OXIDE 400 MG ORAL TABLET 241.3 MG
400 TABLET ORAL
Refills: 0 | Status: DISCONTINUED | OUTPATIENT
Start: 2020-11-05 | End: 2020-11-06

## 2020-11-05 RX ORDER — KETOROLAC TROMETHAMINE 30 MG/ML
10 SYRINGE (ML) INJECTION EVERY 6 HOURS
Refills: 0 | Status: DISCONTINUED | OUTPATIENT
Start: 2020-11-05 | End: 2020-11-06

## 2020-11-05 RX ORDER — HYDRALAZINE HCL 50 MG
10 TABLET ORAL ONCE
Refills: 0 | Status: COMPLETED | OUTPATIENT
Start: 2020-11-05 | End: 2020-11-05

## 2020-11-05 RX ORDER — HYDRALAZINE HCL 50 MG
5 TABLET ORAL ONCE
Refills: 0 | Status: COMPLETED | OUTPATIENT
Start: 2020-11-05 | End: 2020-11-05

## 2020-11-05 RX ORDER — POTASSIUM CHLORIDE 20 MEQ
40 PACKET (EA) ORAL EVERY 4 HOURS
Refills: 0 | Status: COMPLETED | OUTPATIENT
Start: 2020-11-05 | End: 2020-11-05

## 2020-11-05 RX ADMIN — Medication 1 TABLET(S): at 17:47

## 2020-11-05 RX ADMIN — Medication 1 MILLIGRAM(S): at 05:43

## 2020-11-05 RX ADMIN — Medication 1 MILLIGRAM(S): at 12:51

## 2020-11-05 RX ADMIN — HEPARIN SODIUM 5000 UNIT(S): 5000 INJECTION INTRAVENOUS; SUBCUTANEOUS at 17:47

## 2020-11-05 RX ADMIN — Medication 40 MILLIEQUIVALENT(S): at 14:16

## 2020-11-05 RX ADMIN — Medication 50 GRAM(S): at 14:16

## 2020-11-05 RX ADMIN — MAGNESIUM OXIDE 400 MG ORAL TABLET 400 MILLIGRAM(S): 241.3 TABLET ORAL at 17:49

## 2020-11-05 RX ADMIN — HEPARIN SODIUM 5000 UNIT(S): 5000 INJECTION INTRAVENOUS; SUBCUTANEOUS at 05:42

## 2020-11-05 RX ADMIN — Medication 10 MILLIGRAM(S): at 18:31

## 2020-11-05 RX ADMIN — Medication 40 MILLIEQUIVALENT(S): at 11:53

## 2020-11-05 RX ADMIN — Medication 0.5 MILLIGRAM(S): at 17:49

## 2020-11-05 RX ADMIN — Medication 1 TABLET(S): at 05:43

## 2020-11-05 RX ADMIN — Medication 0.5 MILLIGRAM(S): at 14:18

## 2020-11-05 RX ADMIN — Medication 0.5 MILLIGRAM(S): at 21:49

## 2020-11-05 RX ADMIN — PANTOPRAZOLE SODIUM 40 MILLIGRAM(S): 20 TABLET, DELAYED RELEASE ORAL at 05:43

## 2020-11-05 RX ADMIN — Medication 10 MILLIGRAM(S): at 06:58

## 2020-11-05 RX ADMIN — Medication 1 TABLET(S): at 12:51

## 2020-11-05 RX ADMIN — Medication 1 PATCH: at 12:50

## 2020-11-05 RX ADMIN — Medication 0.5 MILLIGRAM(S): at 10:55

## 2020-11-05 RX ADMIN — Medication 1 PATCH: at 21:49

## 2020-11-05 RX ADMIN — MORPHINE SULFATE 2 MILLIGRAM(S): 50 CAPSULE, EXTENDED RELEASE ORAL at 00:15

## 2020-11-05 RX ADMIN — Medication 1 MILLIGRAM(S): at 02:12

## 2020-11-05 RX ADMIN — Medication 5 MILLIGRAM(S): at 00:46

## 2020-11-05 NOTE — DISCHARGE NOTE PROVIDER - HOSPITAL COURSE
Pt is a 46yo M w PMH of ETOH abuse, chronic diarrhea, MS not on medication present to ED with 1 day history of severe epigastric pain. Pt admitted with acute alcoholic hepatitis with mild pancreatitis, improving on IVF. CT a/p on admission showed stranding around tail of pancrease, no evidence of pancreatitis or necrosis. S/p 2L NS at ED. AST/ALT, amylase/lipase are all improving. Hepatitis panel negative. Started on Famotidine, Zofran, Morphine and Toradol. Pt is tolerating a regular diet, stopped IVFs and to transition to po pain meds prior to dc. CIWA protocol started on admission with ativan taper to cont Folic acid, Thiamine and Multivitamin. Also with Possible enterocolitis on CT A/P in setting on chronic diarrhea. GI stool PCR not yet collected; however, no active complaints of diarrhea currently. Seen by gastroenterology, may continue Protonix. All electrolytes replenished. May repeat a CBC, CMP and Magnesium within 1 week with Primary medical doctor.    Pt is a 46yo M w PMH of ETOH abuse, chronic diarrhea, MS not on medication present to ED with 1 day history of severe epigastric pain. Pt admitted with acute alcoholic hepatitis with mild pancreatitis, improving on IVF. CT a/p on admission showed stranding around tail of pancrease, no evidence of pancreatitis or necrosis. S/p 2L NS at ED. AST/ALT, amylase/lipase are all improving. Hepatitis panel negative. Started on Famotidine, Zofran, Morphine and Toradol. Pt is tolerating a regular diet, stopped IVFs and to transition to po pain meds prior to dc. CIWA protocol started on admission with ativan taper to cont Folic acid, Thiamine and Multivitamin. Also with Possible enterocolitis on CT A/P in setting on chronic diarrhea. GI stool PCR not yet collected; however, no active complaints of diarrhea currently. Seen by gastroenterology, may continue Protonix. All electrolytes replenished. May repeat a CBC, CMP and Magnesium within 1 week with Primary medical doctor. patient needs to follow up with Gastroenterology as well. patient was educated on alcohol cessation and seen by Social work.    Vital Signs Last 24 Hrs  T(C): 36.9 (06 Nov 2020 08:35), Max: 37.2 (05 Nov 2020 15:27)  T(F): 98.4 (06 Nov 2020 08:35), Max: 99 (05 Nov 2020 15:27)  HR: 84 (06 Nov 2020 08:39) (84 - 90)  BP: 142/108 (06 Nov 2020 08:39) (134/83 - 149/106)  BP(mean): --  RR: 20 (06 Nov 2020 08:35) (18 - 20)  SpO2: 97% (06 Nov 2020 08:35) (93% - 97%)    PHYSICAL EXAM:  Constitutional: No acute distress, alert and oriented by 3  HEENT: AT/NC, EOMI, PERRLA, Normal conjunctiva, no pharyngeal erythema, moist oral mucosa  Respiratory: CTA BL, equal breath sounds, no crackles or wheezing  Cardiovascular: RRR, no edema  Gastrointestinal: soft, Non-tender, Non-distended + Bowel sounds, no rebound or guarding  Musculoskeletal: No joint edema  Neurological: CN 2-12 grossly intact, no focal deficits  Skin: warm, dry and intact  Psychiatric: normal mood and affect    31 minutes spent on discharge.

## 2020-11-05 NOTE — PROGRESS NOTE ADULT - PROBLEM SELECTOR PLAN 4
CT of abdomen and pelvis revealed persistent diffuse colon wall thickening, especially sigmoid colon and rectum, and prominent wall of the proximal small bowel loops. As per radiology report these findings may be due to partial distention and recommending clinical correlation to assess enterocolitis (infectious or inflammatory).

## 2020-11-05 NOTE — PROGRESS NOTE ADULT - SUBJECTIVE AND OBJECTIVE BOX
Chief Complaint: This is a 45y old man patient being seen in follow-up consultation for pancreatitis.    HPI / 24H events:  Patient seeing and evaluated at bedside, reporting no complains, as per nurse  patient hypertensive overnight with a CIWA score 5, medicated with hydralazine, and morphine. Patient on CIWA protocol. At present tolerating oral intake, LFT's trending down. Denies nausea, vomiting, abdominal pain, chest pain, shortness of breath, hematemesis, hematochezia, melena.      ROS: A 14-point review of systems was reviewed and was otherwise negative save what was reported in the HPI.    PAST MEDICAL/SURGICAL HISTORY:  Chronic diarrhea    Multiple sclerosis    S/P cholecystectomy      MEDICATIONS  (STANDING):  folic acid 1 milliGRAM(s) Oral daily  heparin   Injectable 5000 Unit(s) SubCutaneous every 12 hours  influenza   Vaccine 0.5 milliLiter(s) IntraMuscular once  lactobacillus acidophilus 1 Tablet(s) Oral two times a day  LORazepam     Tablet 0.5 milliGRAM(s) Oral every 4 hours  LORazepam     Tablet   Oral   multivitamin 1 Tablet(s) Oral daily  nicotine -  14 mG/24Hr(s) Patch 1 patch Transdermal daily  pantoprazole    Tablet 40 milliGRAM(s) Oral before breakfast    MEDICATIONS  (PRN):  morphine  - Injectable 2 milliGRAM(s) IV Push every 6 hours PRN Severe Pain (7 - 10)  ondansetron Injectable 4 milliGRAM(s) IV Push once PRN Nausea and/or Vomiting    No Known Allergies    T(C): 36.8 (11-05-20 @ 08:20), Max: 37.2 (11-04-20 @ 23:04)  HR: 87 (11-05-20 @ 08:20) (65 - 94)  BP: 149/84 (11-05-20 @ 08:20) (149/84 - 184/110)  RR: 20 (11-05-20 @ 08:20) (18 - 20)  SpO2: 94% (11-05-20 @ 08:20) (94% - 98%)    I&O's Summary    PHYSICAL EXAM:  Constitutional: Ill looking, on CIWA protocol, in no apparent distress  Eyes: Sclerae anicteric, conjunctivae normal  ENMT: Mucus membranes moist, no oropharyngeal thrush noted  Respiratory: Breathing nonlabored; clear to auscultation  Cardiovascular: Regular rate and rhythm  Gastrointestinal: Soft, nontender, nondistended, normoactive bowel sounds  Extremities: No clubbing, cyanosis or edema  Neurological: Alert and oriented to person, place and time; no asterixis  Skin: No jaundice  Musculoskeletal: No significant peripheral atrophy  Psychiatric: Affect and mood appropriate                   13.0   6.48  )-----------( 116      ( 11-05 @ 07:09 )             37.7                13.1   6.21  )-----------( 93       ( 11-04 @ 06:59 )             37.0                14.6   6.32  )-----------( 92       ( 11-03 @ 08:18 )             40.8       11-05    135  |  97<L>  |  3.0<L>  ----------------------------<  104<H>  3.2<L>   |  28.0  |  0.56    Ca    9.2      05 Nov 2020 07:09  Phos  2.5     11-05  Mg     1.6     11-05    TPro  6.6  /  Alb  3.3  /  TBili  1.4  /  DBili  x   /  AST  72<H>  /  ALT  68<H>  /  AlkPhos  155<H>  11-05    LIVER FUNCTIONS - ( 05 Nov 2020 07:09 )  Alb: 3.3 g/dL / Pro: 6.6 g/dL / ALK PHOS: 155 U/L / ALT: 68 U/L / AST: 72 U/L / GGT: x           Amylase, Serum Total: 37 U/L (11-03-20 @ 08:18)  Amylase, Serum Total: 40 U/L (11-02-20 @ 02:59)    Lipase, Serum: 13 U/L (11-03-20 @ 08:18)  Lipase, Serum: 24 U/L (11-02-20 @ 08:42)  Lipase, Serum: 30 U/L (11-01-20 @ 23:57)        IMAGING: I personally reviewed the CT of abdomen and pelvis, and I agree with the radiologist's interpretation as described below:  FINDINGS:    LOWER CHEST: Unremarkable.    LIVER: Low-attenuation, likely fatty infiltration. Subcentimeter hypodense foci, too small to characterize.  GALLBLADDER/BILE DUCTS: No intrahepatic or extrahepatic biliary dilatation. Cholecystectomy.  PANCREAS: Nonspecific stranding surrounding the tail.  SPLEEN: Decreased size of the splenic lesions since prior study. Contour deformity, which may be due to old infarct/trauma.    ADRENALS: Unremarkable right adrenal gland. Stable mild left adrenal thickening.  KIDNEYS/URETERS: No hydronephrosis, hydroureter or significant perinephric stranding.  BLADDER: Partially distended. Diffusely prominent wall.  REPRODUCTIVE ORGANS: Enlarged prostate.    BOWEL: No bowel obstruction. Unremarkable appendix. Persistent diffuse colon wall thickening, especially sigmoid colon and rectum. Submucosal fat scattered throughout the colon and rectum. Prominent wall of the proximal small bowel loops.  PERITONEUM: No drainable fluid collection or free air.  VESSELS: Atherosclerosis. Normal caliber of the abdominalaorta.  RETROPERITONEUM: No lymphadenopathy.  ABDOMINAL WALL/SOFT TISSUES: Tiny fat-containing umbilical hernia.  BONES: Degenerative changes of the spine.    IMPRESSION:    Nonspecific peripancreatic stranding near the pancreatic tail, which can be seen in pancreatitis. Recommend clinical correlation for further evaluation.    Persistent diffuse colon wall thickening, especially sigmoid colon and rectum. Prominent wall of the proximal small bowel loops. These findings may be due to partial distention. Recommend clinical correlation to assess enterocolitis (infectious or inflammatory).    Diffusely prominent bladder wall, which may be due to partial distention. Recommend clinical correlation to assess urinary tract infection.

## 2020-11-05 NOTE — DISCHARGE NOTE PROVIDER - NSDCMRMEDTOKEN_GEN_ALL_CORE_FT
acetaminophen 325 mg oral tablet: 2 tab(s) orally every 6 hours, As needed, Temp greater or equal to 38C (100.4F), Mild Pain (1 - 3)   amLODIPine 5 mg oral tablet: 1 tab(s) orally once a day  folic acid 1 mg oral tablet: 1 tab(s) orally once a day  magnesium oxide 400 mg (241.3 mg elemental magnesium) oral tablet: 1 tab(s) orally 3 times a day (with meals)  Multiple Vitamins oral tablet: 1 tab(s) orally once a day  nicotine 14 mg/24 hr transdermal film, extended release: 1 patch transdermal once a day   pantoprazole 40 mg oral delayed release tablet: 1 tab(s) orally once a day (before a meal)

## 2020-11-05 NOTE — PROGRESS NOTE ADULT - ATTENDING COMMENTS
Patient seen and examined. States he feels terrible. Denies tremors but states he thought he heard his girlfriend making breakfast this morning. He is alert and oriented by3. Devon chest pains, shortness of breath and headache.     PHYSICAL EXAM:  Constitutional: No acute distress, alert and oriented by 3  HEENT: AT/NC, EOMI, PERRLA, Normal conjunctiva, no pharyngeal erythema, moist oral mucosa  Respiratory: CTA BL, equal breath sounds, no crackles or wheezing  Cardiovascular: RRR, no edema  Gastrointestinal: soft, Non-tender, Non-distended + Bowel sounds, no rebound or guarding  Musculoskeletal: No joint edema, no visible tremors  Neurological: CN 2-12 grossly intact, no focal deficits  Skin: warm, dry and intact  Psychiatric: normal mood and affect    Discussed with AYAN Wheeler and agree with above. changes made to text  watch closely for withdrawal and he remains In the window. Continue on Ativan taper  supplement electrolytes.   patient explained the risk of alcohol withdrawal and importance of staying in the hospital  Sw consult
Patient seen and examined this morning. No tremors. He reports pain is better. no visual, audiotry or tactile hallucinations    PHYSICAL EXAM:  Constitutional: No acute distress, alert and oriented by 3  HEENT: AT/NC, EOMI, PERRLA, Normal conjunctiva, no pharyngeal erythema, moist oral mucosa  Respiratory: CTA BL, equal breath sounds, no crackles or wheezing  Cardiovascular: tachycardic, no edema  Gastrointestinal: soft, mild-tender in epigastrium, Non-distended + Bowel sounds, no rebound or guarding  Musculoskeletal: No joint edema  Neurological: CN 2-12 grossly intact, no focal deficits  Skin: warm, dry and intact  Psychiatric: normal mood and affect    Plan  continue fluids, pain control and CIWA protocol   discussed with AYAN Wright, Agree with above and changes made to text
Patient denied abdominal pain to NP. Told me he had 6/10 upper abdominal pain. No change with solid diet. no fever    H- RRR  L- CTA  A- MIld tenderness on palpation    A/P  pancreatitis  low fat diet  need ETOH rehab ( pt is willing).   possible D/C home tomorrow

## 2020-11-05 NOTE — DISCHARGE NOTE PROVIDER - NSDCCPCAREPLAN_GEN_ALL_CORE_FT
PRINCIPAL DISCHARGE DIAGNOSIS  Diagnosis: Alcohol-induced acute pancreatitis without infection or necrosis  Assessment and Plan of Treatment: s/p IVF, tolerating diet, pending pain control on oral medications. Alcohol cessation advised. Agreeable to EtOH rehab      SECONDARY DISCHARGE DIAGNOSES  Diagnosis: Hypomagnesemia  Assessment and Plan of Treatment: electrolytes replenished, may repeat Magnesium level within 1 week    Diagnosis: Chronic diarrhea  Assessment and Plan of Treatment: tolerating diet well, no active complaints of diarrhea    Diagnosis: Thrombocytopenia  Assessment and Plan of Treatment: stable, improving, no acute signs or symptoms of bleeding. Likely due to EtoH abuse. May follow up outpatient for repeat CBC within 1 week.     PRINCIPAL DISCHARGE DIAGNOSIS  Diagnosis: Alcohol-induced acute pancreatitis without infection or necrosis  Assessment and Plan of Treatment: s/p IVF, tolerating diet, pending pain control on oral medications. Alcohol cessation advised. Agreeable to EtOH rehab      SECONDARY DISCHARGE DIAGNOSES  Diagnosis: Hypertension  Assessment and Plan of Treatment: take amlodipine 5mg daily and follow up with PCP in 1-2weeks for refial and blood pressure check    Diagnosis: Hypomagnesemia  Assessment and Plan of Treatment: electrolytes replenished, may repeat Magnesium level within 1 week    Diagnosis: Chronic diarrhea  Assessment and Plan of Treatment: tolerating diet well, no active complaints of diarrhea    Diagnosis: Thrombocytopenia  Assessment and Plan of Treatment: stable, improving, no acute signs or symptoms of bleeding. Likely due to EtoH abuse. May follow up outpatient for repeat CBC within 1 week.

## 2020-11-05 NOTE — DISCHARGE NOTE PROVIDER - PROVIDER TOKENS
PROVIDER:[TOKEN:[[id number]],FOLLOWUP:[1 week]],PROVIDER:[TOKEN:[[id number]],FOLLOWUP:[1 week]]

## 2020-11-05 NOTE — CHART NOTE - NSCHARTNOTEFT_GEN_A_CORE
B/P this am 184/110 P 90 CIWA score 5  Pt without complaints, denies h/a , cp or any complaints at this time. NAD appears comfortable  Hydralazine 10 mh IVP x 1  continue to monitor

## 2020-11-05 NOTE — PROGRESS NOTE ADULT - PROBLEM SELECTOR PLAN 1
As per CT of abdomen and pelvis revealed nonspecific peripancreatic stranding near the pancreatic tail, which can be seen in pancreatitis  Normal lipase.

## 2020-11-05 NOTE — DISCHARGE NOTE PROVIDER - CARE PROVIDER_API CALL
Franki House  [facility] MEDICINE  [address] [place] [address]  Phone: [phone]  Fax: [phone]  Follow Up Time: 1 week    Lida Melendrez  GASTROENTEROLOGY  [address], [address]  [place] [address]  Phone: [phone]  Fax: [phone]  Follow Up Time: 1 week

## 2020-11-05 NOTE — PROGRESS NOTE ADULT - ASSESSMENT
46yo M w PMH of ETOH abuse, chronic diarrhea, MS not on medication present to ED with 1 day history of sever epigastric pain. Pt admitted with acute alcoholic hepatitis, improving on IVF.     1. Acute alcohol hepatitis with mild pancreatitis   CT showed stranding around tail of pancrease, no evidence of pancreatitis or necrosis   AST/ALT, amylase/lipase improving  C/w Famotidine, Zofran, Morphine and Toradol    S/p 2L NS at ED  Tolerating regular diet  stop IVFs  transition to po pain meds    2. ETOH  Latest CIWA score 5  CIWA protocol with ativan taper  Cont Folic acid, Thiamine, Multivitamin   Seizure and fall precaution     3. Elevated LFTs, improving  Negative hep panel   LFTs daily, stop fluids    4. Enterocolitis/chronic diarrhea   Cont Protonix   GI stool PCR not collected  No active complaints of diarrhea currently, monitor     5. Smoking   Nicotine patch     6. Hypokalemia, hypomagnesemia   s/p repletion and will monitor     7. Thrombocytopenia  Likely secondary to chronic alcoholism  Plt 93K, monitor    8. HTN  no prior hx may be related to alcohol withdrawal will monitor and start amlodipine 5mg if bp remains elevated.     *DVT ppx   Heparin SQ     *Code status : Full code   DISPO: likely dc in 24 hours

## 2020-11-05 NOTE — PROGRESS NOTE ADULT - PROBLEM SELECTOR PLAN 3
Most likely multifactorial, since patient is an active alcohol user and now with elevated lipid panel  Continue to monitor LFT's

## 2020-11-05 NOTE — PROGRESS NOTE ADULT - SUBJECTIVE AND OBJECTIVE BOX
[facility] Division of Hospital Medicine    Chief Complaint: abdominal pain.     SUBJECTIVE / OVERNIGHT EVENTS: patient still with abdominal pain. No longer hearing voices. Denies tactile, auditory and visual hallucinations.      Patient denies chest pain, SOB, abd pain, N/V, fever, chills, dysuria or any other complaints. All remainder ROS negative.     MEDICATIONS  (STANDING):  folic acid 1 milliGRAM(s) Oral daily  heparin   Injectable 5000 Unit(s) SubCutaneous every 12 hours  influenza   Vaccine 0.5 milliLiter(s) IntraMuscular once  lactobacillus acidophilus 1 Tablet(s) Oral two times a day  LORazepam     Tablet 0.5 milliGRAM(s) Oral every 4 hours  LORazepam     Tablet   Oral   multivitamin 1 Tablet(s) Oral daily  nicotine -  14 mG/24Hr(s) Patch 1 patch Transdermal daily  pantoprazole    Tablet 40 milliGRAM(s) Oral before breakfast  potassium chloride    Tablet ER 40 milliEquivalent(s) Oral every 4 hours    MEDICATIONS  (PRN):  morphine  - Injectable 2 milliGRAM(s) IV Push every 6 hours PRN Severe Pain (7 - 10)  ondansetron Injectable 4 milliGRAM(s) IV Push once PRN Nausea and/or Vomiting        I&O's Summary      PHYSICAL EXAM:  Vital Signs Last 24 Hrs  T(C): 36.8 (05 Nov 2020 08:20), Max: 37.2 (04 Nov 2020 23:04)  T(F): 98.3 (05 Nov 2020 08:20), Max: 98.9 (04 Nov 2020 23:04)  HR: 87 (05 Nov 2020 08:20) (65 - 94)  BP: 149/84 (05 Nov 2020 08:20) (149/84 - 184/110)  BP(mean): --  RR: 20 (05 Nov 2020 08:20) (18 - 20)  SpO2: 94% (05 Nov 2020 08:20) (94% - 98%)        GENERAL: no acute distress, well developed   ENMT: PERRL, +EOMI. moist oral mucosa  NECK: soft, Supple, No JVD,   CHEST/LUNG: Clear to auscultation bilaterally; No wheezing.  HEART: S1S2+, Regular rate and rhythm; No murmurs.  ABDOMEN: Soft, Nontender, Nondistended; Bowel sounds present.  MUSCULOSKELETAL: Normal range of motion.  SKIN: warm, dry  NEURO: AAOX3, no focal deficits  PSYCH: flat affect    LABS:                        13.0   6.48  )-----------( 116      ( 05 Nov 2020 07:09 )             37.7     11-05    135  |  97<L>  |  3.0<L>  ----------------------------<  104<H>  3.2<L>   |  28.0  |  0.56    Ca    9.2      05 Nov 2020 07:09  Phos  2.5     11-05  Mg     1.6     11-05    TPro  6.6  /  Alb  3.3  /  TBili  1.4  /  DBili  x   /  AST  72<H>  /  ALT  68<H>  /  AlkPhos  155<H>  11-05              CAPILLARY BLOOD GLUCOSE            RADIOLOGY & ADDITIONAL TESTS:  Results Reviewed:   Imaging Personally Reviewed:  Electrocardiogram Personally Reviewed:

## 2020-11-06 ENCOUNTER — TRANSCRIPTION ENCOUNTER (OUTPATIENT)
Age: 45
End: 2020-11-06

## 2020-11-06 VITALS
OXYGEN SATURATION: 98 % | RESPIRATION RATE: 20 BRPM | SYSTOLIC BLOOD PRESSURE: 157 MMHG | HEART RATE: 98 BPM | DIASTOLIC BLOOD PRESSURE: 91 MMHG | TEMPERATURE: 99 F

## 2020-11-06 LAB
ALBUMIN SERPL ELPH-MCNC: 3.5 G/DL — SIGNIFICANT CHANGE UP (ref 3.3–5.2)
ALP SERPL-CCNC: 146 U/L — HIGH (ref 40–120)
ALT FLD-CCNC: 63 U/L — HIGH
ANION GAP SERPL CALC-SCNC: 11 MMOL/L — SIGNIFICANT CHANGE UP (ref 5–17)
AST SERPL-CCNC: 55 U/L — HIGH
BILIRUB SERPL-MCNC: 1.1 MG/DL — SIGNIFICANT CHANGE UP (ref 0.4–2)
BUN SERPL-MCNC: 8 MG/DL — SIGNIFICANT CHANGE UP (ref 8–20)
CALCIUM SERPL-MCNC: 9.6 MG/DL — SIGNIFICANT CHANGE UP (ref 8.6–10.2)
CHLORIDE SERPL-SCNC: 99 MMOL/L — SIGNIFICANT CHANGE UP (ref 98–107)
CO2 SERPL-SCNC: 27 MMOL/L — SIGNIFICANT CHANGE UP (ref 22–29)
CREAT SERPL-MCNC: 0.76 MG/DL — SIGNIFICANT CHANGE UP (ref 0.5–1.3)
GLUCOSE SERPL-MCNC: 97 MG/DL — SIGNIFICANT CHANGE UP (ref 70–99)
HCT VFR BLD CALC: 40.4 % — SIGNIFICANT CHANGE UP (ref 39–50)
HGB BLD-MCNC: 13.9 G/DL — SIGNIFICANT CHANGE UP (ref 13–17)
MAGNESIUM SERPL-MCNC: 1.7 MG/DL — LOW (ref 1.8–2.6)
MCHC RBC-ENTMCNC: 34 PG — SIGNIFICANT CHANGE UP (ref 27–34)
MCHC RBC-ENTMCNC: 34.4 GM/DL — SIGNIFICANT CHANGE UP (ref 32–36)
MCV RBC AUTO: 98.8 FL — SIGNIFICANT CHANGE UP (ref 80–100)
PLATELET # BLD AUTO: 146 K/UL — LOW (ref 150–400)
POTASSIUM SERPL-MCNC: 3.6 MMOL/L — SIGNIFICANT CHANGE UP (ref 3.5–5.3)
POTASSIUM SERPL-SCNC: 3.6 MMOL/L — SIGNIFICANT CHANGE UP (ref 3.5–5.3)
PROT SERPL-MCNC: 6.8 G/DL — SIGNIFICANT CHANGE UP (ref 6.6–8.7)
RBC # BLD: 4.09 M/UL — LOW (ref 4.2–5.8)
RBC # FLD: 13.2 % — SIGNIFICANT CHANGE UP (ref 10.3–14.5)
SODIUM SERPL-SCNC: 137 MMOL/L — SIGNIFICANT CHANGE UP (ref 135–145)
WBC # BLD: 6.55 K/UL — SIGNIFICANT CHANGE UP (ref 3.8–10.5)
WBC # FLD AUTO: 6.55 K/UL — SIGNIFICANT CHANGE UP (ref 3.8–10.5)

## 2020-11-06 PROCEDURE — 99232 SBSQ HOSP IP/OBS MODERATE 35: CPT

## 2020-11-06 PROCEDURE — G0378: CPT

## 2020-11-06 PROCEDURE — 80048 BASIC METABOLIC PNL TOTAL CA: CPT

## 2020-11-06 PROCEDURE — 80076 HEPATIC FUNCTION PANEL: CPT

## 2020-11-06 PROCEDURE — 80307 DRUG TEST PRSMV CHEM ANLYZR: CPT

## 2020-11-06 PROCEDURE — 96374 THER/PROPH/DIAG INJ IV PUSH: CPT | Mod: XU

## 2020-11-06 PROCEDURE — 96361 HYDRATE IV INFUSION ADD-ON: CPT

## 2020-11-06 PROCEDURE — 82150 ASSAY OF AMYLASE: CPT

## 2020-11-06 PROCEDURE — 96375 TX/PRO/DX INJ NEW DRUG ADDON: CPT

## 2020-11-06 PROCEDURE — U0003: CPT

## 2020-11-06 PROCEDURE — 84100 ASSAY OF PHOSPHORUS: CPT

## 2020-11-06 PROCEDURE — 93005 ELECTROCARDIOGRAM TRACING: CPT

## 2020-11-06 PROCEDURE — 81003 URINALYSIS AUTO W/O SCOPE: CPT

## 2020-11-06 PROCEDURE — 99239 HOSP IP/OBS DSCHRG MGMT >30: CPT

## 2020-11-06 PROCEDURE — 80053 COMPREHEN METABOLIC PANEL: CPT

## 2020-11-06 PROCEDURE — 83615 LACTATE (LD) (LDH) ENZYME: CPT

## 2020-11-06 PROCEDURE — 96376 TX/PRO/DX INJ SAME DRUG ADON: CPT

## 2020-11-06 PROCEDURE — 80061 LIPID PANEL: CPT

## 2020-11-06 PROCEDURE — 85027 COMPLETE CBC AUTOMATED: CPT

## 2020-11-06 PROCEDURE — 83690 ASSAY OF LIPASE: CPT

## 2020-11-06 PROCEDURE — 80074 ACUTE HEPATITIS PANEL: CPT

## 2020-11-06 PROCEDURE — 99285 EMERGENCY DEPT VISIT HI MDM: CPT | Mod: 25

## 2020-11-06 PROCEDURE — 87086 URINE CULTURE/COLONY COUNT: CPT

## 2020-11-06 PROCEDURE — 74177 CT ABD & PELVIS W/CONTRAST: CPT

## 2020-11-06 PROCEDURE — 84484 ASSAY OF TROPONIN QUANT: CPT

## 2020-11-06 PROCEDURE — 83735 ASSAY OF MAGNESIUM: CPT

## 2020-11-06 PROCEDURE — 87635 SARS-COV-2 COVID-19 AMP PRB: CPT

## 2020-11-06 PROCEDURE — 36415 COLL VENOUS BLD VENIPUNCTURE: CPT

## 2020-11-06 PROCEDURE — 85025 COMPLETE CBC W/AUTO DIFF WBC: CPT

## 2020-11-06 PROCEDURE — 90686 IIV4 VACC NO PRSV 0.5 ML IM: CPT

## 2020-11-06 PROCEDURE — 86769 SARS-COV-2 COVID-19 ANTIBODY: CPT

## 2020-11-06 RX ORDER — MAGNESIUM OXIDE 400 MG ORAL TABLET 241.3 MG
1 TABLET ORAL
Qty: 9 | Refills: 0
Start: 2020-11-06 | End: 2020-11-08

## 2020-11-06 RX ORDER — AMLODIPINE BESYLATE 2.5 MG/1
5 TABLET ORAL DAILY
Refills: 0 | Status: DISCONTINUED | OUTPATIENT
Start: 2020-11-06 | End: 2020-11-06

## 2020-11-06 RX ORDER — NICOTINE POLACRILEX 2 MG
1 GUM BUCCAL
Qty: 7 | Refills: 0
Start: 2020-11-06 | End: 2020-11-12

## 2020-11-06 RX ORDER — PANTOPRAZOLE SODIUM 20 MG/1
1 TABLET, DELAYED RELEASE ORAL
Qty: 30 | Refills: 0
Start: 2020-11-06 | End: 2020-12-05

## 2020-11-06 RX ORDER — AMLODIPINE BESYLATE 2.5 MG/1
1 TABLET ORAL
Qty: 14 | Refills: 0
Start: 2020-11-06 | End: 2020-11-19

## 2020-11-06 RX ORDER — FOLIC ACID 0.8 MG
1 TABLET ORAL
Qty: 14 | Refills: 0
Start: 2020-11-06 | End: 2020-11-19

## 2020-11-06 RX ADMIN — INFLUENZA VIRUS VACCINE 0.5 MILLILITER(S): 15; 15; 15; 15 SUSPENSION INTRAMUSCULAR at 14:25

## 2020-11-06 RX ADMIN — Medication 1 PATCH: at 11:12

## 2020-11-06 RX ADMIN — Medication 0.5 MILLIGRAM(S): at 05:50

## 2020-11-06 RX ADMIN — Medication 1 PATCH: at 11:03

## 2020-11-06 RX ADMIN — Medication 1 PATCH: at 07:19

## 2020-11-06 RX ADMIN — Medication 0.5 MILLIGRAM(S): at 02:07

## 2020-11-06 RX ADMIN — Medication 1 TABLET(S): at 05:50

## 2020-11-06 RX ADMIN — AMLODIPINE BESYLATE 5 MILLIGRAM(S): 2.5 TABLET ORAL at 09:12

## 2020-11-06 RX ADMIN — MAGNESIUM OXIDE 400 MG ORAL TABLET 400 MILLIGRAM(S): 241.3 TABLET ORAL at 11:14

## 2020-11-06 RX ADMIN — MAGNESIUM OXIDE 400 MG ORAL TABLET 400 MILLIGRAM(S): 241.3 TABLET ORAL at 08:35

## 2020-11-06 RX ADMIN — HEPARIN SODIUM 5000 UNIT(S): 5000 INJECTION INTRAVENOUS; SUBCUTANEOUS at 05:50

## 2020-11-06 RX ADMIN — Medication 10 MILLIGRAM(S): at 05:30

## 2020-11-06 RX ADMIN — Medication 10 MILLIGRAM(S): at 04:22

## 2020-11-06 RX ADMIN — Medication 1 TABLET(S): at 11:12

## 2020-11-06 RX ADMIN — Medication 1 MILLIGRAM(S): at 11:12

## 2020-11-06 RX ADMIN — PANTOPRAZOLE SODIUM 40 MILLIGRAM(S): 20 TABLET, DELAYED RELEASE ORAL at 05:51

## 2020-11-06 NOTE — DISCHARGE NOTE NURSING/CASE MANAGEMENT/SOCIAL WORK - PATIENT PORTAL LINK FT
You can access the FollowMyHealth Patient Portal offered by [facility] by registering at the following website: [email or web address]. By joining [employer]’s FollowMyHealth portal, you will also be able to view your health information using other applications (apps) compatible with our system.

## 2020-11-06 NOTE — PROGRESS NOTE ADULT - ASSESSMENT
Abdominal pain is better. Alcohol abstinence counseling. Advance diet. No need for antibiotics at this time.

## 2020-11-06 NOTE — PROGRESS NOTE ADULT - SUBJECTIVE AND OBJECTIVE BOX
INTERVAL HPI/OVERNIGHT EVENTS:Fu for abdominal pain. Doing better. Tolerating the diet.     MEDICATIONS  (STANDING):  amLODIPine   Tablet 5 milliGRAM(s) Oral daily  folic acid 1 milliGRAM(s) Oral daily  heparin   Injectable 5000 Unit(s) SubCutaneous every 12 hours  influenza   Vaccine 0.5 milliLiter(s) IntraMuscular once  lactobacillus acidophilus 1 Tablet(s) Oral two times a day  LORazepam     Tablet 0.5 milliGRAM(s) Oral every 12 hours  LORazepam     Tablet   Oral   magnesium oxide 400 milliGRAM(s) Oral three times a day with meals  multivitamin 1 Tablet(s) Oral daily  nicotine -  14 mG/24Hr(s) Patch 1 patch Transdermal daily  pantoprazole    Tablet 40 milliGRAM(s) Oral before breakfast    MEDICATIONS  (PRN):  ketorolac 10 milliGRAM(s) Oral every 6 hours PRN Mild Pain (1 - 3), moderate pain (4-6)  ondansetron Injectable 4 milliGRAM(s) IV Push once PRN Nausea and/or Vomiting      Allergies    No Known Allergies    Intolerances        Vital Signs Last 24 Hrs  T(C): 36.9 (06 Nov 2020 08:35), Max: 37.2 (05 Nov 2020 15:27)  T(F): 98.4 (06 Nov 2020 08:35), Max: 99 (05 Nov 2020 15:27)  HR: 89 (06 Nov 2020 12:38) (84 - 90)  BP: 156/98 (06 Nov 2020 12:38) (134/83 - 156/98)  BP(mean): --  RR: 20 (06 Nov 2020 08:35) (18 - 20)  SpO2: 97% (06 Nov 2020 08:35) (93% - 97%)    LABS:                        13.9   6.55  )-----------( 146      ( 06 Nov 2020 07:30 )             40.4     11-06    137  |  99  |  8.0  ----------------------------<  97  3.6   |  27.0  |  0.76    Ca    9.6      06 Nov 2020 07:30  Phos  2.5     11-05  Mg     1.7     11-06    TPro  6.8  /  Alb  3.5  /  TBili  1.1  /  DBili  x   /  AST  55<H>  /  ALT  63<H>  /  AlkPhos  146<H>  11-06          RADIOLOGY & ADDITIONAL TESTS:  < from: CT Abdomen and Pelvis w/ IV Cont (11.02.20 @ 01:23) >     EXAM:  CT ABDOMEN AND PELVIS IC                          PROCEDURE DATE:  11/02/2020          INTERPRETATION:  CLINICAL INFORMATION: Abdominal pain.    PROCEDURE:  Helical axial images were obtained from the domes of the diaphragm through the pubic symphysis following the administration of intravenous contrast. 92 mls of Omnipaque-300 administered without complication. Coronal and sagittal reformats were also obtained.    COMPARISON: 3/29/2019.    FINDINGS:    LOWER CHEST: Unremarkable.    LIVER: Low-attenuation, likely fatty infiltration. Subcentimeter hypodense foci, too small to characterize.  GALLBLADDER/BILE DUCTS: No intrahepatic or extrahepatic biliary dilatation. Cholecystectomy.  PANCREAS: Nonspecific stranding surrounding the tail.  SPLEEN: Decreased size of the splenic lesions since prior study. Contour deformity, which may be due to old infarct/trauma.    ADRENALS: Unremarkable right adrenal gland. Stable mild left adrenal thickening.  KIDNEYS/URETERS: No hydronephrosis, hydroureter or significant perinephric stranding.  BLADDER: Partially distended. Diffusely prominent wall.  REPRODUCTIVE ORGANS: Enlarged prostate.    BOWEL: No bowel obstruction. Unremarkable appendix. Persistent diffuse colon wall thickening, especially sigmoid colon and rectum. Submucosal fat scattered throughout the colon and rectum. Prominent wall of the proximal small bowel loops.  PERITONEUM: No drainable fluid collection or free air.  VESSELS: Atherosclerosis. Normal caliber of the abdominalaorta.  RETROPERITONEUM: No lymphadenopathy.  ABDOMINAL WALL/SOFT TISSUES: Tiny fat-containing umbilical hernia.  BONES: Degenerative changes of the spine.    IMPRESSION:    Nonspecific peripancreatic stranding near the pancreatic tail, which can be seen in pancreatitis. Recommend clinical correlation for further evaluation.    Persistent diffuse colon wall thickening, especially sigmoid colon and rectum. Prominent wall of the proximal small bowel loops. These findings may be due to partial distention. Recommend clinical correlation to assess enterocolitis (infectious or inflammatory).    Diffusely prominent bladder wall, which may be due to partial distention. Recommend clinical correlation to assess urinary tract infection.    Additional findings as described.              WILLIAM PURDY MD; Attending Radiologist  This document has been electronically signed. Nov 2 2020  2:09AM    < end of copied text >

## 2020-11-06 NOTE — DISCHARGE NOTE NURSING/CASE MANAGEMENT/SOCIAL WORK - NSDCFUADDAPPT_GEN_ALL_CORE_FT
*** you have an appointment  with [facility]  with Dr Karen Kimbrough   November 10,2020 at 10:45  AM     Address  [address] [place] [address]

## 2020-11-17 ENCOUNTER — EMERGENCY (EMERGENCY)
Facility: HOSPITAL | Age: 45
LOS: 1 days | Discharge: HOME | End: 2020-11-17
Attending: STUDENT IN AN ORGANIZED HEALTH CARE EDUCATION/TRAINING PROGRAM
Payer: MEDICAID

## 2020-11-17 VITALS
DIASTOLIC BLOOD PRESSURE: 89 MMHG | TEMPERATURE: 98 F | SYSTOLIC BLOOD PRESSURE: 138 MMHG | RESPIRATION RATE: 18 BRPM | OXYGEN SATURATION: 94 % | HEART RATE: 85 BPM

## 2020-11-17 VITALS
OXYGEN SATURATION: 98 % | TEMPERATURE: 98 F | RESPIRATION RATE: 18 BRPM | SYSTOLIC BLOOD PRESSURE: 135 MMHG | HEART RATE: 93 BPM | HEIGHT: 71 IN | WEIGHT: 175.05 LBS | DIASTOLIC BLOOD PRESSURE: 93 MMHG

## 2020-11-17 DIAGNOSIS — Z90.49 ACQUIRED ABSENCE OF OTHER SPECIFIED PARTS OF DIGESTIVE TRACT: Chronic | ICD-10-CM

## 2020-11-17 LAB
ALBUMIN SERPL ELPH-MCNC: 4.6 G/DL — SIGNIFICANT CHANGE UP (ref 3.3–5.2)
ALP SERPL-CCNC: 206 U/L — HIGH (ref 40–120)
ALT FLD-CCNC: 71 U/L — HIGH
ANION GAP SERPL CALC-SCNC: 15 MMOL/L — SIGNIFICANT CHANGE UP (ref 5–17)
AST SERPL-CCNC: 90 U/L — HIGH
BASOPHILS # BLD AUTO: 0.09 K/UL — SIGNIFICANT CHANGE UP (ref 0–0.2)
BASOPHILS NFR BLD AUTO: 0.6 % — SIGNIFICANT CHANGE UP (ref 0–2)
BILIRUB SERPL-MCNC: 1 MG/DL — SIGNIFICANT CHANGE UP (ref 0.4–2)
BUN SERPL-MCNC: 6 MG/DL — LOW (ref 8–20)
CALCIUM SERPL-MCNC: 9.6 MG/DL — SIGNIFICANT CHANGE UP (ref 8.6–10.2)
CHLORIDE SERPL-SCNC: 94 MMOL/L — LOW (ref 98–107)
CO2 SERPL-SCNC: 28 MMOL/L — SIGNIFICANT CHANGE UP (ref 22–29)
CREAT SERPL-MCNC: 0.54 MG/DL — SIGNIFICANT CHANGE UP (ref 0.5–1.3)
EOSINOPHIL # BLD AUTO: 0.03 K/UL — SIGNIFICANT CHANGE UP (ref 0–0.5)
EOSINOPHIL NFR BLD AUTO: 0.2 % — SIGNIFICANT CHANGE UP (ref 0–6)
ETHANOL SERPL-MCNC: 251 MG/DL — HIGH (ref 0–9)
GLUCOSE SERPL-MCNC: 109 MG/DL — HIGH (ref 70–99)
HCT VFR BLD CALC: 47.1 % — SIGNIFICANT CHANGE UP (ref 39–50)
HGB BLD-MCNC: 17.1 G/DL — HIGH (ref 13–17)
IMM GRANULOCYTES NFR BLD AUTO: 0.3 % — SIGNIFICANT CHANGE UP (ref 0–1.5)
LIDOCAIN IGE QN: 57 U/L — HIGH (ref 22–51)
LYMPHOCYTES # BLD AUTO: 2.91 K/UL — SIGNIFICANT CHANGE UP (ref 1–3.3)
LYMPHOCYTES # BLD AUTO: 20.8 % — SIGNIFICANT CHANGE UP (ref 13–44)
MCHC RBC-ENTMCNC: 34.2 PG — HIGH (ref 27–34)
MCHC RBC-ENTMCNC: 36.3 GM/DL — HIGH (ref 32–36)
MCV RBC AUTO: 94.2 FL — SIGNIFICANT CHANGE UP (ref 80–100)
MONOCYTES # BLD AUTO: 0.54 K/UL — SIGNIFICANT CHANGE UP (ref 0–0.9)
MONOCYTES NFR BLD AUTO: 3.9 % — SIGNIFICANT CHANGE UP (ref 2–14)
NEUTROPHILS # BLD AUTO: 10.39 K/UL — HIGH (ref 1.8–7.4)
NEUTROPHILS NFR BLD AUTO: 74.2 % — SIGNIFICANT CHANGE UP (ref 43–77)
PLATELET # BLD AUTO: 320 K/UL — SIGNIFICANT CHANGE UP (ref 150–400)
POTASSIUM SERPL-MCNC: 3.2 MMOL/L — LOW (ref 3.5–5.3)
POTASSIUM SERPL-SCNC: 3.2 MMOL/L — LOW (ref 3.5–5.3)
PROT SERPL-MCNC: 8.4 G/DL — SIGNIFICANT CHANGE UP (ref 6.6–8.7)
RBC # BLD: 5 M/UL — SIGNIFICANT CHANGE UP (ref 4.2–5.8)
RBC # FLD: 13.6 % — SIGNIFICANT CHANGE UP (ref 10.3–14.5)
SODIUM SERPL-SCNC: 137 MMOL/L — SIGNIFICANT CHANGE UP (ref 135–145)
TROPONIN T SERPL-MCNC: <0.01 NG/ML — SIGNIFICANT CHANGE UP (ref 0–0.06)
WBC # BLD: 14 K/UL — HIGH (ref 3.8–10.5)
WBC # FLD AUTO: 14 K/UL — HIGH (ref 3.8–10.5)

## 2020-11-17 PROCEDURE — 36415 COLL VENOUS BLD VENIPUNCTURE: CPT

## 2020-11-17 PROCEDURE — 96374 THER/PROPH/DIAG INJ IV PUSH: CPT

## 2020-11-17 PROCEDURE — 85025 COMPLETE CBC W/AUTO DIFF WBC: CPT

## 2020-11-17 PROCEDURE — 96376 TX/PRO/DX INJ SAME DRUG ADON: CPT

## 2020-11-17 PROCEDURE — 83690 ASSAY OF LIPASE: CPT

## 2020-11-17 PROCEDURE — 99284 EMERGENCY DEPT VISIT MOD MDM: CPT | Mod: 25

## 2020-11-17 PROCEDURE — 93005 ELECTROCARDIOGRAM TRACING: CPT

## 2020-11-17 PROCEDURE — 80307 DRUG TEST PRSMV CHEM ANLYZR: CPT

## 2020-11-17 PROCEDURE — 74177 CT ABD & PELVIS W/CONTRAST: CPT

## 2020-11-17 PROCEDURE — 74177 CT ABD & PELVIS W/CONTRAST: CPT | Mod: 26

## 2020-11-17 PROCEDURE — 84484 ASSAY OF TROPONIN QUANT: CPT

## 2020-11-17 PROCEDURE — 96361 HYDRATE IV INFUSION ADD-ON: CPT

## 2020-11-17 PROCEDURE — 80053 COMPREHEN METABOLIC PANEL: CPT

## 2020-11-17 PROCEDURE — 99285 EMERGENCY DEPT VISIT HI MDM: CPT

## 2020-11-17 PROCEDURE — 96375 TX/PRO/DX INJ NEW DRUG ADDON: CPT

## 2020-11-17 PROCEDURE — 93010 ELECTROCARDIOGRAM REPORT: CPT

## 2020-11-17 RX ORDER — MORPHINE SULFATE 50 MG/1
4 CAPSULE, EXTENDED RELEASE ORAL ONCE
Refills: 0 | Status: DISCONTINUED | OUTPATIENT
Start: 2020-11-17 | End: 2020-11-17

## 2020-11-17 RX ORDER — FAMOTIDINE 10 MG/ML
20 INJECTION INTRAVENOUS ONCE
Refills: 0 | Status: COMPLETED | OUTPATIENT
Start: 2020-11-17 | End: 2020-11-17

## 2020-11-17 RX ORDER — POTASSIUM CHLORIDE 20 MEQ
40 PACKET (EA) ORAL ONCE
Refills: 0 | Status: COMPLETED | OUTPATIENT
Start: 2020-11-17 | End: 2020-11-17

## 2020-11-17 RX ORDER — KETOROLAC TROMETHAMINE 30 MG/ML
15 SYRINGE (ML) INJECTION ONCE
Refills: 0 | Status: DISCONTINUED | OUTPATIENT
Start: 2020-11-17 | End: 2020-11-17

## 2020-11-17 RX ORDER — ONDANSETRON 8 MG/1
4 TABLET, FILM COATED ORAL ONCE
Refills: 0 | Status: COMPLETED | OUTPATIENT
Start: 2020-11-17 | End: 2020-11-17

## 2020-11-17 RX ORDER — SODIUM CHLORIDE 9 MG/ML
2000 INJECTION INTRAMUSCULAR; INTRAVENOUS; SUBCUTANEOUS ONCE
Refills: 0 | Status: COMPLETED | OUTPATIENT
Start: 2020-11-17 | End: 2020-11-17

## 2020-11-17 RX ADMIN — Medication 15 MILLIGRAM(S): at 06:09

## 2020-11-17 RX ADMIN — Medication 50 MILLIGRAM(S): at 02:55

## 2020-11-17 RX ADMIN — MORPHINE SULFATE 4 MILLIGRAM(S): 50 CAPSULE, EXTENDED RELEASE ORAL at 06:09

## 2020-11-17 RX ADMIN — MORPHINE SULFATE 4 MILLIGRAM(S): 50 CAPSULE, EXTENDED RELEASE ORAL at 06:39

## 2020-11-17 RX ADMIN — Medication 40 MILLIEQUIVALENT(S): at 02:55

## 2020-11-17 RX ADMIN — FAMOTIDINE 20 MILLIGRAM(S): 10 INJECTION INTRAVENOUS at 02:54

## 2020-11-17 RX ADMIN — Medication 30 MILLILITER(S): at 02:54

## 2020-11-17 RX ADMIN — MORPHINE SULFATE 4 MILLIGRAM(S): 50 CAPSULE, EXTENDED RELEASE ORAL at 01:24

## 2020-11-17 RX ADMIN — SODIUM CHLORIDE 2000 MILLILITER(S): 9 INJECTION INTRAMUSCULAR; INTRAVENOUS; SUBCUTANEOUS at 01:24

## 2020-11-17 RX ADMIN — ONDANSETRON 4 MILLIGRAM(S): 8 TABLET, FILM COATED ORAL at 01:24

## 2020-11-17 RX ADMIN — Medication 15 MILLIGRAM(S): at 02:54

## 2020-11-17 RX ADMIN — SODIUM CHLORIDE 2000 MILLILITER(S): 9 INJECTION INTRAMUSCULAR; INTRAVENOUS; SUBCUTANEOUS at 06:09

## 2020-11-17 NOTE — ED ADULT TRIAGE NOTE - CHIEF COMPLAINT QUOTE
Patient is alert and oriented x3 c/o generalized abd pain x2 days with hx of pancreatitis per patient. denies n/v/d. denies chest pain or shortness of breath.

## 2020-11-17 NOTE — ED PROVIDER NOTE - PROGRESS NOTE DETAILS
reviewed ct abd/pelvis, lab work pt clinically sober at this time improvement in pain, long discussion with patient regarding alcohol use - side effects, pt states he is currently following up with [facility]

## 2020-11-17 NOTE — ED ADULT NURSE NOTE - NSIMPLEMENTINTERV_GEN_ALL_ED
Implemented All Fall Risk Interventions:  [place] to call system. Call bell, personal items and telephone within reach. Instruct patient to call for assistance. Room bathroom lighting operational. Non-slip footwear when patient is off stretcher. Physically safe environment: no spills, clutter or unnecessary equipment. Stretcher in lowest position, wheels locked, appropriate side rails in place. Provide visual cue, wrist band, yellow gown, etc. Monitor gait and stability. Monitor for mental status changes and reorient to person, place, and time. Review medications for side effects contributing to fall risk. Reinforce activity limits and safety measures with patient and family.

## 2020-11-17 NOTE — ED PROVIDER NOTE - ATTENDING CONTRIBUTION TO CARE
46 yo male presentation with epigastric pain. I personally saw the patient with the PA, and completed the key components of the history and physical exam. I then discussed the management plan with the PA.

## 2020-11-17 NOTE — ED PROVIDER NOTE - PATIENT PORTAL LINK FT
You can access the FollowMyHealth Patient Portal offered by [facility] by registering at the following website: [email or web address]. By joining [facility]’s FollowMyHealth portal, you will also be able to view your health information using other applications (apps) compatible with our system.

## 2020-11-17 NOTE — ED PROVIDER NOTE - OBJECTIVE STATEMENT
pt is a 44 y/o male with a pmhx of alcohol abuse pancreatitis presenting to the ed with epigastric abdominal pain for the past two days. pt recently admitted to [facility] for pancreatitis due to alcohol use was released ten days ago. pt states started drinking again when he was released last drink five minutes before coming to the ed tonight. pt denies cp, sob, melena, hematemesis, nausea vomiting back pain dysuria

## 2020-11-17 NOTE — ED ADULT NURSE NOTE - DOES PATIENT HAVE ADVANCE DIRECTIVE
Assumed care of patient at 0700. Patient alert and oriented. Vital signs stable. Sinus taras on telemetry. No c/o chest pain or discomfort. Lungs clear on auscultation. O2 sats 100% on room air. Left lower leg and foot casted for left heel wound.   Bart No

## 2020-11-17 NOTE — ED PROVIDER NOTE - PHYSICAL EXAMINATION
Const: Awake, alert and oriented. In no acute distress. Well appearing.  HEENT: NC/AT. Moist mucous membranes.  Eyes: No scleral icterus. EOMI.  Neck:. Soft and supple. Full ROM without pain.  Cardiac: +S1/S2. No murmurs. Peripheral pulses 2+ and symmetric. No LE edema.  Resp: Speaking in full sentences. No evidence of respiratory distress. No wheezes, rales or rhonchi.  Abd: Soft, epigastric tenderness on palpation, non-distended. Normal bowel sounds in all 4 quadrants. No guarding or rebound.  Back: Spine midline and non-tender. No CVAT.  Skin: No rashes, abrasions or lacerations.  Lymph: No cervical lymphadenopathy.  Neuro: Awake, alert & oriented x 3. Moves all extremities symmetrically.

## 2020-11-17 NOTE — ED ADULT NURSE REASSESSMENT NOTE - NS ED NURSE REASSESS COMMENT FT1
pt a&ox4 sitting up in stretcher watching tv.  NAD noted, respirations even and unlabored.  Safety precautions in place.  Plan of care explained, pt verbalized understanding.

## 2020-11-17 NOTE — ED ADULT NURSE NOTE - OBJECTIVE STATEMENT
pt a&ox4 presenting to ED with abdominal pain.  Pt was here 10 days ago for pancreatitis and was d/c on week ago.  Pt state this feels the same.  Pt admits to ETOH use every day and drinks about a bottle of vodka per day.  Pt admits to drinking half a bottle of liquor today.  Denies illicit drug use.  Denies SI, HI.  NAD noted, respirations even and unlabored.  Safety precautions in place.  Plan of care explained, pt verbalized understanding.

## 2020-11-17 NOTE — ED PROVIDER NOTE - CARE PLAN
Principal Discharge DX:	Alcohol abuse  Secondary Diagnosis:	Acute alcoholic gastritis without hemorrhage

## 2021-03-30 ENCOUNTER — APPOINTMENT (OUTPATIENT)
Dept: GASTROENTEROLOGY | Facility: CLINIC | Age: 46
End: 2021-03-30

## 2021-04-16 ENCOUNTER — INPATIENT (INPATIENT)
Facility: HOSPITAL | Age: 46
LOS: 11 days | Discharge: NURSING, REHAB OR LONG-TERM CARE | DRG: 896 | End: 2021-04-28
Attending: STUDENT IN AN ORGANIZED HEALTH CARE EDUCATION/TRAINING PROGRAM | Admitting: INTERNAL MEDICINE
Payer: MEDICAID

## 2021-04-16 VITALS
HEIGHT: 71 IN | HEART RATE: 96 BPM | DIASTOLIC BLOOD PRESSURE: 81 MMHG | TEMPERATURE: 98 F | SYSTOLIC BLOOD PRESSURE: 144 MMHG | OXYGEN SATURATION: 96 % | RESPIRATION RATE: 18 BRPM | WEIGHT: 179.9 LBS

## 2021-04-16 DIAGNOSIS — Z90.49 ACQUIRED ABSENCE OF OTHER SPECIFIED PARTS OF DIGESTIVE TRACT: Chronic | ICD-10-CM

## 2021-04-16 PROCEDURE — 99291 CRITICAL CARE FIRST HOUR: CPT

## 2021-04-16 RX ADMIN — Medication 1 MILLIGRAM(S): at 23:57

## 2021-04-16 NOTE — ED ADULT TRIAGE NOTE - CHIEF COMPLAINT QUOTE
S/p fall. Pt states, "I slipped and fell down a few steps and hit my head on the wall". Denies LOC. +Headache. Denies nausea, vomiting, dizziness. Hx of MS.

## 2021-04-17 DIAGNOSIS — F10.231 ALCOHOL DEPENDENCE WITH WITHDRAWAL DELIRIUM: ICD-10-CM

## 2021-04-17 LAB
ALBUMIN SERPL ELPH-MCNC: 3.1 G/DL — LOW (ref 3.3–5.2)
ALBUMIN SERPL ELPH-MCNC: 3.6 G/DL — SIGNIFICANT CHANGE UP (ref 3.3–5.2)
ALP SERPL-CCNC: 210 U/L — HIGH (ref 40–120)
ALP SERPL-CCNC: 248 U/L — HIGH (ref 40–120)
ALT FLD-CCNC: 71 U/L — HIGH
ALT FLD-CCNC: 84 U/L — HIGH
AMMONIA BLD-MCNC: 61 UMOL/L — HIGH (ref 11–55)
AMMONIA BLD-MCNC: 66 UMOL/L — HIGH (ref 11–55)
ANION GAP SERPL CALC-SCNC: 18 MMOL/L — HIGH (ref 5–17)
ANION GAP SERPL CALC-SCNC: 28 MMOL/L — HIGH (ref 5–17)
ANION GAP SERPL CALC-SCNC: 31 MMOL/L — HIGH (ref 5–17)
ANION GAP SERPL CALC-SCNC: 36 MMOL/L — HIGH (ref 5–17)
ANION GAP SERPL CALC-SCNC: 38 MMOL/L — HIGH (ref 5–17)
ANISOCYTOSIS BLD QL: SLIGHT — SIGNIFICANT CHANGE UP
APTT BLD: 30 SEC — SIGNIFICANT CHANGE UP (ref 27.5–35.5)
AST SERPL-CCNC: 255 U/L — HIGH
AST SERPL-CCNC: 341 U/L — HIGH
BASOPHILS # BLD AUTO: 0.14 K/UL — SIGNIFICANT CHANGE UP (ref 0–0.2)
BASOPHILS NFR BLD AUTO: 0.9 % — SIGNIFICANT CHANGE UP (ref 0–2)
BILIRUB SERPL-MCNC: 10.9 MG/DL — HIGH (ref 0.4–2)
BILIRUB SERPL-MCNC: 16 MG/DL — HIGH (ref 0.4–2)
BUN SERPL-MCNC: 22 MG/DL — HIGH (ref 8–20)
BUN SERPL-MCNC: 31 MG/DL — HIGH (ref 8–20)
BUN SERPL-MCNC: 37 MG/DL — HIGH (ref 8–20)
BUN SERPL-MCNC: 41 MG/DL — HIGH (ref 8–20)
BUN SERPL-MCNC: 43 MG/DL — HIGH (ref 8–20)
CALCIUM SERPL-MCNC: 8.1 MG/DL — LOW (ref 8.6–10.2)
CALCIUM SERPL-MCNC: 8.3 MG/DL — LOW (ref 8.6–10.2)
CALCIUM SERPL-MCNC: 8.3 MG/DL — LOW (ref 8.6–10.2)
CALCIUM SERPL-MCNC: 9.1 MG/DL — SIGNIFICANT CHANGE UP (ref 8.6–10.2)
CALCIUM SERPL-MCNC: 9.2 MG/DL — SIGNIFICANT CHANGE UP (ref 8.6–10.2)
CHLORIDE SERPL-SCNC: 73 MMOL/L — LOW (ref 98–107)
CHLORIDE SERPL-SCNC: 73 MMOL/L — LOW (ref 98–107)
CHLORIDE SERPL-SCNC: 81 MMOL/L — LOW (ref 98–107)
CHLORIDE SERPL-SCNC: 87 MMOL/L — LOW (ref 98–107)
CHLORIDE SERPL-SCNC: 93 MMOL/L — LOW (ref 98–107)
CO2 SERPL-SCNC: 26 MMOL/L — SIGNIFICANT CHANGE UP (ref 22–29)
CO2 SERPL-SCNC: 26 MMOL/L — SIGNIFICANT CHANGE UP (ref 22–29)
CO2 SERPL-SCNC: 27 MMOL/L — SIGNIFICANT CHANGE UP (ref 22–29)
CO2 SERPL-SCNC: 27 MMOL/L — SIGNIFICANT CHANGE UP (ref 22–29)
CO2 SERPL-SCNC: 31 MMOL/L — HIGH (ref 22–29)
CREAT SERPL-MCNC: 0.55 MG/DL — SIGNIFICANT CHANGE UP (ref 0.5–1.3)
CREAT SERPL-MCNC: 0.72 MG/DL — SIGNIFICANT CHANGE UP (ref 0.5–1.3)
CREAT SERPL-MCNC: 1.04 MG/DL — SIGNIFICANT CHANGE UP (ref 0.5–1.3)
CREAT SERPL-MCNC: 1.36 MG/DL — HIGH (ref 0.5–1.3)
CREAT SERPL-MCNC: 1.42 MG/DL — HIGH (ref 0.5–1.3)
EOSINOPHIL # BLD AUTO: 0 K/UL — SIGNIFICANT CHANGE UP (ref 0–0.5)
EOSINOPHIL NFR BLD AUTO: 0 % — SIGNIFICANT CHANGE UP (ref 0–6)
ETHANOL SERPL-MCNC: <10 MG/DL — SIGNIFICANT CHANGE UP (ref 0–9)
GIANT PLATELETS BLD QL SMEAR: PRESENT — SIGNIFICANT CHANGE UP
GLUCOSE SERPL-MCNC: 103 MG/DL — HIGH (ref 70–99)
GLUCOSE SERPL-MCNC: 103 MG/DL — HIGH (ref 70–99)
GLUCOSE SERPL-MCNC: 106 MG/DL — HIGH (ref 70–99)
GLUCOSE SERPL-MCNC: 81 MG/DL — SIGNIFICANT CHANGE UP (ref 70–99)
GLUCOSE SERPL-MCNC: 92 MG/DL — SIGNIFICANT CHANGE UP (ref 70–99)
HCT VFR BLD CALC: 34.2 % — LOW (ref 39–50)
HCT VFR BLD CALC: 39.5 % — SIGNIFICANT CHANGE UP (ref 39–50)
HGB BLD-MCNC: 12 G/DL — LOW (ref 13–17)
HGB BLD-MCNC: 13.6 G/DL — SIGNIFICANT CHANGE UP (ref 13–17)
HYPOCHROMIA BLD QL: SLIGHT — SIGNIFICANT CHANGE UP
INR BLD: 1.95 RATIO — HIGH (ref 0.88–1.16)
LIDOCAIN IGE QN: 15 U/L — LOW (ref 22–51)
LYMPHOCYTES # BLD AUTO: 0.68 K/UL — LOW (ref 1–3.3)
LYMPHOCYTES # BLD AUTO: 4.3 % — LOW (ref 13–44)
MACROCYTES BLD QL: SLIGHT — SIGNIFICANT CHANGE UP
MAGNESIUM SERPL-MCNC: 1.5 MG/DL — LOW (ref 1.8–2.6)
MAGNESIUM SERPL-MCNC: 2 MG/DL — SIGNIFICANT CHANGE UP (ref 1.6–2.6)
MAGNESIUM SERPL-MCNC: 2.3 MG/DL — SIGNIFICANT CHANGE UP (ref 1.6–2.6)
MAGNESIUM SERPL-MCNC: 2.7 MG/DL — HIGH (ref 1.6–2.6)
MANUAL SMEAR VERIFICATION: SIGNIFICANT CHANGE UP
MCHC RBC-ENTMCNC: 33.9 PG — SIGNIFICANT CHANGE UP (ref 27–34)
MCHC RBC-ENTMCNC: 34.3 PG — HIGH (ref 27–34)
MCHC RBC-ENTMCNC: 34.4 GM/DL — SIGNIFICANT CHANGE UP (ref 32–36)
MCHC RBC-ENTMCNC: 35.1 GM/DL — SIGNIFICANT CHANGE UP (ref 32–36)
MCV RBC AUTO: 97.7 FL — SIGNIFICANT CHANGE UP (ref 80–100)
MCV RBC AUTO: 98.5 FL — SIGNIFICANT CHANGE UP (ref 80–100)
MONOCYTES # BLD AUTO: 1.09 K/UL — HIGH (ref 0–0.9)
MONOCYTES NFR BLD AUTO: 6.9 % — SIGNIFICANT CHANGE UP (ref 2–14)
NEUTROPHILS # BLD AUTO: 13.91 K/UL — HIGH (ref 1.8–7.4)
NEUTROPHILS NFR BLD AUTO: 87 % — HIGH (ref 43–77)
NEUTS BAND # BLD: 0.9 % — SIGNIFICANT CHANGE UP (ref 0–8)
PHOSPHATE SERPL-MCNC: 2.5 MG/DL — SIGNIFICANT CHANGE UP (ref 2.4–4.7)
PHOSPHATE SERPL-MCNC: 3.6 MG/DL — SIGNIFICANT CHANGE UP (ref 2.4–4.7)
PHOSPHATE SERPL-MCNC: 5.4 MG/DL — HIGH (ref 2.4–4.7)
PLAT MORPH BLD: ABNORMAL
PLATELET # BLD AUTO: 117 K/UL — LOW (ref 150–400)
PLATELET # BLD AUTO: 97 K/UL — LOW (ref 150–400)
POLYCHROMASIA BLD QL SMEAR: SLIGHT — SIGNIFICANT CHANGE UP
POTASSIUM SERPL-MCNC: 1.9 MMOL/L — CRITICAL LOW (ref 3.5–5.3)
POTASSIUM SERPL-MCNC: 1.9 MMOL/L — CRITICAL LOW (ref 3.5–5.3)
POTASSIUM SERPL-MCNC: 2.1 MMOL/L — CRITICAL LOW (ref 3.5–5.3)
POTASSIUM SERPL-MCNC: 2.2 MMOL/L — CRITICAL LOW (ref 3.5–5.3)
POTASSIUM SERPL-MCNC: 2.2 MMOL/L — CRITICAL LOW (ref 3.5–5.3)
POTASSIUM SERPL-SCNC: 1.9 MMOL/L — CRITICAL LOW (ref 3.5–5.3)
POTASSIUM SERPL-SCNC: 1.9 MMOL/L — CRITICAL LOW (ref 3.5–5.3)
POTASSIUM SERPL-SCNC: 2.1 MMOL/L — CRITICAL LOW (ref 3.5–5.3)
POTASSIUM SERPL-SCNC: 2.2 MMOL/L — CRITICAL LOW (ref 3.5–5.3)
POTASSIUM SERPL-SCNC: 2.2 MMOL/L — CRITICAL LOW (ref 3.5–5.3)
PROT SERPL-MCNC: 6.2 G/DL — LOW (ref 6.6–8.7)
PROT SERPL-MCNC: 7.3 G/DL — SIGNIFICANT CHANGE UP (ref 6.6–8.7)
PROTHROM AB SERPL-ACNC: 21.9 SEC — HIGH (ref 10.6–13.6)
RBC # BLD: 3.5 M/UL — LOW (ref 4.2–5.8)
RBC # BLD: 4.01 M/UL — LOW (ref 4.2–5.8)
RBC # FLD: 12.7 % — SIGNIFICANT CHANGE UP (ref 10.3–14.5)
RBC # FLD: 13 % — SIGNIFICANT CHANGE UP (ref 10.3–14.5)
RBC BLD AUTO: ABNORMAL
SARS-COV-2 RNA SPEC QL NAA+PROBE: SIGNIFICANT CHANGE UP
SODIUM SERPL-SCNC: 136 MMOL/L — SIGNIFICANT CHANGE UP (ref 135–145)
SODIUM SERPL-SCNC: 138 MMOL/L — SIGNIFICANT CHANGE UP (ref 135–145)
SODIUM SERPL-SCNC: 138 MMOL/L — SIGNIFICANT CHANGE UP (ref 135–145)
SODIUM SERPL-SCNC: 141 MMOL/L — SIGNIFICANT CHANGE UP (ref 135–145)
SODIUM SERPL-SCNC: 142 MMOL/L — SIGNIFICANT CHANGE UP (ref 135–145)
STOMATOCYTES BLD QL SMEAR: SIGNIFICANT CHANGE UP
TARGETS BLD QL SMEAR: SLIGHT — SIGNIFICANT CHANGE UP
WBC # BLD: 13.53 K/UL — HIGH (ref 3.8–10.5)
WBC # BLD: 15.82 K/UL — HIGH (ref 3.8–10.5)
WBC # FLD AUTO: 13.53 K/UL — HIGH (ref 3.8–10.5)
WBC # FLD AUTO: 15.82 K/UL — HIGH (ref 3.8–10.5)

## 2021-04-17 PROCEDURE — 93010 ELECTROCARDIOGRAM REPORT: CPT

## 2021-04-17 PROCEDURE — 70450 CT HEAD/BRAIN W/O DYE: CPT | Mod: 26

## 2021-04-17 PROCEDURE — 72125 CT NECK SPINE W/O DYE: CPT | Mod: 26

## 2021-04-17 PROCEDURE — 76705 ECHO EXAM OF ABDOMEN: CPT | Mod: 26,RT

## 2021-04-17 RX ORDER — SODIUM CHLORIDE 9 MG/ML
1000 INJECTION, SOLUTION INTRAVENOUS
Refills: 0 | Status: DISCONTINUED | OUTPATIENT
Start: 2021-04-17 | End: 2021-04-17

## 2021-04-17 RX ORDER — POTASSIUM CHLORIDE 20 MEQ
10 PACKET (EA) ORAL
Refills: 0 | Status: DISCONTINUED | OUTPATIENT
Start: 2021-04-17 | End: 2021-04-17

## 2021-04-17 RX ORDER — POTASSIUM CHLORIDE 20 MEQ
40 PACKET (EA) ORAL ONCE
Refills: 0 | Status: COMPLETED | OUTPATIENT
Start: 2021-04-17 | End: 2021-04-17

## 2021-04-17 RX ORDER — POTASSIUM CHLORIDE 20 MEQ
20 PACKET (EA) ORAL
Refills: 0 | Status: DISCONTINUED | OUTPATIENT
Start: 2021-04-17 | End: 2021-04-17

## 2021-04-17 RX ORDER — PHENOBARBITAL 60 MG
130 TABLET ORAL
Refills: 0 | Status: DISCONTINUED | OUTPATIENT
Start: 2021-04-17 | End: 2021-04-17

## 2021-04-17 RX ORDER — POTASSIUM PHOSPHATE, MONOBASIC POTASSIUM PHOSPHATE, DIBASIC 236; 224 MG/ML; MG/ML
30 INJECTION, SOLUTION INTRAVENOUS ONCE
Refills: 0 | Status: COMPLETED | OUTPATIENT
Start: 2021-04-17 | End: 2021-04-17

## 2021-04-17 RX ORDER — FOLIC ACID 0.8 MG
1 TABLET ORAL DAILY
Refills: 0 | Status: DISCONTINUED | OUTPATIENT
Start: 2021-04-17 | End: 2021-04-26

## 2021-04-17 RX ORDER — POTASSIUM CHLORIDE 20 MEQ
10 PACKET (EA) ORAL
Refills: 0 | Status: COMPLETED | OUTPATIENT
Start: 2021-04-17 | End: 2021-04-18

## 2021-04-17 RX ORDER — CHLORHEXIDINE GLUCONATE 213 G/1000ML
1 SOLUTION TOPICAL
Refills: 0 | Status: DISCONTINUED | OUTPATIENT
Start: 2021-04-17 | End: 2021-04-28

## 2021-04-17 RX ORDER — THIAMINE MONONITRATE (VIT B1) 100 MG
100 TABLET ORAL DAILY
Refills: 0 | Status: DISCONTINUED | OUTPATIENT
Start: 2021-04-17 | End: 2021-04-26

## 2021-04-17 RX ORDER — PHENOBARBITAL 60 MG
65 TABLET ORAL EVERY 6 HOURS
Refills: 0 | Status: DISCONTINUED | OUTPATIENT
Start: 2021-04-17 | End: 2021-04-17

## 2021-04-17 RX ORDER — SODIUM CHLORIDE 9 MG/ML
1000 INJECTION INTRAMUSCULAR; INTRAVENOUS; SUBCUTANEOUS ONCE
Refills: 0 | Status: COMPLETED | OUTPATIENT
Start: 2021-04-17 | End: 2021-04-17

## 2021-04-17 RX ORDER — PANTOPRAZOLE SODIUM 20 MG/1
40 TABLET, DELAYED RELEASE ORAL DAILY
Refills: 0 | Status: DISCONTINUED | OUTPATIENT
Start: 2021-04-17 | End: 2021-04-28

## 2021-04-17 RX ORDER — PHENOBARBITAL 60 MG
540 TABLET ORAL ONCE
Refills: 0 | Status: DISCONTINUED | OUTPATIENT
Start: 2021-04-17 | End: 2021-04-17

## 2021-04-17 RX ORDER — POTASSIUM CHLORIDE 20 MEQ
10 PACKET (EA) ORAL
Refills: 0 | Status: COMPLETED | OUTPATIENT
Start: 2021-04-17 | End: 2021-04-17

## 2021-04-17 RX ORDER — MAGNESIUM SULFATE 500 MG/ML
2 VIAL (ML) INJECTION ONCE
Refills: 0 | Status: COMPLETED | OUTPATIENT
Start: 2021-04-17 | End: 2021-04-17

## 2021-04-17 RX ORDER — SODIUM CHLORIDE 9 MG/ML
1000 INJECTION, SOLUTION INTRAVENOUS
Refills: 0 | Status: DISCONTINUED | OUTPATIENT
Start: 2021-04-17 | End: 2021-04-21

## 2021-04-17 RX ORDER — POTASSIUM CHLORIDE 20 MEQ
10 PACKET (EA) ORAL ONCE
Refills: 0 | Status: COMPLETED | OUTPATIENT
Start: 2021-04-17 | End: 2021-04-17

## 2021-04-17 RX ORDER — SODIUM CHLORIDE 9 MG/ML
1000 INJECTION, SOLUTION INTRAVENOUS ONCE
Refills: 0 | Status: COMPLETED | OUTPATIENT
Start: 2021-04-17 | End: 2021-04-17

## 2021-04-17 RX ADMIN — Medication 100 MILLIEQUIVALENT(S): at 05:21

## 2021-04-17 RX ADMIN — Medication 100 MILLIEQUIVALENT(S): at 06:08

## 2021-04-17 RX ADMIN — Medication 65 MILLIGRAM(S): at 06:08

## 2021-04-17 RX ADMIN — Medication 4 MILLIGRAM(S): at 17:41

## 2021-04-17 RX ADMIN — Medication 130 MILLIGRAM(S): at 10:22

## 2021-04-17 RX ADMIN — POTASSIUM PHOSPHATE, MONOBASIC POTASSIUM PHOSPHATE, DIBASIC 83.33 MILLIMOLE(S): 236; 224 INJECTION, SOLUTION INTRAVENOUS at 21:43

## 2021-04-17 RX ADMIN — PANTOPRAZOLE SODIUM 40 MILLIGRAM(S): 20 TABLET, DELAYED RELEASE ORAL at 10:00

## 2021-04-17 RX ADMIN — Medication 100 MILLIEQUIVALENT(S): at 18:06

## 2021-04-17 RX ADMIN — Medication 50 GRAM(S): at 03:38

## 2021-04-17 RX ADMIN — SODIUM CHLORIDE 1000 MILLILITER(S): 9 INJECTION, SOLUTION INTRAVENOUS at 02:31

## 2021-04-17 RX ADMIN — Medication 100 MILLIEQUIVALENT(S): at 07:59

## 2021-04-17 RX ADMIN — CHLORHEXIDINE GLUCONATE 1 APPLICATION(S): 213 SOLUTION TOPICAL at 05:21

## 2021-04-17 RX ADMIN — SODIUM CHLORIDE 1000 MILLILITER(S): 9 INJECTION INTRAMUSCULAR; INTRAVENOUS; SUBCUTANEOUS at 01:36

## 2021-04-17 RX ADMIN — Medication 100 MILLIEQUIVALENT(S): at 17:40

## 2021-04-17 RX ADMIN — Medication 4 MILLIGRAM(S): at 14:42

## 2021-04-17 RX ADMIN — Medication 100 MILLIEQUIVALENT(S): at 15:48

## 2021-04-17 RX ADMIN — Medication 100 MILLIEQUIVALENT(S): at 23:12

## 2021-04-17 RX ADMIN — Medication 130 MILLIGRAM(S): at 02:28

## 2021-04-17 RX ADMIN — Medication 32 MILLIGRAM(S): at 05:21

## 2021-04-17 RX ADMIN — Medication 4 MILLIGRAM(S): at 18:18

## 2021-04-17 RX ADMIN — Medication 65 MILLIGRAM(S): at 11:34

## 2021-04-17 RX ADMIN — Medication 100 MILLIEQUIVALENT(S): at 16:52

## 2021-04-17 RX ADMIN — Medication 100 MILLIEQUIVALENT(S): at 03:39

## 2021-04-17 RX ADMIN — Medication 100 MILLIGRAM(S): at 10:00

## 2021-04-17 RX ADMIN — Medication 100 MILLIEQUIVALENT(S): at 02:20

## 2021-04-17 RX ADMIN — Medication 2 MILLIGRAM(S): at 00:00

## 2021-04-17 RX ADMIN — SODIUM CHLORIDE 125 MILLILITER(S): 9 INJECTION, SOLUTION INTRAVENOUS at 17:41

## 2021-04-17 RX ADMIN — Medication 416.6 MILLIGRAM(S): at 01:33

## 2021-04-17 RX ADMIN — Medication 4 MILLIGRAM(S): at 21:57

## 2021-04-17 RX ADMIN — Medication 50 GRAM(S): at 01:37

## 2021-04-17 RX ADMIN — Medication 100 MILLIEQUIVALENT(S): at 01:37

## 2021-04-17 RX ADMIN — Medication 1 MILLIGRAM(S): at 11:34

## 2021-04-17 NOTE — ED PROVIDER NOTE - CLINICAL SUMMARY MEDICAL DECISION MAKING FREE TEXT BOX
Pt likely in alcohol withdrawal and liver disease with scleral icterus. Multiple recent falls and head injury. Will check labs, CT head.

## 2021-04-17 NOTE — H&P ADULT - HISTORY OF PRESENT ILLNESS
46 y/o M with a h/o EtOH abuse, chronic diarrhea, MS (untreated), recent admission for EtOH induced pancreatitis, presents to the ED with complaints of generalized weakness, falls resulting in hitting head several times, and new onset yellowing of his eyes with RUQ pain. Patient experienced progressive encephalopathy and tremulousness in the ED and began requiring repetitive IV benzo for suspected EtOH withdrawal. He reportedly had mentioned that he has been trying to cut down on drinking recently. EtOH level < 10. Patient unable to provide any history upon ICU eval. Also noted to have ALBER, severe hypokalemia, transaminitis, elevated INR, TBili of 16. CT head/neck pending.

## 2021-04-17 NOTE — DIETITIAN NUTRITION RISK NOTIFICATION - ADDITIONAL COMMENTS/DIETITIAN RECOMMENDATIONS
Advance diet as medically feasible/tolerable; if concerns for chewing/swallowing difficulty, suggest SLP swallow evaluation.  Continue folic acid and thiamine, add MVI daily.   Obtain daily weights to monitor trends.

## 2021-04-17 NOTE — ED ADULT NURSE NOTE - OBJECTIVE STATEMENT
Patient alert and oriented with periods of confusion. admits to daily drinker. last drink 1pm per patient. tremors can be felt and seen, appears anxious, sclera yellow in color. denies chest pain or shortness of breath. jaundice in color.

## 2021-04-17 NOTE — ED PROVIDER NOTE - OBJECTIVE STATEMENT
44y/o M with PMHx of MS; not on meds, in remission and ETOH abuse presents to the ED with multiple medical complaints. Pt endorses multiple recent falls where he hit his head several times. He additionally c/o generalized weakness. Pt endorses recent admission for Pancreatitis. States he recently appreciated yellowing of his eyes and has secondary c/o RUQ abdominal pain. No other complaints.

## 2021-04-17 NOTE — ED PROVIDER NOTE - PROGRESS NOTE DETAILS
Radha: pt continues to be tremulous, picking at clothes and with confusion, k reported at 1.9, will recheck but start k riders with mag. loading with 540mg phenobarb, ekg, micu saw pt and to accept for likely DT's.

## 2021-04-17 NOTE — PROVIDER CONTACT NOTE (EICU) - SITUATION
Patient noted with critical potassium of 2.2, CCMD made aware  and supplements to be ordered. CCMD called to emergency and E-ICU utilized to obtain supplements for critical potassium of 2.2

## 2021-04-17 NOTE — DIETITIAN INITIAL EVALUATION ADULT. - ENTER FROM (G/KG)
[facility]  [address] [facility] [address]  Phone: [phone]  Fax: [phone]   May 29, 2020    Jacques Glover MD  [address] [facility] [address]    Patient: Alhaji Mendez Jr.   MR Number: [id number]   YOB: 1938   Date of Visit: 5/29/2020       Dear Dr. Glover:    Today I saw Mr. Alhaji Mendez Jr. for his evaluation. Here is my assessment and plan of care:    Assessment/    Below you will find my full exam findings. If you have questions, please do not hesitate to call me. I look forward to following Mr. Alhaji Mendez Jr. along with you.    Sincerely,        Jaron Lima MD       CC  No Recipients             Base Eye Exam     Visual Acuity (Snellen - Linear)       Right Left    Dist sc NLP HM          Tonometry (Applanation, 9:31 AM)       Right Left    Pressure  12          Neuro/Psych     Oriented x3:  Yes    Mood/Affect:  Normal            Slit Lamp and Fundus Exam     External Exam       Right Left    External dermatitis depressed OS with enophthalmos and ptosis          Slit Lamp Exam       Right Left    Lids/Lashes Normal Normal    Conjunctiva/Sclera 2+ Injection 1+ Injection     Cornea densely opacified , Neovascularization of graft  clear graft with sutures intact    Anterior Chamber organized Deep and quiet    Iris  irreg with synch in angle    Lens  Posterior chamber intraocular lens                1.2

## 2021-04-17 NOTE — DIETITIAN INITIAL EVALUATION ADULT. - OTHER INFO
45 year old male with a h/o EtOH abuse, chronic diarrhea, MS (untreated), recent admission for EtOH induced pancreatitis, presents to the ED with complaints of generalized weakness, falls resulting in hitting head several times, and new onset yellowing of his eyes with RUQ pain. Admitted with severe DTs, acute alcoholic hepatitis, ALBER, hypokalemia, coagulopathy. Attempted to interview, pt very lethargic. Currently NPO. Limited NFPE conducted, noted with muscle loss and fat loss. RD to follow up.

## 2021-04-17 NOTE — DIETITIAN INITIAL EVALUATION ADULT. - CONTINUE CURRENT NUTRITION CARE PLAN
- advance diet as medically feasible/tolerable; if concerns for chewing/swallowing difficulty, suggest SLP swallow evaluation./yes

## 2021-04-17 NOTE — ED PROVIDER NOTE - NS ED ROS FT
ROS: No fever/chills. No ear pain/sore throat/dysphagia, No chest pain/palpitations. No SOB/cough/. + abdominal pain, no N/V/D, no black/bloody bm. No dysuria/frequency/discharge, No headache. No Dizziness.    No rashes or breaks in skin. No numbness/tingling. +yellowing of eyes +generalized weakness

## 2021-04-17 NOTE — DIETITIAN INITIAL EVALUATION ADULT. - ETIOLOGY
related to inability to meet sufficient protein-energy in setting of EtOH abuse, MS, chronic diarrhea, recent alcohol induced pancreatitis, alcohol withdrawal, severe DTs, acute alcoholic hepatitis

## 2021-04-17 NOTE — DIETITIAN INITIAL EVALUATION ADULT. - PERTINENT LABORATORY DATA
04-17 Na138 mmol/L Glu 92 mg/dL K+ 1.9 mmol/L<LL> Cr  1.04 mg/dL BUN 37.0 mg/dL<H> Phos 5.4 mg/dL<H> Alb n/a   PAB n/a

## 2021-04-17 NOTE — DIETITIAN INITIAL EVALUATION ADULT. - PERTINENT MEDS FT
MEDICATIONS  (STANDING):  chlorhexidine 2% Cloths 1 Application(s) Topical <User Schedule>  folic acid Injectable 1 milliGRAM(s) IV Push daily  lactated ringers 1000 milliLiter(s) (125 mL/Hr) IV Continuous <Continuous>  methylPREDNISolone sodium succinate Injectable 32 milliGRAM(s) IV Push daily  pantoprazole  Injectable 40 milliGRAM(s) IV Push daily  PHENobarbital Injectable 65 milliGRAM(s) IV Push every 6 hours  potassium chloride  10 mEq/100 mL IVPB 10 milliEquivalent(s) IV Intermittent every 1 hour  thiamine Injectable 100 milliGRAM(s) IV Push daily    MEDICATIONS  (PRN):  PHENobarbital Injectable 130 milliGRAM(s) IV Push every 30 minutes PRN breakthrough withdrawal symptoms

## 2021-04-17 NOTE — ED PROVIDER NOTE - PHYSICAL EXAMINATION
Gen: Very tremulous, generally unkempt and generally unwell.   HEENT: Mucous membranes moist. +scleral icterus   CV: RRR, nl s1/s2.  Resp: CTAB, normal rate and effort  GI: Abdomen soft, TTP RUQ, ND. No rebound, no guarding  : No CVAT  Neuro: A&O x 3, moving all 4 extremities  MSK: No spine or joint tenderness to palpation  Skin: No rashes. intact and perfused.

## 2021-04-17 NOTE — H&P ADULT - ASSESSMENT
44 y/o M with a h/o EtOH abuse, chronic diarrhea, MS (untreated), recent admission for EtOH induced pancreatitis, with severe DTs, acute alcoholic hepatitis, ALBER, hypokalemia, coagulopathy.    Admit to ICU for further management.    - will load with 540mg phenobarbital IVPB  - start ATC MICHAEL potentiation with 65mg IV phenobarbital Q 6 hours  - 130mg IV phenobarbital Q 30 mins for breakthrough withdrawal symptoms  - escalate above dosing as necessary  - will consider adjunctive dexmedetomidine infusion to help achieve calm state to allow for barbiturate loading  - monitor respiratory status and airway closely given high risk for aspiration/resp failure  - CT head/neck pending   - IV thiamine and folic acid  - Discriminant Factor of 61.5 which indicates poor prognosis, will start 32mg IV methylprednisolone QD  - RUQ US reveals hepatic steatosis and hepatomegaly  - check ammonia level  - would benefit from GI consult  - suspect ALBER is pre-renal secondary to dehydration, has h/o chronic diarrhea, BUN/Cr ratio 28  - bolus 2L isotonic IVF and start maintenance fluids with LR @ 125cc/hr  - empirically repleting potassium although will repeat serum chemistry to confirm given profound hypokalemia (K 1.9)      Case discussed in detail with ICU physician, Dr. Delacruz.        CRITICAL CARE TIME SPENT: 55 mins  Time spent evaluating/treating patient with medical issues that pose a high risk for life threatening deterioration and/or end-organ damage, reviewing data/labs/imaging, discussing case with multidisciplinary team, discussing plan/goals of care with patient/family. Non-inclusive of procedure time.

## 2021-04-18 LAB
ALBUMIN SERPL ELPH-MCNC: 3 G/DL — LOW (ref 3.3–5.2)
ALP SERPL-CCNC: 202 U/L — HIGH (ref 40–120)
ALT FLD-CCNC: 70 U/L — HIGH
ANION GAP SERPL CALC-SCNC: 16 MMOL/L — SIGNIFICANT CHANGE UP (ref 5–17)
ANION GAP SERPL CALC-SCNC: 18 MMOL/L — HIGH (ref 5–17)
AST SERPL-CCNC: 228 U/L — HIGH
BILIRUB SERPL-MCNC: 8.9 MG/DL — HIGH (ref 0.4–2)
BUN SERPL-MCNC: 11 MG/DL — SIGNIFICANT CHANGE UP (ref 8–20)
BUN SERPL-MCNC: 14 MG/DL — SIGNIFICANT CHANGE UP (ref 8–20)
CALCIUM SERPL-MCNC: 8.2 MG/DL — LOW (ref 8.6–10.2)
CALCIUM SERPL-MCNC: 8.7 MG/DL — SIGNIFICANT CHANGE UP (ref 8.6–10.2)
CHLORIDE SERPL-SCNC: 94 MMOL/L — LOW (ref 98–107)
CHLORIDE SERPL-SCNC: 96 MMOL/L — LOW (ref 98–107)
CO2 SERPL-SCNC: 31 MMOL/L — HIGH (ref 22–29)
CO2 SERPL-SCNC: 31 MMOL/L — HIGH (ref 22–29)
COVID-19 SPIKE DOMAIN AB INTERP: NEGATIVE — SIGNIFICANT CHANGE UP
COVID-19 SPIKE DOMAIN ANTIBODY RESULT: 0.4 U/ML — SIGNIFICANT CHANGE UP
CREAT SERPL-MCNC: 0.48 MG/DL — LOW (ref 0.5–1.3)
CREAT SERPL-MCNC: 0.49 MG/DL — LOW (ref 0.5–1.3)
GLUCOSE SERPL-MCNC: 73 MG/DL — SIGNIFICANT CHANGE UP (ref 70–99)
GLUCOSE SERPL-MCNC: 79 MG/DL — SIGNIFICANT CHANGE UP (ref 70–99)
HCT VFR BLD CALC: 36.1 % — LOW (ref 39–50)
HGB BLD-MCNC: 12.2 G/DL — LOW (ref 13–17)
MAGNESIUM SERPL-MCNC: 1.6 MG/DL — LOW (ref 1.8–2.6)
MAGNESIUM SERPL-MCNC: 1.9 MG/DL — SIGNIFICANT CHANGE UP (ref 1.6–2.6)
MCHC RBC-ENTMCNC: 33.4 PG — SIGNIFICANT CHANGE UP (ref 27–34)
MCHC RBC-ENTMCNC: 33.8 GM/DL — SIGNIFICANT CHANGE UP (ref 32–36)
MCV RBC AUTO: 98.9 FL — SIGNIFICANT CHANGE UP (ref 80–100)
PHOSPHATE SERPL-MCNC: 1.8 MG/DL — LOW (ref 2.4–4.7)
PHOSPHATE SERPL-MCNC: 2.5 MG/DL — SIGNIFICANT CHANGE UP (ref 2.4–4.7)
PLATELET # BLD AUTO: 105 K/UL — LOW (ref 150–400)
POTASSIUM SERPL-MCNC: 2.5 MMOL/L — CRITICAL LOW (ref 3.5–5.3)
POTASSIUM SERPL-MCNC: 2.6 MMOL/L — CRITICAL LOW (ref 3.5–5.3)
POTASSIUM SERPL-SCNC: 2.5 MMOL/L — CRITICAL LOW (ref 3.5–5.3)
POTASSIUM SERPL-SCNC: 2.6 MMOL/L — CRITICAL LOW (ref 3.5–5.3)
PROT SERPL-MCNC: 6.1 G/DL — LOW (ref 6.6–8.7)
RBC # BLD: 3.65 M/UL — LOW (ref 4.2–5.8)
RBC # FLD: 12.8 % — SIGNIFICANT CHANGE UP (ref 10.3–14.5)
SARS-COV-2 IGG+IGM SERPL QL IA: 0.4 U/ML — SIGNIFICANT CHANGE UP
SARS-COV-2 IGG+IGM SERPL QL IA: NEGATIVE — SIGNIFICANT CHANGE UP
SODIUM SERPL-SCNC: 142 MMOL/L — SIGNIFICANT CHANGE UP (ref 135–145)
SODIUM SERPL-SCNC: 143 MMOL/L — SIGNIFICANT CHANGE UP (ref 135–145)
WBC # BLD: 7.98 K/UL — SIGNIFICANT CHANGE UP (ref 3.8–10.5)
WBC # FLD AUTO: 7.98 K/UL — SIGNIFICANT CHANGE UP (ref 3.8–10.5)

## 2021-04-18 PROCEDURE — 99233 SBSQ HOSP IP/OBS HIGH 50: CPT

## 2021-04-18 RX ORDER — POTASSIUM PHOSPHATE, MONOBASIC POTASSIUM PHOSPHATE, DIBASIC 236; 224 MG/ML; MG/ML
30 INJECTION, SOLUTION INTRAVENOUS ONCE
Refills: 0 | Status: COMPLETED | OUTPATIENT
Start: 2021-04-18 | End: 2021-04-18

## 2021-04-18 RX ORDER — POTASSIUM CHLORIDE 20 MEQ
10 PACKET (EA) ORAL
Refills: 0 | Status: COMPLETED | OUTPATIENT
Start: 2021-04-18 | End: 2021-04-18

## 2021-04-18 RX ORDER — ENOXAPARIN SODIUM 100 MG/ML
40 INJECTION SUBCUTANEOUS DAILY
Refills: 0 | Status: DISCONTINUED | OUTPATIENT
Start: 2021-04-18 | End: 2021-04-28

## 2021-04-18 RX ORDER — MAGNESIUM SULFATE 500 MG/ML
2 VIAL (ML) INJECTION ONCE
Refills: 0 | Status: COMPLETED | OUTPATIENT
Start: 2021-04-18 | End: 2021-04-18

## 2021-04-18 RX ORDER — LACTULOSE 10 G/15ML
15 SOLUTION ORAL EVERY 6 HOURS
Refills: 0 | Status: DISCONTINUED | OUTPATIENT
Start: 2021-04-18 | End: 2021-04-19

## 2021-04-18 RX ADMIN — Medication 4 MILLIGRAM(S): at 13:23

## 2021-04-18 RX ADMIN — Medication 3 MILLIGRAM(S): at 22:16

## 2021-04-18 RX ADMIN — SODIUM CHLORIDE 125 MILLILITER(S): 9 INJECTION, SOLUTION INTRAVENOUS at 08:10

## 2021-04-18 RX ADMIN — Medication 100 MILLIEQUIVALENT(S): at 01:21

## 2021-04-18 RX ADMIN — LACTULOSE 15 GRAM(S): 10 SOLUTION ORAL at 17:21

## 2021-04-18 RX ADMIN — LACTULOSE 15 GRAM(S): 10 SOLUTION ORAL at 11:36

## 2021-04-18 RX ADMIN — Medication 4 MILLIGRAM(S): at 18:33

## 2021-04-18 RX ADMIN — ENOXAPARIN SODIUM 40 MILLIGRAM(S): 100 INJECTION SUBCUTANEOUS at 21:14

## 2021-04-18 RX ADMIN — Medication 1 MILLIGRAM(S): at 11:51

## 2021-04-18 RX ADMIN — Medication 4 MILLIGRAM(S): at 20:54

## 2021-04-18 RX ADMIN — Medication 4 MILLIGRAM(S): at 08:27

## 2021-04-18 RX ADMIN — POTASSIUM PHOSPHATE, MONOBASIC POTASSIUM PHOSPHATE, DIBASIC 83.33 MILLIMOLE(S): 236; 224 INJECTION, SOLUTION INTRAVENOUS at 08:20

## 2021-04-18 RX ADMIN — Medication 100 MILLIEQUIVALENT(S): at 22:17

## 2021-04-18 RX ADMIN — Medication 4 MILLIGRAM(S): at 07:12

## 2021-04-18 RX ADMIN — Medication 3 MILLIGRAM(S): at 17:30

## 2021-04-18 RX ADMIN — Medication 100 MILLIEQUIVALENT(S): at 08:27

## 2021-04-18 RX ADMIN — Medication 100 MILLIEQUIVALENT(S): at 20:18

## 2021-04-18 RX ADMIN — Medication 50 GRAM(S): at 11:33

## 2021-04-18 RX ADMIN — Medication 100 MILLIEQUIVALENT(S): at 06:27

## 2021-04-18 RX ADMIN — Medication 100 MILLIEQUIVALENT(S): at 08:10

## 2021-04-18 RX ADMIN — Medication 3 MILLIGRAM(S): at 09:19

## 2021-04-18 RX ADMIN — SODIUM CHLORIDE 125 MILLILITER(S): 9 INJECTION, SOLUTION INTRAVENOUS at 22:15

## 2021-04-18 RX ADMIN — Medication 100 MILLIEQUIVALENT(S): at 09:19

## 2021-04-18 RX ADMIN — Medication 100 MILLIEQUIVALENT(S): at 05:22

## 2021-04-18 RX ADMIN — Medication 100 MILLIEQUIVALENT(S): at 00:34

## 2021-04-18 RX ADMIN — CHLORHEXIDINE GLUCONATE 1 APPLICATION(S): 213 SOLUTION TOPICAL at 05:22

## 2021-04-18 RX ADMIN — PANTOPRAZOLE SODIUM 40 MILLIGRAM(S): 20 TABLET, DELAYED RELEASE ORAL at 08:32

## 2021-04-18 RX ADMIN — SODIUM CHLORIDE 125 MILLILITER(S): 9 INJECTION, SOLUTION INTRAVENOUS at 18:36

## 2021-04-18 RX ADMIN — Medication 4 MILLIGRAM(S): at 02:02

## 2021-04-18 RX ADMIN — Medication 4 MILLIGRAM(S): at 11:29

## 2021-04-18 RX ADMIN — Medication 4 MILLIGRAM(S): at 05:21

## 2021-04-18 RX ADMIN — Medication 32 MILLIGRAM(S): at 05:22

## 2021-04-18 RX ADMIN — Medication 50 GRAM(S): at 08:27

## 2021-04-18 RX ADMIN — Medication 100 MILLIEQUIVALENT(S): at 07:00

## 2021-04-18 RX ADMIN — Medication 100 MILLIEQUIVALENT(S): at 21:14

## 2021-04-18 RX ADMIN — Medication 3 MILLIGRAM(S): at 14:37

## 2021-04-18 RX ADMIN — Medication 100 MILLIGRAM(S): at 11:35

## 2021-04-18 NOTE — PROGRESS NOTE ADULT - SUBJECTIVE AND OBJECTIVE BOX
Patient is a 45y old  Male who presents with a chief complaint of severe DTs (17 Apr 2021 08:32)      HPI/BRIEF HOSPITAL COURSE:  45 year old  male with past medical history of how call use disorder, chronic diarrhea, untreated multiple sclerosis , alcohol pancreatitis presented to [facility] on April 16th with generalized weakness, falls with changing color of skin to yellow with right upper quadrant pain it was admitted to medical ICU or alcohol withdrawal with delirium tremens , alcoholic hepatitis , severe persistent hypokalemia.   Patience mentation minimally improved. For 24 hours on admission patient received phenobarbital loading followed by scheduled and as needed that has been since changed to Ativan taper with as needed Ativan. Aggressive replacement of potassium. Improvement in diarrhea as well as tremulousness        PAST MEDICAL & SURGICAL HISTORY:  Multiple sclerosis    Chronic diarrhea    ETOH abuse    S/P cholecystectomy        Could not obtain ROS due to underlying mentation      Physical Examination:    ICU Vital Signs Last 24 Hrs  T(C): 36.7 (18 Apr 2021 15:29), Max: 37 (18 Apr 2021 11:28)  T(F): 98.1 (18 Apr 2021 15:29), Max: 98.6 (18 Apr 2021 11:28)  HR: 86 (18 Apr 2021 19:00) (77 - 98)  BP: 128/83 (18 Apr 2021 18:00) (113/76 - 156/131)  BP(mean): 96 (18 Apr 2021 18:00) (76 - 138)  ABP: --  ABP(mean): --  RR: 10 (18 Apr 2021 19:00) (10 - 23)  SpO2: 96% (18 Apr 2021 18:00) (86% - 98%)      General: No acute distress.      Neuro: obtunded, No motor, sensory, or cranial nerve deficit, minimal tremulousness     HEENT: Pupils equal, reactive to light, Oral mucosa     PULM: Clear to auscultation bilaterally, no significant adventitious breath sounds     CVS: Regular rhythm and controlled rate, no murmurs, rubs, or gallops    ABD: Soft, nondistended, nontender, normoactive bowel sounds, no CVA tenderness    EXT: No b/l LE edema, nontender with pedal pulse palpable     SKIN: Warm and well perfused, no acute rashes       Medications:        LORazepam   Injectable   IV Push   LORazepam   Injectable 4 milliGRAM(s) IV Push every 1 hour PRN  LORazepam   Injectable 3 milliGRAM(s) IV Push every 4 hours      enoxaparin Injectable 40 milliGRAM(s) SubCutaneous daily    lactulose Syrup 15 Gram(s) Oral every 6 hours  pantoprazole  Injectable 40 milliGRAM(s) IV Push daily      methylPREDNISolone sodium succinate Injectable 32 milliGRAM(s) IV Push daily    folic acid Injectable 1 milliGRAM(s) IV Push daily  lactated ringers 1000 milliLiter(s) IV Continuous <Continuous>  thiamine Injectable 100 milliGRAM(s) IV Push daily      chlorhexidine 2% Cloths 1 Application(s) Topical <User Schedule>            I&O's Detail    17 Apr 2021 07:01  -  18 Apr 2021 07:00  --------------------------------------------------------  IN:    IV PiggyBack: 1000 mL    Lactated Ringers w/ Additives: 2700 mL  Total IN: 3700 mL    OUT:    Incontinent per Condom Catheter (mL): 1850 mL  Total OUT: 1850 mL    Total NET: 1850 mL      18 Apr 2021 07:01  -  18 Apr 2021 19:28  --------------------------------------------------------  IN:    IV PiggyBack: 300 mL    IV PiggyBack: 499.8 mL    Lactated Ringers w/ Additives: 1375 mL  Total IN: 2174.8 mL    OUT:    Incontinent per Condom Catheter (mL): 1100 mL  Total OUT: 1100 mL    Total NET: 1074.8 mL            RADIOLOGY/ Microbiology/ Labs: reviewed     I have personally provided 50 minutes of critical care time excluding time spent on separate procedures

## 2021-04-19 LAB
ALBUMIN SERPL ELPH-MCNC: 2.9 G/DL — LOW (ref 3.3–5.2)
ALBUMIN SERPL ELPH-MCNC: 2.9 G/DL — LOW (ref 3.3–5.2)
ALBUMIN SERPL ELPH-MCNC: 3 G/DL — LOW (ref 3.3–5.2)
ALP SERPL-CCNC: 202 U/L — HIGH (ref 40–120)
ALP SERPL-CCNC: 204 U/L — HIGH (ref 40–120)
ALP SERPL-CCNC: 209 U/L — HIGH (ref 40–120)
ALT FLD-CCNC: 68 U/L — HIGH
ALT FLD-CCNC: 69 U/L — HIGH
ALT FLD-CCNC: 69 U/L — HIGH
ANION GAP SERPL CALC-SCNC: 13 MMOL/L — SIGNIFICANT CHANGE UP (ref 5–17)
ANION GAP SERPL CALC-SCNC: 15 MMOL/L — SIGNIFICANT CHANGE UP (ref 5–17)
ANION GAP SERPL CALC-SCNC: 16 MMOL/L — SIGNIFICANT CHANGE UP (ref 5–17)
AST SERPL-CCNC: 178 U/L — HIGH
AST SERPL-CCNC: 200 U/L — HIGH
AST SERPL-CCNC: 201 U/L — HIGH
BILIRUB SERPL-MCNC: 7.4 MG/DL — HIGH (ref 0.4–2)
BILIRUB SERPL-MCNC: 7.8 MG/DL — HIGH (ref 0.4–2)
BILIRUB SERPL-MCNC: 7.9 MG/DL — HIGH (ref 0.4–2)
BUN SERPL-MCNC: 6 MG/DL — LOW (ref 8–20)
BUN SERPL-MCNC: 7 MG/DL — LOW (ref 8–20)
BUN SERPL-MCNC: 9 MG/DL — SIGNIFICANT CHANGE UP (ref 8–20)
CALCIUM SERPL-MCNC: 8 MG/DL — LOW (ref 8.6–10.2)
CALCIUM SERPL-MCNC: 8.3 MG/DL — LOW (ref 8.6–10.2)
CALCIUM SERPL-MCNC: 8.4 MG/DL — LOW (ref 8.6–10.2)
CHLORIDE SERPL-SCNC: 91 MMOL/L — LOW (ref 98–107)
CHLORIDE SERPL-SCNC: 94 MMOL/L — LOW (ref 98–107)
CHLORIDE SERPL-SCNC: 94 MMOL/L — LOW (ref 98–107)
CK MB CFR SERPL CALC: 5.5 NG/ML — SIGNIFICANT CHANGE UP (ref 0–6.7)
CK SERPL-CCNC: 474 U/L — HIGH (ref 30–200)
CO2 SERPL-SCNC: 31 MMOL/L — HIGH (ref 22–29)
CO2 SERPL-SCNC: 33 MMOL/L — HIGH (ref 22–29)
CO2 SERPL-SCNC: 33 MMOL/L — HIGH (ref 22–29)
CREAT SERPL-MCNC: 0.35 MG/DL — LOW (ref 0.5–1.3)
CREAT SERPL-MCNC: 0.38 MG/DL — LOW (ref 0.5–1.3)
CREAT SERPL-MCNC: 0.45 MG/DL — LOW (ref 0.5–1.3)
GLUCOSE SERPL-MCNC: 100 MG/DL — HIGH (ref 70–99)
GLUCOSE SERPL-MCNC: 78 MG/DL — SIGNIFICANT CHANGE UP (ref 70–99)
GLUCOSE SERPL-MCNC: 78 MG/DL — SIGNIFICANT CHANGE UP (ref 70–99)
HAV IGM SER-ACNC: SIGNIFICANT CHANGE UP
HBV CORE IGM SER-ACNC: SIGNIFICANT CHANGE UP
HBV SURFACE AG SER-ACNC: SIGNIFICANT CHANGE UP
HCT VFR BLD CALC: 35.3 % — LOW (ref 39–50)
HCV AB S/CO SERPL IA: 0.13 S/CO — SIGNIFICANT CHANGE UP (ref 0–0.99)
HCV AB SERPL-IMP: SIGNIFICANT CHANGE UP
HGB BLD-MCNC: 12 G/DL — LOW (ref 13–17)
HIV 1 & 2 AB SERPL IA.RAPID: SIGNIFICANT CHANGE UP
MAGNESIUM SERPL-MCNC: 1.6 MG/DL — SIGNIFICANT CHANGE UP (ref 1.6–2.6)
MAGNESIUM SERPL-MCNC: 1.8 MG/DL — SIGNIFICANT CHANGE UP (ref 1.6–2.6)
MCHC RBC-ENTMCNC: 33.6 PG — SIGNIFICANT CHANGE UP (ref 27–34)
MCHC RBC-ENTMCNC: 34 GM/DL — SIGNIFICANT CHANGE UP (ref 32–36)
MCV RBC AUTO: 98.9 FL — SIGNIFICANT CHANGE UP (ref 80–100)
PHOSPHATE SERPL-MCNC: 1.9 MG/DL — LOW (ref 2.4–4.7)
PHOSPHATE SERPL-MCNC: 2.3 MG/DL — LOW (ref 2.4–4.7)
PLATELET # BLD AUTO: 122 K/UL — LOW (ref 150–400)
POTASSIUM SERPL-MCNC: 2.7 MMOL/L — CRITICAL LOW (ref 3.5–5.3)
POTASSIUM SERPL-MCNC: 3.1 MMOL/L — LOW (ref 3.5–5.3)
POTASSIUM SERPL-MCNC: 3.4 MMOL/L — LOW (ref 3.5–5.3)
POTASSIUM SERPL-SCNC: 2.7 MMOL/L — CRITICAL LOW (ref 3.5–5.3)
POTASSIUM SERPL-SCNC: 3.1 MMOL/L — LOW (ref 3.5–5.3)
POTASSIUM SERPL-SCNC: 3.4 MMOL/L — LOW (ref 3.5–5.3)
PROT SERPL-MCNC: 6 G/DL — LOW (ref 6.6–8.7)
PROT SERPL-MCNC: 6.1 G/DL — LOW (ref 6.6–8.7)
PROT SERPL-MCNC: 6.1 G/DL — LOW (ref 6.6–8.7)
RBC # BLD: 3.57 M/UL — LOW (ref 4.2–5.8)
RBC # FLD: 13.1 % — SIGNIFICANT CHANGE UP (ref 10.3–14.5)
SODIUM SERPL-SCNC: 137 MMOL/L — SIGNIFICANT CHANGE UP (ref 135–145)
SODIUM SERPL-SCNC: 140 MMOL/L — SIGNIFICANT CHANGE UP (ref 135–145)
SODIUM SERPL-SCNC: 143 MMOL/L — SIGNIFICANT CHANGE UP (ref 135–145)
T PALLIDUM AB TITR SER: NEGATIVE — SIGNIFICANT CHANGE UP
WBC # BLD: 6.22 K/UL — SIGNIFICANT CHANGE UP (ref 3.8–10.5)
WBC # FLD AUTO: 6.22 K/UL — SIGNIFICANT CHANGE UP (ref 3.8–10.5)

## 2021-04-19 PROCEDURE — 99233 SBSQ HOSP IP/OBS HIGH 50: CPT

## 2021-04-19 RX ORDER — LACTULOSE 10 G/15ML
15 SOLUTION ORAL EVERY 6 HOURS
Refills: 0 | Status: DISCONTINUED | OUTPATIENT
Start: 2021-04-19 | End: 2021-04-21

## 2021-04-19 RX ORDER — POTASSIUM PHOSPHATE, MONOBASIC POTASSIUM PHOSPHATE, DIBASIC 236; 224 MG/ML; MG/ML
30 INJECTION, SOLUTION INTRAVENOUS ONCE
Refills: 0 | Status: COMPLETED | OUTPATIENT
Start: 2021-04-19 | End: 2021-04-19

## 2021-04-19 RX ORDER — PHENOBARBITAL 60 MG
65 TABLET ORAL
Refills: 0 | Status: DISCONTINUED | OUTPATIENT
Start: 2021-04-19 | End: 2021-04-21

## 2021-04-19 RX ORDER — MAGNESIUM SULFATE 500 MG/ML
2 VIAL (ML) INJECTION ONCE
Refills: 0 | Status: COMPLETED | OUTPATIENT
Start: 2021-04-19 | End: 2021-04-19

## 2021-04-19 RX ORDER — POTASSIUM CHLORIDE 20 MEQ
10 PACKET (EA) ORAL
Refills: 0 | Status: COMPLETED | OUTPATIENT
Start: 2021-04-19 | End: 2021-04-19

## 2021-04-19 RX ADMIN — Medication 100 MILLIEQUIVALENT(S): at 09:38

## 2021-04-19 RX ADMIN — Medication 4 MILLIGRAM(S): at 04:10

## 2021-04-19 RX ADMIN — SODIUM CHLORIDE 125 MILLILITER(S): 9 INJECTION, SOLUTION INTRAVENOUS at 15:04

## 2021-04-19 RX ADMIN — Medication 65 MILLIGRAM(S): at 15:29

## 2021-04-19 RX ADMIN — LACTULOSE 15 GRAM(S): 10 SOLUTION ORAL at 06:05

## 2021-04-19 RX ADMIN — Medication 1 MILLIGRAM(S): at 11:07

## 2021-04-19 RX ADMIN — Medication 2 MILLIGRAM(S): at 17:27

## 2021-04-19 RX ADMIN — Medication 100 MILLIEQUIVALENT(S): at 04:13

## 2021-04-19 RX ADMIN — Medication 2 MILLIGRAM(S): at 13:47

## 2021-04-19 RX ADMIN — Medication 4 MILLIGRAM(S): at 19:54

## 2021-04-19 RX ADMIN — Medication 100 MILLIEQUIVALENT(S): at 17:27

## 2021-04-19 RX ADMIN — Medication 100 MILLIEQUIVALENT(S): at 02:18

## 2021-04-19 RX ADMIN — Medication 32 MILLIGRAM(S): at 06:05

## 2021-04-19 RX ADMIN — Medication 3 MILLIGRAM(S): at 06:04

## 2021-04-19 RX ADMIN — Medication 100 MILLIEQUIVALENT(S): at 03:15

## 2021-04-19 RX ADMIN — Medication 100 MILLIEQUIVALENT(S): at 18:26

## 2021-04-19 RX ADMIN — ENOXAPARIN SODIUM 40 MILLIGRAM(S): 100 INJECTION SUBCUTANEOUS at 11:07

## 2021-04-19 RX ADMIN — PANTOPRAZOLE SODIUM 40 MILLIGRAM(S): 20 TABLET, DELAYED RELEASE ORAL at 11:07

## 2021-04-19 RX ADMIN — POTASSIUM PHOSPHATE, MONOBASIC POTASSIUM PHOSPHATE, DIBASIC 83.33 MILLIMOLE(S): 236; 224 INJECTION, SOLUTION INTRAVENOUS at 04:00

## 2021-04-19 RX ADMIN — Medication 100 MILLIEQUIVALENT(S): at 18:00

## 2021-04-19 RX ADMIN — Medication 50 GRAM(S): at 02:17

## 2021-04-19 RX ADMIN — Medication 100 MILLIEQUIVALENT(S): at 08:35

## 2021-04-19 RX ADMIN — CHLORHEXIDINE GLUCONATE 1 APPLICATION(S): 213 SOLUTION TOPICAL at 06:05

## 2021-04-19 RX ADMIN — Medication 100 MILLIEQUIVALENT(S): at 07:46

## 2021-04-19 RX ADMIN — Medication 100 MILLIGRAM(S): at 11:07

## 2021-04-19 RX ADMIN — Medication 2 MILLIGRAM(S): at 09:38

## 2021-04-19 RX ADMIN — Medication 3 MILLIGRAM(S): at 02:18

## 2021-04-19 NOTE — PROGRESS NOTE ADULT - SUBJECTIVE AND OBJECTIVE BOX
Patient is a 45y old  Male who presents with a chief complaint of severe DTs (18 Apr 2021 19:25)      BRIEF HOSPITAL COURSE:   45M PMH EtOH use disorder with h/o EtOH pancreatitis, chronic diarrhea, untreated MS who presents with generalized weakness, falls, jaundice, and RUQ pain, admitted to ICU for management of DTs, EtOH hepatitis, severe hypokalemia. Was loaded with phenobarb on admission and started on Ativan taper.      PAST MEDICAL & SURGICAL HISTORY:  Multiple sclerosis    Chronic diarrhea    ETOH abuse    S/P cholecystectomy          Medications:        LORazepam   Injectable   IV Push   LORazepam   Injectable 4 milliGRAM(s) IV Push every 1 hour PRN  LORazepam   Injectable 2 milliGRAM(s) IV Push every 4 hours  PHENobarbital Injectable 65 milliGRAM(s) IV Push every 2 hours PRN      enoxaparin Injectable 40 milliGRAM(s) SubCutaneous daily    lactulose Retention Enema 15 Gram(s) Rectal every 6 hours  pantoprazole  Injectable 40 milliGRAM(s) IV Push daily      methylPREDNISolone sodium succinate Injectable 32 milliGRAM(s) IV Push daily    folic acid Injectable 1 milliGRAM(s) IV Push daily  lactated ringers 1000 milliLiter(s) IV Continuous <Continuous>  potassium chloride  10 mEq/100 mL IVPB 10 milliEquivalent(s) IV Intermittent every 1 hour  thiamine Injectable 100 milliGRAM(s) IV Push daily      chlorhexidine 2% Cloths 1 Application(s) Topical <User Schedule>            ICU Vital Signs Last 24 Hrs  T(C): 36.5 (19 Apr 2021 15:30), Max: 36.9 (19 Apr 2021 07:30)  T(F): 97.7 (19 Apr 2021 15:30), Max: 98.4 (19 Apr 2021 07:30)  HR: 63 (19 Apr 2021 17:00) (57 - 92)  BP: 128/70 (19 Apr 2021 17:00) (113/93 - 152/89)  BP(mean): 86 (19 Apr 2021 17:00) (86 - 114)  ABP: --  ABP(mean): --  RR: 12 (19 Apr 2021 17:00) (6 - 22)  SpO2: 100% (19 Apr 2021 17:00) (92% - 100%)          I&O's Detail    18 Apr 2021 07:01  -  19 Apr 2021 07:00  --------------------------------------------------------  IN:    IV PiggyBack: 499.8 mL    IV PiggyBack: 50 mL    IV PiggyBack: 900 mL    IV PiggyBack: 333.2 mL    Lactated Ringers w/ Additives: 3000 mL  Total IN: 4783 mL    OUT:    Incontinent per Condom Catheter (mL): 3000 mL  Total OUT: 3000 mL    Total NET: 1783 mL      19 Apr 2021 07:01  -  19 Apr 2021 17:32  --------------------------------------------------------  IN:    IV PiggyBack: 166.6 mL    IV PiggyBack: 300 mL    Lactated Ringers w/ Additives: 1125 mL  Total IN: 1591.6 mL    OUT:    Incontinent per Condom Catheter (mL): 950 mL  Total OUT: 950 mL    Total NET: 641.6 mL            LABS:                        12.0   6.22  )-----------( 122      ( 19 Apr 2021 07:55 )             35.3     04-19    137  |  91<L>  |  6.0<L>  ----------------------------<  100<H>  3.4<L>   |  31.0<H>  |  0.38<L>    Ca    8.0<L>      19 Apr 2021 11:38  Phos  2.3     04-19  Mg     1.8     04-19    TPro  6.1<L>  /  Alb  3.0<L>  /  TBili  7.4<H>  /  DBili  x   /  AST  178<H>  /  ALT  69<H>  /  AlkPhos  209<H>  04-19      CARDIAC MARKERS ( 19 Apr 2021 06:14 )  x     / x     / 474 U/L / x     / 5.5 ng/mL      CAPILLARY BLOOD GLUCOSE            CULTURES:      Physical Examination:  GENERAL: In NAD   HEENT: NC/AT  NECK: Supple  PULM: CTA B/L  CVS: +S1, S2  ABD: Soft, non-tender  EXT: No pedal edema  SKIN: Warm and well perfused, no rashes noted.  NEURO: Lethargic    DEVICES:     RADIOLOGY:

## 2021-04-20 LAB
ALBUMIN SERPL ELPH-MCNC: 3 G/DL — LOW (ref 3.3–5.2)
ALP SERPL-CCNC: 218 U/L — HIGH (ref 40–120)
ALT FLD-CCNC: 64 U/L — HIGH
ANION GAP SERPL CALC-SCNC: 13 MMOL/L — SIGNIFICANT CHANGE UP (ref 5–17)
ANION GAP SERPL CALC-SCNC: 15 MMOL/L — SIGNIFICANT CHANGE UP (ref 5–17)
AST SERPL-CCNC: 159 U/L — HIGH
BILIRUB SERPL-MCNC: 6.9 MG/DL — HIGH (ref 0.4–2)
BUN SERPL-MCNC: 6 MG/DL — LOW (ref 8–20)
BUN SERPL-MCNC: 6 MG/DL — LOW (ref 8–20)
CALCIUM SERPL-MCNC: 8.2 MG/DL — LOW (ref 8.6–10.2)
CALCIUM SERPL-MCNC: 8.5 MG/DL — LOW (ref 8.6–10.2)
CHLORIDE SERPL-SCNC: 89 MMOL/L — LOW (ref 98–107)
CHLORIDE SERPL-SCNC: 89 MMOL/L — LOW (ref 98–107)
CO2 SERPL-SCNC: 30 MMOL/L — HIGH (ref 22–29)
CO2 SERPL-SCNC: 36 MMOL/L — HIGH (ref 22–29)
CREAT SERPL-MCNC: 0.38 MG/DL — LOW (ref 0.5–1.3)
CREAT SERPL-MCNC: 0.49 MG/DL — LOW (ref 0.5–1.3)
GLUCOSE SERPL-MCNC: 89 MG/DL — SIGNIFICANT CHANGE UP (ref 70–99)
GLUCOSE SERPL-MCNC: 96 MG/DL — SIGNIFICANT CHANGE UP (ref 70–99)
HCT VFR BLD CALC: 37 % — LOW (ref 39–50)
HGB BLD-MCNC: 12.5 G/DL — LOW (ref 13–17)
MAGNESIUM SERPL-MCNC: 1.3 MG/DL — LOW (ref 1.6–2.6)
MAGNESIUM SERPL-MCNC: 1.7 MG/DL — SIGNIFICANT CHANGE UP (ref 1.6–2.6)
MCHC RBC-ENTMCNC: 33.6 PG — SIGNIFICANT CHANGE UP (ref 27–34)
MCHC RBC-ENTMCNC: 33.8 GM/DL — SIGNIFICANT CHANGE UP (ref 32–36)
MCV RBC AUTO: 99.5 FL — SIGNIFICANT CHANGE UP (ref 80–100)
PHOSPHATE SERPL-MCNC: 2.2 MG/DL — LOW (ref 2.4–4.7)
PHOSPHATE SERPL-MCNC: 3.4 MG/DL — SIGNIFICANT CHANGE UP (ref 2.4–4.7)
PLATELET # BLD AUTO: 136 K/UL — LOW (ref 150–400)
POTASSIUM SERPL-MCNC: 3 MMOL/L — LOW (ref 3.5–5.3)
POTASSIUM SERPL-MCNC: 3.8 MMOL/L — SIGNIFICANT CHANGE UP (ref 3.5–5.3)
POTASSIUM SERPL-SCNC: 3 MMOL/L — LOW (ref 3.5–5.3)
POTASSIUM SERPL-SCNC: 3.8 MMOL/L — SIGNIFICANT CHANGE UP (ref 3.5–5.3)
PROT SERPL-MCNC: 6 G/DL — LOW (ref 6.6–8.7)
RBC # BLD: 3.72 M/UL — LOW (ref 4.2–5.8)
RBC # FLD: 12.9 % — SIGNIFICANT CHANGE UP (ref 10.3–14.5)
SODIUM SERPL-SCNC: 134 MMOL/L — LOW (ref 135–145)
SODIUM SERPL-SCNC: 138 MMOL/L — SIGNIFICANT CHANGE UP (ref 135–145)
WBC # BLD: 6.73 K/UL — SIGNIFICANT CHANGE UP (ref 3.8–10.5)
WBC # FLD AUTO: 6.73 K/UL — SIGNIFICANT CHANGE UP (ref 3.8–10.5)

## 2021-04-20 PROCEDURE — 99233 SBSQ HOSP IP/OBS HIGH 50: CPT

## 2021-04-20 RX ORDER — POTASSIUM PHOSPHATE, MONOBASIC POTASSIUM PHOSPHATE, DIBASIC 236; 224 MG/ML; MG/ML
15 INJECTION, SOLUTION INTRAVENOUS ONCE
Refills: 0 | Status: COMPLETED | OUTPATIENT
Start: 2021-04-20 | End: 2021-04-20

## 2021-04-20 RX ORDER — HALOPERIDOL DECANOATE 100 MG/ML
5 INJECTION INTRAMUSCULAR ONCE
Refills: 0 | Status: DISCONTINUED | OUTPATIENT
Start: 2021-04-20 | End: 2021-04-20

## 2021-04-20 RX ORDER — MAGNESIUM SULFATE 500 MG/ML
2 VIAL (ML) INJECTION ONCE
Refills: 0 | Status: COMPLETED | OUTPATIENT
Start: 2021-04-20 | End: 2021-04-20

## 2021-04-20 RX ORDER — POTASSIUM CHLORIDE 20 MEQ
10 PACKET (EA) ORAL
Refills: 0 | Status: COMPLETED | OUTPATIENT
Start: 2021-04-20 | End: 2021-04-20

## 2021-04-20 RX ORDER — POTASSIUM PHOSPHATE, MONOBASIC POTASSIUM PHOSPHATE, DIBASIC 236; 224 MG/ML; MG/ML
30 INJECTION, SOLUTION INTRAVENOUS ONCE
Refills: 0 | Status: DISCONTINUED | OUTPATIENT
Start: 2021-04-20 | End: 2021-04-20

## 2021-04-20 RX ADMIN — Medication 100 MILLIEQUIVALENT(S): at 09:53

## 2021-04-20 RX ADMIN — Medication 100 MILLIEQUIVALENT(S): at 08:40

## 2021-04-20 RX ADMIN — Medication 1 MILLIGRAM(S): at 18:03

## 2021-04-20 RX ADMIN — Medication 1 MILLIGRAM(S): at 21:06

## 2021-04-20 RX ADMIN — Medication 65 MILLIGRAM(S): at 11:22

## 2021-04-20 RX ADMIN — LACTULOSE 15 GRAM(S): 10 SOLUTION ORAL at 06:03

## 2021-04-20 RX ADMIN — SODIUM CHLORIDE 125 MILLILITER(S): 9 INJECTION, SOLUTION INTRAVENOUS at 18:03

## 2021-04-20 RX ADMIN — Medication 2 MILLIGRAM(S): at 03:00

## 2021-04-20 RX ADMIN — ENOXAPARIN SODIUM 40 MILLIGRAM(S): 100 INJECTION SUBCUTANEOUS at 11:26

## 2021-04-20 RX ADMIN — Medication 1 MILLIGRAM(S): at 14:34

## 2021-04-20 RX ADMIN — LACTULOSE 15 GRAM(S): 10 SOLUTION ORAL at 18:03

## 2021-04-20 RX ADMIN — Medication 100 MILLIEQUIVALENT(S): at 07:26

## 2021-04-20 RX ADMIN — SODIUM CHLORIDE 125 MILLILITER(S): 9 INJECTION, SOLUTION INTRAVENOUS at 11:25

## 2021-04-20 RX ADMIN — Medication 1 MILLIGRAM(S): at 11:25

## 2021-04-20 RX ADMIN — CHLORHEXIDINE GLUCONATE 1 APPLICATION(S): 213 SOLUTION TOPICAL at 06:03

## 2021-04-20 RX ADMIN — Medication 2 MILLIGRAM(S): at 06:48

## 2021-04-20 RX ADMIN — LACTULOSE 15 GRAM(S): 10 SOLUTION ORAL at 01:16

## 2021-04-20 RX ADMIN — PANTOPRAZOLE SODIUM 40 MILLIGRAM(S): 20 TABLET, DELAYED RELEASE ORAL at 11:25

## 2021-04-20 RX ADMIN — Medication 100 MILLIGRAM(S): at 11:25

## 2021-04-20 RX ADMIN — LACTULOSE 15 GRAM(S): 10 SOLUTION ORAL at 23:56

## 2021-04-20 RX ADMIN — POTASSIUM PHOSPHATE, MONOBASIC POTASSIUM PHOSPHATE, DIBASIC 62.5 MILLIMOLE(S): 236; 224 INJECTION, SOLUTION INTRAVENOUS at 07:26

## 2021-04-20 RX ADMIN — Medication 1 MILLIGRAM(S): at 10:22

## 2021-04-20 RX ADMIN — Medication 50 GRAM(S): at 07:26

## 2021-04-20 RX ADMIN — Medication 32 MILLIGRAM(S): at 06:02

## 2021-04-20 RX ADMIN — SODIUM CHLORIDE 125 MILLILITER(S): 9 INJECTION, SOLUTION INTRAVENOUS at 01:16

## 2021-04-20 NOTE — PROGRESS NOTE ADULT - SUBJECTIVE AND OBJECTIVE BOX
Patient is a 45y old  Male who presents with a chief complaint of severe DTs (19 Apr 2021 17:30)      BRIEF HOSPITAL COURSE:   45M PMH EtOH use disorder with h/o EtOH pancreatitis, chronic diarrhea, untreated MS who presents with generalized weakness, falls, jaundice, and RUQ pain, admitted to ICU for management of DTs, EtOH hepatitis, severe hypokalemia. Was loaded with phenobarb on admission and started on Ativan taper and monitored in the ICU.      PAST MEDICAL & SURGICAL HISTORY:  Multiple sclerosis    Chronic diarrhea    ETOH abuse    S/P cholecystectomy          Medications:        LORazepam   Injectable   IV Push   LORazepam   Injectable 4 milliGRAM(s) IV Push every 1 hour PRN  LORazepam   Injectable 1 milliGRAM(s) IV Push every 4 hours  PHENobarbital Injectable 65 milliGRAM(s) IV Push every 2 hours PRN      enoxaparin Injectable 40 milliGRAM(s) SubCutaneous daily    lactulose Retention Enema 15 Gram(s) Rectal every 6 hours  pantoprazole  Injectable 40 milliGRAM(s) IV Push daily      methylPREDNISolone sodium succinate Injectable 32 milliGRAM(s) IV Push daily    folic acid Injectable 1 milliGRAM(s) IV Push daily  lactated ringers 1000 milliLiter(s) IV Continuous <Continuous>  thiamine Injectable 100 milliGRAM(s) IV Push daily      chlorhexidine 2% Cloths 1 Application(s) Topical <User Schedule>            ICU Vital Signs Last 24 Hrs  T(C): 36.7 (20 Apr 2021 08:05), Max: 37 (19 Apr 2021 19:54)  T(F): 98.1 (20 Apr 2021 08:05), Max: 98.6 (19 Apr 2021 19:54)  HR: 76 (20 Apr 2021 12:00) (57 - 98)  BP: 143/95 (20 Apr 2021 12:00) (125/86 - 177/126)  BP(mean): 106 (20 Apr 2021 12:00) (86 - 141)  ABP: --  ABP(mean): --  RR: 10 (20 Apr 2021 07:00) (6 - 25)  SpO2: 95% (20 Apr 2021 12:00) (91% - 100%)          I&O's Detail    19 Apr 2021 07:01  -  20 Apr 2021 07:00  --------------------------------------------------------  IN:    IV PiggyBack: 166.6 mL    IV PiggyBack: 600 mL    Lactated Ringers w/ Additives: 3000 mL  Total IN: 3766.6 mL    OUT:    Incontinent per Condom Catheter (mL): 2375 mL  Total OUT: 2375 mL    Total NET: 1391.6 mL      20 Apr 2021 07:01  -  20 Apr 2021 12:17  --------------------------------------------------------  IN:    IV PiggyBack: 250 mL    IV PiggyBack: 300 mL    IV PiggyBack: 50 mL    Lactated Ringers w/ Additives: 625 mL  Total IN: 1225 mL    OUT:    Incontinent per Condom Catheter (mL): 1000 mL  Total OUT: 1000 mL    Total NET: 225 mL            LABS:                        12.5   6.73  )-----------( 136      ( 20 Apr 2021 05:33 )             37.0     04-20    138  |  89<L>  |  6.0<L>  ----------------------------<  89  3.0<L>   |  36.0<H>  |  0.49<L>    Ca    8.2<L>      20 Apr 2021 05:33  Phos  2.2     04-20  Mg     1.3     04-20    TPro  6.0<L>  /  Alb  3.0<L>  /  TBili  6.9<H>  /  DBili  x   /  AST  159<H>  /  ALT  64<H>  /  AlkPhos  218<H>  04-20      CARDIAC MARKERS ( 19 Apr 2021 06:14 )  x     / x     / 474 U/L / x     / 5.5 ng/mL      CAPILLARY BLOOD GLUCOSE            CULTURES:      Physical Examination:  GENERAL: In NAD   HEENT: NC/AT  NECK: Supple  PULM: CTA B/L  CVS: +S1, S2  ABD: Soft, non-tender  EXT: No pedal edema  SKIN: Warm and well perfused, no rashes noted.  NEURO: Lethargic    DEVICES:     RADIOLOGY:

## 2021-04-20 NOTE — CHART NOTE - NSCHARTNOTEFT_GEN_A_CORE
Source: Patient [ ]  Family [ ]   other [ x]    Current Diet: Diet, NPO (04-17-21 @ 02:06)    Current Weight:   (4/20) 173.7 lbs  (4/16) 179.8 lbs  Possible 6 lb (3.4%) wt loss since admission, continue to trend  No edema documented     Pertinent Medications: MEDICATIONS  (STANDING):  chlorhexidine 2% Cloths 1 Application(s) Topical <User Schedule>  enoxaparin Injectable 40 milliGRAM(s) SubCutaneous daily  folic acid Injectable 1 milliGRAM(s) IV Push daily  lactated ringers 1000 milliLiter(s) (125 mL/Hr) IV Continuous <Continuous>  lactulose Retention Enema 15 Gram(s) Rectal every 6 hours  LORazepam   Injectable   IV Push   LORazepam   Injectable 1 milliGRAM(s) IV Push every 4 hours  methylPREDNISolone sodium succinate Injectable 32 milliGRAM(s) IV Push daily  pantoprazole  Injectable 40 milliGRAM(s) IV Push daily  potassium chloride  10 mEq/100 mL IVPB 10 milliEquivalent(s) IV Intermittent every 1 hour  thiamine Injectable 100 milliGRAM(s) IV Push daily    MEDICATIONS  (PRN):  LORazepam   Injectable 4 milliGRAM(s) IV Push every 1 hour PRN Symptom-triggered: each CIWA -Ar score 8 or GREATER  PHENobarbital Injectable 65 milliGRAM(s) IV Push every 2 hours PRN AGITATION, CIWAj >=12    Pertinent Labs: CBC Full  -  ( 20 Apr 2021 05:33 )  WBC Count : 6.73 K/uL  RBC Count : 3.72 M/uL  Hemoglobin : 12.5 g/dL  Hematocrit : 37.0 %  Platelet Count - Automated : 136 K/uL  Mean Cell Volume : 99.5 fl  Mean Cell Hemoglobin : 33.6 pg  Mean Cell Hemoglobin Concentration : 33.8 gm/dL  Auto Neutrophil # : x  Auto Lymphocyte # : x  Auto Monocyte # : x  Auto Eosinophil # : x  Auto Basophil # : x  Auto Neutrophil % : x  Auto Lymphocyte % : x  Auto Monocyte % : x  Auto Eosinophil % : x  Auto Basophil % : x    04-20 Na138 mmol/L Glu 89 mg/dL K+ 3.0 mmol/L<L> Cr  0.49 mg/dL<L> BUN 6.0 mg/dL<L> Phos 2.2 mg/dL<L> Alb 3.0 g/dL<L> PAB n/a       Skin: Intact per documentation    Nutrition focused physical exam previously conducted - found signs of malnutrition [ ]absent [ x]present    Subcutaneous fat loss: [ ] Orbital fat pads region, [ ]Buccal fat region, [ ]Triceps region,  [ ]Ribs region    Muscle wasting: [ ]Temples region, [ ]Clavicle region, [ ]Shoulder region, [ ]Scapula region, [ ]Interosseous region,  [ ]thigh region, [ ]Calf region    Estimated Needs:   [x ] no change since previous assessment  [ ] recalculated:     Current Nutrition Diagnosis: Pt remains at high nutrition risk secondary to malnutrition (severe, acute on chronic) related to inability to meet sufficient protein-energy in setting of EtOH abuse, MS, chronic diarrhea, recent alcohol induced pancreatitis, alcohol withdrawal, severe DTs, acute alcoholic hepatitis as evidenced by likely meeting <75% nutrient needs >1 month, severe muscle loss of temples and clavicles, moderate muscle loss of shoulders, moderate fat loss of orbitals and buccal pads, possible 3.4% wt loss since admission. Pt agitated/restless requiring sedation. Remains NPO. Last BM 4/19. RD to follow up.     Recommendations:   1) Advance diet as medically feasible/tolerable; suggest SLP swallow evaluation prior to diet advancement.  2) If pt anticipated to be NPO >5-7 lima, consider initiation of enteral nutrition via NGT- Vital 1.5 @ 10 ml/hr and advance 10 ml/hr q4 hrs until goal rate of 70 ml/hr (x20 hrs) to provide 1400 ml, 2100 kcal, 95g protein, 1070 ml free water, and >100% of RDIs for vitamins/minerals; additional free water per MD discretion.  2) Continue thiamine and folic acid, add MVI daily.  3) Monitor electrolytes and replete as needed.  4) Obtain daily weights to monitor trends.     Monitoring and Evaluation:   [ ] PO intake [ ] Tolerance to diet prescription [X] Weights  [X] Follow up per protocol [X] Labs Source: Patient [ ]  Family [ ]   other [ x]    Current Diet: Diet, NPO (04-17-21 @ 02:06)    Current Weight:   (4/20) 173.7 lbs  (4/16) 179.8 lbs  Possible 6 lb (3.4%) wt loss since admission, continue to trend  No edema documented     Pertinent Medications: MEDICATIONS  (STANDING):  chlorhexidine 2% Cloths 1 Application(s) Topical <User Schedule>  enoxaparin Injectable 40 milliGRAM(s) SubCutaneous daily  folic acid Injectable 1 milliGRAM(s) IV Push daily  lactated ringers 1000 milliLiter(s) (125 mL/Hr) IV Continuous <Continuous>  lactulose Retention Enema 15 Gram(s) Rectal every 6 hours  LORazepam   Injectable   IV Push   LORazepam   Injectable 1 milliGRAM(s) IV Push every 4 hours  methylPREDNISolone sodium succinate Injectable 32 milliGRAM(s) IV Push daily  pantoprazole  Injectable 40 milliGRAM(s) IV Push daily  potassium chloride  10 mEq/100 mL IVPB 10 milliEquivalent(s) IV Intermittent every 1 hour  thiamine Injectable 100 milliGRAM(s) IV Push daily    MEDICATIONS  (PRN):  LORazepam   Injectable 4 milliGRAM(s) IV Push every 1 hour PRN Symptom-triggered: each CIWA -Ar score 8 or GREATER  PHENobarbital Injectable 65 milliGRAM(s) IV Push every 2 hours PRN AGITATION, CIWAj >=12    Pertinent Labs: CBC Full  -  ( 20 Apr 2021 05:33 )  WBC Count : 6.73 K/uL  RBC Count : 3.72 M/uL  Hemoglobin : 12.5 g/dL  Hematocrit : 37.0 %  Platelet Count - Automated : 136 K/uL  Mean Cell Volume : 99.5 fl  Mean Cell Hemoglobin : 33.6 pg  Mean Cell Hemoglobin Concentration : 33.8 gm/dL  Auto Neutrophil # : x  Auto Lymphocyte # : x  Auto Monocyte # : x  Auto Eosinophil # : x  Auto Basophil # : x  Auto Neutrophil % : x  Auto Lymphocyte % : x  Auto Monocyte % : x  Auto Eosinophil % : x  Auto Basophil % : x    04-20 Na138 mmol/L Glu 89 mg/dL K+ 3.0 mmol/L<L> Cr  0.49 mg/dL<L> BUN 6.0 mg/dL<L> Phos 2.2 mg/dL<L> Alb 3.0 g/dL<L> PAB n/a       Skin: Intact per documentation    Nutrition focused physical exam previously conducted - found signs of malnutrition [ ]absent [ x]present    Subcutaneous fat loss: [ ] Orbital fat pads region, [ ]Buccal fat region, [ ]Triceps region,  [ ]Ribs region    Muscle wasting: [ ]Temples region, [ ]Clavicle region, [ ]Shoulder region, [ ]Scapula region, [ ]Interosseous region,  [ ]thigh region, [ ]Calf region    Estimated Needs:   [x ] no change since previous assessment  [ ] recalculated:     Current Nutrition Diagnosis: Pt remains at high nutrition risk secondary to malnutrition (severe, acute on chronic) related to inability to meet sufficient protein-energy in setting of EtOH abuse, MS, chronic diarrhea, recent alcohol induced pancreatitis, alcohol withdrawal, severe DTs, acute alcoholic hepatitis as evidenced by likely meeting <75% nutrient needs >1 month, severe muscle loss of temples and clavicles, moderate muscle loss of shoulders, moderate fat loss of orbitals and buccal pads, possible 3.4% wt loss since admission. Pt agitated/restless requiring sedation. Remains NPO. Last BM 4/19. RD to follow up.     Recommendations:   1) Advance diet as medically feasible/tolerable; suggest SLP swallow evaluation prior to diet advancement.  2) If pt anticipated to be NPO >5-7 lima, consider initiation of enteral nutrition via NGT- Vital 1.5 @ 10 ml/hr and advance 10 ml/hr q4 hrs until goal rate of 70 ml/hr (x20 hrs) to provide 1400 ml, 2100 kcal, 95g protein, 1070 ml free water, and >100% of RDIs for vitamins/minerals; additional free water per MD discretion.  3) Continue thiamine and folic acid, add MVI daily.  4) Monitor electrolytes and replete as needed.  5) Obtain daily weights to monitor trends.     Monitoring and Evaluation:   [ ] PO intake [ ] Tolerance to diet prescription [X] Weights  [X] Follow up per protocol [X] Labs Source: Patient [ ]  Family [ ]   other [ x]    Current Diet: Diet, NPO (04-17-21 @ 02:06)    Current Weight:   (4/20) 173.7 lbs  (4/16) 179.8 lbs  Possible 6 lb (3.4%) wt loss since admission, continue to trend  No edema documented     Pertinent Medications: MEDICATIONS  (STANDING):  chlorhexidine 2% Cloths 1 Application(s) Topical <User Schedule>  enoxaparin Injectable 40 milliGRAM(s) SubCutaneous daily  folic acid Injectable 1 milliGRAM(s) IV Push daily  lactated ringers 1000 milliLiter(s) (125 mL/Hr) IV Continuous <Continuous>  lactulose Retention Enema 15 Gram(s) Rectal every 6 hours  LORazepam   Injectable   IV Push   LORazepam   Injectable 1 milliGRAM(s) IV Push every 4 hours  methylPREDNISolone sodium succinate Injectable 32 milliGRAM(s) IV Push daily  pantoprazole  Injectable 40 milliGRAM(s) IV Push daily  potassium chloride  10 mEq/100 mL IVPB 10 milliEquivalent(s) IV Intermittent every 1 hour  thiamine Injectable 100 milliGRAM(s) IV Push daily    MEDICATIONS  (PRN):  LORazepam   Injectable 4 milliGRAM(s) IV Push every 1 hour PRN Symptom-triggered: each CIWA -Ar score 8 or GREATER  PHENobarbital Injectable 65 milliGRAM(s) IV Push every 2 hours PRN AGITATION, CIWAj >=12    Pertinent Labs: CBC Full  -  ( 20 Apr 2021 05:33 )  WBC Count : 6.73 K/uL  RBC Count : 3.72 M/uL  Hemoglobin : 12.5 g/dL  Hematocrit : 37.0 %  Platelet Count - Automated : 136 K/uL  Mean Cell Volume : 99.5 fl  Mean Cell Hemoglobin : 33.6 pg  Mean Cell Hemoglobin Concentration : 33.8 gm/dL  Auto Neutrophil # : x  Auto Lymphocyte # : x  Auto Monocyte # : x  Auto Eosinophil # : x  Auto Basophil # : x  Auto Neutrophil % : x  Auto Lymphocyte % : x  Auto Monocyte % : x  Auto Eosinophil % : x  Auto Basophil % : x    04-20 Na138 mmol/L Glu 89 mg/dL K+ 3.0 mmol/L<L> Cr  0.49 mg/dL<L> BUN 6.0 mg/dL<L> Phos 2.2 mg/dL<L> Alb 3.0 g/dL<L> PAB n/a       Skin: Intact per documentation    Nutrition focused physical exam previously conducted - found signs of malnutrition [ ]absent [ x]present    Subcutaneous fat loss: [ ] Orbital fat pads region, [ ]Buccal fat region, [ ]Triceps region,  [ ]Ribs region    Muscle wasting: [ ]Temples region, [ ]Clavicle region, [ ]Shoulder region, [ ]Scapula region, [ ]Interosseous region,  [ ]thigh region, [ ]Calf region    Estimated Needs:   [x ] no change since previous assessment  [ ] recalculated:     Current Nutrition Diagnosis: Pt remains at high nutrition risk secondary to malnutrition (severe, acute on chronic) related to inability to meet sufficient protein-energy in setting of EtOH abuse, MS, chronic diarrhea, recent alcohol induced pancreatitis, alcohol withdrawal, severe DTs, acute alcoholic hepatitis as evidenced by likely meeting <75% nutrient needs >1 month, severe muscle loss of temples and clavicles, moderate muscle loss of shoulders, moderate fat loss of orbitals and buccal pads, possible 3.4% wt loss since admission. Pt agitated/restless requiring sedation. Remains NPO. Last BM 4/19. RD to follow up.     Recommendations:   1) Advance diet as medically feasible/tolerable; suggest SLP swallow evaluation prior to diet advancement.  2) If pt anticipated to be NPO >5-7 days, consider initiation of enteral nutrition via NGT- Vital 1.5 @ 10 ml/hr and advance 10 ml/hr q4 hrs until goal rate of 70 ml/hr (x20 hrs) to provide 1400 ml, 2100 kcal, 95g protein, 1070 ml free water, and >100% of RDIs for vitamins/minerals; additional free water per MD discretion.  3) Continue thiamine and folic acid, add MVI daily.  4) Monitor electrolytes and replete as needed.  5) Obtain daily weights to monitor trends.     Monitoring and Evaluation:   [ ] PO intake [ ] Tolerance to diet prescription [X] Weights  [X] Follow up per protocol [X] Labs

## 2021-04-21 DIAGNOSIS — F10.239 ALCOHOL DEPENDENCE WITH WITHDRAWAL, UNSPECIFIED: ICD-10-CM

## 2021-04-21 DIAGNOSIS — E83.42 HYPOMAGNESEMIA: ICD-10-CM

## 2021-04-21 DIAGNOSIS — K70.10 ALCOHOLIC HEPATITIS WITHOUT ASCITES: ICD-10-CM

## 2021-04-21 DIAGNOSIS — Z02.9 ENCOUNTER FOR ADMINISTRATIVE EXAMINATIONS, UNSPECIFIED: ICD-10-CM

## 2021-04-21 DIAGNOSIS — E87.6 HYPOKALEMIA: ICD-10-CM

## 2021-04-21 DIAGNOSIS — R13.10 DYSPHAGIA, UNSPECIFIED: ICD-10-CM

## 2021-04-21 DIAGNOSIS — G35 MULTIPLE SCLEROSIS: ICD-10-CM

## 2021-04-21 LAB
ALBUMIN SERPL ELPH-MCNC: 3.1 G/DL — LOW (ref 3.3–5.2)
ALP SERPL-CCNC: 255 U/L — HIGH (ref 40–120)
ALT FLD-CCNC: 69 U/L — HIGH
ANION GAP SERPL CALC-SCNC: 20 MMOL/L — HIGH (ref 5–17)
APTT BLD: 27.9 SEC — SIGNIFICANT CHANGE UP (ref 27.5–35.5)
AST SERPL-CCNC: 158 U/L — HIGH
BILIRUB SERPL-MCNC: 7.4 MG/DL — HIGH (ref 0.4–2)
BUN SERPL-MCNC: 6 MG/DL — LOW (ref 8–20)
CALCIUM SERPL-MCNC: 8.8 MG/DL — SIGNIFICANT CHANGE UP (ref 8.6–10.2)
CHLORIDE SERPL-SCNC: 89 MMOL/L — LOW (ref 98–107)
CO2 SERPL-SCNC: 29 MMOL/L — SIGNIFICANT CHANGE UP (ref 22–29)
CREAT SERPL-MCNC: 0.38 MG/DL — LOW (ref 0.5–1.3)
GLUCOSE SERPL-MCNC: 92 MG/DL — SIGNIFICANT CHANGE UP (ref 70–99)
HCT VFR BLD CALC: 40.3 % — SIGNIFICANT CHANGE UP (ref 39–50)
HGB BLD-MCNC: 14 G/DL — SIGNIFICANT CHANGE UP (ref 13–17)
INR BLD: 1.45 RATIO — HIGH (ref 0.88–1.16)
MAGNESIUM SERPL-MCNC: 1.5 MG/DL — LOW (ref 1.6–2.6)
MCHC RBC-ENTMCNC: 33.9 PG — SIGNIFICANT CHANGE UP (ref 27–34)
MCHC RBC-ENTMCNC: 34.7 GM/DL — SIGNIFICANT CHANGE UP (ref 32–36)
MCV RBC AUTO: 97.6 FL — SIGNIFICANT CHANGE UP (ref 80–100)
PHOSPHATE SERPL-MCNC: 2.2 MG/DL — LOW (ref 2.4–4.7)
PLATELET # BLD AUTO: 158 K/UL — SIGNIFICANT CHANGE UP (ref 150–400)
POTASSIUM SERPL-MCNC: 3.2 MMOL/L — LOW (ref 3.5–5.3)
POTASSIUM SERPL-SCNC: 3.2 MMOL/L — LOW (ref 3.5–5.3)
PROT SERPL-MCNC: 6.5 G/DL — LOW (ref 6.6–8.7)
PROTHROM AB SERPL-ACNC: 16.5 SEC — HIGH (ref 10.6–13.6)
RBC # BLD: 4.13 M/UL — LOW (ref 4.2–5.8)
RBC # FLD: 13.1 % — SIGNIFICANT CHANGE UP (ref 10.3–14.5)
SODIUM SERPL-SCNC: 137 MMOL/L — SIGNIFICANT CHANGE UP (ref 135–145)
WBC # BLD: 7.37 K/UL — SIGNIFICANT CHANGE UP (ref 3.8–10.5)
WBC # FLD AUTO: 7.37 K/UL — SIGNIFICANT CHANGE UP (ref 3.8–10.5)

## 2021-04-21 PROCEDURE — 99233 SBSQ HOSP IP/OBS HIGH 50: CPT

## 2021-04-21 RX ORDER — MAGNESIUM SULFATE 500 MG/ML
2 VIAL (ML) INJECTION ONCE
Refills: 0 | Status: COMPLETED | OUTPATIENT
Start: 2021-04-21 | End: 2021-04-21

## 2021-04-21 RX ORDER — MAGNESIUM SULFATE 500 MG/ML
4 VIAL (ML) INJECTION ONCE
Refills: 0 | Status: DISCONTINUED | OUTPATIENT
Start: 2021-04-21 | End: 2021-04-21

## 2021-04-21 RX ORDER — LACTULOSE 10 G/15ML
10 SOLUTION ORAL THREE TIMES A DAY
Refills: 0 | Status: DISCONTINUED | OUTPATIENT
Start: 2021-04-21 | End: 2021-04-21

## 2021-04-21 RX ORDER — SODIUM CHLORIDE 9 MG/ML
1000 INJECTION, SOLUTION INTRAVENOUS
Refills: 0 | Status: DISCONTINUED | OUTPATIENT
Start: 2021-04-21 | End: 2021-04-24

## 2021-04-21 RX ORDER — POTASSIUM PHOSPHATE, MONOBASIC POTASSIUM PHOSPHATE, DIBASIC 236; 224 MG/ML; MG/ML
15 INJECTION, SOLUTION INTRAVENOUS ONCE
Refills: 0 | Status: COMPLETED | OUTPATIENT
Start: 2021-04-21 | End: 2021-04-21

## 2021-04-21 RX ORDER — LACTULOSE 10 G/15ML
10 SOLUTION ORAL
Refills: 0 | Status: DISCONTINUED | OUTPATIENT
Start: 2021-04-21 | End: 2021-04-28

## 2021-04-21 RX ORDER — POTASSIUM CHLORIDE 20 MEQ
40 PACKET (EA) ORAL ONCE
Refills: 0 | Status: COMPLETED | OUTPATIENT
Start: 2021-04-21 | End: 2021-04-21

## 2021-04-21 RX ADMIN — Medication 32 MILLIGRAM(S): at 05:34

## 2021-04-21 RX ADMIN — Medication 100 MILLIGRAM(S): at 11:39

## 2021-04-21 RX ADMIN — SODIUM CHLORIDE 150 MILLILITER(S): 9 INJECTION, SOLUTION INTRAVENOUS at 10:21

## 2021-04-21 RX ADMIN — ENOXAPARIN SODIUM 40 MILLIGRAM(S): 100 INJECTION SUBCUTANEOUS at 11:39

## 2021-04-21 RX ADMIN — Medication 4 MILLIGRAM(S): at 03:21

## 2021-04-21 RX ADMIN — Medication 2 MILLIGRAM(S): at 21:02

## 2021-04-21 RX ADMIN — Medication 40 MILLIEQUIVALENT(S): at 05:48

## 2021-04-21 RX ADMIN — Medication 2 MILLIGRAM(S): at 10:21

## 2021-04-21 RX ADMIN — Medication 2 MILLIGRAM(S): at 18:12

## 2021-04-21 RX ADMIN — CHLORHEXIDINE GLUCONATE 1 APPLICATION(S): 213 SOLUTION TOPICAL at 05:34

## 2021-04-21 RX ADMIN — Medication 1 MILLIGRAM(S): at 11:39

## 2021-04-21 RX ADMIN — Medication 50 GRAM(S): at 10:55

## 2021-04-21 RX ADMIN — Medication 1 MILLIGRAM(S): at 05:34

## 2021-04-21 RX ADMIN — Medication 50 GRAM(S): at 05:48

## 2021-04-21 RX ADMIN — Medication 2 MILLIGRAM(S): at 14:13

## 2021-04-21 RX ADMIN — Medication 1 MILLIGRAM(S): at 01:14

## 2021-04-21 RX ADMIN — LACTULOSE 10 GRAM(S): 10 SOLUTION ORAL at 06:12

## 2021-04-21 RX ADMIN — SODIUM CHLORIDE 150 MILLILITER(S): 9 INJECTION, SOLUTION INTRAVENOUS at 18:24

## 2021-04-21 RX ADMIN — PANTOPRAZOLE SODIUM 40 MILLIGRAM(S): 20 TABLET, DELAYED RELEASE ORAL at 11:39

## 2021-04-21 RX ADMIN — POTASSIUM PHOSPHATE, MONOBASIC POTASSIUM PHOSPHATE, DIBASIC 62.5 MILLIMOLE(S): 236; 224 INJECTION, SOLUTION INTRAVENOUS at 05:48

## 2021-04-21 RX ADMIN — SODIUM CHLORIDE 125 MILLILITER(S): 9 INJECTION, SOLUTION INTRAVENOUS at 05:56

## 2021-04-21 NOTE — PROGRESS NOTE ADULT - PROBLEM SELECTOR PLAN 1
Elevations of AST/ALT in ratio consistent with ETOH, now improving.    Total Bilirubin improving, will get STAT INR to check   Cannot calculate Maddrey without cougs score on admission was 56  -Continue Steroids for total of 28 days.

## 2021-04-21 NOTE — SWALLOW BEDSIDE ASSESSMENT ADULT - ORAL PHASE
reduced attention to bolus, +oral holding, further impacted by level of alertness/Delayed oral transit time

## 2021-04-21 NOTE — SWALLOW BEDSIDE ASSESSMENT ADULT - COMMENTS
As per charting, pt is a "45M PMH EtOH use disorder with h/o EtOH pancreatitis, chronic diarrhea, untreated MS who presented with generalized weakness, falls, jaundice, and RUQ pain, admitted to ICU for management of DTs, EtOH hepatitis, severe hypokalemia."

## 2021-04-21 NOTE — PROGRESS NOTE ADULT - PROBLEM SELECTOR PLAN 2
Continue current taper, for now   CIWA protocol   MVM, Thiamine and Folic acid.    Patient lives with girlfriend who is an alcoholic and currently admitted with ETOH cirrhosis

## 2021-04-21 NOTE — PROGRESS NOTE ADULT - PROBLEM SELECTOR PLAN 6
Failed bedside dysphagia   WIll need formal speech and swallow.    d5/ns at 150 cc/hr Failed bedside dysphagia   Formal swallow evaluation recommending NPO.  As per ICU today is first day of full alertness.  Hold of on NG tube for now, will have swallow re-eval tomorrow as mental status continues to improve.    d5/ns at 150 cc/hr

## 2021-04-21 NOTE — PROGRESS NOTE ADULT - SUBJECTIVE AND OBJECTIVE BOX
Patient is a 45y old  Male who presents with a chief complaint of severe DTs (20 Apr 2021 12:16)      BRIEF HOSPITAL COURSE:   45M PMH EtOH use disorder with h/o EtOH pancreatitis, chronic diarrhea, untreated MS who presents with generalized weakness, falls, jaundice, and RUQ pain, admitted to ICU for management of DTs, EtOH hepatitis, severe hypokalemia. Was loaded with phenobarb on admission and started on Ativan taper and monitored in the ICU. Severe hypokalemia eventually responded to aggressive repletion. Total bilirubin noted to be elevated, in setting of EtOH hepatitis, RUQ sono without stones or obstruction. Was also started on steroids for EtOH hepatitis. Given his AMS, despite ammonia level not being too elevated, CO lactulose was started and his mental status did improve, and was switched over to PO. On 4/21 deemed stable for downgrade from ICU.    Events last 24 hours: As above    PAST MEDICAL & SURGICAL HISTORY:  Multiple sclerosis    Chronic diarrhea    ETOH abuse    S/P cholecystectomy          Medications:        LORazepam   Injectable   IV Push   LORazepam   Injectable 4 milliGRAM(s) IV Push every 1 hour PRN  LORazepam   Injectable 1 milliGRAM(s) IV Push every 12 hours      enoxaparin Injectable 40 milliGRAM(s) SubCutaneous daily    lactulose Syrup 10 Gram(s) Oral three times a day  pantoprazole  Injectable 40 milliGRAM(s) IV Push daily      methylPREDNISolone sodium succinate Injectable 32 milliGRAM(s) IV Push daily    dextrose 5% + sodium chloride 0.9%. 1000 milliLiter(s) IV Continuous <Continuous>  folic acid Injectable 1 milliGRAM(s) IV Push daily  thiamine Injectable 100 milliGRAM(s) IV Push daily      chlorhexidine 2% Cloths 1 Application(s) Topical <User Schedule>            ICU Vital Signs Last 24 Hrs  T(C): 37 (21 Apr 2021 03:50), Max: 37 (21 Apr 2021 03:50)  T(F): 98.6 (21 Apr 2021 03:50), Max: 98.6 (21 Apr 2021 03:50)  HR: 86 (21 Apr 2021 08:00) (68 - 101)  BP: 147/115 (21 Apr 2021 08:00) (126/80 - 177/126)  BP(mean): 121 (21 Apr 2021 08:00) (94 - 141)  ABP: --  ABP(mean): --  RR: 13 (21 Apr 2021 08:00) (8 - 20)  SpO2: 95% (21 Apr 2021 08:00) (91% - 100%)          I&O's Detail    20 Apr 2021 07:01  -  21 Apr 2021 07:00  --------------------------------------------------------  IN:    IV PiggyBack: 300 mL    IV PiggyBack: 100 mL    IV PiggyBack: 437.5 mL    Lactated Ringers w/ Additives: 3000 mL  Total IN: 3837.5 mL    OUT:    Incontinent per Condom Catheter (mL): 4225 mL  Total OUT: 4225 mL    Total NET: -387.5 mL      21 Apr 2021 07:01  -  21 Apr 2021 08:21  --------------------------------------------------------  IN:    IV PiggyBack: 125 mL    Lactated Ringers w/ Additives: 125 mL  Total IN: 250 mL    OUT:    Incontinent per Condom Catheter (mL): 350 mL  Total OUT: 350 mL    Total NET: -100 mL            LABS:                        14.0   7.37  )-----------( 158      ( 21 Apr 2021 04:55 )             40.3     04-21    137  |  89<L>  |  6.0<L>  ----------------------------<  92  3.2<L>   |  29.0  |  0.38<L>    Ca    8.8      21 Apr 2021 04:55  Phos  2.2     04-21  Mg     1.5     04-21    TPro  6.5<L>  /  Alb  3.1<L>  /  TBili  7.4<H>  /  DBili  x   /  AST  158<H>  /  ALT  69<H>  /  AlkPhos  255<H>  04-21          CAPILLARY BLOOD GLUCOSE            CULTURES:      Physical Examination:  GENERAL: In NAD   HEENT: NC/AT  NECK: Supple  PULM: CTA anteriorly  CVS: +S1, S2  ABD: Soft, non-tender  EXT: No pedal edema  SKIN: Warm and well perfused, no rashes noted.  NEURO: Grossly non-focal    DEVICES:     RADIOLOGY:   < from: US Abdomen Upper Quadrant Right (04.17.21 @ 00:48) >  EXAM:  US ABDOMEN RT UPR QUADRANT                          PROCEDURE DATE:  04/17/2021          INTERPRETATION:  CLINICAL INFORMATION: Scleral icterus, EtOH abuse    COMPARISON: None available.    TECHNIQUE: Sonography of the right upper quadrant.    FINDINGS:    Liver: Steatosis and hepatomegaly, 20 cm.  Bile ducts: Normal caliber. Common bile duct measures 4 mm.  Gallbladder: Cholecystectomy.  Pancreas: Not visualized.  Right kidney: 11.3 cm. No hydronephrosis.  Ascites: None.  IVC: Visualized portions are within normal limits.    IMPRESSION:    Hepatic steatosis and hepatomegaly.    < end of copied text >

## 2021-04-21 NOTE — SWALLOW BEDSIDE ASSESSMENT ADULT - SLP GENERAL OBSERVATIONS
Pt received sleeping in bed, arousable with cues, however, difficulty maintaining arousal Ox3 with cues, reduced cognition, +1:1 supervision at bedside, pain scale 0/10 pre & post eval

## 2021-04-21 NOTE — PROGRESS NOTE ADULT - SUBJECTIVE AND OBJECTIVE BOX
Hospital Course:      45M PMH EtOH use disorder with h/o EtOH pancreatitis, chronic diarrhea, untreated MS who presented with generalized weakness, falls, jaundice, and RUQ pain, admitted to ICU for management of DTs, EtOH hepatitis, severe hypokalemia. Was loaded with phenobarb on admission and started on Ativan taper and monitored in the ICU. Severe hypokalemia eventually responded to aggressive repletion. Total bilirubin noted to be elevated, in setting of EtOH hepatitis, RUQ sono without stones or obstruction. Was also started on steroids for EtOH hepatitis. Given his AMS, despite ammonia level not being too elevated, DE lactulose was started and his mental status did improve, and was switched over to PO. On 4/21 deemed stable for downgrade from ICU.    [facility] Division of Hospital Medicine    Chief Complaint:  ETOH w/d     SUBJECTIVE / OVERNIGHT EVENTS:  patient taken off phenobarbital     Patient denies chest pain, SOB, abd pain, N/V, fever, chills, dysuria or any other complaints. All remainder ROS negative.     MEDICATIONS  (STANDING):  chlorhexidine 2% Cloths 1 Application(s) Topical <User Schedule>  dextrose 5% + sodium chloride 0.9%. 1000 milliLiter(s) (150 mL/Hr) IV Continuous <Continuous>  enoxaparin Injectable 40 milliGRAM(s) SubCutaneous daily  folic acid Injectable 1 milliGRAM(s) IV Push daily  lactulose Syrup 10 Gram(s) Oral three times a day  LORazepam   Injectable 2 milliGRAM(s) IV Push every 4 hours  LORazepam   Injectable   IV Push   magnesium sulfate  IVPB 2 Gram(s) IV Intermittent once  methylPREDNISolone sodium succinate Injectable 32 milliGRAM(s) IV Push daily  pantoprazole  Injectable 40 milliGRAM(s) IV Push daily  thiamine Injectable 100 milliGRAM(s) IV Push daily    MEDICATIONS  (PRN):        I&O's Summary    20 Apr 2021 07:01  -  21 Apr 2021 07:00  --------------------------------------------------------  IN: 3837.5 mL / OUT: 4225 mL / NET: -387.5 mL    21 Apr 2021 07:01  -  21 Apr 2021 10:29  --------------------------------------------------------  IN: 250 mL / OUT: 575 mL / NET: -325 mL        PHYSICAL EXAM:  Vital Signs Last 24 Hrs  T(C): 37 (21 Apr 2021 03:50), Max: 37 (21 Apr 2021 03:50)  T(F): 98.6 (21 Apr 2021 03:50), Max: 98.6 (21 Apr 2021 03:50)  HR: 88 (21 Apr 2021 10:00) (68 - 101)  BP: 151/108 (21 Apr 2021 10:00) (126/80 - 177/126)  BP(mean): 120 (21 Apr 2021 10:00) (94 - 141)  RR: 14 (21 Apr 2021 10:00) (8 - 20)  SpO2: 97% (21 Apr 2021 10:00) (91% - 100%)        CONSTITUTIONAL: NAD   ENMT: Moist oral mucosa, no pharyngeal injection    RESPIRATORY: Normal respiratory effort; lungs are clear to auscultation bilaterally  CARDIOVASCULAR: Regular rate and rhythm, normal S1 and S2, no murmur ; No lower extremity edema   ABDOMEN: Nontender to palpation, normoactive bowel sounds, no rebound   MUSCLOSKELETAL:  Weakness of all extremities, likely from recent ICU stay.    PSYCH: A+O to person, place, and time; affect appropriate  NEUROLOGY: CN 2-12 are intact and symmetric; no gross sensory deficits;       LABS:                        14.0   7.37  )-----------( 158      ( 21 Apr 2021 04:55 )             40.3     04-21    137  |  89<L>  |  6.0<L>  ----------------------------<  92  3.2<L>   |  29.0  |  0.38<L>    Ca    8.8      21 Apr 2021 04:55  Phos  2.2     04-21  Mg     1.5     04-21    TPro  6.5<L>  /  Alb  3.1<L>  /  TBili  7.4<H>  /  DBili  x   /  AST  158<H>  /  ALT  69<H>  /  AlkPhos  255<H>  04-21              CAPILLARY BLOOD GLUCOSE            RADIOLOGY & ADDITIONAL TESTS:  Results Reviewed:   Imaging Personally Reviewed:  Electrocardiogram Personally Reviewed:

## 2021-04-21 NOTE — PROGRESS NOTE ADULT - TIME BILLING
reviewing labs, notes, orders, radiographic studies, as well as counseling and coordinating care with the relevant multidisciplinary team, including with the primary and consulting providers.

## 2021-04-21 NOTE — SWALLOW BEDSIDE ASSESSMENT ADULT - SWALLOW EVAL: DIAGNOSIS
At least mild oral dysphagia marked by reduced attention to bolus, +oral holding, further impacted by reduced alertness.  Pharyngeal stage deemed functional for puree, no overt s/s aspiration noted, however, due to current level of arousal pt at risk for aspiration.

## 2021-04-22 LAB
ALBUMIN SERPL ELPH-MCNC: 3 G/DL — LOW (ref 3.3–5.2)
ALP SERPL-CCNC: 241 U/L — HIGH (ref 40–120)
ALT FLD-CCNC: 60 U/L — HIGH
ANION GAP SERPL CALC-SCNC: 14 MMOL/L — SIGNIFICANT CHANGE UP (ref 5–17)
AST SERPL-CCNC: 118 U/L — HIGH
BILIRUB SERPL-MCNC: 6.3 MG/DL — HIGH (ref 0.4–2)
BUN SERPL-MCNC: 4 MG/DL — LOW (ref 8–20)
CALCIUM SERPL-MCNC: 8.4 MG/DL — LOW (ref 8.6–10.2)
CHLORIDE SERPL-SCNC: 98 MMOL/L — SIGNIFICANT CHANGE UP (ref 98–107)
CO2 SERPL-SCNC: 29 MMOL/L — SIGNIFICANT CHANGE UP (ref 22–29)
CREAT SERPL-MCNC: 0.46 MG/DL — LOW (ref 0.5–1.3)
GLUCOSE SERPL-MCNC: 122 MG/DL — HIGH (ref 70–99)
HCT VFR BLD CALC: 36.9 % — LOW (ref 39–50)
HGB BLD-MCNC: 12.8 G/DL — LOW (ref 13–17)
MAGNESIUM SERPL-MCNC: 1.8 MG/DL — SIGNIFICANT CHANGE UP (ref 1.6–2.6)
MCHC RBC-ENTMCNC: 34.4 PG — HIGH (ref 27–34)
MCHC RBC-ENTMCNC: 34.7 GM/DL — SIGNIFICANT CHANGE UP (ref 32–36)
MCV RBC AUTO: 99.2 FL — SIGNIFICANT CHANGE UP (ref 80–100)
PHOSPHATE SERPL-MCNC: 3 MG/DL — SIGNIFICANT CHANGE UP (ref 2.4–4.7)
PLATELET # BLD AUTO: 194 K/UL — SIGNIFICANT CHANGE UP (ref 150–400)
POTASSIUM SERPL-MCNC: 3.4 MMOL/L — LOW (ref 3.5–5.3)
POTASSIUM SERPL-SCNC: 3.4 MMOL/L — LOW (ref 3.5–5.3)
PROT SERPL-MCNC: 6.4 G/DL — LOW (ref 6.6–8.7)
RBC # BLD: 3.72 M/UL — LOW (ref 4.2–5.8)
RBC # FLD: 13.4 % — SIGNIFICANT CHANGE UP (ref 10.3–14.5)
SODIUM SERPL-SCNC: 140 MMOL/L — SIGNIFICANT CHANGE UP (ref 135–145)
WBC # BLD: 5.38 K/UL — SIGNIFICANT CHANGE UP (ref 3.8–10.5)
WBC # FLD AUTO: 5.38 K/UL — SIGNIFICANT CHANGE UP (ref 3.8–10.5)

## 2021-04-22 PROCEDURE — 99233 SBSQ HOSP IP/OBS HIGH 50: CPT

## 2021-04-22 RX ORDER — POTASSIUM CHLORIDE 20 MEQ
10 PACKET (EA) ORAL
Refills: 0 | Status: COMPLETED | OUTPATIENT
Start: 2021-04-22 | End: 2021-04-22

## 2021-04-22 RX ORDER — NICOTINE POLACRILEX 2 MG
1 GUM BUCCAL DAILY
Refills: 0 | Status: DISCONTINUED | OUTPATIENT
Start: 2021-04-22 | End: 2021-04-28

## 2021-04-22 RX ORDER — MAGNESIUM SULFATE 500 MG/ML
1 VIAL (ML) INJECTION ONCE
Refills: 0 | Status: COMPLETED | OUTPATIENT
Start: 2021-04-22 | End: 2021-04-22

## 2021-04-22 RX ORDER — POTASSIUM CHLORIDE 20 MEQ
40 PACKET (EA) ORAL ONCE
Refills: 0 | Status: COMPLETED | OUTPATIENT
Start: 2021-04-22 | End: 2021-04-22

## 2021-04-22 RX ADMIN — Medication 32 MILLIGRAM(S): at 05:01

## 2021-04-22 RX ADMIN — Medication 100 MILLIEQUIVALENT(S): at 08:53

## 2021-04-22 RX ADMIN — ENOXAPARIN SODIUM 40 MILLIGRAM(S): 100 INJECTION SUBCUTANEOUS at 09:59

## 2021-04-22 RX ADMIN — Medication 1.5 MILLIGRAM(S): at 09:54

## 2021-04-22 RX ADMIN — Medication 1.5 MILLIGRAM(S): at 18:10

## 2021-04-22 RX ADMIN — CHLORHEXIDINE GLUCONATE 1 APPLICATION(S): 213 SOLUTION TOPICAL at 05:01

## 2021-04-22 RX ADMIN — Medication 100 GRAM(S): at 13:10

## 2021-04-22 RX ADMIN — SODIUM CHLORIDE 150 MILLILITER(S): 9 INJECTION, SOLUTION INTRAVENOUS at 08:23

## 2021-04-22 RX ADMIN — Medication 1 MILLIGRAM(S): at 09:59

## 2021-04-22 RX ADMIN — Medication 2 MILLIGRAM(S): at 05:01

## 2021-04-22 RX ADMIN — PANTOPRAZOLE SODIUM 40 MILLIGRAM(S): 20 TABLET, DELAYED RELEASE ORAL at 09:59

## 2021-04-22 RX ADMIN — Medication 2 MILLIGRAM(S): at 01:32

## 2021-04-22 RX ADMIN — Medication 1.5 MILLIGRAM(S): at 14:24

## 2021-04-22 RX ADMIN — Medication 100 MILLIEQUIVALENT(S): at 11:04

## 2021-04-22 RX ADMIN — Medication 100 MILLIEQUIVALENT(S): at 09:54

## 2021-04-22 RX ADMIN — Medication 1.5 MILLIGRAM(S): at 22:31

## 2021-04-22 RX ADMIN — SODIUM CHLORIDE 150 MILLILITER(S): 9 INJECTION, SOLUTION INTRAVENOUS at 18:10

## 2021-04-22 RX ADMIN — Medication 100 MILLIGRAM(S): at 14:24

## 2021-04-22 NOTE — PHYSICAL THERAPY INITIAL EVALUATION ADULT - TRANSFER SAFETY CONCERNS NOTED: SIT/STAND, REHAB EVAL
decreased safety awareness/losing balance/decreased proprioception/decreased weight-shifting ability

## 2021-04-22 NOTE — PHYSICAL THERAPY INITIAL EVALUATION ADULT - CRITERIA FOR SKILLED THERAPEUTIC INTERVENTIONS
NICOLE (pending progress, consider ETOH substance abuse therapy)/impairments found/anticipated discharge recommendation

## 2021-04-22 NOTE — PHYSICAL THERAPY INITIAL EVALUATION ADULT - SITTING BALANCE: DYNAMIC
several anterior/posterior Losses of balance in sitting requiring Min to mod A to recover/poor balance

## 2021-04-22 NOTE — PHYSICAL THERAPY INITIAL EVALUATION ADULT - ADDITIONAL COMMENTS
Pt lives in a private home with his girlfriend (currently admitted as well, also ETOH abuser per H&P) 3 steps to enter with handrails, no steps inside. Pt was independent PTA with intermittent use of SAC. Pt owns SAC only.

## 2021-04-22 NOTE — PROGRESS NOTE ADULT - SUBJECTIVE AND OBJECTIVE BOX
Chief complaint: DTs    Patient seen and examined at bedside. No acute overnight events reported. Patient states he has pain "all over" but otherwise has no complaints. Denies fever, chills, cough, chest pain, shortness of breath, nausea or vomiting.     Vital Signs Last 24 Hrs  T(F): 98.3 (22 Apr 2021 08:38), Max: 98.9 (22 Apr 2021 01:24)  HR: 76 (22 Apr 2021 11:13) (60 - 101)  BP: 129/78 (22 Apr 2021 04:00) (129/78 - 157/83)  RR: 18 (22 Apr 2021 04:00) (18 - 18)  SpO2: 96% (22 Apr 2021 11:13) (94% - 100%)    Physical Exam:  Constitutional: alert and oriented, in no acute distress   Eyes: icteric sclera  Neck: Soft and supple  Respiratory: Clear to auscultation bilaterally, no wheezes or crackles  Cardiovascular: Regular rate and rhythm, no murmurs, gallops, rubs  Gastrointestinal: Soft, non-tender to palpation, +bs  Vascular: 2+ peripheral pulses  Neurological: A/O x 2, no focal neurological deficits  Musculoskeletal: no lower extremity edema bilaterally    Labs:                        12.8   5.38  )-----------( 194      ( 22 Apr 2021 06:22 )             36.9   04-22    140  |  98  |  4.0<L>  ----------------------------<  122<H>  3.4<L>   |  29.0  |  0.46<L>    Ca    8.4<L>      22 Apr 2021 06:22  Phos  3.0     04-22  Mg     1.8     04-22    TPro  6.4<L>  /  Alb  3.0<L>  /  TBili  6.3<H>  /  DBili  x   /  AST  118<H>  /  ALT  60<H>  /  AlkPhos  241<H>  04-22

## 2021-04-22 NOTE — PHYSICAL THERAPY INITIAL EVALUATION ADULT - IMPAIRMENTS FOUND, PT EVAL
arousal, attention, and cognition/gait, locomotion, and balance/muscle strength/poor safety awareness/posture

## 2021-04-22 NOTE — CHART NOTE - NSCHARTNOTEFT_GEN_A_CORE
Per RN, patient agitated, pulling at lines    -Bilateral wrist restrains Per RN, patient agitated, pulling at lines. Patient seen and assessed. Patient with unlabored breathing, but very agitated. CIWA score 10-11.   -Bilateral unsecured mittens.  Despite the unsecured mittens patient continues to be agitated, peeing on the floor, and uncontrolled. Per RN, patient agitated, pulling at lines. Patient seen and assessed. Patient with unlabored breathing, but very agitated. CIWA score 10-11.   -Bilateral unsecured mittens.    Addendum  Despite the unsecured mittens patient continues to be agitated, peeing on the floor, and uncontrolled.  Vital Signs Stable. Patient hemodynamically stable. No signs of sedation noted  -Valium 10mg IVP x1  -Ativan 2mg IVP PO  x1  Discussed care with nocturnistDr Siegel

## 2021-04-22 NOTE — PHYSICAL THERAPY INITIAL EVALUATION ADULT - PERTINENT HX OF CURRENT PROBLEM, REHAB EVAL
45M PMH EtOH use disorder with h/o EtOH pancreatitis, chronic diarrhea, untreated MS who presented with generalized weakness, falls, jaundice, and RUQ pain, admitted to ICU for management of DTs, EtOH hepatitis, severe hypokalemia. Pt downgraded to medical floor 4/21

## 2021-04-22 NOTE — PHYSICAL THERAPY INITIAL EVALUATION ADULT - GENERAL OBSERVATIONS, REHAB EVAL
Pt received supine in bed + telemetry/ + IV, + bed alarm, c/o 8/10 pain at IV site (RN Aware), pt agreeable to PT

## 2021-04-23 LAB
ALBUMIN SERPL ELPH-MCNC: 3.4 G/DL — SIGNIFICANT CHANGE UP (ref 3.3–5.2)
ALP SERPL-CCNC: 280 U/L — HIGH (ref 40–120)
ALT FLD-CCNC: 63 U/L — HIGH
ANION GAP SERPL CALC-SCNC: 13 MMOL/L — SIGNIFICANT CHANGE UP (ref 5–17)
AST SERPL-CCNC: 111 U/L — HIGH
BASOPHILS # BLD AUTO: 0.06 K/UL — SIGNIFICANT CHANGE UP (ref 0–0.2)
BASOPHILS NFR BLD AUTO: 0.9 % — SIGNIFICANT CHANGE UP (ref 0–2)
BILIRUB SERPL-MCNC: 6.7 MG/DL — HIGH (ref 0.4–2)
BUN SERPL-MCNC: 3 MG/DL — LOW (ref 8–20)
CALCIUM SERPL-MCNC: 8.5 MG/DL — LOW (ref 8.6–10.2)
CHLORIDE SERPL-SCNC: 99 MMOL/L — SIGNIFICANT CHANGE UP (ref 98–107)
CO2 SERPL-SCNC: 28 MMOL/L — SIGNIFICANT CHANGE UP (ref 22–29)
CREAT SERPL-MCNC: 0.45 MG/DL — LOW (ref 0.5–1.3)
EOSINOPHIL # BLD AUTO: 0.01 K/UL — SIGNIFICANT CHANGE UP (ref 0–0.5)
EOSINOPHIL NFR BLD AUTO: 0.1 % — SIGNIFICANT CHANGE UP (ref 0–6)
GLUCOSE BLDC GLUCOMTR-MCNC: 120 MG/DL — HIGH (ref 70–99)
GLUCOSE SERPL-MCNC: 110 MG/DL — HIGH (ref 70–99)
HCT VFR BLD CALC: 43.8 % — SIGNIFICANT CHANGE UP (ref 39–50)
HGB BLD-MCNC: 14.3 G/DL — SIGNIFICANT CHANGE UP (ref 13–17)
IMM GRANULOCYTES NFR BLD AUTO: 1.9 % — HIGH (ref 0–1.5)
LYMPHOCYTES # BLD AUTO: 1.65 K/UL — SIGNIFICANT CHANGE UP (ref 1–3.3)
LYMPHOCYTES # BLD AUTO: 24.4 % — SIGNIFICANT CHANGE UP (ref 13–44)
MCHC RBC-ENTMCNC: 32.6 GM/DL — SIGNIFICANT CHANGE UP (ref 32–36)
MCHC RBC-ENTMCNC: 33.1 PG — SIGNIFICANT CHANGE UP (ref 27–34)
MCV RBC AUTO: 101.4 FL — HIGH (ref 80–100)
MONOCYTES # BLD AUTO: 0.92 K/UL — HIGH (ref 0–0.9)
MONOCYTES NFR BLD AUTO: 13.6 % — SIGNIFICANT CHANGE UP (ref 2–14)
NEUTROPHILS # BLD AUTO: 3.98 K/UL — SIGNIFICANT CHANGE UP (ref 1.8–7.4)
NEUTROPHILS NFR BLD AUTO: 59.1 % — SIGNIFICANT CHANGE UP (ref 43–77)
PLATELET # BLD AUTO: 227 K/UL — SIGNIFICANT CHANGE UP (ref 150–400)
POTASSIUM SERPL-MCNC: 3.1 MMOL/L — LOW (ref 3.5–5.3)
POTASSIUM SERPL-SCNC: 3.1 MMOL/L — LOW (ref 3.5–5.3)
PROT SERPL-MCNC: 7 G/DL — SIGNIFICANT CHANGE UP (ref 6.6–8.7)
RBC # BLD: 4.32 M/UL — SIGNIFICANT CHANGE UP (ref 4.2–5.8)
RBC # FLD: 13.6 % — SIGNIFICANT CHANGE UP (ref 10.3–14.5)
SODIUM SERPL-SCNC: 140 MMOL/L — SIGNIFICANT CHANGE UP (ref 135–145)
WBC # BLD: 6.75 K/UL — SIGNIFICANT CHANGE UP (ref 3.8–10.5)
WBC # FLD AUTO: 6.75 K/UL — SIGNIFICANT CHANGE UP (ref 3.8–10.5)

## 2021-04-23 PROCEDURE — 99233 SBSQ HOSP IP/OBS HIGH 50: CPT

## 2021-04-23 RX ORDER — POTASSIUM CHLORIDE 20 MEQ
10 PACKET (EA) ORAL
Refills: 0 | Status: DISCONTINUED | OUTPATIENT
Start: 2021-04-23 | End: 2021-04-23

## 2021-04-23 RX ORDER — DIAZEPAM 5 MG
10 TABLET ORAL ONCE
Refills: 0 | Status: DISCONTINUED | OUTPATIENT
Start: 2021-04-23 | End: 2021-04-23

## 2021-04-23 RX ORDER — POTASSIUM CHLORIDE 20 MEQ
10 PACKET (EA) ORAL
Refills: 0 | Status: COMPLETED | OUTPATIENT
Start: 2021-04-23 | End: 2021-04-23

## 2021-04-23 RX ADMIN — Medication 1 PATCH: at 19:48

## 2021-04-23 RX ADMIN — Medication 1 MILLIGRAM(S): at 17:33

## 2021-04-23 RX ADMIN — ENOXAPARIN SODIUM 40 MILLIGRAM(S): 100 INJECTION SUBCUTANEOUS at 11:12

## 2021-04-23 RX ADMIN — Medication 100 MILLIEQUIVALENT(S): at 10:08

## 2021-04-23 RX ADMIN — Medication 1 MILLIGRAM(S): at 22:50

## 2021-04-23 RX ADMIN — SODIUM CHLORIDE 150 MILLILITER(S): 9 INJECTION, SOLUTION INTRAVENOUS at 14:33

## 2021-04-23 RX ADMIN — Medication 1.5 MILLIGRAM(S): at 06:29

## 2021-04-23 RX ADMIN — Medication 1 MILLIGRAM(S): at 09:02

## 2021-04-23 RX ADMIN — Medication 2 MILLIGRAM(S): at 01:29

## 2021-04-23 RX ADMIN — Medication 1 MILLIGRAM(S): at 13:03

## 2021-04-23 RX ADMIN — SODIUM CHLORIDE 150 MILLILITER(S): 9 INJECTION, SOLUTION INTRAVENOUS at 22:50

## 2021-04-23 RX ADMIN — Medication 100 MILLIEQUIVALENT(S): at 11:11

## 2021-04-23 RX ADMIN — CHLORHEXIDINE GLUCONATE 1 APPLICATION(S): 213 SOLUTION TOPICAL at 06:32

## 2021-04-23 RX ADMIN — SODIUM CHLORIDE 150 MILLILITER(S): 9 INJECTION, SOLUTION INTRAVENOUS at 07:49

## 2021-04-23 RX ADMIN — Medication 1 PATCH: at 11:12

## 2021-04-23 RX ADMIN — Medication 1.5 MILLIGRAM(S): at 02:22

## 2021-04-23 RX ADMIN — PANTOPRAZOLE SODIUM 40 MILLIGRAM(S): 20 TABLET, DELAYED RELEASE ORAL at 11:12

## 2021-04-23 RX ADMIN — Medication 100 MILLIEQUIVALENT(S): at 12:11

## 2021-04-23 RX ADMIN — Medication 1 MILLIGRAM(S): at 11:12

## 2021-04-23 RX ADMIN — Medication 10 MILLIGRAM(S): at 04:00

## 2021-04-23 RX ADMIN — Medication 32 MILLIGRAM(S): at 06:28

## 2021-04-23 RX ADMIN — Medication 100 MILLIGRAM(S): at 11:12

## 2021-04-23 RX ADMIN — Medication 10 MILLIGRAM(S): at 00:47

## 2021-04-23 NOTE — CHART NOTE - NSCHARTNOTEFT_GEN_A_CORE
Called by RN for restraint renewal, due to continued agitation, confusion secondary to alcohol withdrawal syndrome.   -Restraints reordered

## 2021-04-23 NOTE — PROGRESS NOTE ADULT - SUBJECTIVE AND OBJECTIVE BOX
Chief complaint: Severe DTs     Patient seen and examined at bedside. Overnight the patient was extremely agitated, given IV Ativan. Patient complaining about the mittens but he continues to try to pull out IV lines. Denies fever, chills, cough, chest pain, shortness of breath, nausea or vomiting.     Vital Signs Last 24 Hrs  T(F): 98.1 (23 Apr 2021 10:45), Max: 98.3 (23 Apr 2021 00:45)  HR: 92 (23 Apr 2021 10:45) (83 - 92)  BP: 156/93 (23 Apr 2021 10:45) (136/83 - 163/91)  RR: 20 (23 Apr 2021 10:45) (18 - 20)  SpO2: 97% (23 Apr 2021 10:45) (92% - 98%)    Physical Exam:  Constitutional: alert and oriented, in no acute distress   Neck: Soft and supple  Respiratory: Clear to auscultation bilaterally, no wheezes or crackles  Cardiovascular: Regular rate and rhythm no murmurs, gallops, rubs  Gastrointestinal: Soft, non-tender to palpation, +bs  Vascular: 2+ peripheral pulses  Neurological: A/O x 2, no focal neurological deficits  Musculoskeletal: no lower extremity edema bilaterally    Labs:                        14.3   6.75  )-----------( 227      ( 23 Apr 2021 07:41 )             43.8   04-23    140  |  99  |  3.0<L>  ----------------------------<  110<H>  3.1<L>   |  28.0  |  0.45<L>    Ca    8.5<L>      23 Apr 2021 07:41  Phos  3.0     04-22  Mg     1.8     04-22    TPro  7.0  /  Alb  3.4  /  TBili  6.7<H>  /  DBili  x   /  AST  111<H>  /  ALT  63<H>  /  AlkPhos  280<H>  04-23

## 2021-04-24 LAB
ALBUMIN SERPL ELPH-MCNC: 3.3 G/DL — SIGNIFICANT CHANGE UP (ref 3.3–5.2)
ALP SERPL-CCNC: 266 U/L — HIGH (ref 40–120)
ALT FLD-CCNC: 60 U/L — HIGH
ANION GAP SERPL CALC-SCNC: 16 MMOL/L — SIGNIFICANT CHANGE UP (ref 5–17)
AST SERPL-CCNC: 97 U/L — HIGH
BASOPHILS # BLD AUTO: 0.05 K/UL — SIGNIFICANT CHANGE UP (ref 0–0.2)
BASOPHILS NFR BLD AUTO: 0.7 % — SIGNIFICANT CHANGE UP (ref 0–2)
BILIRUB SERPL-MCNC: 5.9 MG/DL — HIGH (ref 0.4–2)
BUN SERPL-MCNC: 4 MG/DL — LOW (ref 8–20)
CALCIUM SERPL-MCNC: 8.8 MG/DL — SIGNIFICANT CHANGE UP (ref 8.6–10.2)
CHLORIDE SERPL-SCNC: 102 MMOL/L — SIGNIFICANT CHANGE UP (ref 98–107)
CO2 SERPL-SCNC: 22 MMOL/L — SIGNIFICANT CHANGE UP (ref 22–29)
CREAT SERPL-MCNC: 0.54 MG/DL — SIGNIFICANT CHANGE UP (ref 0.5–1.3)
EOSINOPHIL # BLD AUTO: 0.02 K/UL — SIGNIFICANT CHANGE UP (ref 0–0.5)
EOSINOPHIL NFR BLD AUTO: 0.3 % — SIGNIFICANT CHANGE UP (ref 0–6)
GLUCOSE SERPL-MCNC: 89 MG/DL — SIGNIFICANT CHANGE UP (ref 70–99)
HCT VFR BLD CALC: 43.3 % — SIGNIFICANT CHANGE UP (ref 39–50)
HGB BLD-MCNC: 14.2 G/DL — SIGNIFICANT CHANGE UP (ref 13–17)
IMM GRANULOCYTES NFR BLD AUTO: 0.7 % — SIGNIFICANT CHANGE UP (ref 0–1.5)
LYMPHOCYTES # BLD AUTO: 1.62 K/UL — SIGNIFICANT CHANGE UP (ref 1–3.3)
LYMPHOCYTES # BLD AUTO: 23.5 % — SIGNIFICANT CHANGE UP (ref 13–44)
MAGNESIUM SERPL-MCNC: 1.8 MG/DL — SIGNIFICANT CHANGE UP (ref 1.6–2.6)
MCHC RBC-ENTMCNC: 32.8 GM/DL — SIGNIFICANT CHANGE UP (ref 32–36)
MCHC RBC-ENTMCNC: 33.8 PG — SIGNIFICANT CHANGE UP (ref 27–34)
MCV RBC AUTO: 103.1 FL — HIGH (ref 80–100)
MONOCYTES # BLD AUTO: 0.9 K/UL — SIGNIFICANT CHANGE UP (ref 0–0.9)
MONOCYTES NFR BLD AUTO: 13.1 % — SIGNIFICANT CHANGE UP (ref 2–14)
NEUTROPHILS # BLD AUTO: 4.25 K/UL — SIGNIFICANT CHANGE UP (ref 1.8–7.4)
NEUTROPHILS NFR BLD AUTO: 61.7 % — SIGNIFICANT CHANGE UP (ref 43–77)
PLATELET # BLD AUTO: 270 K/UL — SIGNIFICANT CHANGE UP (ref 150–400)
POTASSIUM SERPL-MCNC: 3.4 MMOL/L — LOW (ref 3.5–5.3)
POTASSIUM SERPL-SCNC: 3.4 MMOL/L — LOW (ref 3.5–5.3)
PROT SERPL-MCNC: 7 G/DL — SIGNIFICANT CHANGE UP (ref 6.6–8.7)
RBC # BLD: 4.2 M/UL — SIGNIFICANT CHANGE UP (ref 4.2–5.8)
RBC # FLD: 14.3 % — SIGNIFICANT CHANGE UP (ref 10.3–14.5)
SODIUM SERPL-SCNC: 139 MMOL/L — SIGNIFICANT CHANGE UP (ref 135–145)
WBC # BLD: 6.89 K/UL — SIGNIFICANT CHANGE UP (ref 3.8–10.5)
WBC # FLD AUTO: 6.89 K/UL — SIGNIFICANT CHANGE UP (ref 3.8–10.5)

## 2021-04-24 PROCEDURE — 99233 SBSQ HOSP IP/OBS HIGH 50: CPT

## 2021-04-24 RX ADMIN — SODIUM CHLORIDE 150 MILLILITER(S): 9 INJECTION, SOLUTION INTRAVENOUS at 11:54

## 2021-04-24 RX ADMIN — Medication 1 MILLIGRAM(S): at 12:14

## 2021-04-24 RX ADMIN — Medication 25 MILLIGRAM(S): at 21:41

## 2021-04-24 RX ADMIN — Medication 32 MILLIGRAM(S): at 06:22

## 2021-04-24 RX ADMIN — ENOXAPARIN SODIUM 40 MILLIGRAM(S): 100 INJECTION SUBCUTANEOUS at 11:39

## 2021-04-24 RX ADMIN — SODIUM CHLORIDE 150 MILLILITER(S): 9 INJECTION, SOLUTION INTRAVENOUS at 03:25

## 2021-04-24 RX ADMIN — Medication 25 MILLIGRAM(S): at 16:22

## 2021-04-24 RX ADMIN — CHLORHEXIDINE GLUCONATE 1 APPLICATION(S): 213 SOLUTION TOPICAL at 06:24

## 2021-04-24 RX ADMIN — Medication 1 PATCH: at 19:55

## 2021-04-24 RX ADMIN — Medication 1 MILLIGRAM(S): at 06:22

## 2021-04-24 RX ADMIN — Medication 0.5 MILLIGRAM(S): at 10:38

## 2021-04-24 RX ADMIN — Medication 1 MILLIGRAM(S): at 03:00

## 2021-04-24 RX ADMIN — Medication 2 MILLIGRAM(S): at 00:53

## 2021-04-24 RX ADMIN — Medication 1 PATCH: at 11:39

## 2021-04-24 RX ADMIN — Medication 100 MILLIGRAM(S): at 11:42

## 2021-04-24 RX ADMIN — PANTOPRAZOLE SODIUM 40 MILLIGRAM(S): 20 TABLET, DELAYED RELEASE ORAL at 11:39

## 2021-04-24 NOTE — PROGRESS NOTE ADULT - SUBJECTIVE AND OBJECTIVE BOX
Patient is a 45y old  Male who presents with a chief complaint of severe DTs (23 Apr 2021 13:02)      INTERVAL HPI/OVERNIGHT EVENTS: seen and examined. He was on wrist restrictions, but alert and oriented.   Per nurse, overnight was getting out of bed and removing his IV lines. CIWA score 6-8    MEDICATIONS  (STANDING):  chlordiazePOXIDE 25 milliGRAM(s) Oral three times a day  chlorhexidine 2% Cloths 1 Application(s) Topical <User Schedule>  dextrose 5% + sodium chloride 0.9%. 1000 milliLiter(s) (150 mL/Hr) IV Continuous <Continuous>  enoxaparin Injectable 40 milliGRAM(s) SubCutaneous daily  folic acid Injectable 1 milliGRAM(s) IV Push daily  methylPREDNISolone sodium succinate Injectable 32 milliGRAM(s) IV Push daily  nicotine - 21 mG/24Hr(s) Patch 1 patch Transdermal daily  pantoprazole  Injectable 40 milliGRAM(s) IV Push daily  thiamine Injectable 100 milliGRAM(s) IV Push daily    MEDICATIONS  (PRN):  lactulose Syrup 10 Gram(s) Oral two times a day PRN bowel movements  LORazepam   Injectable 2 milliGRAM(s) IV Push every 4 hours PRN CIWA > 8      Allergies    No Known Allergies    Intolerances        REVIEW OF SYSTEMS:  CONSTITUTIONAL: No fever, weight loss, or fatigue  RESPIRATORY: No cough, wheezing, chills or hemoptysis; No shortness of breath  CARDIOVASCULAR: No chest pain, palpitations, dizziness, or leg swelling  GASTROINTESTINAL: No abdominal or epigastric pain. No nausea, vomiting, or hematemesis; No diarrhea or constipation. No melena or hematochezia.  NEUROLOGICAL: No headaches, memory loss, loss of strength, numbness, or tremors  MUSCULOSKELETAL: No joint pain or swelling; No muscle, back, or extremity pain      Vital Signs Last 24 Hrs  T(C): 36.4 (24 Apr 2021 09:01), Max: 37.2 (23 Apr 2021 20:31)  T(F): 97.5 (24 Apr 2021 09:01), Max: 99 (23 Apr 2021 20:31)  HR: 95 (24 Apr 2021 09:01) (91 - 102)  BP: 157/89 (24 Apr 2021 09:01) (113/78 - 162/90)  BP(mean): --  RR: 18 (24 Apr 2021 09:01) (18 - 20)  SpO2: 97% (24 Apr 2021 09:01) (97% - 97%)    PHYSICAL EXAM:  GENERAL: NAD, well-groomed, well-developed  HEAD:  Atraumatic, Normocephalic  EYES: EOMI, PERRLA, conjunctiva and sclera clear  NECK: Supple, No JVD, Normal thyroid  NERVOUS SYSTEM:  Alert & Oriented X3, No gross focal deficits  CHEST/LUNG: Clear to percussion bilaterally; No rales, rhonchi, wheezing, or rubs  HEART: Regular rate and rhythm; No murmurs, rubs, or gallops  ABDOMEN: Soft, Nontender, Nondistended; Bowel sounds present  EXTREMITIES:  No clubbing, cyanosis, or edema  SKIN: No rashes or lesions    LABS:                        14.2   6.89  )-----------( 270      ( 24 Apr 2021 06:21 )             43.3     04-24    139  |  102  |  4.0<L>  ----------------------------<  89  3.4<L>   |  22.0  |  0.54    Ca    8.8      24 Apr 2021 06:21  Mg     1.8     04-24    TPro  7.0  /  Alb  3.3  /  TBili  5.9<H>  /  DBili  x   /  AST  97<H>  /  ALT  60<H>  /  AlkPhos  266<H>  04-24        CAPILLARY BLOOD GLUCOSE      POCT Blood Glucose.: 120 mg/dL (23 Apr 2021 22:46)      RADIOLOGY & ADDITIONAL TESTS:    Imaging Personally Reviewed:  [ ] YES  [ ] NO    Consultant(s) Notes Reviewed:  [ ] YES  [ ] NO    Care Discussed with Consultants/Other Providers [ ] YES  [ ] NO    Plan of Care discussed with Housestaff [ ]YES [ ] NO Patient is a 45y old  Male who presents with a chief complaint of severe DTs (23 Apr 2021 13:02)      INTERVAL HPI/OVERNIGHT EVENTS: seen and examined. He was on wrist restrictions, but alert and oriented.   Per nurse, overnight was getting out of bed and removing his IV lines. CIWA score 6-8    MEDICATIONS  (STANDING):  chlordiazePOXIDE 25 milliGRAM(s) Oral three times a day  chlorhexidine 2% Cloths 1 Application(s) Topical <User Schedule>  dextrose 5% + sodium chloride 0.9%. 1000 milliLiter(s) (150 mL/Hr) IV Continuous <Continuous>  enoxaparin Injectable 40 milliGRAM(s) SubCutaneous daily  folic acid Injectable 1 milliGRAM(s) IV Push daily  methylPREDNISolone sodium succinate Injectable 32 milliGRAM(s) IV Push daily  nicotine - 21 mG/24Hr(s) Patch 1 patch Transdermal daily  pantoprazole  Injectable 40 milliGRAM(s) IV Push daily  thiamine Injectable 100 milliGRAM(s) IV Push daily    MEDICATIONS  (PRN):  lactulose Syrup 10 Gram(s) Oral two times a day PRN bowel movements  LORazepam   Injectable 2 milliGRAM(s) IV Push every 4 hours PRN CIWA > 8      Allergies    No Known Allergies    Intolerances        REVIEW OF SYSTEMS:  CONSTITUTIONAL: No fever, weight loss, or fatigue  RESPIRATORY: No cough, wheezing, chills or hemoptysis; No shortness of breath  CARDIOVASCULAR: No chest pain, palpitations, dizziness, or leg swelling  GASTROINTESTINAL: No abdominal or epigastric pain. No nausea, vomiting, or hematemesis; No diarrhea or constipation. No melena or hematochezia.  NEUROLOGICAL: No headaches, memory loss, loss of strength, numbness, or tremors  MUSCULOSKELETAL: No joint pain or swelling; No muscle, back, or extremity pain      Vital Signs Last 24 Hrs  T(C): 36.4 (24 Apr 2021 09:01), Max: 37.2 (23 Apr 2021 20:31)  T(F): 97.5 (24 Apr 2021 09:01), Max: 99 (23 Apr 2021 20:31)  HR: 95 (24 Apr 2021 09:01) (91 - 102)  BP: 157/89 (24 Apr 2021 09:01) (113/78 - 162/90)  BP(mean): --  RR: 18 (24 Apr 2021 09:01) (18 - 20)  SpO2: 97% (24 Apr 2021 09:01) (97% - 97%)    PHYSICAL EXAM:  GENERAL: laying in bed, on wrist restriction   HEAD:  Atraumatic, Normocephalic  EYES: EOMI, PERRLA, conjunctiva and sclera clear  NECK: Supple, No JVD, Normal thyroid  NERVOUS SYSTEM:  Alert & Oriented X3, No gross focal deficits  CHEST/LUNG: Clear to percussion bilaterally; No rales, rhonchi, wheezing, or rubs  HEART: Regular rate and rhythm; No murmurs, rubs, or gallops  ABDOMEN: Soft, Nontender, Nondistended; Bowel sounds present  EXTREMITIES:  No clubbing, cyanosis, or edema  SKIN: No rashes or lesions    LABS:                        14.2   6.89  )-----------( 270      ( 24 Apr 2021 06:21 )             43.3     04-24    139  |  102  |  4.0<L>  ----------------------------<  89  3.4<L>   |  22.0  |  0.54    Ca    8.8      24 Apr 2021 06:21  Mg     1.8     04-24    TPro  7.0  /  Alb  3.3  /  TBili  5.9<H>  /  DBili  x   /  AST  97<H>  /  ALT  60<H>  /  AlkPhos  266<H>  04-24        CAPILLARY BLOOD GLUCOSE      POCT Blood Glucose.: 120 mg/dL (23 Apr 2021 22:46)      RADIOLOGY & ADDITIONAL TESTS:    Imaging Personally Reviewed:  [ ] YES  [ ] NO    Consultant(s) Notes Reviewed:  [ ] YES  [ ] NO    Care Discussed with Consultants/Other Providers [ ] YES  [ ] NO    Plan of Care discussed with Housestaff [ ]YES [ ] NO

## 2021-04-25 LAB
ALBUMIN SERPL ELPH-MCNC: 3.7 G/DL — SIGNIFICANT CHANGE UP (ref 3.3–5.2)
ALP SERPL-CCNC: 293 U/L — HIGH (ref 40–120)
ALT FLD-CCNC: 66 U/L — HIGH
ANION GAP SERPL CALC-SCNC: 17 MMOL/L — SIGNIFICANT CHANGE UP (ref 5–17)
AST SERPL-CCNC: 100 U/L — HIGH
BILIRUB SERPL-MCNC: 6.2 MG/DL — HIGH (ref 0.4–2)
BUN SERPL-MCNC: 8 MG/DL — SIGNIFICANT CHANGE UP (ref 8–20)
CALCIUM SERPL-MCNC: 9.7 MG/DL — SIGNIFICANT CHANGE UP (ref 8.6–10.2)
CHLORIDE SERPL-SCNC: 96 MMOL/L — LOW (ref 98–107)
CO2 SERPL-SCNC: 21 MMOL/L — LOW (ref 22–29)
CREAT SERPL-MCNC: 0.72 MG/DL — SIGNIFICANT CHANGE UP (ref 0.5–1.3)
GLUCOSE SERPL-MCNC: 100 MG/DL — HIGH (ref 70–99)
POTASSIUM SERPL-MCNC: 3.7 MMOL/L — SIGNIFICANT CHANGE UP (ref 3.5–5.3)
POTASSIUM SERPL-SCNC: 3.7 MMOL/L — SIGNIFICANT CHANGE UP (ref 3.5–5.3)
PROT SERPL-MCNC: 7.9 G/DL — SIGNIFICANT CHANGE UP (ref 6.6–8.7)
SARS-COV-2 RNA SPEC QL NAA+PROBE: SIGNIFICANT CHANGE UP
SODIUM SERPL-SCNC: 134 MMOL/L — LOW (ref 135–145)

## 2021-04-25 PROCEDURE — 99233 SBSQ HOSP IP/OBS HIGH 50: CPT

## 2021-04-25 RX ADMIN — Medication 32 MILLIGRAM(S): at 05:45

## 2021-04-25 RX ADMIN — CHLORHEXIDINE GLUCONATE 1 APPLICATION(S): 213 SOLUTION TOPICAL at 05:49

## 2021-04-25 RX ADMIN — Medication 30 MILLILITER(S): at 21:47

## 2021-04-25 RX ADMIN — Medication 1 PATCH: at 12:43

## 2021-04-25 RX ADMIN — Medication 1 PATCH: at 19:50

## 2021-04-25 RX ADMIN — Medication 1 MILLIGRAM(S): at 12:02

## 2021-04-25 RX ADMIN — Medication 25 MILLIGRAM(S): at 05:45

## 2021-04-25 RX ADMIN — ENOXAPARIN SODIUM 40 MILLIGRAM(S): 100 INJECTION SUBCUTANEOUS at 12:43

## 2021-04-25 RX ADMIN — PANTOPRAZOLE SODIUM 40 MILLIGRAM(S): 20 TABLET, DELAYED RELEASE ORAL at 15:43

## 2021-04-25 RX ADMIN — Medication 100 MILLIGRAM(S): at 15:43

## 2021-04-25 NOTE — PROGRESS NOTE ADULT - SUBJECTIVE AND OBJECTIVE BOX
Patient is a 45y old  Male who presents with a chief complaint of severe DTs (24 Apr 2021 15:11)      INTERVAL HPI/OVERNIGHT EVENTS: seen and examined. Alert and oriented, fully follows commands and cooperative. CIWA scare 0-1.   NO overnight events     MEDICATIONS  (STANDING):  chlorhexidine 2% Cloths 1 Application(s) Topical <User Schedule>  enoxaparin Injectable 40 milliGRAM(s) SubCutaneous daily  folic acid Injectable 1 milliGRAM(s) IV Push daily  methylPREDNISolone sodium succinate Injectable 32 milliGRAM(s) IV Push daily  nicotine - 21 mG/24Hr(s) Patch 1 patch Transdermal daily  pantoprazole  Injectable 40 milliGRAM(s) IV Push daily  thiamine Injectable 100 milliGRAM(s) IV Push daily    MEDICATIONS  (PRN):  lactulose Syrup 10 Gram(s) Oral two times a day PRN bowel movements  LORazepam   Injectable 2 milliGRAM(s) IV Push every 4 hours PRN CIWA > 8      Allergies    No Known Allergies    Intolerances        REVIEW OF SYSTEMS:  CONSTITUTIONAL: No fever, weight loss, or fatigue  RESPIRATORY: No cough, wheezing, chills or hemoptysis; No shortness of breath  CARDIOVASCULAR: No chest pain, palpitations, dizziness, or leg swelling  GASTROINTESTINAL: No abdominal or epigastric pain. No nausea, vomiting, or hematemesis; No diarrhea or constipation. No melena or hematochezia.  NEUROLOGICAL: No headaches, memory loss, loss of strength, numbness, or tremors  MUSCULOSKELETAL: No joint pain or swelling; No muscle, back, or extremity pain      Vital Signs Last 24 Hrs  T(C): 37.2 (25 Apr 2021 10:06), Max: 37.2 (25 Apr 2021 10:06)  T(F): 99 (25 Apr 2021 10:06), Max: 99 (25 Apr 2021 10:06)  HR: 100 (25 Apr 2021 10:06) (79 - 100)  BP: 139/98 (25 Apr 2021 10:06) (128/76 - 159/94)  BP(mean): --  RR: 18 (25 Apr 2021 10:06) (18 - 18)  SpO2: 100% (25 Apr 2021 10:06) (92% - 100%)    PHYSICAL EXAM:  GENERAL: NAD, well-groomed, well-developed, laying in bed   HEAD:  Atraumatic, Normocephalic  EYES: EOMI, PERRLA, conjunctiva and sclera jaundiced   NECK: Supple, No JVD, Normal thyroid  NERVOUS SYSTEM:  Alert & Oriented X3, No gross focal deficits  CHEST/LUNG: Clear to percussion bilaterally; No rales, rhonchi, wheezing, or rubs  HEART: Regular rate and rhythm; No murmurs, rubs, or gallops  ABDOMEN: Soft, Nontender, Nondistended; Bowel sounds present  EXTREMITIES:  No clubbing, cyanosis, or edema  SKIN: jaundiced     LABS:                        14.2   6.89  )-----------( 270      ( 24 Apr 2021 06:21 )             43.3     04-25    134<L>  |  96<L>  |  8.0  ----------------------------<  100<H>  3.7   |  21.0<L>  |  0.72    Ca    9.7      25 Apr 2021 12:43  Mg     1.8     04-24    TPro  7.9  /  Alb  3.7  /  TBili  6.2<H>  /  DBili  x   /  AST  100<H>  /  ALT  66<H>  /  AlkPhos  293<H>  04-25        CAPILLARY BLOOD GLUCOSE          RADIOLOGY & ADDITIONAL TESTS:    Imaging Personally Reviewed:  [ ] YES  [ ] NO    Consultant(s) Notes Reviewed:  [ ] YES  [ ] NO    Care Discussed with Consultants/Other Providers [ ] YES  [ ] NO    Plan of Care discussed with Housestaff [ ]YES [ ] NO

## 2021-04-26 LAB — SARS-COV-2 RNA SPEC QL NAA+PROBE: SIGNIFICANT CHANGE UP

## 2021-04-26 PROCEDURE — 99222 1ST HOSP IP/OBS MODERATE 55: CPT

## 2021-04-26 PROCEDURE — 99233 SBSQ HOSP IP/OBS HIGH 50: CPT

## 2021-04-26 RX ORDER — FOLIC ACID 0.8 MG
1 TABLET ORAL DAILY
Refills: 0 | Status: DISCONTINUED | OUTPATIENT
Start: 2021-04-26 | End: 2021-04-28

## 2021-04-26 RX ORDER — THIAMINE MONONITRATE (VIT B1) 100 MG
100 TABLET ORAL DAILY
Refills: 0 | Status: DISCONTINUED | OUTPATIENT
Start: 2021-04-26 | End: 2021-04-28

## 2021-04-26 RX ADMIN — Medication 32 MILLIGRAM(S): at 05:15

## 2021-04-26 RX ADMIN — Medication 1 PATCH: at 12:26

## 2021-04-26 RX ADMIN — ENOXAPARIN SODIUM 40 MILLIGRAM(S): 100 INJECTION SUBCUTANEOUS at 12:26

## 2021-04-26 RX ADMIN — PANTOPRAZOLE SODIUM 40 MILLIGRAM(S): 20 TABLET, DELAYED RELEASE ORAL at 12:26

## 2021-04-26 RX ADMIN — Medication 1 PATCH: at 19:39

## 2021-04-26 RX ADMIN — CHLORHEXIDINE GLUCONATE 1 APPLICATION(S): 213 SOLUTION TOPICAL at 05:15

## 2021-04-26 RX ADMIN — Medication 1 MILLIGRAM(S): at 12:26

## 2021-04-26 RX ADMIN — Medication 100 MILLIGRAM(S): at 15:38

## 2021-04-26 NOTE — PROGRESS NOTE ADULT - SUBJECTIVE AND OBJECTIVE BOX
Patient is a 45y old  Male who presents with a chief complaint of severe DTs (25 Apr 2021 13:34)      INTERVAL HPI/OVERNIGHT EVENTS: No compalins     MEDICATIONS  (STANDING):  chlorhexidine 2% Cloths 1 Application(s) Topical <User Schedule>  enoxaparin Injectable 40 milliGRAM(s) SubCutaneous daily  folic acid 1 milliGRAM(s) Oral daily  methylPREDNISolone sodium succinate Injectable 32 milliGRAM(s) IV Push daily  nicotine - 21 mG/24Hr(s) Patch 1 patch Transdermal daily  pantoprazole  Injectable 40 milliGRAM(s) IV Push daily  thiamine 100 milliGRAM(s) Oral daily    MEDICATIONS  (PRN):  lactulose Syrup 10 Gram(s) Oral two times a day PRN bowel movements  LORazepam   Injectable 2 milliGRAM(s) IV Push every 4 hours PRN CIWA > 8      Allergies    No Known Allergies    Intolerances        REVIEW OF SYSTEMS:  CONSTITUTIONAL: No fever, weight loss, or fatigue  RESPIRATORY: No cough, wheezing, chills or hemoptysis; No shortness of breath  CARDIOVASCULAR: No chest pain, palpitations, dizziness, or leg swelling  GASTROINTESTINAL: No abdominal or epigastric pain. No nausea, vomiting, or hematemesis; No diarrhea or constipation. No melena or hematochezia.  NEUROLOGICAL: No headaches, memory loss, loss of strength, numbness, or tremors  MUSCULOSKELETAL: No joint pain or swelling; No muscle, back, or extremity pain      Vital Signs Last 24 Hrs  T(C): 37 (26 Apr 2021 10:05), Max: 37.3 (25 Apr 2021 16:45)  T(F): 98.6 (26 Apr 2021 10:05), Max: 99.2 (25 Apr 2021 16:45)  HR: 74 (26 Apr 2021 10:05) (74 - 100)  BP: 135/82 (26 Apr 2021 10:05) (113/71 - 152/88)  BP(mean): --  RR: 18 (26 Apr 2021 10:05) (18 - 20)  SpO2: 96% (26 Apr 2021 10:05) (94% - 96%)    PHYSICAL EXAM:  GENERAL: NAD, well-groomed, well-developed  HEAD:  Atraumatic, Normocephalic  EYES: EOMI, PERRLA, conjunctiva and sclera clear  NECK: Supple, No JVD, Normal thyroid  NERVOUS SYSTEM:  Alert & Oriented X3, No gross focal deficits  CHEST/LUNG: Clear to percussion bilaterally; No rales, rhonchi, wheezing, or rubs  HEART: Regular rate and rhythm; No murmurs, rubs, or gallops  ABDOMEN: Soft, Nontender, Nondistended; Bowel sounds present  EXTREMITIES:  No clubbing, cyanosis, or edema  SKIN: No rashes or lesions    LABS:    04-25    134<L>  |  96<L>  |  8.0  ----------------------------<  100<H>  3.7   |  21.0<L>  |  0.72    Ca    9.7      25 Apr 2021 12:43    TPro  7.9  /  Alb  3.7  /  TBili  6.2<H>  /  DBili  x   /  AST  100<H>  /  ALT  66<H>  /  AlkPhos  293<H>  04-25        CAPILLARY BLOOD GLUCOSE          RADIOLOGY & ADDITIONAL TESTS:    Imaging Personally Reviewed:  [ ] YES  [ ] NO    Consultant(s) Notes Reviewed:  [ ] YES  [ ] NO    Care Discussed with Consultants/Other Providers [ ] YES  [ ] NO    Plan of Care discussed with Housestaff [ ]YES [ ] NO Patient is a 45y old  Male who presents with a chief complaint of severe DTs (25 Apr 2021 13:34)      INTERVAL HPI/OVERNIGHT EVENTS: Had one episode of vomiting last night.   Tolerating regular diet. CIWA 0      MEDICATIONS  (STANDING):  chlorhexidine 2% Cloths 1 Application(s) Topical <User Schedule>  enoxaparin Injectable 40 milliGRAM(s) SubCutaneous daily  folic acid 1 milliGRAM(s) Oral daily  methylPREDNISolone sodium succinate Injectable 32 milliGRAM(s) IV Push daily  nicotine - 21 mG/24Hr(s) Patch 1 patch Transdermal daily  pantoprazole  Injectable 40 milliGRAM(s) IV Push daily  thiamine 100 milliGRAM(s) Oral daily    MEDICATIONS  (PRN):  lactulose Syrup 10 Gram(s) Oral two times a day PRN bowel movements  LORazepam   Injectable 2 milliGRAM(s) IV Push every 4 hours PRN CIWA > 8      Allergies    No Known Allergies    Intolerances        REVIEW OF SYSTEMS:  CONSTITUTIONAL: No fever, weight loss, or fatigue  RESPIRATORY: No cough, wheezing, chills or hemoptysis; No shortness of breath  CARDIOVASCULAR: No chest pain, palpitations, dizziness, or leg swelling  GASTROINTESTINAL: No abdominal or epigastric pain. No nausea, vomiting, or hematemesis; No diarrhea or constipation. No melena or hematochezia.  NEUROLOGICAL: No headaches, memory loss, loss of strength, numbness, or tremors  MUSCULOSKELETAL: No joint pain or swelling; No muscle, back, or extremity pain      Vital Signs Last 24 Hrs  T(C): 37 (26 Apr 2021 10:05), Max: 37.3 (25 Apr 2021 16:45)  T(F): 98.6 (26 Apr 2021 10:05), Max: 99.2 (25 Apr 2021 16:45)  HR: 74 (26 Apr 2021 10:05) (74 - 100)  BP: 135/82 (26 Apr 2021 10:05) (113/71 - 152/88)  BP(mean): --  RR: 18 (26 Apr 2021 10:05) (18 - 20)  SpO2: 96% (26 Apr 2021 10:05) (94% - 96%)    PHYSICAL EXAM:  GENERAL: NAD, well-groomed, well-developed, sitting on the bed   HEAD:  Atraumatic, Normocephalic  EYES: EOMI, PERRLA, conjunctiva and sclera jaundiced   NECK: Supple, No JVD, Normal thyroid  NERVOUS SYSTEM:  Alert & Oriented X3, No gross focal deficits  CHEST/LUNG: Clear to percussion bilaterally; No rales, rhonchi, wheezing, or rubs  HEART: Regular rate and rhythm; No murmurs, rubs, or gallops  ABDOMEN: Soft, Nontender, Nondistended; Bowel sounds present  EXTREMITIES:  No clubbing, cyanosis, or edema  SKIN: jaundiced     LABS:    04-25    134<L>  |  96<L>  |  8.0  ----------------------------<  100<H>  3.7   |  21.0<L>  |  0.72    Ca    9.7      25 Apr 2021 12:43    TPro  7.9  /  Alb  3.7  /  TBili  6.2<H>  /  DBili  x   /  AST  100<H>  /  ALT  66<H>  /  AlkPhos  293<H>  04-25        CAPILLARY BLOOD GLUCOSE          RADIOLOGY & ADDITIONAL TESTS:    Imaging Personally Reviewed:  [ ] YES  [ ] NO    Consultant(s) Notes Reviewed:  [ ] YES  [ ] NO    Care Discussed with Consultants/Other Providers [ ] YES  [ ] NO    Plan of Care discussed with Housestaff [ ]YES [ ] NO

## 2021-04-26 NOTE — CONSULT NOTE ADULT - SUBJECTIVE AND OBJECTIVE BOX
HPI:  44 y/o M with a h/o EtOH abuse, chronic diarrhea, MS (untreated), recent admission for EtOH induced pancreatitis, presents to the ED with complaints of generalized weakness, falls resulting in hitting head several times, and new onset yellowing of his eyes with RUQ pain. Patient experienced progressive encephalopathy and tremulousness in the ED and began requiring repetitive IV benzo for suspected EtOH withdrawal. He reportedly had mentioned that he has been trying to cut down on drinking recently. EtOH level < 10. Patient unable to provide any history upon ICU eval. Also noted to have ALBER, severe hypokalemia, transaminitis, elevated INR, TBili of 16. CT head/neck pending. (17 Apr 2021 01:45)      PAST MEDICAL & SURGICAL HISTORY:  Multiple sclerosis    Chronic diarrhea    ETOH abuse    S/P cholecystectomy        ROS:  No Heartburn, regurgitation, dysphagia, odynophagia.  No dyspepsia  No abdominal pain.    No Nausea, vomiting.  No Bleeding.  No hematemesis.   No diarrhea.    No hematochesia.  No weight loss, anorexia.  No edema.      MEDICATIONS  (STANDING):  chlorhexidine 2% Cloths 1 Application(s) Topical <User Schedule>  enoxaparin Injectable 40 milliGRAM(s) SubCutaneous daily  folic acid 1 milliGRAM(s) Oral daily  methylPREDNISolone sodium succinate Injectable 32 milliGRAM(s) IV Push daily  nicotine - 21 mG/24Hr(s) Patch 1 patch Transdermal daily  pantoprazole  Injectable 40 milliGRAM(s) IV Push daily  thiamine 100 milliGRAM(s) Oral daily    MEDICATIONS  (PRN):  lactulose Syrup 10 Gram(s) Oral two times a day PRN bowel movements  LORazepam   Injectable 2 milliGRAM(s) IV Push every 4 hours PRN CIWA > 8      Allergies    No Known Allergies    Intolerances        SOCIAL HISTORY:    ENDOSCOPIC/GI HISTORY:    FAMILY HISTORY:  FHx: diabetes mellitus  father        Vital Signs Last 24 Hrs  T(C): 36.9 (26 Apr 2021 16:58), Max: 37.2 (25 Apr 2021 20:00)  T(F): 98.5 (26 Apr 2021 16:58), Max: 99 (25 Apr 2021 20:00)  HR: 79 (26 Apr 2021 16:58) (74 - 100)  BP: 133/86 (26 Apr 2021 16:58) (113/71 - 152/88)  BP(mean): --  RR: 18 (26 Apr 2021 16:58) (18 - 20)  SpO2: 97% (26 Apr 2021 16:58) (95% - 97%)    PHYSICAL EXAM:    GENERAL: NAD, well-groomed, well-developed  HEAD:  Atraumatic, Normocephalic  EYES: EOMI, PERRLA, conjunctiva and sclera clear  ENMT: No tonsillar erythema, exudates, or enlargement; Moist mucous membranes, Good dentition, No lesions  NECK: Supple, No JVD, Normal thyroid  CHEST/LUNG: Clear to percussion bilaterally; No rales, rhonchi, wheezing, or rubs  HEART: Regular rate and rhythm; No murmurs, rubs, or gallops  ABDOMEN: Soft, Nontender, Nondistended; Bowel sounds present  EXTREMITIES:  2+ Peripheral Pulses, No clubbing, cyanosis, or edema  LYMPH: No lymphadenopathy noted  SKIN: No rashes or lesions      LABS:    04-25    134<L>  |  96<L>  |  8.0  ----------------------------<  100<H>  3.7   |  21.0<L>  |  0.72    Ca    9.7      25 Apr 2021 12:43    TPro  7.9  /  Alb  3.7  /  TBili  6.2<H>  /  DBili  x   /  AST  100<H>  /  ALT  66<H>  /  AlkPhos  293<H>  04-25           LIVER FUNCTIONS - ( 25 Apr 2021 12:43 )  Alb: 3.7 g/dL / Pro: 7.9 g/dL / ALK PHOS: 293 U/L / ALT: 66 U/L / AST: 100 U/L / GGT: x               RADIOLOGY & ADDITIONAL STUDIES: HPI:  45-year-old man who had been admitted with history of ethanol abuse, chronic diarrhea, untreated MS, ethanol related pancreatitis.  He was admitted with alcoholic hepatitis and DTs and now has been on steroids.  His bilirubin was up to 16 and gradually trending down.  He is denying any complaints currently.  He is eating well.  GI evaluation is called for help with the alcoholic hepatitis.      PAST MEDICAL & SURGICAL HISTORY:  Multiple sclerosis    Chronic diarrhea    ETOH abuse    S/P cholecystectomy        ROS:  No Heartburn, regurgitation, dysphagia, odynophagia.  No dyspepsia  No abdominal pain.    No Nausea, vomiting.  No Bleeding.  No hematemesis.   No diarrhea.    No hematochesia.  No weight loss, anorexia.  No edema.      MEDICATIONS  (STANDING):  chlorhexidine 2% Cloths 1 Application(s) Topical <User Schedule>  enoxaparin Injectable 40 milliGRAM(s) SubCutaneous daily  folic acid 1 milliGRAM(s) Oral daily  methylPREDNISolone sodium succinate Injectable 32 milliGRAM(s) IV Push daily  nicotine - 21 mG/24Hr(s) Patch 1 patch Transdermal daily  pantoprazole  Injectable 40 milliGRAM(s) IV Push daily  thiamine 100 milliGRAM(s) Oral daily    MEDICATIONS  (PRN):  lactulose Syrup 10 Gram(s) Oral two times a day PRN bowel movements  LORazepam   Injectable 2 milliGRAM(s) IV Push every 4 hours PRN CIWA > 8      Allergies    No Known Allergies    Intolerances        SOCIAL HISTORY:Alcohol abuse    ENDOSCOPIC/GI HISTORY: Remote EGD and colonoscopy    FAMILY HISTORY:  FHx: diabetes mellitus  father        Vital Signs Last 24 Hrs  T(C): 36.9 (26 Apr 2021 16:58), Max: 37.2 (25 Apr 2021 20:00)  T(F): 98.5 (26 Apr 2021 16:58), Max: 99 (25 Apr 2021 20:00)  HR: 79 (26 Apr 2021 16:58) (74 - 100)  BP: 133/86 (26 Apr 2021 16:58) (113/71 - 152/88)  BP(mean): --  RR: 18 (26 Apr 2021 16:58) (18 - 20)  SpO2: 97% (26 Apr 2021 16:58) (95% - 97%)    PHYSICAL EXAM:    GENERAL: NAD, well-groomed, well-developed  HEAD:  Atraumatic, Normocephalic  EYES: EOMI, PERRLA, conjunctiva and sclera icterus  ENMT: No tonsillar erythema, exudates, or enlargement; Moist mucous membranes, Good dentition, No lesions  NECK: Supple, No JVD, Normal thyroid  CHEST/LUNG: Clear to percussion bilaterally; No rales, rhonchi, wheezing, or rubs  HEART: Regular rate and rhythm; No murmurs, rubs, or gallops  ABDOMEN: Soft, Nontender, Nondistended; Bowel sounds present  EXTREMITIES:  2+ Peripheral Pulses, No clubbing, cyanosis, or edema  LYMPH: No lymphadenopathy noted  SKIN: No rashes or lesions      LABS:    04-25    134<L>  |  96<L>  |  8.0  ----------------------------<  100<H>  3.7   |  21.0<L>  |  0.72    Ca    9.7      25 Apr 2021 12:43    TPro  7.9  /  Alb  3.7  /  TBili  6.2<H>  /  DBili  x   /  AST  100<H>  /  ALT  66<H>  /  AlkPhos  293<H>  04-25       Acute Hepatitis Panel (04.19.21 @ 10:10)    Hepatitis C Virus Interpretation: Nonreact: Hepatitis C AB  S/CO Ratio                        Interpretation  < 1.00                                   Non-Reactive  1.00 - 4.99                         Weakly-Reactive  >= 5.00                                Reactive  Non-Reactive: A person witha non-reactive HCV antibody result is  considered uninfected.  No further action is needed unless recent  infection is suspected.  In these cases, consider repeat testing later to  detect seroconversion..  Weakly-Reactive: HCV antibody test is abnormal, HCV RNA Qualitative test  will follow.  Reactive: HCV antibody test is abnormal, HCV RNA Qualitative test will  follow.  Note: HCV antibody testing is performed on the [employer]  system.    Hepatitis C Virus S/CO Ratio: 0.13 S/CO    Hepatitis B Core IgM Antibody: Nonreact    Hepatitis B Surface Antigen: Nonreact    Hepatitis A IgM Antibody: Nonreact        LIVER FUNCTIONS - ( 25 Apr 2021 12:43 )  Alb: 3.7 g/dL / Pro: 7.9 g/dL / ALK PHOS: 293 U/L / ALT: 66 U/L / AST: 100 U/L / GGT: x               RADIOLOGY & ADDITIONAL STUDIES:  < from: US Abdomen Upper Quadrant Right (04.17.21 @ 00:48) >     EXAM:  US ABDOMEN RT UPR QUADRANT                          PROCEDURE DATE:  04/17/2021          INTERPRETATION:  CLINICAL INFORMATION: Scleral icterus, EtOH abuse    COMPARISON: None available.    TECHNIQUE: Sonography of the right upper quadrant.    FINDINGS:    Liver: Steatosis and hepatomegaly, 20 cm.  Bile ducts: Normal caliber. Common bile duct measures 4 mm.  Gallbladder: Cholecystectomy.  Pancreas: Not visualized.  Right kidney: 11.3 cm. No hydronephrosis.  Ascites: None.  IVC: Visualized portions are within normal limits.    IMPRESSION:    Hepatic steatosis and hepatomegaly.              ZACH GUTHRIE MD; Attending Radiologist  This document has been electronically signed. Apr 17 2021  1:11AM    < end of copied text >

## 2021-04-26 NOTE — CHART NOTE - NSCHARTNOTEFT_GEN_A_CORE
Patient admitted with CIWA, patient with c/o emesis x 1   +passing gas and BM   Patient just started on regular diet, he states he "ate too much"   Maalox x 1 with good relief Patient admitted with Alcohol withdrawal, patient with c/o emesis x 1   +passing gas and BM   Patient just started on regular diet, he states he "ate too much"   Maalox x 1 with good relief

## 2021-04-27 LAB
ALBUMIN SERPL ELPH-MCNC: 3.6 G/DL — SIGNIFICANT CHANGE UP (ref 3.3–5.2)
ALP SERPL-CCNC: 256 U/L — HIGH (ref 40–120)
ALT FLD-CCNC: 55 U/L — HIGH
ANION GAP SERPL CALC-SCNC: 13 MMOL/L — SIGNIFICANT CHANGE UP (ref 5–17)
APTT BLD: 32.3 SEC — SIGNIFICANT CHANGE UP (ref 27.5–35.5)
AST SERPL-CCNC: 80 U/L — HIGH
BASOPHILS # BLD AUTO: 0.04 K/UL — SIGNIFICANT CHANGE UP (ref 0–0.2)
BASOPHILS NFR BLD AUTO: 0.5 % — SIGNIFICANT CHANGE UP (ref 0–2)
BILIRUB SERPL-MCNC: 5.7 MG/DL — HIGH (ref 0.4–2)
BUN SERPL-MCNC: 9 MG/DL — SIGNIFICANT CHANGE UP (ref 8–20)
CALCIUM SERPL-MCNC: 9.2 MG/DL — SIGNIFICANT CHANGE UP (ref 8.6–10.2)
CHLORIDE SERPL-SCNC: 97 MMOL/L — LOW (ref 98–107)
CO2 SERPL-SCNC: 27 MMOL/L — SIGNIFICANT CHANGE UP (ref 22–29)
CREAT SERPL-MCNC: 0.56 MG/DL — SIGNIFICANT CHANGE UP (ref 0.5–1.3)
EOSINOPHIL # BLD AUTO: 0.01 K/UL — SIGNIFICANT CHANGE UP (ref 0–0.5)
EOSINOPHIL NFR BLD AUTO: 0.1 % — SIGNIFICANT CHANGE UP (ref 0–6)
GLUCOSE SERPL-MCNC: 120 MG/DL — HIGH (ref 70–99)
HCT VFR BLD CALC: 44.3 % — SIGNIFICANT CHANGE UP (ref 39–50)
HGB BLD-MCNC: 14.3 G/DL — SIGNIFICANT CHANGE UP (ref 13–17)
IMM GRANULOCYTES NFR BLD AUTO: 0.4 % — SIGNIFICANT CHANGE UP (ref 0–1.5)
INR BLD: 1.49 RATIO — HIGH (ref 0.88–1.16)
LYMPHOCYTES # BLD AUTO: 0.96 K/UL — LOW (ref 1–3.3)
LYMPHOCYTES # BLD AUTO: 11.3 % — LOW (ref 13–44)
MAGNESIUM SERPL-MCNC: 1.8 MG/DL — SIGNIFICANT CHANGE UP (ref 1.8–2.6)
MCHC RBC-ENTMCNC: 32.3 GM/DL — SIGNIFICANT CHANGE UP (ref 32–36)
MCHC RBC-ENTMCNC: 33.2 PG — SIGNIFICANT CHANGE UP (ref 27–34)
MCV RBC AUTO: 102.8 FL — HIGH (ref 80–100)
MONOCYTES # BLD AUTO: 0.31 K/UL — SIGNIFICANT CHANGE UP (ref 0–0.9)
MONOCYTES NFR BLD AUTO: 3.7 % — SIGNIFICANT CHANGE UP (ref 2–14)
NEUTROPHILS # BLD AUTO: 7.12 K/UL — SIGNIFICANT CHANGE UP (ref 1.8–7.4)
NEUTROPHILS NFR BLD AUTO: 84 % — HIGH (ref 43–77)
PLATELET # BLD AUTO: 324 K/UL — SIGNIFICANT CHANGE UP (ref 150–400)
POTASSIUM SERPL-MCNC: 3.6 MMOL/L — SIGNIFICANT CHANGE UP (ref 3.5–5.3)
POTASSIUM SERPL-SCNC: 3.6 MMOL/L — SIGNIFICANT CHANGE UP (ref 3.5–5.3)
PROT SERPL-MCNC: 7.1 G/DL — SIGNIFICANT CHANGE UP (ref 6.6–8.7)
PROTHROM AB SERPL-ACNC: 17 SEC — HIGH (ref 10.6–13.6)
RBC # BLD: 4.31 M/UL — SIGNIFICANT CHANGE UP (ref 4.2–5.8)
RBC # FLD: 13.6 % — SIGNIFICANT CHANGE UP (ref 10.3–14.5)
SODIUM SERPL-SCNC: 137 MMOL/L — SIGNIFICANT CHANGE UP (ref 135–145)
WBC # BLD: 8.47 K/UL — SIGNIFICANT CHANGE UP (ref 3.8–10.5)
WBC # FLD AUTO: 8.47 K/UL — SIGNIFICANT CHANGE UP (ref 3.8–10.5)

## 2021-04-27 PROCEDURE — 99232 SBSQ HOSP IP/OBS MODERATE 35: CPT

## 2021-04-27 RX ADMIN — PANTOPRAZOLE SODIUM 40 MILLIGRAM(S): 20 TABLET, DELAYED RELEASE ORAL at 07:55

## 2021-04-27 RX ADMIN — Medication 1 PATCH: at 12:00

## 2021-04-27 RX ADMIN — Medication 32 MILLIGRAM(S): at 05:11

## 2021-04-27 RX ADMIN — Medication 1 PATCH: at 16:47

## 2021-04-27 RX ADMIN — Medication 1 PATCH: at 19:07

## 2021-04-27 RX ADMIN — Medication 100 MILLIGRAM(S): at 07:55

## 2021-04-27 RX ADMIN — CHLORHEXIDINE GLUCONATE 1 APPLICATION(S): 213 SOLUTION TOPICAL at 05:11

## 2021-04-27 RX ADMIN — ENOXAPARIN SODIUM 40 MILLIGRAM(S): 100 INJECTION SUBCUTANEOUS at 07:55

## 2021-04-27 RX ADMIN — Medication 1 MILLIGRAM(S): at 07:55

## 2021-04-27 NOTE — PROGRESS NOTE ADULT - PROVIDER SPECIALTY LIST ADULT
Critical Care
Hospitalist
Critical Care
Critical Care
Hospitalist
Critical Care
Gastroenterology
Hospitalist

## 2021-04-27 NOTE — PROGRESS NOTE ADULT - SUBJECTIVE AND OBJECTIVE BOX
INTERVAL HPI/OVERNIGHT EVENTS:Follow-up is being performed for alcoholic hepatitis.  Patient is being planned for subacute rehab discharge.  Otherwise he is denying any complaints.  His bilirubin is continuing to trend down. His IV steroids are switched to oral prednisone.    MEDICATIONS  (STANDING):  chlorhexidine 2% Cloths 1 Application(s) Topical <User Schedule>  enoxaparin Injectable 40 milliGRAM(s) SubCutaneous daily  folic acid 1 milliGRAM(s) Oral daily  nicotine - 21 mG/24Hr(s) Patch 1 patch Transdermal daily  pantoprazole  Injectable 40 milliGRAM(s) IV Push daily  predniSONE   Tablet 40 milliGRAM(s) Oral daily  thiamine 100 milliGRAM(s) Oral daily    MEDICATIONS  (PRN):  lactulose Syrup 10 Gram(s) Oral two times a day PRN bowel movements  LORazepam   Injectable 2 milliGRAM(s) IV Push every 4 hours PRN CIWA > 8      Allergies    No Known Allergies    Intolerances        Vital Signs Last 24 Hrs  T(C): 37 (27 Apr 2021 16:56), Max: 37 (26 Apr 2021 20:13)  T(F): 98.6 (27 Apr 2021 16:56), Max: 98.6 (26 Apr 2021 20:13)  HR: 79 (27 Apr 2021 16:56) (68 - 82)  BP: 130/82 (27 Apr 2021 16:56) (119/77 - 134/87)  BP(mean): --  RR: 18 (27 Apr 2021 16:56) (18 - 20)  SpO2: 94% (27 Apr 2021 16:56) (94% - 98%)    LABS:                        14.3   8.47  )-----------( 324      ( 27 Apr 2021 08:53 )             44.3     04-27    137  |  97<L>  |  9.0  ----------------------------<  120<H>  3.6   |  27.0  |  0.56    Ca    9.2      27 Apr 2021 08:53  Mg     1.8     04-27    TPro  7.1  /  Alb  3.6  /  TBili  5.7<H>  /  DBili  x   /  AST  80<H>  /  ALT  55<H>  /  AlkPhos  256<H>  04-27    PT/INR - ( 27 Apr 2021 08:53 )   PT: 17.0 sec;   INR: 1.49 ratio         PTT - ( 27 Apr 2021 08:53 )  PTT:32.3 sec      RADIOLOGY & ADDITIONAL TESTS:

## 2021-04-27 NOTE — PROGRESS NOTE ADULT - ASSESSMENT
45M PMH EtOH use disorder with h/o EtOH pancreatitis, chronic diarrhea, untreated MS who presented with generalized weakness, falls, jaundice, and RUQ pain, admitted to ICU for management of DTs, EtOH hepatitis, severe hypokalemia.    Alcoholic hepatitis without ascites  - Elevations of AST/ALT in ratio consistent with ETOH, now improving.  - Total Bilirubin improving, will get STAT INR to check   - Continue Steroids for total of 28 days.     Alcohol withdrawal with complication with inpatient treatment, with unspecified complication  - Continue current taper, for now   - [facility] protocol   - MVI, Thiamine and Folic acid    Multiple sclerosis  - Patient has diagnosis, no treatment initiated  - After this hospitalization will need further management    Hypomagnesemia  - Mag 1.8  - repleted    Hypokalemia  - K 3.4  - repleted  - monitor BMP    Dysphagia, unspecified type  - Swallow evaluation appreciated  - patient intermittently alert   - d5/ns at 150 cc/hr.    DVT ppx  - Lovenox     Dispo: pending clinical course
1:  DTs with underlying AUD  2: Hepatic encephalopathy   3: severe persistent hypokalemia   4: chronic diarrhea possibly secondary 2 chronic pancreatitis   5: alcoholic hepatitis with Jaundice ruled out stones  Patient seen and examined. Full code.   Status post phenobarb load and maintenance for 1st 24 hours of admission. Currently managing with Ativan taper and as needed intravenously. Since patient is not yet tolerating by mouth medication YouTube severe aspiration risk, now contemplate adding Lactulose enema and trend mental status with ammonia for now. Thiamine, MVI, FA; Fall, aspiration , seizure precaution ; outpatient management for multiple sclerosis. CIWA monitoring.   supplemental oxygen to maintain saturation >90%. Chest PT added and IS when more awake.   NPO for now. Patient on steroids as elevated discriminants function. Protonix daily. Order for hepatitis, HIV, RPR panel or tomorrow.   Replacing potassium aggressively with close follow up with target potassium of four add magnesium target 2 and above and phosphorus three and above    Added DVT ppx with Lovenox daily   
45M PMH EtOH use disorder with h/o EtOH pancreatitis, chronic diarrhea, untreated MS who presented with generalized weakness, falls, jaundice, and RUQ pain, admitted to ICU for management of DTs, EtOH hepatitis, severe hypokalemia.    1. Alcoholic hepatitis without ascites  - Elevations of AST/ALT in ratio consistent with ETOH, now improving.  - T. Bili 5.7  Switch Solumedrol to Prednisone for 18 more days    GI consult noted   Outpatient follow up with Dr. Lagunas after completion of prednisone      2. Alcohol withdrawal with complication with inpatient treatment, with unspecified complication  On  CIWA protocol, score 0  C/w Ativan PRN   - MVI, Thiamine and Folic acid  Get out of bed to chair and PT evaluation     3. Multiple sclerosis  - Patient has diagnosis, no treatment initiated  - After this hospitalization will need further management    4. Hypomagnesemia  - Mag 1.8  - repleted    5. Hypokalemia - improved   - K 3.8  - repleted  - monitor BMP    6. Dysphagia, unspecified type: Tolerating regular food     DVT ppx  - Lovenox     Dispo: Discharge planning to [facility] 
45M PMH EtOH use disorder with h/o EtOH pancreatitis, chronic diarrhea, untreated MS who presents with generalized weakness, falls, jaundice, and RUQ pain, admitted to ICU for management of DTs, EtOH hepatitis, severe hypokalemia    Impression:  - DTs  - EtOH withdrawal  - Severe hypokalemia  - Untreated MS    Plan:  - Ativan taper as ordered  - Phenobarb 65mg IV Q2H PRN for CIWA >=12  - Monitor mental status  - LR 125cc/hr  - PPI  - Thiamine/folate/MVI  - Lactulose CO titrate for BMs  - Steroids given elevated Discriminant function  - DVT PPx, PPI  - C/w care in ICU    Srikanth Campbell M.D.  Pulmonary & Critical Care Medicine  [facility]  Pulmonary and Sleep Medicine at [place]  [address]  [place] [address]  T: [phone]  F: [phone]
45M PMH EtOH use disorder with h/o EtOH pancreatitis, chronic diarrhea, untreated MS who presents with generalized weakness, falls, jaundice, and RUQ pain, admitted to ICU for management of DTs, EtOH hepatitis, severe hypokalemia    Impression:  - DTs  - EtOH withdrawal  - Severe hypokalemia  - Untreated MS    Plan:  - Ativan taper as ordered  - Phenobarb 65mg IV Q2H PRN for CIWA >=12  - Monitor mental status  - LR 125cc/hr  - PPI  - Thiamine/folate/MVI  - Lactulose CT titrate for BMs  - Steroids given elevated Discriminant function  - DVT PPx, PPI  - C/w care in ICU but if stable by later today will consider downgrade    Srikanth Campbell M.D.  Pulmonary & Critical Care Medicine  [facility]  Pulmonary and Sleep Medicine at [place]  [address]  [place] [address]  T: [phone]  F: [phone]
45M PMH EtOH use disorder with h/o EtOH pancreatitis, chronic diarrhea, untreated MS who presented with generalized weakness, falls, jaundice, and RUQ pain, admitted to ICU for management of DTs, EtOH hepatitis, severe hypokalemia.    Alcoholic hepatitis without ascites  - Elevations of AST/ALT in ratio consistent with ETOH, now improving.  - T. Bili 6.7  - c/w Solumedrol 32mg daily for a total of 28 days     Alcohol withdrawal with complication with inpatient treatment, with unspecified complication  - [facility] protocol  - Ativan taper  - MVI, Thiamine and Folic acid    Multiple sclerosis  - Patient has diagnosis, no treatment initiated  - After this hospitalization will need further management    Hypomagnesemia  - Mag 1.8  - repleted    Hypokalemia  - K 3.1  - repleted  - monitor BMP    Dysphagia, unspecified type  - Swallow evaluation appreciated  - patient intermittently alert   - D5-NS at 150mL/hr     DVT ppx  - Lovenox     Dispo: pending clinical course
45M PMH EtOH use disorder with h/o EtOH pancreatitis, chronic diarrhea, untreated MS who presented with generalized weakness, falls, jaundice, and RUQ pain, admitted to ICU for management of DTs, EtOH hepatitis, severe hypokalemia.    1. Alcoholic hepatitis without ascites  - Elevations of AST/ALT in ratio consistent with ETOH, now improving.  - T. Bili 6.7  - c/w Solumedrol 32mg daily for a total of 28 days     2. Alcohol withdrawal with complication with inpatient treatment, with unspecified complication  On  CIWA protocol, score 0-1  d/c Librium secondary to hepatitis   C/w Ativan PRN   - MVI, Thiamine and Folic acid  Get out of bed to chair and PT evaluation     3. Multiple sclerosis  - Patient has diagnosis, no treatment initiated  - After this hospitalization will need further management    4. Hypomagnesemia  - Mag 1.8  - repleted    5. Hypokalemia  - K 3.4  - repleted  - monitor BMP    6. Dysphagia, unspecified type: Tolerating regular food     DVT ppx  - Lovenox     Dispo: pending clinical course
45M PMH EtOH use disorder with h/o EtOH pancreatitis, chronic diarrhea, untreated MS who presented with generalized weakness, falls, jaundice, and RUQ pain, admitted to ICU for management of DTs, EtOH hepatitis, severe hypokalemia.    1. Alcoholic hepatitis without ascites  - Elevations of AST/ALT in ratio consistent with ETOH, now improving.  - T. Bili 6.7  - c/w Solumedrol 32mg daily for a total of 28 days     2. Alcohol withdrawal with complication with inpatient treatment, with unspecified complication  - CIWA protocol  d/c standing doses of Ativan and start Librium 25 mg po three times a day   C/w Ativan PRN   - MVI, Thiamine and Folic acid  d/c wrist restriction     3. Multiple sclerosis  - Patient has diagnosis, no treatment initiated  - After this hospitalization will need further management    4. Hypomagnesemia  - Mag 1.8  - repleted    5. Hypokalemia  - K 3.4  - repleted  - monitor BMP    6. Dysphagia, unspecified type: patient is fully alert and pass dysphagia screen done by myself   Advance diet to soft       DVT ppx  - Lovenox     Dispo: pending clinical course
45M PMH EtOH use disorder with h/o EtOH pancreatitis, chronic diarrhea, untreated MS who presents with generalized weakness, falls, jaundice, and RUQ pain, admitted to ICU for management of DTs, EtOH hepatitis, severe hypokalemia    Impression:  - DTs  - EtOH withdrawal  - Severe hypokalemia  - EtOH hepatitis  - Hypochloremia, metabolic alkalosis - suspect contraction given poor PO intake, NPO status  - Elevated Tbili, transaminemia - RUQ without obstruction, likely chronic component; tbili is improving  - AMS - responded to lactulose  - Untreated MS    Plan:  - Electrolytes repleted  - Ativan taper as ordered  - Monitor mental status  - LR switched to D5 NS @ 150cc/hr; suspect electrolyte abnormalities will continue to improve after starts PO diet  - Formal SLP evaluation; did pass bedside evaluation by RN  - PPI, Thiamine/folate/MVI  - Lactulose PO titrate for BMs  - Steroids given elevated Discriminant function - SoluMedrol 32mg IV  - DVT PPx  - Pt stable for downgrade from ICU to telemetry (given electrolyte abnormalities)    Discussed case with hospitalist Dr. Isma Campbell M.D.  Pulmonary & Critical Care Medicine  [facility]  Pulmonary and Sleep Medicine at [place]  [address]  [place] [address]  T: [phone]  F: [phone]
45M PMH EtOH use disorder with h/o EtOH pancreatitis, chronic diarrhea, untreated MS who presented with generalized weakness, falls, jaundice, and RUQ pain, admitted to ICU for management of DTs, EtOH hepatitis, severe hypokalemia.    1. Alcoholic hepatitis without ascites  - Elevations of AST/ALT in ratio consistent with ETOH, now improving.  - T. Bili 6.7  - c/w Solumedrol 32mg daily for a total of 28 days   GI consult requested, spoke to Dr. Lagunas     2. Alcohol withdrawal with complication with inpatient treatment, with unspecified complication  On  CIWA protocol, score 0  C/w Ativan PRN   - MVI, Thiamine and Folic acid  Get out of bed to chair and PT evaluation     3. Multiple sclerosis  - Patient has diagnosis, no treatment initiated  - After this hospitalization will need further management    4. Hypomagnesemia  - Mag 1.8  - repleted    5. Hypokalemia - improved   - K 3.8  - repleted  - monitor BMP    6. Dysphagia, unspecified type: Tolerating regular food     DVT ppx  - Lovenox     Dispo: pending clinical course
45M PMH EtOH use disorder with h/o EtOH pancreatitis, chronic diarrhea, untreated MS who presented with generalized weakness, falls, jaundice, and RUQ pain, admitted to ICU for management of DTs, EtOH hepatitis, severe hypokalemia.
Alcoholic hepatitis: The patient is nicely improving with the addition of the steroids.  Continue the steroids for additional 18 days and then taper by 5 mg/week.  Continue multivitamin thiamine and folate.  No evidence of any encephalopathy.  Call GI as needed.

## 2021-04-27 NOTE — PROGRESS NOTE ADULT - SUBJECTIVE AND OBJECTIVE BOX
Patient is a 45y old  Male who presents with a chief complaint of severe DTs (26 Apr 2021 17:27)      INTERVAL HPI/OVERNIGHT EVENTS: Feeling better, no complains, no events     MEDICATIONS  (STANDING):  chlorhexidine 2% Cloths 1 Application(s) Topical <User Schedule>  enoxaparin Injectable 40 milliGRAM(s) SubCutaneous daily  folic acid 1 milliGRAM(s) Oral daily  nicotine - 21 mG/24Hr(s) Patch 1 patch Transdermal daily  pantoprazole  Injectable 40 milliGRAM(s) IV Push daily  predniSONE   Tablet 40 milliGRAM(s) Oral daily  thiamine 100 milliGRAM(s) Oral daily    MEDICATIONS  (PRN):  lactulose Syrup 10 Gram(s) Oral two times a day PRN bowel movements  LORazepam   Injectable 2 milliGRAM(s) IV Push every 4 hours PRN CIWA > 8      Allergies    No Known Allergies    Intolerances        REVIEW OF SYSTEMS:  CONSTITUTIONAL: No fever, weight loss, or fatigue  RESPIRATORY: No cough, wheezing, chills or hemoptysis; No shortness of breath  CARDIOVASCULAR: No chest pain, palpitations, dizziness, or leg swelling  GASTROINTESTINAL: No abdominal or epigastric pain. No nausea, vomiting, or hematemesis; No diarrhea or constipation. No melena or hematochezia.  NEUROLOGICAL: No headaches, memory loss, loss of strength, numbness, or tremors  MUSCULOSKELETAL: No joint pain or swelling; No muscle, back, or extremity pain      Vital Signs Last 24 Hrs  T(C): 36.9 (27 Apr 2021 11:48), Max: 37 (26 Apr 2021 20:13)  T(F): 98.4 (27 Apr 2021 11:48), Max: 98.6 (26 Apr 2021 20:13)  HR: 68 (27 Apr 2021 11:48) (68 - 82)  BP: 124/82 (27 Apr 2021 11:48) (119/77 - 134/87)  BP(mean): --  RR: 20 (27 Apr 2021 11:48) (18 - 20)  SpO2: 95% (27 Apr 2021 11:48) (95% - 98%)    PHYSICAL EXAM:  GENERAL: NAD, well-groomed, well-developed, laying in bed   HEAD:  Atraumatic, Normocephalic  EYES: EOMI, PERRLA, conjunctiva and sclera jaundiced   NECK: Supple, No JVD, Normal thyroid  NERVOUS SYSTEM:  Alert & Oriented X3, No gross focal deficits  CHEST/LUNG: Clear to percussion bilaterally; No rales, rhonchi, wheezing, or rubs  HEART: Regular rate and rhythm; No murmurs, rubs, or gallops  ABDOMEN: Soft, Nontender, Nondistended; Bowel sounds present  EXTREMITIES:  No clubbing, cyanosis, or edema  SKIN: No rashes or lesions    LABS:                        14.3   8.47  )-----------( 324      ( 27 Apr 2021 08:53 )             44.3     04-27    137  |  97<L>  |  9.0  ----------------------------<  120<H>  3.6   |  27.0  |  0.56    Ca    9.2      27 Apr 2021 08:53  Mg     1.8     04-27    TPro  7.1  /  Alb  3.6  /  TBili  5.7<H>  /  DBili  x   /  AST  80<H>  /  ALT  55<H>  /  AlkPhos  256<H>  04-27    PT/INR - ( 27 Apr 2021 08:53 )   PT: 17.0 sec;   INR: 1.49 ratio         PTT - ( 27 Apr 2021 08:53 )  PTT:32.3 sec    CAPILLARY BLOOD GLUCOSE          RADIOLOGY & ADDITIONAL TESTS:    Imaging Personally Reviewed:  [ ] YES  [ ] NO    Consultant(s) Notes Reviewed:  [ ] YES  [ ] NO    Care Discussed with Consultants/Other Providers [ ] YES  [ ] NO    Plan of Care discussed with Housestaff [ ]YES [ ] NO

## 2021-04-27 NOTE — PROGRESS NOTE ADULT - NUTRITIONAL ASSESSMENT
This patient has been assessed with a concern for Malnutrition and has been determined to have a diagnosis/diagnoses of Severe protein-calorie malnutrition.    This patient is being managed with:   Diet NPO-  Entered: Apr 17 2021  2:06AM    
This patient has been assessed with a concern for Malnutrition and has been determined to have a diagnosis/diagnoses of Severe protein-calorie malnutrition.    This patient is being managed with:   Diet NPO-  Entered: Apr 17 2021  2:06AM    
This patient has been assessed with a concern for Malnutrition and has been determined to have a diagnosis/diagnoses of Severe protein-calorie malnutrition.    This patient is being managed with:   Diet Dysphagia 1 Pureed-Nectar Consistency Fluid-  Entered: Apr 22 2021  3:52PM    
This patient has been assessed with a concern for Malnutrition and has been determined to have a diagnosis/diagnoses of Severe protein-calorie malnutrition.    This patient is being managed with:   Diet Dysphagia 1 Pureed-Nectar Consistency Fluid-  Entered: Apr 22 2021  3:52PM    
This patient has been assessed with a concern for Malnutrition and has been determined to have a diagnosis/diagnoses of Severe protein-calorie malnutrition.    This patient is being managed with:   Diet Regular-  Entered: Apr 24 2021  3:32PM    
This patient has been assessed with a concern for Malnutrition and has been determined to have a diagnosis/diagnoses of Severe protein-calorie malnutrition.    This patient is being managed with:   Diet Regular-  Entered: Apr 24 2021  3:32PM    
This patient has been assessed with a concern for Malnutrition and has been determined to have a diagnosis/diagnoses of Severe protein-calorie malnutrition.    This patient is being managed with:   Diet NPO-  Entered: Apr 17 2021  2:06AM    
This patient has been assessed with a concern for Malnutrition and has been determined to have a diagnosis/diagnoses of Severe protein-calorie malnutrition.    This patient is being managed with:   Diet Regular-  Entered: Apr 24 2021  3:32PM    
This patient has been assessed with a concern for Malnutrition and has been determined to have a diagnosis/diagnoses of Severe protein-calorie malnutrition.    This patient is being managed with:   Diet NPO-  Entered: Apr 17 2021  2:06AM    

## 2021-04-27 NOTE — PROGRESS NOTE ADULT - REASON FOR ADMISSION
severe DTs

## 2021-04-27 NOTE — CHART NOTE - NSCHARTNOTEFT_GEN_A_CORE
Source: Patient [ ]  Family [ ]   other [ x]EMR, rounds    Current Diet: regular      PO intake:  < 50% [ ]   50-75%  [ x]   %  [ ]  other :    Source for PO intake [ ] Patient [ ] family [x ] chart [ ] staff [ ] other    Enteral /Parenteral Nutrition:     Current Weight: 4/16 179.8#, 4/21 173#. Need recent weight  left arm 1 +    % Weight Change     Pertinent Medications: MEDICATIONS  (STANDING):  chlorhexidine 2% Cloths 1 Application(s) Topical <User Schedule>  enoxaparin Injectable 40 milliGRAM(s) SubCutaneous daily  folic acid 1 milliGRAM(s) Oral daily  methylPREDNISolone sodium succinate Injectable 32 milliGRAM(s) IV Push daily  nicotine - 21 mG/24Hr(s) Patch 1 patch Transdermal daily  pantoprazole  Injectable 40 milliGRAM(s) IV Push daily  thiamine 100 milliGRAM(s) Oral daily    MEDICATIONS  (PRN):  lactulose Syrup 10 Gram(s) Oral two times a day PRN bowel movements  LORazepam   Injectable 2 milliGRAM(s) IV Push every 4 hours PRN CIWA > 8    Pertinent Labs: CBC Full  -  ( 27 Apr 2021 08:53 )  WBC Count : 8.47 K/uL  RBC Count : 4.31 M/uL  Hemoglobin : 14.3 g/dL  Hematocrit : 44.3 %  Platelet Count - Automated : 324 K/uL  Mean Cell Volume : 102.8 fl  Mean Cell Hemoglobin : 33.2 pg  Mean Cell Hemoglobin Concentration : 32.3 gm/dL  Auto Neutrophil # : 7.12 K/uL  Auto Lymphocyte # : 0.96 K/uL  Auto Monocyte # : 0.31 K/uL  Auto Eosinophil # : 0.01 K/uL  Auto Basophil # : 0.04 K/uL  Auto Neutrophil % : 84.0 %  Auto Lymphocyte % : 11.3 %  Auto Monocyte % : 3.7 %  Auto Eosinophil % : 0.1 %  Auto Basophil % : 0.5 %      04-27 Na137 mmol/L Glu 120 mg/dL<H> K+ 3.6 mmol/L Cr  0.56 mg/dL BUN 9.0 mg/dL Phos n/a   Alb 3.6 g/dL PAB n/a           Skin:     Nutrition focused physical exam previously conducted - found signs of malnutrition [ ]absent [x ]present    Subcutaneous fat loss: [ x] Orbital fat pads region, [ x]Buccal fat region, [ ]Triceps region,  [ ]Ribs region    Muscle wasting: [x ]Temples region, [x ]Clavicle region, [ x]Shoulder region, [ ]Scapula region, [ ]Interosseous region,  [ ]thigh region, [ ]Calf region    Estimated Needs:   [x ] no change since previous assessment  [ ] recalculated:     Current Nutrition Diagnosis:   Pt remains at high nutrition risk secondary to malnutrition (severe, acute on chronic) related to inability to meet sufficient protein-energy in setting of EtOH abuse, MS, chronic diarrhea, recent alcohol induced pancreatitis, alcohol withdrawal, severe DTs, acute alcoholic hepatitis as evidenced by likely meeting <75% nutrient needs >1 month, severe muscle loss of temples and clavicles, moderate muscle loss of shoulders, moderate fat loss of orbitals and buccal pads, possible 3.4% wt loss since admission. Pt now on regular diet. Plan is home with out pt PT. Last BM 4/26. RD to follow up.     Recommendations:   1) Advance diet as medically feasible/tolerable.   2) Continue thiamine and folic acid, add MVI daily.  3) Monitor electrolytes and replete as needed.  4) Obtain daily weights to monitor trends.       Monitoring and Evaluation:   [x ] PO intake [x ] Tolerance to diet prescription [X] Weights  [X] Follow up per protocol [X] Labs:

## 2021-04-27 NOTE — PROGRESS NOTE ADULT - EYES COMMENTS
Patient alert and oriented x 4. Denied pain all shift. LVAD numbers WNL. MAP-73.3. Dressing to LVAD site CDI. Denied chest pain, dizziness, SOB or any N/V noted. No respiratory distress noted. Hydralazine scheduled given. Will continue with current plan of care.   Scleral icterus

## 2021-04-28 ENCOUNTER — TRANSCRIPTION ENCOUNTER (OUTPATIENT)
Age: 46
End: 2021-04-28

## 2021-04-28 VITALS
SYSTOLIC BLOOD PRESSURE: 136 MMHG | RESPIRATION RATE: 19 BRPM | TEMPERATURE: 98 F | OXYGEN SATURATION: 99 % | HEART RATE: 86 BPM | DIASTOLIC BLOOD PRESSURE: 90 MMHG

## 2021-04-28 PROCEDURE — 80074 ACUTE HEPATITIS PANEL: CPT

## 2021-04-28 PROCEDURE — 84100 ASSAY OF PHOSPHORUS: CPT

## 2021-04-28 PROCEDURE — 93005 ELECTROCARDIOGRAM TRACING: CPT

## 2021-04-28 PROCEDURE — 82553 CREATINE MB FRACTION: CPT

## 2021-04-28 PROCEDURE — 83735 ASSAY OF MAGNESIUM: CPT

## 2021-04-28 PROCEDURE — 86780 TREPONEMA PALLIDUM: CPT

## 2021-04-28 PROCEDURE — 82140 ASSAY OF AMMONIA: CPT

## 2021-04-28 PROCEDURE — 96374 THER/PROPH/DIAG INJ IV PUSH: CPT

## 2021-04-28 PROCEDURE — 85610 PROTHROMBIN TIME: CPT

## 2021-04-28 PROCEDURE — 80307 DRUG TEST PRSMV CHEM ANLYZR: CPT

## 2021-04-28 PROCEDURE — 96375 TX/PRO/DX INJ NEW DRUG ADDON: CPT

## 2021-04-28 PROCEDURE — 36415 COLL VENOUS BLD VENIPUNCTURE: CPT

## 2021-04-28 PROCEDURE — 76705 ECHO EXAM OF ABDOMEN: CPT

## 2021-04-28 PROCEDURE — U0003: CPT

## 2021-04-28 PROCEDURE — 85027 COMPLETE CBC AUTOMATED: CPT

## 2021-04-28 PROCEDURE — 83690 ASSAY OF LIPASE: CPT

## 2021-04-28 PROCEDURE — 80053 COMPREHEN METABOLIC PANEL: CPT

## 2021-04-28 PROCEDURE — 97530 THERAPEUTIC ACTIVITIES: CPT

## 2021-04-28 PROCEDURE — 86769 SARS-COV-2 COVID-19 ANTIBODY: CPT

## 2021-04-28 PROCEDURE — 86703 HIV-1/HIV-2 1 RESULT ANTBDY: CPT

## 2021-04-28 PROCEDURE — 72125 CT NECK SPINE W/O DYE: CPT

## 2021-04-28 PROCEDURE — 85730 THROMBOPLASTIN TIME PARTIAL: CPT

## 2021-04-28 PROCEDURE — 80048 BASIC METABOLIC PNL TOTAL CA: CPT

## 2021-04-28 PROCEDURE — 85025 COMPLETE CBC W/AUTO DIFF WBC: CPT

## 2021-04-28 PROCEDURE — 82962 GLUCOSE BLOOD TEST: CPT

## 2021-04-28 PROCEDURE — 97116 GAIT TRAINING THERAPY: CPT

## 2021-04-28 PROCEDURE — 99285 EMERGENCY DEPT VISIT HI MDM: CPT | Mod: 25

## 2021-04-28 PROCEDURE — U0005: CPT

## 2021-04-28 PROCEDURE — 82550 ASSAY OF CK (CPK): CPT

## 2021-04-28 PROCEDURE — 70450 CT HEAD/BRAIN W/O DYE: CPT

## 2021-04-28 PROCEDURE — 97163 PT EVAL HIGH COMPLEX 45 MIN: CPT

## 2021-04-28 PROCEDURE — 99239 HOSP IP/OBS DSCHRG MGMT >30: CPT

## 2021-04-28 PROCEDURE — 92526 ORAL FUNCTION THERAPY: CPT

## 2021-04-28 RX ORDER — THIAMINE MONONITRATE (VIT B1) 100 MG
1 TABLET ORAL
Qty: 0 | Refills: 0 | DISCHARGE
Start: 2021-04-28

## 2021-04-28 RX ORDER — LACTULOSE 10 G/15ML
15 SOLUTION ORAL
Qty: 0 | Refills: 0 | DISCHARGE
Start: 2021-04-28

## 2021-04-28 RX ORDER — CHLORHEXIDINE GLUCONATE 213 G/1000ML
1 SOLUTION TOPICAL
Qty: 0 | Refills: 0 | DISCHARGE
Start: 2021-04-28

## 2021-04-28 RX ADMIN — CHLORHEXIDINE GLUCONATE 1 APPLICATION(S): 213 SOLUTION TOPICAL at 05:49

## 2021-04-28 RX ADMIN — Medication 1 MILLIGRAM(S): at 11:17

## 2021-04-28 RX ADMIN — Medication 40 MILLIGRAM(S): at 05:48

## 2021-04-28 RX ADMIN — Medication 1 PATCH: at 11:17

## 2021-04-28 RX ADMIN — ENOXAPARIN SODIUM 40 MILLIGRAM(S): 100 INJECTION SUBCUTANEOUS at 11:19

## 2021-04-28 RX ADMIN — PANTOPRAZOLE SODIUM 40 MILLIGRAM(S): 20 TABLET, DELAYED RELEASE ORAL at 11:16

## 2021-04-28 RX ADMIN — Medication 100 MILLIGRAM(S): at 11:17

## 2021-04-28 RX ADMIN — Medication 1 PATCH: at 08:30

## 2021-04-28 NOTE — DISCHARGE NOTE PROVIDER - HOSPITAL COURSE
44 y/o M with a h/o EtOH abuse, chronic diarrhea, MS (untreated), recent admission for EtOH induced pancreatitis, presents to the ED with complaints of generalized weakness, falls resulting in hitting head several times, and new onset yellowing of his eyes with RUQ pain. Patient experienced progressive encephalopathy and tremulousness in the ED and began requiring repetitive IV benzo for suspected EtOH withdrawal. He reportedly had mentioned that he has been trying to cut down on drinking recently. EtOH level < 10. Patient unable to provide any history upon ICU eval. Also noted to have ALBER, severe hypokalemia, transaminitis, elevated INR, TBili of 16. CT head/neck pending.   46 y/o M with a h/o EtOH abuse, chronic diarrhea, MS (untreated), recent admission for EtOH induced pancreatitis, presents to the ED with complaints of generalized weakness, falls resulting in hitting head several times, and new onset yellowing of his eyes with RUQ pain. Patient experienced progressive encephalopathy and tremulousness in the ED and began requiring repetitive IV benzo for suspected EtOH withdrawal. He reportedly had mentioned that he has been trying to cut down on drinking recently. EtOH level < 10. Patient unable to provide any history upon ICU eval. Also noted to have ALBER, severe hypokalemia, transaminitis, elevated INR, TBili of 16. CT head/neck was obtained and negative for acute hemorrhage or infarct, and negative for any cervical spine fracture.  During hospitalization and under the care of ICU pt received aggressive potassium repletion ad was maintained on phenobarbital and ativan until pt was stable enough to be downgraded to medicine on 4/21/21.  Pt mentation continued to improved.  GI was consulted given elevated LFTs and pt was started on steroids for alcoholic hepatitis. GI recommended to complete his therapy in the next 18 days and then gradually taper off the steroids.  He had no signs of any hepatitis B or C.  Also recommended Alcohol rehab, multivitamin thiamine and folate.  Alcohol cessation counseling performed. Pt continued to improved and now medically stable. All electrolyte abnormalities were monitored carefully and repleted as necessary during this hospitalization. At the time of discharge patient was hemodynamically stable and amenable to all terms of discharge. The patient has received verbal instructions from myself regarding discharge plans.     Length of Discharge: 45MIN    Vital Signs Last 24 Hrs  T(C): 36.9 (28 Apr 2021 10:44), Max: 37 (27 Apr 2021 16:56)  T(F): 98.5 (28 Apr 2021 10:44), Max: 98.6 (27 Apr 2021 16:56)  HR: 78 (28 Apr 2021 10:44) (67 - 92)  BP: 132/90 (28 Apr 2021 10:44) (116/76 - 132/90)  BP(mean): --  RR: 18 (28 Apr 2021 10:44) (16 - 18)  SpO2: 97% (28 Apr 2021 10:44) (94% - 98%)    PHYSICAL EXAM:  GENERAL: NAD, sitting in bed comfortably  HEAD:  Atraumatic, Normocephalic  EYES: EOMI, PERRL, conjunctiva and sclera jaundiced  ENT: Moist mucous membranes  NECK: Supple, No JVD  CHEST/LUNG: Clear to auscultation bilaterally; No rales, rhonchi, wheezing, or rubs. Unlabored respirations  HEART: Regular rate and rhythm; No murmurs, rubs, or gallops  ABDOMEN: Bowel sounds present; Soft, Nontender, Nondistended   EXTREMITIES:  2+ Peripheral Pulses, brisk capillary refill. No clubbing, cyanosis, or edema  NERVOUS SYSTEM:  Alert & Oriented X3, speech clear. No facial droop, tongue protrusion midline. Answers questions appropriately. Sensation intact. No deficits   MSK: FROM x 4 extremities   SKIN: No rashes or lesions

## 2021-04-28 NOTE — DISCHARGE NOTE PROVIDER - CARE PROVIDER_API CALL
Jseus Lagunas)  Gastroenterology; Internal Medicine  [address] [place] [address]  Phone: [phone]  Fax: [phone]  Follow Up Time: 2 weeks

## 2021-04-28 NOTE — DISCHARGE NOTE PROVIDER - NSDCCPCAREPLAN_GEN_ALL_CORE_FT
PRINCIPAL DISCHARGE DIAGNOSIS  Diagnosis: Delirium tremens  Assessment and Plan of Treatment: Alcohol cessation discussed and stressed. Continue MVI, thamine and folic acid.      SECONDARY DISCHARGE DIAGNOSES  Diagnosis: Alcoholic hepatitis without ascites  Assessment and Plan of Treatment: Continue to take Prednisone 40mg dailt for 18 more days (5/16/21) Than taper down every 3 days.    Diagnosis: Hypokalemia  Assessment and Plan of Treatment: Repleted.  Monitor for electrolyte imbalance.    Diagnosis: Multiple sclerosis  Assessment and Plan of Treatment: Follow up outpt will be required.     PRINCIPAL DISCHARGE DIAGNOSIS  Diagnosis: Alcoholic hepatitis without ascites  Assessment and Plan of Treatment: Continue to take Prednisone 40mg dailt for 18 more days (5/16/21) Than taper down every 3 days. Follow with Dr. Lagunas after completion of prednisone      SECONDARY DISCHARGE DIAGNOSES  Diagnosis: Hypokalemia  Assessment and Plan of Treatment: Repleted.  Monitor for electrolyte imbalance.    Diagnosis: Alcohol withdrawal with complication with inpatient treatment, with unspecified complication  Assessment and Plan of Treatment: Alcohol withdrawal with complication with inpatient treatment, with unspecified complication    Diagnosis: Multiple sclerosis  Assessment and Plan of Treatment: Follow up outpt will be required.    Diagnosis: Alcoholic hepatitis without ascites  Assessment and Plan of Treatment: Alcoholic hepatitis without ascites    Diagnosis: Alcoholic hepatitis without ascites  Assessment and Plan of Treatment: Continue to take Prednisone 40mg dailt for 18 more days (5/16/21) Than taper down every 3 days.

## 2021-04-28 NOTE — DISCHARGE NOTE PROVIDER - NSDCMRMEDTOKEN_GEN_ALL_CORE_FT
amLODIPine 5 mg oral tablet: 1 tab(s) orally once a day  folic acid 1 mg oral tablet: 1 tab(s) orally once a day  magnesium oxide 400 mg (241.3 mg elemental magnesium) oral tablet: 1 tab(s) orally 3 times a day (with meals)  Multiple Vitamins oral tablet: 1 tab(s) orally once a day  nicotine 14 mg/24 hr transdermal film, extended release: 1 patch transdermal once a day   pantoprazole 40 mg oral delayed release tablet: 1 tab(s) orally once a day (before a meal)   amLODIPine 5 mg oral tablet: 1 tab(s) orally once a day  chlorhexidine 2% topical pad: 1 application topically   folic acid 1 mg oral tablet: 1 tab(s) orally once a day  magnesium oxide 400 mg (241.3 mg elemental magnesium) oral tablet: 1 tab(s) orally 3 times a day (with meals)  Multiple Vitamins oral tablet: 1 tab(s) orally once a day  nicotine 14 mg/24 hr transdermal film, extended release: 1 patch transdermal once a day   pantoprazole 40 mg oral delayed release tablet: 1 tab(s) orally once a day (before a meal)  predniSONE 20 mg oral tablet: 2 tab(s) orally once a day   amLODIPine 5 mg oral tablet: 1 tab(s) orally once a day  chlorhexidine 2% topical pad: 1 application topically   folic acid 1 mg oral tablet: 1 tab(s) orally once a day  lactulose 10 g/15 mL oral syrup: 15 milliliter(s) orally 2 times a day, As needed, bowel movements  nicotine 14 mg/24 hr transdermal film, extended release: 1 patch transdermal once a day   pantoprazole 40 mg oral delayed release tablet: 1 tab(s) orally once a day (before a meal)  predniSONE 20 mg oral tablet: 2 tab(s) orally once a day  thiamine 100 mg oral tablet: 1 tab(s) orally once a day   amLODIPine 5 mg oral tablet: 1 tab(s) orally once a day  folic acid 1 mg oral tablet: 1 tab(s) orally once a day  nicotine 14 mg/24 hr transdermal film, extended release: 1 patch transdermal once a day   pantoprazole 40 mg oral delayed release tablet: 1 tab(s) orally once a day (before a meal)  predniSONE 20 mg oral tablet: 2 tab(s) orally once a day  thiamine 100 mg oral tablet: 1 tab(s) orally once a day

## 2021-04-28 NOTE — DISCHARGE NOTE PROVIDER - DETAILS OF MALNUTRITION DIAGNOSIS/DIAGNOSES
This patient has been assessed with a concern for Malnutrition and was treated during this hospitalization for the following Nutrition diagnosis/diagnoses:     -  04/17/2021: Severe protein-calorie malnutrition

## 2021-04-28 NOTE — DISCHARGE NOTE NURSING/CASE MANAGEMENT/SOCIAL WORK - PATIENT PORTAL LINK FT
You can access the FollowMyHealth Patient Portal offered by [facility] by registering at the following website: [email or web address]. By joining [employer]’s FollowMyHealth portal, you will also be able to view your health information using other applications (apps) compatible with our system.

## 2021-07-10 NOTE — ED ADULT NURSE NOTE - DISCHARGE DATE/TIME
Have him increase his losartan to 100mg daily, I sent in a new script and please make him an appointment with me in the next two weeks for a bp recheck. 10-Jul-2021 13:14 31-Dec-2019 06:50

## 2021-09-30 NOTE — ED ADULT NURSE NOTE - GASTROINTESTINAL ASSESSMENT
Acute Care Clinic Note     Patient ID: Osiris is a 66 year old female.  MRN: [id number]  Chief Complaint   Patient presents with   • Swelling     Patient went to  see her pulmonlogist he was concern about the swelling on the right side of her face     HISTORY OF PRESENT ILLNESS  Osiris is a 66yoF presenting for facial swelling  - Woke up with left side of jaw swollen  - Went to pulmonology appointment and pulmonologist was concerned that swelling was due to patient's lisinopril  - Patient denied any lip swelling, tongue swelling, difficulty breathing, or difficulty swallowing  - She did not take anything for the swelling and reports that it has greatly improved since this morning  - Patient ate a hamburger last night for dinner  - Did have seafood earlier in the week, but last ate left overs over 1 day prior to symptom onset  - Denies any recent dental work   - Denies any rashes or bug bites  - Face does not itch   - Denies any new lotions, creams, face washes     Anxiety  - Patient reports that due to recent diagnoses, she has become more anxious about her general health and having to attend doctor's appointments  - Is interested in an as needed medication to use before bed to help her sleep       Review of Systems   Constitutional: Negative for chills and fever.   HENT: Negative for dental problem, drooling, sore throat and trouble swallowing.         Left facial swelling   Respiratory: Negative for cough and shortness of breath.    Cardiovascular: Negative for chest pain.   Gastrointestinal: Negative for abdominal pain, diarrhea, nausea and vomiting.   Genitourinary: Negative for dysuria and hematuria.   Skin: Negative for rash.   Neurological: Negative for headaches.   Psychiatric/Behavioral: The patient is nervous/anxious.        ALLERGIES  ALLERGIES:   Allergen Reactions   • Penicillin G Other (See Comments)     Rxn occurred yrs ago, does not remember rxn type       MEDICAL HISTORY  Past Medical History:    Diagnosis Date   • Anxiety    • COPD (chronic obstructive pulmonary disease) (CMS/[facility])    • Essential (primary) hypertension      Patient Active Problem List   Diagnosis   • Weight loss   • Encounter to establish care   • Essential hypertension   • Centrilobular emphysema (CMS/[facility])   • Jaw swelling   • Anxiety     SURGICAL HISTORY  No past surgical history on file.  FAMILY HISTORY  No family history on file.  SOCIAL HISTORY  Social History     Socioeconomic History   • Marital status: Single     Spouse name: Not on file   • Number of children: Not on file   • Years of education: Not on file   • Highest education level: Not on file   Occupational History   • Not on file   Tobacco Use   • Smoking status: Former Smoker     Packs/day: 1.00     Years: 19.00     Pack years: 19.00     Types: Cigarettes     Start date: 1971     Quit date: 2020     Years since quittin.2   • Smokeless tobacco: Never Used   Vaping Use   • Vaping Use: never used   Substance and Sexual Activity   • Alcohol use: Not Currently   • Drug use: Never   • Sexual activity: Not Currently   Other Topics Concern   • Not on file   Social History Narrative   • Not on file     Social Determinants of Health     Financial Resource Strain:    • Social Determinants: Financial Resource Strain: Not on file   Food Insecurity:    • Social Determinants: Food Insecurity: Not on file   Transportation Needs:    • Lack of Transportation (Medical): Not on file   • Lack of Transportation (Non-Medical): Not on file   Physical Activity:    • Days of Exercise per Week: Not on file   • Minutes of Exercise per Session: Not on file   Stress:    • Social Determinants: Stress: Not on file   Social Connections:    • Social Determinants: Social Connections: Not on file   Intimate Partner Violence: Not At Risk   • Social Determinants: Intimate Partner Violence Past Fear: No   • Social Determinants: Intimate Partner Violence Current Fear: No     CURRENT  MEDICATIONS  Current Outpatient Medications   Medication Sig Dispense Refill   • methylPREDNISolone (MEDROL DOSEPAK) 4 MG tablet Take 1 tablet by mouth as directed. follow package directions 21 tablet 0   • fluticasone-umeclidin-vilanterol (TRELEGY ELLIPTA) 100-62.5-25 MCG/INH inhaler Inhale 1 puff into the lungs daily. Do not start before July 13, 2021. 30 each 11   • amLODIPine (NORVASC) 10 MG tablet Take 1 tablet by mouth daily. Do not start before July 13, 2021. 30 tablet 11   • albuterol 108 (90 Base) MCG/ACT inhaler Inhale 2 puffs into the lungs Every 6 hours as needed for Shortness of Breath or Wheezing. 1 each 12   • hydrOXYzine (ATARAX) 25 MG tablet Take 1 tablet by mouth every 8 hours as needed for Anxiety. 30 tablet 0     No current facility-administered medications for this visit.       OBJECTIVE  Vitals:    09/30/21 1347   BP: 120/67   BP Location: LUE - Left upper extremity   Patient Position: Sitting   Cuff Size: Small Adult   Pulse: (!) 106   Resp: 18   Temp: 98.4 °F (36.9 °C)   TempSrc: Oral   SpO2: 94%   Weight: 41.4 kg (91 lb 4.3 oz)   Height: 5' 5.5\" (1.664 m)     Physical Exam  Vitals reviewed.   Constitutional:       Appearance: Normal appearance.   HENT:      Head: Normocephalic and atraumatic.      Mouth/Throat:      Pharynx: Oropharynx is clear. No oropharyngeal exudate or posterior oropharyngeal erythema.      Comments: Minimal swelling of left lateral jaw. No lip or tongue swelling  Eyes:      Extraocular Movements: Extraocular movements intact.      Conjunctiva/sclera: Conjunctivae normal.   Cardiovascular:      Rate and Rhythm: Regular rhythm. Tachycardia present.      Pulses: Normal pulses.      Heart sounds: Normal heart sounds. No murmur heard.   No friction rub. No gallop.    Pulmonary:      Effort: Pulmonary effort is normal.      Breath sounds: Normal breath sounds. No wheezing, rhonchi or rales.   Abdominal:      Palpations: Abdomen is soft.      Tenderness: There is no abdominal  tenderness.   Skin:     General: Skin is warm.      Findings: No erythema or rash.   Neurological:      General: No focal deficit present.      Mental Status: She is alert and oriented to person, place, and time.   Psychiatric:         Mood and Affect: Mood normal.         Behavior: Behavior normal.          ASSESSMENT AND PLAN  Problem List Items Addressed This Visit        ENT    Jaw swelling     - Unclear etiology at this time  - Symptom presentation not consistent with angioedema  - Will continue lisinopril 10mg daily for now. Patient instructed to immediately stop the medication if swelling occurs again and to notify our office  - Patient also instructed to take benadryl if swelling occurs again             Mental Health    Anxiety - Primary     - Will start hydroxyzine nightly as needed for anxiety  - Patient to schedule follow up with PCP for further evaluation and management          Relevant Medications    hydrOXYzine (ATARAX) 25 MG tablet            Health Maintenance Summary     Pneumococcal Vaccine 65+ (1 of 4 - PCV13)  Overdue - never done    DTaP/Tdap/Td Vaccine (1 - Tdap)  Overdue - never done    Shingles Vaccine (1 of 2)  Overdue - never done    Colorectal Cancer Screen- (Colonoscopy - Every 10 Years)  Ordered on 7/15/2021    Osteoporosis Screening (Once)  Ordered on 7/15/2021    COVID-19 Vaccine (3 - Moderna risk 3-dose series)  Overdue since 5/4/2021    Influenza Vaccine (1)  Due since 9/1/2021    Depression Screening (Yearly)  Next due on 9/30/2022    Breast Cancer Screening (Every 2 Years)  Next due on 8/10/2023    Hepatitis C Screening   Completed    Hepatitis B Vaccine   Aged Out    Meningococcal Vaccine   Aged Out    HPV Vaccine   Aged Out          Schedule follow up: Return in about 4 weeks (around 10/28/2021).    Discussed with attending physician, Dr. Toby Manzano DO    - - -

## 2021-12-24 ENCOUNTER — EMERGENCY (EMERGENCY)
Facility: HOSPITAL | Age: 46
LOS: 1 days | End: 2021-12-24
Payer: MEDICAID

## 2021-12-24 VITALS
SYSTOLIC BLOOD PRESSURE: 161 MMHG | HEIGHT: 71 IN | TEMPERATURE: 98 F | DIASTOLIC BLOOD PRESSURE: 96 MMHG | OXYGEN SATURATION: 97 % | HEART RATE: 101 BPM | RESPIRATION RATE: 16 BRPM

## 2021-12-24 DIAGNOSIS — Z90.49 ACQUIRED ABSENCE OF OTHER SPECIFIED PARTS OF DIGESTIVE TRACT: Chronic | ICD-10-CM

## 2021-12-24 PROCEDURE — 99283 EMERGENCY DEPT VISIT LOW MDM: CPT

## 2021-12-24 PROCEDURE — 99284 EMERGENCY DEPT VISIT MOD MDM: CPT

## 2021-12-24 PROCEDURE — U0003: CPT

## 2021-12-24 PROCEDURE — U0005: CPT

## 2021-12-25 ENCOUNTER — TRANSCRIPTION ENCOUNTER (OUTPATIENT)
Age: 46
End: 2021-12-25

## 2021-12-25 PROBLEM — F10.10 ALCOHOL ABUSE, UNCOMPLICATED: Chronic | Status: ACTIVE | Noted: 2021-04-17

## 2021-12-25 LAB — SARS-COV-2 RNA SPEC QL NAA+PROBE: DETECTED

## 2022-01-22 NOTE — ED PROVIDER NOTE - PATIENT PORTAL LINK FT
You can access the FollowMyHealth Patient Portal offered by [facility] by registering at the following website: [email or web address]. By joining [employer]’s FollowMyHealth portal, you will also be able to view your health information using other applications (apps) compatible with our system.

## 2022-02-23 ENCOUNTER — INPATIENT (INPATIENT)
Facility: HOSPITAL | Age: 47
LOS: 3 days | Discharge: LEFT BEFORE DISCHARGE | DRG: 184 | End: 2022-02-27
Attending: SURGERY | Admitting: SURGERY
Payer: MEDICAID

## 2022-02-23 VITALS
SYSTOLIC BLOOD PRESSURE: 124 MMHG | OXYGEN SATURATION: 98 % | DIASTOLIC BLOOD PRESSURE: 84 MMHG | HEIGHT: 71 IN | HEART RATE: 84 BPM | TEMPERATURE: 98 F | RESPIRATION RATE: 20 BRPM | WEIGHT: 175.05 LBS

## 2022-02-23 DIAGNOSIS — S22.41XA MULTIPLE FRACTURES OF RIBS, RIGHT SIDE, INITIAL ENCOUNTER FOR CLOSED FRACTURE: ICD-10-CM

## 2022-02-23 DIAGNOSIS — Z90.49 ACQUIRED ABSENCE OF OTHER SPECIFIED PARTS OF DIGESTIVE TRACT: Chronic | ICD-10-CM

## 2022-02-23 LAB
ALBUMIN SERPL ELPH-MCNC: 4.3 G/DL — SIGNIFICANT CHANGE UP (ref 3.3–5.2)
ALP SERPL-CCNC: 137 U/L — HIGH (ref 40–120)
ALT FLD-CCNC: 60 U/L — HIGH
ANION GAP SERPL CALC-SCNC: 16 MMOL/L — SIGNIFICANT CHANGE UP (ref 5–17)
APPEARANCE UR: CLEAR — SIGNIFICANT CHANGE UP
APTT BLD: 31.3 SEC — SIGNIFICANT CHANGE UP (ref 27.5–35.5)
AST SERPL-CCNC: 103 U/L — HIGH
BASOPHILS # BLD AUTO: 0.05 K/UL — SIGNIFICANT CHANGE UP (ref 0–0.2)
BASOPHILS NFR BLD AUTO: 0.5 % — SIGNIFICANT CHANGE UP (ref 0–2)
BILIRUB SERPL-MCNC: 0.5 MG/DL — SIGNIFICANT CHANGE UP (ref 0.4–2)
BILIRUB UR-MCNC: NEGATIVE — SIGNIFICANT CHANGE UP
BUN SERPL-MCNC: 5.8 MG/DL — LOW (ref 8–20)
CALCIUM SERPL-MCNC: 8.9 MG/DL — SIGNIFICANT CHANGE UP (ref 8.6–10.2)
CHLORIDE SERPL-SCNC: 103 MMOL/L — SIGNIFICANT CHANGE UP (ref 98–107)
CO2 SERPL-SCNC: 25 MMOL/L — SIGNIFICANT CHANGE UP (ref 22–29)
COLOR SPEC: YELLOW — SIGNIFICANT CHANGE UP
CREAT SERPL-MCNC: 0.69 MG/DL — SIGNIFICANT CHANGE UP (ref 0.5–1.3)
DIFF PNL FLD: NEGATIVE — SIGNIFICANT CHANGE UP
EOSINOPHIL # BLD AUTO: 0.02 K/UL — SIGNIFICANT CHANGE UP (ref 0–0.5)
EOSINOPHIL NFR BLD AUTO: 0.2 % — SIGNIFICANT CHANGE UP (ref 0–6)
ETHANOL SERPL-MCNC: 228 MG/DL — HIGH (ref 0–9)
GLUCOSE SERPL-MCNC: 97 MG/DL — SIGNIFICANT CHANGE UP (ref 70–99)
GLUCOSE UR QL: NEGATIVE MG/DL — SIGNIFICANT CHANGE UP
HCT VFR BLD CALC: 48.8 % — SIGNIFICANT CHANGE UP (ref 39–50)
HGB BLD-MCNC: 17.3 G/DL — HIGH (ref 13–17)
IMM GRANULOCYTES NFR BLD AUTO: 0.4 % — SIGNIFICANT CHANGE UP (ref 0–1.5)
INR BLD: 1.19 RATIO — HIGH (ref 0.88–1.16)
KETONES UR-MCNC: ABNORMAL
LEUKOCYTE ESTERASE UR-ACNC: NEGATIVE — SIGNIFICANT CHANGE UP
LYMPHOCYTES # BLD AUTO: 1.67 K/UL — SIGNIFICANT CHANGE UP (ref 1–3.3)
LYMPHOCYTES # BLD AUTO: 17.1 % — SIGNIFICANT CHANGE UP (ref 13–44)
MAGNESIUM SERPL-MCNC: 1.8 MG/DL — SIGNIFICANT CHANGE UP (ref 1.8–2.6)
MCHC RBC-ENTMCNC: 34.1 PG — HIGH (ref 27–34)
MCHC RBC-ENTMCNC: 35.5 GM/DL — SIGNIFICANT CHANGE UP (ref 32–36)
MCV RBC AUTO: 96.1 FL — SIGNIFICANT CHANGE UP (ref 80–100)
MONOCYTES # BLD AUTO: 0.71 K/UL — SIGNIFICANT CHANGE UP (ref 0–0.9)
MONOCYTES NFR BLD AUTO: 7.3 % — SIGNIFICANT CHANGE UP (ref 2–14)
NEUTROPHILS # BLD AUTO: 7.25 K/UL — SIGNIFICANT CHANGE UP (ref 1.8–7.4)
NEUTROPHILS NFR BLD AUTO: 74.5 % — SIGNIFICANT CHANGE UP (ref 43–77)
NITRITE UR-MCNC: NEGATIVE — SIGNIFICANT CHANGE UP
NT-PROBNP SERPL-SCNC: 50 PG/ML — SIGNIFICANT CHANGE UP (ref 0–300)
PH UR: 6 — SIGNIFICANT CHANGE UP (ref 5–8)
PLATELET # BLD AUTO: 217 K/UL — SIGNIFICANT CHANGE UP (ref 150–400)
POTASSIUM SERPL-MCNC: 3.3 MMOL/L — LOW (ref 3.5–5.3)
POTASSIUM SERPL-SCNC: 3.3 MMOL/L — LOW (ref 3.5–5.3)
PROT SERPL-MCNC: 7.4 G/DL — SIGNIFICANT CHANGE UP (ref 6.6–8.7)
PROT UR-MCNC: NEGATIVE — SIGNIFICANT CHANGE UP
PROTHROM AB SERPL-ACNC: 13.8 SEC — HIGH (ref 10.5–13.4)
RBC # BLD: 5.08 M/UL — SIGNIFICANT CHANGE UP (ref 4.2–5.8)
RBC # FLD: 13.3 % — SIGNIFICANT CHANGE UP (ref 10.3–14.5)
SARS-COV-2 RNA SPEC QL NAA+PROBE: SIGNIFICANT CHANGE UP
SODIUM SERPL-SCNC: 144 MMOL/L — SIGNIFICANT CHANGE UP (ref 135–145)
SP GR SPEC: 1.01 — SIGNIFICANT CHANGE UP (ref 1.01–1.02)
TROPONIN T SERPL-MCNC: <0.01 NG/ML — SIGNIFICANT CHANGE UP (ref 0–0.06)
UROBILINOGEN FLD QL: NEGATIVE MG/DL — SIGNIFICANT CHANGE UP
WBC # BLD: 9.74 K/UL — SIGNIFICANT CHANGE UP (ref 3.8–10.5)
WBC # FLD AUTO: 9.74 K/UL — SIGNIFICANT CHANGE UP (ref 3.8–10.5)

## 2022-02-23 PROCEDURE — G1004: CPT

## 2022-02-23 PROCEDURE — 72125 CT NECK SPINE W/O DYE: CPT | Mod: 26,MG

## 2022-02-23 PROCEDURE — 71260 CT THORAX DX C+: CPT | Mod: 26,ME

## 2022-02-23 PROCEDURE — 70450 CT HEAD/BRAIN W/O DYE: CPT | Mod: 26,MG

## 2022-02-23 PROCEDURE — 99285 EMERGENCY DEPT VISIT HI MDM: CPT

## 2022-02-23 PROCEDURE — 93010 ELECTROCARDIOGRAM REPORT: CPT

## 2022-02-23 PROCEDURE — 71045 X-RAY EXAM CHEST 1 VIEW: CPT | Mod: 26

## 2022-02-23 PROCEDURE — 99223 1ST HOSP IP/OBS HIGH 75: CPT

## 2022-02-23 PROCEDURE — 74177 CT ABD & PELVIS W/CONTRAST: CPT | Mod: 26,ME

## 2022-02-23 RX ORDER — OXYCODONE HYDROCHLORIDE 5 MG/1
5 TABLET ORAL EVERY 4 HOURS
Refills: 0 | Status: DISCONTINUED | OUTPATIENT
Start: 2022-02-23 | End: 2022-02-24

## 2022-02-23 RX ORDER — FOLIC ACID 0.8 MG
1 TABLET ORAL DAILY
Refills: 0 | Status: DISCONTINUED | OUTPATIENT
Start: 2022-02-23 | End: 2022-02-27

## 2022-02-23 RX ORDER — ACETAMINOPHEN 500 MG
650 TABLET ORAL EVERY 6 HOURS
Refills: 0 | Status: DISCONTINUED | OUTPATIENT
Start: 2022-02-23 | End: 2022-02-24

## 2022-02-23 RX ORDER — LIDOCAINE 4 G/100G
1 CREAM TOPICAL EVERY 24 HOURS
Refills: 0 | Status: DISCONTINUED | OUTPATIENT
Start: 2022-02-23 | End: 2022-02-27

## 2022-02-23 RX ORDER — THIAMINE MONONITRATE (VIT B1) 100 MG
500 TABLET ORAL EVERY 8 HOURS
Refills: 0 | Status: COMPLETED | OUTPATIENT
Start: 2022-02-23 | End: 2022-02-26

## 2022-02-23 RX ORDER — FENTANYL CITRATE 50 UG/ML
50 INJECTION INTRAVENOUS ONCE
Refills: 0 | Status: DISCONTINUED | OUTPATIENT
Start: 2022-02-23 | End: 2022-02-23

## 2022-02-23 RX ORDER — ENOXAPARIN SODIUM 100 MG/ML
40 INJECTION SUBCUTANEOUS DAILY
Refills: 0 | Status: DISCONTINUED | OUTPATIENT
Start: 2022-02-23 | End: 2022-02-27

## 2022-02-23 RX ORDER — IBUPROFEN 200 MG
600 TABLET ORAL EVERY 6 HOURS
Refills: 0 | Status: DISCONTINUED | OUTPATIENT
Start: 2022-02-23 | End: 2022-02-24

## 2022-02-23 RX ORDER — OXYCODONE HYDROCHLORIDE 5 MG/1
10 TABLET ORAL EVERY 4 HOURS
Refills: 0 | Status: DISCONTINUED | OUTPATIENT
Start: 2022-02-23 | End: 2022-02-24

## 2022-02-23 RX ORDER — AMLODIPINE BESYLATE 2.5 MG/1
5 TABLET ORAL DAILY
Refills: 0 | Status: DISCONTINUED | OUTPATIENT
Start: 2022-02-23 | End: 2022-02-24

## 2022-02-23 RX ORDER — INFLUENZA VIRUS VACCINE 15; 15; 15; 15 UG/.5ML; UG/.5ML; UG/.5ML; UG/.5ML
0.5 SUSPENSION INTRAMUSCULAR ONCE
Refills: 0 | Status: DISCONTINUED | OUTPATIENT
Start: 2022-02-23 | End: 2022-02-27

## 2022-02-23 RX ORDER — CHLORHEXIDINE GLUCONATE 213 G/1000ML
1 SOLUTION TOPICAL DAILY
Refills: 0 | Status: DISCONTINUED | OUTPATIENT
Start: 2022-02-23 | End: 2022-02-27

## 2022-02-23 RX ADMIN — OXYCODONE HYDROCHLORIDE 10 MILLIGRAM(S): 5 TABLET ORAL at 22:46

## 2022-02-23 RX ADMIN — FENTANYL CITRATE 50 MICROGRAM(S): 50 INJECTION INTRAVENOUS at 19:58

## 2022-02-23 RX ADMIN — Medication 1 MILLIGRAM(S): at 22:40

## 2022-02-23 RX ADMIN — Medication 105 MILLIGRAM(S): at 22:40

## 2022-02-23 RX ADMIN — OXYCODONE HYDROCHLORIDE 10 MILLIGRAM(S): 5 TABLET ORAL at 21:14

## 2022-02-23 RX ADMIN — FENTANYL CITRATE 50 MICROGRAM(S): 50 INJECTION INTRAVENOUS at 17:32

## 2022-02-23 RX ADMIN — Medication 650 MILLIGRAM(S): at 23:48

## 2022-02-23 RX ADMIN — LIDOCAINE 1 PATCH: 4 CREAM TOPICAL at 21:13

## 2022-02-23 RX ADMIN — AMLODIPINE BESYLATE 5 MILLIGRAM(S): 2.5 TABLET ORAL at 22:40

## 2022-02-23 RX ADMIN — Medication 600 MILLIGRAM(S): at 23:48

## 2022-02-23 RX ADMIN — Medication 100 MILLIGRAM(S): at 21:14

## 2022-02-23 NOTE — ED PROVIDER NOTE - PROGRESS NOTE DETAILS
stat cxr called overhead to evaluate for ptx. no large ptx on cxr.  patient hemodynamically stable on monitor. now pending ct.
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
## 2022-02-23 NOTE — PATIENT PROFILE ADULT - FALL HARM RISK - HARM RISK INTERVENTIONS
Assistance with ambulation/Assistance OOB with selected safe patient handling equipment/Communicate Risk of Fall with Harm to all staff/Monitor for mental status changes/Monitor gait and stability/Reinforce activity limits and safety measures with patient and family/Tailored Fall Risk Interventions/Toileting schedule using arm’s reach rule for commode and bathroom/Use of alarms - bed, chair and/or voice tab/Visual Cue: Yellow wristband and red socks/Bed in lowest position, wheels locked, appropriate side rails in place/Call bell, personal items and telephone in reach/Instruct patient to call for assistance before getting out of bed or chair/Non-slip footwear when patient is out of bed/[place] to call system/Physically safe environment - no spills, clutter or unnecessary equipment/Purposeful Proactive Rounding/Room/bathroom lighting operational, light cord in reach

## 2022-02-23 NOTE — SBIRT NOTE ADULT - NSSBIRTBRIEFINTDET_GEN_A_CORE
Provided SBIRT services:  Full Screen Positive. Brief Intervention Performed and Referral to Treatment Attempted.  Screening results were reviewed with the patient and patient was provided information about healthy guidelines and potential negative consequences associated with level of risk. Motivation and readiness to reduce or stop use was discussed and goals and activities to make changes were suggested/offered.  Options discussed for further evaluation and treatment, but referral to treatment was not completed because: Patient requires medical care.
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
## 2022-02-23 NOTE — SBIRT NOTE ADULT - NSSBIRTALCPASSREFTXDET_GEN_A_CORE
Patient enrolled in [employer].  Patient provided with information for Remote SBIRT services.

## 2022-02-23 NOTE — ED ADULT NURSE NOTE - HOW OFTEN DO YOU HAVE A DRINK CONTAINING ALCOHOL?
Four or more times a week [Fatigue] : fatigue [Nasal Congestion] : nasal congestion [Heartburn] : heartburn [A.M. Headache] : headache present upon awakening [Arthralgias] : arthralgias [Nocturia] : nocturia [Snoring] : snoring [Anxious] : anxious [Unintentional Sleep while inactive] : unintentional sleep while inactive [Awakes With Headache] : awakes with a headache [Recent Wt Gain (___ Lbs)] : recent [unfilled] ~Ulb weight gain [Lower Extremity Discomfort] : lower extremity discomfort [Sleep Disturbances due to LE symptoms] : ~T sleep disturbances due to lower extremity symptoms [Awakes With Dry Mouth] : awakes without dry mouth [FreeTextEntry1] : 10-10:30 pm [FreeTextEntry2] : 4-6:30 am [FreeTextEntry3] : "30 seconds" was taking 45 minutes in January - work related stress [FreeTextEntry4] : 1-2 [FreeTextEntry5] : 30-90 minutes [FreeTextEntry6] : 4.5 to 5 hours [Postnasal Drip] : no postnasal drip [Witnessed Apneas] : no witnessed apnea [Shortness Of Breath] : no shortness of breath [A.M. Dry Mouth] : no a.m. dry mouth [Chest Pain] : no chest pain [Edema] : ~T edema was not present [Palpitations] : no palpitations [CHF] : no congestive heart failure [Obesity] : not obese [Diabetes] : no diabetes  [Thyroid Disease] : no thyroid disease [History of Iron Deficiency] : no history of iron deficiency [Irresistible urge to move legs] : no irresistible urge to move legs because of lower extremity discomfort [Anemia] : no anemia [Late day/ Evening symptoms] : no late day/evening symptoms [LE discomfort relieved by movement] : lower extremity discomfort not relieved by movement [FreeTextEntry7] : related to spicy food

## 2022-02-23 NOTE — ED PROVIDER NOTE - PHYSICAL EXAMINATION
Gen: Alert, NAD  Head: NC, AT, PERRL, EOMI, normal lids/conjunctiva  ENT: B TM WNL  Neck: +supple, no tenderness/meningismus/JVD, +Trachea midline  Pulm: Bilateral BS, normal resp effort, no wheeze/stridor/retractions  CV: RRR, no M/R/G, +dist pulses  CHEST WALL: ttp over bilateral chest wall, crepitus over left lower chest wall, no ecchymoses  Abd: soft, NT/ND, +BS, no hepatosplenomegaly  Mskel:    L UE: from/nt @shoulder/elbow/wrist/hand   R UE: from/nt @shoulder/elbow/wrist/hand   L LE: from/nt @ hip/knee/ankle   R LE: from/nt @hip/knee/ankle   distal pulses intact  BACK: nt midline c/t/l/s spine.   Skin: no rash  Neuro: AAOx3, no sensory/motor deficit

## 2022-02-23 NOTE — ED PROVIDER NOTE - NS ED ROS FT
Constitutional: (-) fever  (-)chills  (-)sweats  Eyes/ENT: (-)   Cardiovascular: (+ chest pain, (-) palpitations (-) edema   Respiratory: (-) cough, (-) shortness of breath   Gastrointestinal: (+)nausea  (-)vomiting, (-) diarrhea  (-) abdominal pain   :  (-)dysuria, (-)frequency, (-)urgency, (-)hematuria  Musculoskeletal: (-) neck pain, (-) back pain, (-) joint pain  Integumentary: (-) rash, (-) edema  Neurological: (-) headache, (-) altered mental status  (-)LOC
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
## 2022-02-23 NOTE — SBIRT NOTE ADULT - NSSBIRTDRGBRIEFINTDET_GEN_A_CORE
Provided SBIRT services:  Full Screen Positive. Brief Intervention Performed.  Screening results were reviewed with the patient and patient was provided information about healthy guidelines and potential negative consequences associated with level of risk. Motivation and readiness to reduce or stop use was discussed and goals and activities to make changes were suggested/offered.
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
## 2022-02-24 LAB
ANION GAP SERPL CALC-SCNC: 12 MMOL/L — SIGNIFICANT CHANGE UP (ref 5–17)
BASOPHILS # BLD AUTO: 0.06 K/UL — SIGNIFICANT CHANGE UP (ref 0–0.2)
BASOPHILS NFR BLD AUTO: 0.8 % — SIGNIFICANT CHANGE UP (ref 0–2)
BUN SERPL-MCNC: 8.9 MG/DL — SIGNIFICANT CHANGE UP (ref 8–20)
CALCIUM SERPL-MCNC: 8.5 MG/DL — LOW (ref 8.6–10.2)
CHLORIDE SERPL-SCNC: 100 MMOL/L — SIGNIFICANT CHANGE UP (ref 98–107)
CO2 SERPL-SCNC: 25 MMOL/L — SIGNIFICANT CHANGE UP (ref 22–29)
CREAT SERPL-MCNC: 0.6 MG/DL — SIGNIFICANT CHANGE UP (ref 0.5–1.3)
EOSINOPHIL # BLD AUTO: 0.04 K/UL — SIGNIFICANT CHANGE UP (ref 0–0.5)
EOSINOPHIL NFR BLD AUTO: 0.5 % — SIGNIFICANT CHANGE UP (ref 0–6)
GLUCOSE SERPL-MCNC: 88 MG/DL — SIGNIFICANT CHANGE UP (ref 70–99)
HCT VFR BLD CALC: 45.6 % — SIGNIFICANT CHANGE UP (ref 39–50)
HGB BLD-MCNC: 15.7 G/DL — SIGNIFICANT CHANGE UP (ref 13–17)
IMM GRANULOCYTES NFR BLD AUTO: 0.3 % — SIGNIFICANT CHANGE UP (ref 0–1.5)
LYMPHOCYTES # BLD AUTO: 2.38 K/UL — SIGNIFICANT CHANGE UP (ref 1–3.3)
LYMPHOCYTES # BLD AUTO: 30 % — SIGNIFICANT CHANGE UP (ref 13–44)
MAGNESIUM SERPL-MCNC: 1.7 MG/DL — SIGNIFICANT CHANGE UP (ref 1.6–2.6)
MCHC RBC-ENTMCNC: 33.4 PG — SIGNIFICANT CHANGE UP (ref 27–34)
MCHC RBC-ENTMCNC: 34.4 GM/DL — SIGNIFICANT CHANGE UP (ref 32–36)
MCV RBC AUTO: 97 FL — SIGNIFICANT CHANGE UP (ref 80–100)
MONOCYTES # BLD AUTO: 0.69 K/UL — SIGNIFICANT CHANGE UP (ref 0–0.9)
MONOCYTES NFR BLD AUTO: 8.7 % — SIGNIFICANT CHANGE UP (ref 2–14)
NEUTROPHILS # BLD AUTO: 4.75 K/UL — SIGNIFICANT CHANGE UP (ref 1.8–7.4)
NEUTROPHILS NFR BLD AUTO: 59.7 % — SIGNIFICANT CHANGE UP (ref 43–77)
PHOSPHATE SERPL-MCNC: 3.4 MG/DL — SIGNIFICANT CHANGE UP (ref 2.4–4.7)
PLATELET # BLD AUTO: 184 K/UL — SIGNIFICANT CHANGE UP (ref 150–400)
POTASSIUM SERPL-MCNC: 3.3 MMOL/L — LOW (ref 3.5–5.3)
POTASSIUM SERPL-SCNC: 3.3 MMOL/L — LOW (ref 3.5–5.3)
RBC # BLD: 4.7 M/UL — SIGNIFICANT CHANGE UP (ref 4.2–5.8)
RBC # FLD: 13.4 % — SIGNIFICANT CHANGE UP (ref 10.3–14.5)
SODIUM SERPL-SCNC: 137 MMOL/L — SIGNIFICANT CHANGE UP (ref 135–145)
WBC # BLD: 7.94 K/UL — SIGNIFICANT CHANGE UP (ref 3.8–10.5)
WBC # FLD AUTO: 7.94 K/UL — SIGNIFICANT CHANGE UP (ref 3.8–10.5)

## 2022-02-24 RX ORDER — MAGNESIUM SULFATE 500 MG/ML
2 VIAL (ML) INJECTION ONCE
Refills: 0 | Status: COMPLETED | OUTPATIENT
Start: 2022-02-24 | End: 2022-02-24

## 2022-02-24 RX ORDER — OXYCODONE HYDROCHLORIDE 5 MG/1
10 TABLET ORAL EVERY 4 HOURS
Refills: 0 | Status: DISCONTINUED | OUTPATIENT
Start: 2022-02-24 | End: 2022-02-24

## 2022-02-24 RX ORDER — NICOTINE POLACRILEX 2 MG
1 GUM BUCCAL DAILY
Refills: 0 | Status: DISCONTINUED | OUTPATIENT
Start: 2022-02-24 | End: 2022-02-27

## 2022-02-24 RX ORDER — OXYCODONE HYDROCHLORIDE 5 MG/1
10 TABLET ORAL EVERY 4 HOURS
Refills: 0 | Status: DISCONTINUED | OUTPATIENT
Start: 2022-02-24 | End: 2022-02-27

## 2022-02-24 RX ORDER — GABAPENTIN 400 MG/1
100 CAPSULE ORAL EVERY 8 HOURS
Refills: 0 | Status: DISCONTINUED | OUTPATIENT
Start: 2022-02-24 | End: 2022-02-27

## 2022-02-24 RX ORDER — AMLODIPINE BESYLATE 2.5 MG/1
10 TABLET ORAL DAILY
Refills: 0 | Status: DISCONTINUED | OUTPATIENT
Start: 2022-02-24 | End: 2022-02-27

## 2022-02-24 RX ORDER — POLYETHYLENE GLYCOL 3350 17 G/17G
17 POWDER, FOR SOLUTION ORAL DAILY
Refills: 0 | Status: DISCONTINUED | OUTPATIENT
Start: 2022-02-24 | End: 2022-02-27

## 2022-02-24 RX ORDER — HYDROMORPHONE HYDROCHLORIDE 2 MG/ML
0.5 INJECTION INTRAMUSCULAR; INTRAVENOUS; SUBCUTANEOUS EVERY 4 HOURS
Refills: 0 | Status: DISCONTINUED | OUTPATIENT
Start: 2022-02-24 | End: 2022-02-27

## 2022-02-24 RX ORDER — OXYCODONE HYDROCHLORIDE 5 MG/1
15 TABLET ORAL EVERY 4 HOURS
Refills: 0 | Status: DISCONTINUED | OUTPATIENT
Start: 2022-02-24 | End: 2022-02-27

## 2022-02-24 RX ORDER — SENNA PLUS 8.6 MG/1
2 TABLET ORAL AT BEDTIME
Refills: 0 | Status: DISCONTINUED | OUTPATIENT
Start: 2022-02-24 | End: 2022-02-27

## 2022-02-24 RX ORDER — ACETAMINOPHEN 500 MG
975 TABLET ORAL EVERY 6 HOURS
Refills: 0 | Status: COMPLETED | OUTPATIENT
Start: 2022-02-24 | End: 2022-02-26

## 2022-02-24 RX ORDER — POTASSIUM CHLORIDE 20 MEQ
40 PACKET (EA) ORAL EVERY 4 HOURS
Refills: 0 | Status: COMPLETED | OUTPATIENT
Start: 2022-02-24 | End: 2022-02-24

## 2022-02-24 RX ORDER — KETOROLAC TROMETHAMINE 30 MG/ML
15 SYRINGE (ML) INJECTION EVERY 6 HOURS
Refills: 0 | Status: DISCONTINUED | OUTPATIENT
Start: 2022-02-24 | End: 2022-02-27

## 2022-02-24 RX ORDER — OXYCODONE HYDROCHLORIDE 5 MG/1
5 TABLET ORAL EVERY 4 HOURS
Refills: 0 | Status: DISCONTINUED | OUTPATIENT
Start: 2022-02-24 | End: 2022-02-24

## 2022-02-24 RX ADMIN — Medication 100 MILLIGRAM(S): at 21:02

## 2022-02-24 RX ADMIN — AMLODIPINE BESYLATE 10 MILLIGRAM(S): 2.5 TABLET ORAL at 13:45

## 2022-02-24 RX ADMIN — Medication 600 MILLIGRAM(S): at 06:50

## 2022-02-24 RX ADMIN — ENOXAPARIN SODIUM 40 MILLIGRAM(S): 100 INJECTION SUBCUTANEOUS at 11:18

## 2022-02-24 RX ADMIN — Medication 100 MILLIGRAM(S): at 05:46

## 2022-02-24 RX ADMIN — Medication 650 MILLIGRAM(S): at 05:46

## 2022-02-24 RX ADMIN — LIDOCAINE 1 PATCH: 4 CREAM TOPICAL at 21:03

## 2022-02-24 RX ADMIN — Medication 40 MILLIEQUIVALENT(S): at 09:24

## 2022-02-24 RX ADMIN — LIDOCAINE 1 PATCH: 4 CREAM TOPICAL at 06:50

## 2022-02-24 RX ADMIN — Medication 650 MILLIGRAM(S): at 01:35

## 2022-02-24 RX ADMIN — Medication 40 MILLIEQUIVALENT(S): at 05:45

## 2022-02-24 RX ADMIN — GABAPENTIN 100 MILLIGRAM(S): 400 CAPSULE ORAL at 13:45

## 2022-02-24 RX ADMIN — Medication 25 GRAM(S): at 06:46

## 2022-02-24 RX ADMIN — Medication 1 PATCH: at 11:18

## 2022-02-24 RX ADMIN — Medication 650 MILLIGRAM(S): at 06:50

## 2022-02-24 RX ADMIN — Medication 105 MILLIGRAM(S): at 05:47

## 2022-02-24 RX ADMIN — Medication 975 MILLIGRAM(S): at 23:19

## 2022-02-24 RX ADMIN — OXYCODONE HYDROCHLORIDE 5 MILLIGRAM(S): 5 TABLET ORAL at 09:46

## 2022-02-24 RX ADMIN — Medication 105 MILLIGRAM(S): at 15:11

## 2022-02-24 RX ADMIN — OXYCODONE HYDROCHLORIDE 10 MILLIGRAM(S): 5 TABLET ORAL at 03:35

## 2022-02-24 RX ADMIN — OXYCODONE HYDROCHLORIDE 10 MILLIGRAM(S): 5 TABLET ORAL at 23:17

## 2022-02-24 RX ADMIN — Medication 975 MILLIGRAM(S): at 14:44

## 2022-02-24 RX ADMIN — Medication 975 MILLIGRAM(S): at 13:44

## 2022-02-24 RX ADMIN — Medication 1 PATCH: at 18:19

## 2022-02-24 RX ADMIN — Medication 600 MILLIGRAM(S): at 01:35

## 2022-02-24 RX ADMIN — CHLORHEXIDINE GLUCONATE 1 APPLICATION(S): 213 SOLUTION TOPICAL at 11:18

## 2022-02-24 RX ADMIN — Medication 105 MILLIGRAM(S): at 23:19

## 2022-02-24 RX ADMIN — AMLODIPINE BESYLATE 5 MILLIGRAM(S): 2.5 TABLET ORAL at 05:45

## 2022-02-24 RX ADMIN — OXYCODONE HYDROCHLORIDE 10 MILLIGRAM(S): 5 TABLET ORAL at 21:02

## 2022-02-24 RX ADMIN — GABAPENTIN 100 MILLIGRAM(S): 400 CAPSULE ORAL at 21:02

## 2022-02-24 RX ADMIN — Medication 1 MILLIGRAM(S): at 11:19

## 2022-02-24 RX ADMIN — Medication 600 MILLIGRAM(S): at 05:46

## 2022-02-24 RX ADMIN — LIDOCAINE 1 PATCH: 4 CREAM TOPICAL at 08:51

## 2022-02-24 RX ADMIN — Medication 1 TABLET(S): at 13:45

## 2022-02-24 RX ADMIN — OXYCODONE HYDROCHLORIDE 10 MILLIGRAM(S): 5 TABLET ORAL at 05:04

## 2022-02-24 RX ADMIN — OXYCODONE HYDROCHLORIDE 5 MILLIGRAM(S): 5 TABLET ORAL at 08:46

## 2022-02-24 RX ADMIN — Medication 100 MILLIGRAM(S): at 13:46
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
## 2022-02-24 NOTE — CONSULT NOTE ADULT - ASSESSMENT
46M  mult anterolateral R rib fx      Patient interested in SAP nerve block   r/b/a discussed  written consent obtained and witnessed  Please see procedure note for details
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
## 2022-02-24 NOTE — PROCEDURE NOTE - ADDITIONAL PROCEDURE DETAILS
Using the ultrasound probe to count ribs starting from the 12th rib, the 6th rib was located at the mid axillary line. After sterile prep and gloves were don, a 22G 50mm Pajunk needle was advanced in-plane and 12cc of Exparel + 12cc of Bupivacaine 0.25% was administered into the superficial and deep SAP in divided doses. Aspiration q5ccs was negative for heme/csf/air. Local anesthetic spread was visualized on ultrasound.

## 2022-02-25 LAB
ANION GAP SERPL CALC-SCNC: 13 MMOL/L — SIGNIFICANT CHANGE UP (ref 5–17)
BASOPHILS # BLD AUTO: 0.04 K/UL — SIGNIFICANT CHANGE UP (ref 0–0.2)
BASOPHILS NFR BLD AUTO: 0.5 % — SIGNIFICANT CHANGE UP (ref 0–2)
BUN SERPL-MCNC: 7.4 MG/DL — LOW (ref 8–20)
CALCIUM SERPL-MCNC: 8.5 MG/DL — LOW (ref 8.6–10.2)
CHLORIDE SERPL-SCNC: 102 MMOL/L — SIGNIFICANT CHANGE UP (ref 98–107)
CO2 SERPL-SCNC: 23 MMOL/L — SIGNIFICANT CHANGE UP (ref 22–29)
CREAT SERPL-MCNC: 0.72 MG/DL — SIGNIFICANT CHANGE UP (ref 0.5–1.3)
EOSINOPHIL # BLD AUTO: 0.09 K/UL — SIGNIFICANT CHANGE UP (ref 0–0.5)
EOSINOPHIL NFR BLD AUTO: 1.1 % — SIGNIFICANT CHANGE UP (ref 0–6)
GLUCOSE SERPL-MCNC: 93 MG/DL — SIGNIFICANT CHANGE UP (ref 70–99)
HCT VFR BLD CALC: 48.2 % — SIGNIFICANT CHANGE UP (ref 39–50)
HGB BLD-MCNC: 16.3 G/DL — SIGNIFICANT CHANGE UP (ref 13–17)
IMM GRANULOCYTES NFR BLD AUTO: 0.4 % — SIGNIFICANT CHANGE UP (ref 0–1.5)
LYMPHOCYTES # BLD AUTO: 1.9 K/UL — SIGNIFICANT CHANGE UP (ref 1–3.3)
LYMPHOCYTES # BLD AUTO: 22.3 % — SIGNIFICANT CHANGE UP (ref 13–44)
MAGNESIUM SERPL-MCNC: 1.8 MG/DL — SIGNIFICANT CHANGE UP (ref 1.6–2.6)
MCHC RBC-ENTMCNC: 33.3 PG — SIGNIFICANT CHANGE UP (ref 27–34)
MCHC RBC-ENTMCNC: 33.8 GM/DL — SIGNIFICANT CHANGE UP (ref 32–36)
MCV RBC AUTO: 98.4 FL — SIGNIFICANT CHANGE UP (ref 80–100)
MONOCYTES # BLD AUTO: 0.56 K/UL — SIGNIFICANT CHANGE UP (ref 0–0.9)
MONOCYTES NFR BLD AUTO: 6.6 % — SIGNIFICANT CHANGE UP (ref 2–14)
NEUTROPHILS # BLD AUTO: 5.9 K/UL — SIGNIFICANT CHANGE UP (ref 1.8–7.4)
NEUTROPHILS NFR BLD AUTO: 69.1 % — SIGNIFICANT CHANGE UP (ref 43–77)
PHOSPHATE SERPL-MCNC: 2.5 MG/DL — SIGNIFICANT CHANGE UP (ref 2.4–4.7)
PLATELET # BLD AUTO: 175 K/UL — SIGNIFICANT CHANGE UP (ref 150–400)
POTASSIUM SERPL-MCNC: 3.4 MMOL/L — LOW (ref 3.5–5.3)
POTASSIUM SERPL-SCNC: 3.4 MMOL/L — LOW (ref 3.5–5.3)
RBC # BLD: 4.9 M/UL — SIGNIFICANT CHANGE UP (ref 4.2–5.8)
RBC # FLD: 13.2 % — SIGNIFICANT CHANGE UP (ref 10.3–14.5)
SODIUM SERPL-SCNC: 137 MMOL/L — SIGNIFICANT CHANGE UP (ref 135–145)
WBC # BLD: 8.52 K/UL — SIGNIFICANT CHANGE UP (ref 3.8–10.5)
WBC # FLD AUTO: 8.52 K/UL — SIGNIFICANT CHANGE UP (ref 3.8–10.5)

## 2022-02-25 PROCEDURE — 72146 MRI CHEST SPINE W/O DYE: CPT | Mod: 26

## 2022-02-25 PROCEDURE — 99221 1ST HOSP IP/OBS SF/LOW 40: CPT

## 2022-02-25 PROCEDURE — 99233 SBSQ HOSP IP/OBS HIGH 50: CPT

## 2022-02-25 RX ORDER — THIAMINE MONONITRATE (VIT B1) 100 MG
100 TABLET ORAL DAILY
Refills: 0 | Status: DISCONTINUED | OUTPATIENT
Start: 2022-02-27 | End: 2022-02-27

## 2022-02-25 RX ORDER — POTASSIUM PHOSPHATE, MONOBASIC POTASSIUM PHOSPHATE, DIBASIC 236; 224 MG/ML; MG/ML
15 INJECTION, SOLUTION INTRAVENOUS ONCE
Refills: 0 | Status: COMPLETED | OUTPATIENT
Start: 2022-02-25 | End: 2022-02-25

## 2022-02-25 RX ORDER — POTASSIUM CHLORIDE 20 MEQ
40 PACKET (EA) ORAL ONCE
Refills: 0 | Status: COMPLETED | OUTPATIENT
Start: 2022-02-25 | End: 2022-02-25

## 2022-02-25 RX ORDER — MAGNESIUM SULFATE 500 MG/ML
2 VIAL (ML) INJECTION ONCE
Refills: 0 | Status: COMPLETED | OUTPATIENT
Start: 2022-02-25 | End: 2022-02-25

## 2022-02-25 RX ADMIN — Medication 105 MILLIGRAM(S): at 06:19

## 2022-02-25 RX ADMIN — Medication 50 MILLIGRAM(S): at 21:10

## 2022-02-25 RX ADMIN — Medication 1 PATCH: at 19:15

## 2022-02-25 RX ADMIN — Medication 975 MILLIGRAM(S): at 23:23

## 2022-02-25 RX ADMIN — Medication 25 GRAM(S): at 05:56

## 2022-02-25 RX ADMIN — GABAPENTIN 100 MILLIGRAM(S): 400 CAPSULE ORAL at 13:41

## 2022-02-25 RX ADMIN — Medication 1 PATCH: at 11:26

## 2022-02-25 RX ADMIN — Medication 975 MILLIGRAM(S): at 12:00

## 2022-02-25 RX ADMIN — OXYCODONE HYDROCHLORIDE 15 MILLIGRAM(S): 5 TABLET ORAL at 22:00

## 2022-02-25 RX ADMIN — POTASSIUM PHOSPHATE, MONOBASIC POTASSIUM PHOSPHATE, DIBASIC 62.5 MILLIMOLE(S): 236; 224 INJECTION, SOLUTION INTRAVENOUS at 09:05

## 2022-02-25 RX ADMIN — ENOXAPARIN SODIUM 40 MILLIGRAM(S): 100 INJECTION SUBCUTANEOUS at 11:35

## 2022-02-25 RX ADMIN — Medication 100 MILLIGRAM(S): at 13:42

## 2022-02-25 RX ADMIN — LIDOCAINE 1 PATCH: 4 CREAM TOPICAL at 11:25

## 2022-02-25 RX ADMIN — Medication 975 MILLIGRAM(S): at 11:34

## 2022-02-25 RX ADMIN — OXYCODONE HYDROCHLORIDE 10 MILLIGRAM(S): 5 TABLET ORAL at 14:00

## 2022-02-25 RX ADMIN — OXYCODONE HYDROCHLORIDE 10 MILLIGRAM(S): 5 TABLET ORAL at 10:00

## 2022-02-25 RX ADMIN — CHLORHEXIDINE GLUCONATE 1 APPLICATION(S): 213 SOLUTION TOPICAL at 12:42

## 2022-02-25 RX ADMIN — LIDOCAINE 1 PATCH: 4 CREAM TOPICAL at 08:20

## 2022-02-25 RX ADMIN — OXYCODONE HYDROCHLORIDE 10 MILLIGRAM(S): 5 TABLET ORAL at 13:42

## 2022-02-25 RX ADMIN — GABAPENTIN 100 MILLIGRAM(S): 400 CAPSULE ORAL at 21:10

## 2022-02-25 RX ADMIN — Medication 1 TABLET(S): at 11:35

## 2022-02-25 RX ADMIN — Medication 105 MILLIGRAM(S): at 22:23

## 2022-02-25 RX ADMIN — Medication 2 MILLIGRAM(S): at 21:50

## 2022-02-25 RX ADMIN — OXYCODONE HYDROCHLORIDE 10 MILLIGRAM(S): 5 TABLET ORAL at 09:04

## 2022-02-25 RX ADMIN — AMLODIPINE BESYLATE 10 MILLIGRAM(S): 2.5 TABLET ORAL at 05:56

## 2022-02-25 RX ADMIN — Medication 975 MILLIGRAM(S): at 00:25

## 2022-02-25 RX ADMIN — Medication 975 MILLIGRAM(S): at 17:34

## 2022-02-25 RX ADMIN — Medication 1 MILLIGRAM(S): at 11:35

## 2022-02-25 RX ADMIN — Medication 100 MILLIGRAM(S): at 05:56

## 2022-02-25 RX ADMIN — Medication 975 MILLIGRAM(S): at 06:17

## 2022-02-25 RX ADMIN — Medication 105 MILLIGRAM(S): at 15:58

## 2022-02-25 RX ADMIN — GABAPENTIN 100 MILLIGRAM(S): 400 CAPSULE ORAL at 05:56

## 2022-02-25 RX ADMIN — Medication 975 MILLIGRAM(S): at 05:56

## 2022-02-25 RX ADMIN — Medication 1 PATCH: at 11:38

## 2022-02-25 RX ADMIN — OXYCODONE HYDROCHLORIDE 15 MILLIGRAM(S): 5 TABLET ORAL at 21:12

## 2022-02-25 RX ADMIN — Medication 975 MILLIGRAM(S): at 17:55

## 2022-02-25 RX ADMIN — Medication 40 MILLIEQUIVALENT(S): at 05:55
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
## 2022-02-25 NOTE — DISCHARGE NOTE PROVIDER - NSDCCPCAREPLAN_GEN_ALL_CORE_FT
PRINCIPAL DISCHARGE DIAGNOSIS  Diagnosis: Fracture of multiple ribs of right side  Assessment and Plan of Treatment: Please call and make an appointment with Neurosurgery clinic within 10-14 days for re-evaluation.  Acute Care Surgery Clinic in 10-14 days after discharge. Also, please call and make an appointment with your primary care physician as per your usual schedule.   Activity: Avoid heavy lifting or strenuous activity until follow-up appointment.   Diet: May continue regular diet.  Medications: Please take all home medications as prescribed by your primary care doctor. Pain medication has been prescribed for you. Please, take it as it has been prescribed, do not drive or operate heavy machinery while taking narcotics.   Wound Care: Please keep surgical site clean and dry. You may shower, but do not bathe    If confusion, altered mental status, fever, chest pain, shortness of breath, severe or worsening pain, vomiting, change or worsening of medical status, please come back to the emergency room, and in case of emergency call 911      SECONDARY DISCHARGE DIAGNOSES  Diagnosis: Compression fx, thoracic spine  Assessment and Plan of Treatment: Please call and make an appointment with Neurosurgery clinic within 10-14 days for re-evaluation.  Acute Care Surgery Clinic in 10-14 days after discharge. Also, please call and make an appointment with your primary care physician as per your usual schedule.   Activity: Avoid heavy lifting or strenuous activity until follow-up appointment.   Diet: May continue regular diet.  Medications: Please take all home medications as prescribed by your primary care doctor. Pain medication has been prescribed for you. Please, take it as it has been prescribed, do not drive or operate heavy machinery while taking narcotics.   Wound Care: Please keep surgical site clean and dry. You may shower, but do not bathe    If confusion, altered mental status, fever, chest pain, shortness of breath, severe or worsening pain, vomiting, change or worsening of medical status, please come back to the emergency room, and in case of emergency call 911    Diagnosis: Alcohol abuse  Assessment and Plan of Treatment: Please call and make an appointment with Neurosurgery clinic within 10-14 days for re-evaluation.  Acute Care Surgery Clinic in 10-14 days after discharge. Also, please call and make an appointment with your primary care physician as per your usual schedule.   Activity: Avoid heavy lifting or strenuous activity until follow-up appointment.   Diet: May continue regular diet.  Medications: Please take all home medications as prescribed by your primary care doctor. Pain medication has been prescribed for you. Please, take it as it has been prescribed, do not drive or operate heavy machinery while taking narcotics.   Wound Care: Please keep surgical site clean and dry. You may shower, but do not bathe    If confusion, altered mental status, fever, chest pain, shortness of breath, severe or worsening pain, vomiting, change or worsening of medical status, please come back to the emergency room, and in case of emergency call 911    Diagnosis: Multiple sclerosis  Assessment and Plan of Treatment: Please call and make an appointment with Neurosurgery clinic within 10-14 days for re-evaluation.  Acute Care Surgery Clinic in 10-14 days after discharge. Also, please call and make an appointment with your primary care physician as per your usual schedule.   Activity: Avoid heavy lifting or strenuous activity until follow-up appointment.   Diet: May continue regular diet.  Medications: Please take all home medications as prescribed by your primary care doctor. Pain medication has been prescribed for you. Please, take it as it has been prescribed, do not drive or operate heavy machinery while taking narcotics.   Wound Care: Please keep surgical site clean and dry. You may shower, but do not bathe    If confusion, altered mental status, fever, chest pain, shortness of breath, severe or worsening pain, vomiting, change or worsening of medical status, please come back to the emergency room, and in case of emergency call 911

## 2022-02-25 NOTE — DIETITIAN INITIAL EVALUATION ADULT. - ORAL INTAKE PTA/DIET HISTORY
Pt currently on a regular diet; with good appetite/po intake, consumed 100% of breakfast per tray observation. Weight appears relatively stable over the last year. Low K+ noted, supplementation in place. Pt now downgraded from ICU. RD to follow up.

## 2022-02-25 NOTE — DISCHARGE NOTE PROVIDER - NSDCMRMEDTOKEN_GEN_ALL_CORE_FT
amLODIPine 5 mg oral tablet: 1 tab(s) orally once a day  folic acid 1 mg oral tablet: 1 tab(s) orally once a day  nicotine 14 mg/24 hr transdermal film, extended release: 1 patch transdermal once a day   pantoprazole 40 mg oral delayed release tablet: 1 tab(s) orally once a day (before a meal)  predniSONE 20 mg oral tablet: 2 tab(s) orally once a day  thiamine 100 mg oral tablet: 1 tab(s) orally once a day
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
## 2022-02-25 NOTE — CHART NOTE - NSCHARTNOTEFT_GEN_A_CORE
SICU TRANSFER NOTE  -----------------------------  ICU Admission Date: 2/23/2022  Transfer Date: 02-25-22 @ 16:33    Admission Diagnosis:  Right 5-9 rib fracture, T5 superior endplate fracture, T7 compression deformity, ETOH intoxication / abuse, Multiple sclerosis    Active Problems/injuries: Right 5-9 rib fractures, ETOH intoxication / abuse, Multiple Sclerosis, Thoracic spine fractures are chronic    Procedures: 2/24: Right rib block    Consultants:  Pain management  Neurosurgery  PT: Home with PT    Medications  acetaminophen     Tablet .. 975 milliGRAM(s) Oral every 6 hours  amLODIPine   Tablet 10 milliGRAM(s) Oral daily  chlordiazePOXIDE 50 milliGRAM(s) Oral every 8 hours  chlordiazePOXIDE   Oral   chlorhexidine 2% Cloths 1 Application(s) Topical daily  enoxaparin Injectable 40 milliGRAM(s) SubCutaneous daily  folic acid 1 milliGRAM(s) Oral daily  gabapentin 100 milliGRAM(s) Oral every 8 hours  HYDROmorphone  Injectable 0.5 milliGRAM(s) IV Push every 4 hours PRN  influenza   Vaccine 0.5 milliLiter(s) IntraMuscular once  ketorolac   Injectable 15 milliGRAM(s) IV Push every 6 hours PRN  lidocaine   4% Patch 1 Patch Transdermal every 24 hours  LORazepam   Injectable 2 milliGRAM(s) IV Push every 1 hour PRN  multivitamin 1 Tablet(s) Oral daily  nicotine - 21 mG/24Hr(s) Patch 1 patch Transdermal daily  oxyCODONE    IR 10 milliGRAM(s) Oral every 4 hours PRN  oxyCODONE    IR 15 milliGRAM(s) Oral every 4 hours PRN  polyethylene glycol 3350 17 Gram(s) Oral daily  senna 2 Tablet(s) Oral at bedtime  thiamine IVPB 500 milliGRAM(s) IV Intermittent every 8 hours      [x ] I attest I have reviewed and reconciled all medications prior to transfer        I have discussed this case with Dr. Ribeiro with [facility] upon transfer and all questions regarding ICU course were answered.  The following items are to be followed up:    - MRI of T-spine reveal fractures visualized on CT are chronic  - No TLSO warranted  - Pt with PMH of MS related to abnormalities seen on MR.  Pt is aware  - Pain consulted and s/p rib block on 2/24: Can add Robaxin if necessary  - CIWA  - Librium taper   - PT states Rolling walker, shower chair, Home with home PT.  Pt lives with family member so will not be able to have home PT  - Social work for substance abuse  - Dispo planning.
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
## 2022-02-25 NOTE — PHYSICAL THERAPY INITIAL EVALUATION ADULT - GENERAL OBSERVATIONS, REHAB EVAL
Pt received supine in bed, + IV + telemetry//BP. C/o 9/10 pre and Post PT rib pain, (+) Pain meds on board per RN. Pt agreeable to PT

## 2022-02-25 NOTE — CONSULT NOTE ADULT - SUBJECTIVE AND OBJECTIVE BOX
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 possibly acute; correlation is recommended for pain   in this region.    Subtle bowing of the anterior cortex of the mid sternum, likely a   fracture, also of uncertain chronicity; correlation is also recommended   for pain in this region.    Diffuse wall thickening involving the sigmoid colon consistent with   colitis; wall thickening in the sigmoid colon appears increased since   2020; clinical correlation is recommended for inflammatory bowel   disease.    The findings were discussed with Dr. aMnn on 2022 4:10 PM    --- End of Report ---            TEJ AMEZQUITA MD; Attending Radiologist  This document has been electronically signed. 2022  4:27PM   (2022 18:27)      INTERVAL HPI/OVERNIGHT EVENTS:  46y Male seen lying comfortably in bed. NSGY consulted for suspicion of T5 vertebral body compression fracture, MRI revealed chronic compression fractures. Patient complaining of exquisite point tenderness pain along the spinal processes. Admits to chronic LE pain/numbness since MS diagnosis in .      Vital Signs Last 24 Hrs  T(C): 36.4 (2022 07:51), Max: 36.9 (2022 16:05)  T(F): 97.5 (2022 07:51), Max: 98.5 (2022 04:00)  HR: 93 (2022 13:00) (70 - 95)  BP: 154/91 (2022 13:00) (125/77 - 165/116)  BP(mean): 103 (2022 13:00) (89 - 130)  RR: 14 (2022 13:00) (8 - 26)  SpO2: 91% (2022 13:00) (90% - 99%)      PHYSICAL EXAM:  GENERAL: NAD, well-groomed, well-developed  HEAD:  Atraumatic, normocephalic  XIN COMA SCORE: E4 V5 M6 =15       E: 4= opens eyes spontaneously 3= to voice 2= to noxious 1= no opening       V: 5= oriented 4= confused 3= inappropriate words 2= incomprehensible sounds 1= nonverbal 1T= intubated       M: 6= follows commands 5= localizes 4= withdraws 3= flexor posturing 2= extensor posturing 1= no movement  MENTAL STATUS: AAO x3; Awake. Opens eyes spontaneously. Appropriately conversant without aphasia, following simple commands.  CRANIAL NERVES: Visual acuity normal for age, visual fields full to confrontation, PERRL. EOMI without nystagmus. Facial sensation intact V1-3 distribution b/l. Face symmetric w/ normal eye closure and smile, tongue midline. Hearing grossly intact. Speech clear.   MOTOR: strength 5/5 b/l upper and lower extremities  SENSATION: grossly intact to light touch all extremities      LABS:                        16.3   8.52  )-----------( 175      ( 2022 03:13 )             48.2         137  |  102  |  7.4<L>  ----------------------------<  93  3.4<L>   |  23.0  |  0.72    Ca    8.5<L>      2022 03:13  Phos  2.5       Mg     1.8             Urinalysis Basic - ( 2022 22:53 )    Color: Yellow / Appearance: Clear / S.015 / pH: x  Gluc: x / Ketone: Trace  / Bili: Negative / Urobili: Negative mg/dL   Blood: x / Protein: Negative / Nitrite: Negative   Leuk Esterase: Negative / RBC: x / WBC x   Sq Epi: x / Non Sq Epi: x / Bacteria: x         @ 07:01  -   @ 07:00  --------------------------------------------------------  IN: 890 mL / OUT: 2225 mL / NET: -1335 mL     @ 07:01  -   @ 13:55  --------------------------------------------------------  IN: 840 mL / OUT: 950 mL / NET: -110 mL        RADIOLOGY & ADDITIONAL TESTS:  < from: CT Head No Cont (22 @ 14:41) >  IMPRESSION:    CT BRAIN: No acute intracranial bleeding.    CT CERVICAL SPINE: No fracture. Mild C5-C6 disc degeneration.      < end of copied text >    < from: CT Cervical Spine No Cont (22 @ 14:41) >  IMPRESSION:    CT BRAIN: No acute intracranial bleeding.    CT CERVICAL SPINE: No fracture. Mild C5-C6 disc degeneration.      < end of copied text >    < from: MR Thoracic Spine No Cont (22 @ 12:12) >  IMPRESSION: Chronic compression fractures. No acute thoracic spine   fracture. Suggestion of multifocal mid and distal thoracic cord signal   hyperintensities. Differential diagnosis above. Postcontrast images are   recommended.    < end of copied text >        CAPRINI SCORE [CLOT]:  Patient has an estimated Caprini score of greater than 5.  However, the patient's unique clinical situation will be addressed in an individual manner to determine appropriate anticoagulation treatment, if any.

## 2022-02-25 NOTE — DIETITIAN INITIAL EVALUATION ADULT. - PERTINENT LABORATORY DATA
02-25 Na137 mmol/L Glu 93 mg/dL K+ 3.4 mmol/L<L> Cr  0.72 mg/dL BUN 7.4 mg/dL<L> Phos 2.5 mg/dL Alb n/a   PAB n/a

## 2022-02-25 NOTE — DIETITIAN INITIAL EVALUATION ADULT. - PERTINENT MEDS FT
MEDICATIONS  (STANDING):  acetaminophen     Tablet .. 975 milliGRAM(s) Oral every 6 hours  amLODIPine   Tablet 10 milliGRAM(s) Oral daily  chlordiazePOXIDE   Oral   chlordiazePOXIDE 100 milliGRAM(s) Oral every 8 hours  chlordiazePOXIDE 50 milliGRAM(s) Oral every 8 hours  chlorhexidine 2% Cloths 1 Application(s) Topical daily  enoxaparin Injectable 40 milliGRAM(s) SubCutaneous daily  folic acid 1 milliGRAM(s) Oral daily  gabapentin 100 milliGRAM(s) Oral every 8 hours  influenza   Vaccine 0.5 milliLiter(s) IntraMuscular once  lidocaine   4% Patch 1 Patch Transdermal every 24 hours  multivitamin 1 Tablet(s) Oral daily  nicotine - 21 mG/24Hr(s) Patch 1 patch Transdermal daily  polyethylene glycol 3350 17 Gram(s) Oral daily  senna 2 Tablet(s) Oral at bedtime  thiamine IVPB 500 milliGRAM(s) IV Intermittent every 8 hours    MEDICATIONS  (PRN):  HYDROmorphone  Injectable 0.5 milliGRAM(s) IV Push every 4 hours PRN Breakthrough pain  ketorolac   Injectable 15 milliGRAM(s) IV Push every 6 hours PRN Mild Pain (1 - 3)  LORazepam   Injectable 2 milliGRAM(s) IV Push every 1 hour PRN Symptom-triggered: each CIWA -Ar score 8 or GREATER  oxyCODONE    IR 10 milliGRAM(s) Oral every 4 hours PRN Moderate Pain (4 - 6)  oxyCODONE    IR 15 milliGRAM(s) Oral every 4 hours PRN Severe Pain (7 - 10)
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
## 2022-02-25 NOTE — PHYSICAL THERAPY INITIAL EVALUATION ADULT - IMPAIRED TRANSFERS: SIT/STAND, REHAB EVAL
c/o dizziness upon standing/ataxic/impaired balance/pain/impaired postural control/decreased strength

## 2022-02-25 NOTE — DISCHARGE NOTE PROVIDER - NSFOLLOWUPCLINICS_GEN_ALL_ED_FT
[facility] Acute Care Surgery  Acute Care Surgery  [address] [place] [address]  Phone: [phone]  Fax:

## 2022-02-25 NOTE — DIETITIAN INITIAL EVALUATION ADULT. - ADD RECOMMEND
Continue MVI, thiamine, and folic acid supplementation. Continue K+ supplementation until WNL. Encourage po intake, monitor diet tolerance, and provide assistance at meals as needed. Obtain daily weights to monitor trends.

## 2022-02-25 NOTE — PHYSICAL THERAPY INITIAL EVALUATION ADULT - ADDITIONAL COMMENTS
Pt lives in a private home with his elderly uncle. 3-4 steps to enter with handrails, no steps inside. Pt was independent PTA with intermittent use of a SAC. Pt owns SAC only, no other DME.

## 2022-02-25 NOTE — PROGRESS NOTE ADULT - SUBJECTIVE AND OBJECTIVE BOX
Interval Hx:  Patient seen during rounds  s/p sap block yesterday - states to have minimal relief  c/o back and sternal pain today  only required 2 doses of PRN opioid  will rec starting robaxin in aid in multimodal analgesia  Patient denies sedation with medications     Analgesic Dosing for past 24 hours reviewed as below:    acetaminophen     Tablet ..   975 milliGRAM(s) Oral (22 @ 11:34)   975 milliGRAM(s) Oral (22 @ 05:56)   975 milliGRAM(s) Oral (22 @ 23:19)   975 milliGRAM(s) Oral (22 @ 13:44)    chlordiazePOXIDE   100 milliGRAM(s) Oral (22 @ 13:46)    chlordiazePOXIDE   100 milliGRAM(s) Oral (22 @ 05:56)   100 milliGRAM(s) Oral (22 @ 21:02)    gabapentin   100 milliGRAM(s) Oral (22 @ 05:56)   100 milliGRAM(s) Oral (22 @ 21:02)   100 milliGRAM(s) Oral (22 @ 13:45)    oxyCODONE    IR   10 milliGRAM(s) Oral (22 @ 09:04)   10 milliGRAM(s) Oral (22 @ 21:02)          T(C): 36.4 (22 @ 07:51), Max: 36.9 (22 @ 16:05)  HR: 93 (22 @ 13:00) (70 - 95)  BP: 154/91 (22 @ 13:00) (125/77 - 165/116)  RR: 14 (22 @ 13:00) (8 - 26)  SpO2: 91% (22 @ 13:00) (90% - 99%)      22 @ 07:01  -  22 @ 07:00  --------------------------------------------------------  IN: 890 mL / OUT: 2225 mL / NET: -1335 mL    22 @ 07:01  -  22 @ 13:15  --------------------------------------------------------  IN: 840 mL / OUT: 950 mL / NET: -110 mL        acetaminophen     Tablet .. 975 milliGRAM(s) Oral every 6 hours  amLODIPine   Tablet 10 milliGRAM(s) Oral daily  chlordiazePOXIDE 100 milliGRAM(s) Oral every 8 hours  chlordiazePOXIDE 50 milliGRAM(s) Oral every 8 hours  chlordiazePOXIDE   Oral   chlorhexidine 2% Cloths 1 Application(s) Topical daily  enoxaparin Injectable 40 milliGRAM(s) SubCutaneous daily  folic acid 1 milliGRAM(s) Oral daily  gabapentin 100 milliGRAM(s) Oral every 8 hours  HYDROmorphone  Injectable 0.5 milliGRAM(s) IV Push every 4 hours PRN  influenza   Vaccine 0.5 milliLiter(s) IntraMuscular once  ketorolac   Injectable 15 milliGRAM(s) IV Push every 6 hours PRN  lidocaine   4% Patch 1 Patch Transdermal every 24 hours  LORazepam   Injectable 2 milliGRAM(s) IV Push every 1 hour PRN  multivitamin 1 Tablet(s) Oral daily  nicotine - 21 mG/24Hr(s) Patch 1 patch Transdermal daily  oxyCODONE    IR 10 milliGRAM(s) Oral every 4 hours PRN  oxyCODONE    IR 15 milliGRAM(s) Oral every 4 hours PRN  polyethylene glycol 3350 17 Gram(s) Oral daily  senna 2 Tablet(s) Oral at bedtime  thiamine IVPB 500 milliGRAM(s) IV Intermittent every 8 hours                          16.3   8.52  )-----------( 175      ( 2022 03:13 )             48.2         137  |  102  |  7.4<L>  ----------------------------<  93  3.4<L>   |  23.0  |  0.72    Ca    8.5<L>      2022 03:13  Phos  2.5     -  Mg     1.8     -        Urinalysis Basic - ( 2022 22:53 )    Color: Yellow / Appearance: Clear / S.015 / pH: x  Gluc: x / Ketone: Trace  / Bili: Negative / Urobili: Negative mg/dL   Blood: x / Protein: Negative / Nitrite: Negative   Leuk Esterase: Negative / RBC: x / WBC x   Sq Epi: x / Non Sq Epi: x / Bacteria: x        Pain Service   [phone]
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
## 2022-02-25 NOTE — DIETITIAN INITIAL EVALUATION ADULT. - CALCULATED FROM (G/KG)
[FreeTextEntry1] :  75 
77.5

## 2022-02-25 NOTE — CHART NOTE - NSCHARTNOTEFT_GEN_A_CORE
Tertiary Trauma Survey (TTS)    Date of TTS: 02-25-22 @ 16:23                             Admit Date: 02-23-22 @ 18:39      Trauma Activation: B    Subjective / 24 hour events:  Pt optimized with multimodal pain control.  Pt is s/p right sided RIB block with improvement.  PT was continued on librium taper and currently without signs of withdrawal.  CIWA scores remain low.  Neurosurgery consulted for Thoracic spine fractures and MRI obtained.  Thoracic spine fractures are chronic and no TLSO or surgical intervention warranted.  PIC score remains at 8.  Pt with no pain to extremities.  Pt denies chest pain, motor/sensation deficit, SOB, or abdominal pain    Vital Signs Last 24 Hrs  T(C): 36.9 (25 Feb 2022 15:59), Max: 36.9 (25 Feb 2022 04:00)  T(F): 98.5 (25 Feb 2022 15:59), Max: 98.5 (25 Feb 2022 04:00)  HR: 73 (25 Feb 2022 16:00) (73 - 95)  BP: 122/85 (25 Feb 2022 16:00) (122/85 - 159/97)  BP(mean): 96 (25 Feb 2022 16:00) (89 - 117)  RR: 14 (25 Feb 2022 16:00) (8 - 26)  SpO2: 95% (25 Feb 2022 16:00) (90% - 99%)    Physical Exam:    Neuro: non focal neurological exam     HEENT: Normo-cephalic/atraumatic     Pulm/Chest:  Tenderness upon palpation of right chest wall.  CTA b/l       Cardiac: S1S2, sinus rhythm     GI / Abdomen: Soft, non-tender, non-distended     Musculoskeletal / Extremities: normal active ROM      Integumentary: Skin intact, Warm, Dry    Vascular: 2+ palpable distal pulses        List Injuries Identified to Date:  Right 5-9 rib fractures  Compression deformity of the superior endplate of T5 as well as progression deformity of T7 ( chronic)    List Operative and Interventional Radiological Procedures:     Consults (Date):  Neurosurgery   Social Work  Pain management    RADIOLOGICAL FINDINGS REVIEW:    CT C/A/P: Fractures of the anterolateral aspect of the right fifth-ninth ribs of uncertain chronicity, possibly acute; correlation is recommended for pain in this region.  Subtle bowing of the anterior cortex of the mid sternum, likely a fracture, also of uncertain chronicity.  Compression deformity of the superior endplate of T5 as well as progression deformity of T7.    MRI thoracic: Chronic compression fractures. No acute thoracic spine fracture. Suggestion of multifocal mid and distal thoracic cord signal hyperintensities (PMH of MS).

## 2022-02-25 NOTE — PHYSICAL THERAPY INITIAL EVALUATION ADULT - CRITERIA FOR SKILLED THERAPEUTIC INTERVENTIONS
Home with RW, Home PT, ETOH counseling/impairments found/rehab potential/anticipated discharge recommendation

## 2022-02-26 PROCEDURE — 99231 SBSQ HOSP IP/OBS SF/LOW 25: CPT | Mod: GC

## 2022-02-26 RX ADMIN — Medication 2 MILLIGRAM(S): at 20:04

## 2022-02-26 RX ADMIN — Medication 50 MILLIGRAM(S): at 14:09

## 2022-02-26 RX ADMIN — AMLODIPINE BESYLATE 10 MILLIGRAM(S): 2.5 TABLET ORAL at 06:32

## 2022-02-26 RX ADMIN — Medication 105 MILLIGRAM(S): at 06:31

## 2022-02-26 RX ADMIN — Medication 2 MILLIGRAM(S): at 13:21

## 2022-02-26 RX ADMIN — Medication 975 MILLIGRAM(S): at 06:32

## 2022-02-26 RX ADMIN — Medication 2 MILLIGRAM(S): at 10:01

## 2022-02-26 RX ADMIN — Medication 1 TABLET(S): at 13:20

## 2022-02-26 RX ADMIN — GABAPENTIN 100 MILLIGRAM(S): 400 CAPSULE ORAL at 13:22

## 2022-02-26 RX ADMIN — ENOXAPARIN SODIUM 40 MILLIGRAM(S): 100 INJECTION SUBCUTANEOUS at 13:19

## 2022-02-26 RX ADMIN — HYDROMORPHONE HYDROCHLORIDE 0.5 MILLIGRAM(S): 2 INJECTION INTRAMUSCULAR; INTRAVENOUS; SUBCUTANEOUS at 03:00

## 2022-02-26 RX ADMIN — Medication 2 MILLIGRAM(S): at 14:37

## 2022-02-26 RX ADMIN — CHLORHEXIDINE GLUCONATE 1 APPLICATION(S): 213 SOLUTION TOPICAL at 12:46

## 2022-02-26 RX ADMIN — GABAPENTIN 100 MILLIGRAM(S): 400 CAPSULE ORAL at 23:24

## 2022-02-26 RX ADMIN — HYDROMORPHONE HYDROCHLORIDE 0.5 MILLIGRAM(S): 2 INJECTION INTRAMUSCULAR; INTRAVENOUS; SUBCUTANEOUS at 02:08

## 2022-02-26 RX ADMIN — Medication 1 MILLIGRAM(S): at 13:20

## 2022-02-26 RX ADMIN — Medication 50 MILLIGRAM(S): at 06:32

## 2022-02-26 RX ADMIN — Medication 1 PATCH: at 19:48

## 2022-02-26 RX ADMIN — Medication 1 PATCH: at 13:23

## 2022-02-26 RX ADMIN — GABAPENTIN 100 MILLIGRAM(S): 400 CAPSULE ORAL at 06:32

## 2022-02-26 RX ADMIN — Medication 2 MILLIGRAM(S): at 02:08

## 2022-02-26 RX ADMIN — Medication 105 MILLIGRAM(S): at 13:19

## 2022-02-26 RX ADMIN — Medication 975 MILLIGRAM(S): at 00:00

## 2022-02-26 RX ADMIN — Medication 2 MILLIGRAM(S): at 23:24

## 2022-02-26 RX ADMIN — Medication 1 PATCH: at 13:29

## 2022-02-26 NOTE — PROGRESS NOTE ADULT - SUBJECTIVE AND OBJECTIVE BOX
TRAUMA SURGERY PROGRESS NOTE    Subjective: Patient examined at bedside this AM. Reports he is very anxious about missing more work. Wanted to leave AMA overnight, agreed to wait until AM for SW for DME and librium. No other    Objective:  Vital Signs  T(C): 37 (02-26 @ 02:00), Max: 37 (02-26 @ 02:00)  HR: 110 (02-26 @ 02:00) (73 - 110)  BP: 143/92 (02-26 @ 02:00) (122/85 - 159/92)  RR: 18 (02-26 @ 02:00) (10 - 18)  SpO2: 94% (02-26 @ 02:00) (90% - 96%)  02-24-22 @ 07:01  -  02-25-22 @ 07:00  --------------------------------------------------------  IN:  Total IN: 0 mL    OUT:    Voided (mL): 2225 mL  Total OUT: 2225 mL    Total NET: -2225 mL      02-25-22 @ 07:01  -  02-26-22 @ 06:10  --------------------------------------------------------  IN:  Total IN: 0 mL    OUT:    Voided (mL): 1500 mL  Total OUT: 1500 mL    Total NET: -1500 mL          Physical Exam:  General: alert and oriented, NAD  Resp: airway patent, respirations unlabored  Abdomen: soft, nontender, nondistended  Extremities: no edema, moving all extremities spontaneously  Skin: warm, dry, appropriate color      Labs:                        16.3   8.52  )-----------( 175      ( 25 Feb 2022 03:13 )             48.2   02-25    137  |  102  |  7.4<L>  ----------------------------<  93  3.4<L>   |  23.0  |  0.72    Ca    8.5<L>      25 Feb 2022 03:13  Phos  2.5     02-25  Mg     1.8     02-25            Medications:   MEDICATIONS  (STANDING):  acetaminophen     Tablet .. 975 milliGRAM(s) Oral every 6 hours  amLODIPine   Tablet 10 milliGRAM(s) Oral daily  chlordiazePOXIDE 50 milliGRAM(s) Oral every 8 hours  chlordiazePOXIDE   Oral   chlorhexidine 2% Cloths 1 Application(s) Topical daily  enoxaparin Injectable 40 milliGRAM(s) SubCutaneous daily  folic acid 1 milliGRAM(s) Oral daily  gabapentin 100 milliGRAM(s) Oral every 8 hours  influenza   Vaccine 0.5 milliLiter(s) IntraMuscular once  lidocaine   4% Patch 1 Patch Transdermal every 24 hours  multivitamin 1 Tablet(s) Oral daily  nicotine - 21 mG/24Hr(s) Patch 1 patch Transdermal daily  polyethylene glycol 3350 17 Gram(s) Oral daily  senna 2 Tablet(s) Oral at bedtime  thiamine IVPB 500 milliGRAM(s) IV Intermittent every 8 hours    MEDICATIONS  (PRN):  HYDROmorphone  Injectable 0.5 milliGRAM(s) IV Push every 4 hours PRN Breakthrough pain  ketorolac   Injectable 15 milliGRAM(s) IV Push every 6 hours PRN Mild Pain (1 - 3)  LORazepam   Injectable 2 milliGRAM(s) IV Push every 1 hour PRN Symptom-triggered: each CIWA -Ar score 8 or GREATER  oxyCODONE    IR 10 milliGRAM(s) Oral every 4 hours PRN Moderate Pain (4 - 6)  oxyCODONE    IR 15 milliGRAM(s) Oral every 4 hours PRN Severe Pain (7 - 10)   TRAUMA SURGERY PROGRESS NOTE    Subjective: Patient examined at bedside this AM. Reports he is very anxious about missing more work. Wanted to leave AMA overnight, agreed to wait until AM for SW for DME and librium. No other events overnight    Objective:  Vital Signs  T(C): 37 (02-26 @ 02:00), Max: 37 (02-26 @ 02:00)  HR: 110 (02-26 @ 02:00) (73 - 110)  BP: 143/92 (02-26 @ 02:00) (122/85 - 159/92)  RR: 18 (02-26 @ 02:00) (10 - 18)  SpO2: 94% (02-26 @ 02:00) (90% - 96%)  02-24-22 @ 07:01  -  02-25-22 @ 07:00  --------------------------------------------------------  IN:  Total IN: 0 mL    OUT:    Voided (mL): 2225 mL  Total OUT: 2225 mL    Total NET: -2225 mL      02-25-22 @ 07:01  -  02-26-22 @ 06:10  --------------------------------------------------------  IN:  Total IN: 0 mL    OUT:    Voided (mL): 1500 mL  Total OUT: 1500 mL    Total NET: -1500 mL          Physical Exam:  General: alert and oriented, NAD  Resp: airway patent, respirations unlabored  Abdomen: soft, nontender, nondistended  Extremities: no edema, moving all extremities spontaneously  Skin: warm, dry, appropriate color      Labs:                        16.3   8.52  )-----------( 175      ( 25 Feb 2022 03:13 )             48.2   02-25    137  |  102  |  7.4<L>  ----------------------------<  93  3.4<L>   |  23.0  |  0.72    Ca    8.5<L>      25 Feb 2022 03:13  Phos  2.5     02-25  Mg     1.8     02-25            Medications:   MEDICATIONS  (STANDING):  acetaminophen     Tablet .. 975 milliGRAM(s) Oral every 6 hours  amLODIPine   Tablet 10 milliGRAM(s) Oral daily  chlordiazePOXIDE 50 milliGRAM(s) Oral every 8 hours  chlordiazePOXIDE   Oral   chlorhexidine 2% Cloths 1 Application(s) Topical daily  enoxaparin Injectable 40 milliGRAM(s) SubCutaneous daily  folic acid 1 milliGRAM(s) Oral daily  gabapentin 100 milliGRAM(s) Oral every 8 hours  influenza   Vaccine 0.5 milliLiter(s) IntraMuscular once  lidocaine   4% Patch 1 Patch Transdermal every 24 hours  multivitamin 1 Tablet(s) Oral daily  nicotine - 21 mG/24Hr(s) Patch 1 patch Transdermal daily  polyethylene glycol 3350 17 Gram(s) Oral daily  senna 2 Tablet(s) Oral at bedtime  thiamine IVPB 500 milliGRAM(s) IV Intermittent every 8 hours    MEDICATIONS  (PRN):  HYDROmorphone  Injectable 0.5 milliGRAM(s) IV Push every 4 hours PRN Breakthrough pain  ketorolac   Injectable 15 milliGRAM(s) IV Push every 6 hours PRN Mild Pain (1 - 3)  LORazepam   Injectable 2 milliGRAM(s) IV Push every 1 hour PRN Symptom-triggered: each CIWA -Ar score 8 or GREATER  oxyCODONE    IR 10 milliGRAM(s) Oral every 4 hours PRN Moderate Pain (4 - 6)  oxyCODONE    IR 15 milliGRAM(s) Oral every 4 hours PRN Severe Pain (7 - 10)

## 2022-02-27 ENCOUNTER — EMERGENCY (EMERGENCY)
Facility: HOSPITAL | Age: 47
LOS: 1 days | Discharge: HOME | End: 2022-02-27
Attending: EMERGENCY MEDICINE
Payer: MEDICAID

## 2022-02-27 VITALS
DIASTOLIC BLOOD PRESSURE: 73 MMHG | TEMPERATURE: 98 F | HEART RATE: 97 BPM | OXYGEN SATURATION: 92 % | RESPIRATION RATE: 20 BRPM | SYSTOLIC BLOOD PRESSURE: 114 MMHG

## 2022-02-27 VITALS — HEART RATE: 99 BPM

## 2022-02-27 VITALS
HEART RATE: 112 BPM | WEIGHT: 171.08 LBS | SYSTOLIC BLOOD PRESSURE: 123 MMHG | TEMPERATURE: 98 F | OXYGEN SATURATION: 95 % | DIASTOLIC BLOOD PRESSURE: 79 MMHG | HEIGHT: 70 IN | RESPIRATION RATE: 16 BRPM

## 2022-02-27 DIAGNOSIS — Z90.49 ACQUIRED ABSENCE OF OTHER SPECIFIED PARTS OF DIGESTIVE TRACT: Chronic | ICD-10-CM

## 2022-02-27 LAB
ALBUMIN SERPL ELPH-MCNC: 4.2 G/DL — SIGNIFICANT CHANGE UP (ref 3.3–5.2)
ALP SERPL-CCNC: 130 U/L — HIGH (ref 40–120)
ALT FLD-CCNC: 25 U/L — SIGNIFICANT CHANGE UP
ANION GAP SERPL CALC-SCNC: 16 MMOL/L — SIGNIFICANT CHANGE UP (ref 5–17)
ANION GAP SERPL CALC-SCNC: 21 MMOL/L — HIGH (ref 5–17)
AST SERPL-CCNC: 24 U/L — SIGNIFICANT CHANGE UP
BASOPHILS # BLD AUTO: 0.03 K/UL — SIGNIFICANT CHANGE UP (ref 0–0.2)
BASOPHILS NFR BLD AUTO: 0.4 % — SIGNIFICANT CHANGE UP (ref 0–2)
BILIRUB SERPL-MCNC: 0.5 MG/DL — SIGNIFICANT CHANGE UP (ref 0.4–2)
BUN SERPL-MCNC: 7.7 MG/DL — LOW (ref 8–20)
BUN SERPL-MCNC: 7.9 MG/DL — LOW (ref 8–20)
CALCIUM SERPL-MCNC: 9.6 MG/DL — SIGNIFICANT CHANGE UP (ref 8.6–10.2)
CALCIUM SERPL-MCNC: 9.9 MG/DL — SIGNIFICANT CHANGE UP (ref 8.6–10.2)
CHLORIDE SERPL-SCNC: 103 MMOL/L — SIGNIFICANT CHANGE UP (ref 98–107)
CHLORIDE SERPL-SCNC: 99 MMOL/L — SIGNIFICANT CHANGE UP (ref 98–107)
CO2 SERPL-SCNC: 19 MMOL/L — LOW (ref 22–29)
CO2 SERPL-SCNC: 22 MMOL/L — SIGNIFICANT CHANGE UP (ref 22–29)
CREAT SERPL-MCNC: 0.61 MG/DL — SIGNIFICANT CHANGE UP (ref 0.5–1.3)
CREAT SERPL-MCNC: 0.8 MG/DL — SIGNIFICANT CHANGE UP (ref 0.5–1.3)
EOSINOPHIL # BLD AUTO: 0.09 K/UL — SIGNIFICANT CHANGE UP (ref 0–0.5)
EOSINOPHIL NFR BLD AUTO: 1.1 % — SIGNIFICANT CHANGE UP (ref 0–6)
ETHANOL SERPL-MCNC: <10 MG/DL — SIGNIFICANT CHANGE UP (ref 0–9)
GLUCOSE SERPL-MCNC: 105 MG/DL — HIGH (ref 70–99)
GLUCOSE SERPL-MCNC: 89 MG/DL — SIGNIFICANT CHANGE UP (ref 70–99)
HCT VFR BLD CALC: 50.4 % — HIGH (ref 39–50)
HGB BLD-MCNC: 16.9 G/DL — SIGNIFICANT CHANGE UP (ref 13–17)
IMM GRANULOCYTES NFR BLD AUTO: 0.5 % — SIGNIFICANT CHANGE UP (ref 0–1.5)
LYMPHOCYTES # BLD AUTO: 1.94 K/UL — SIGNIFICANT CHANGE UP (ref 1–3.3)
LYMPHOCYTES # BLD AUTO: 22.7 % — SIGNIFICANT CHANGE UP (ref 13–44)
MAGNESIUM SERPL-MCNC: 1.8 MG/DL — SIGNIFICANT CHANGE UP (ref 1.6–2.6)
MCHC RBC-ENTMCNC: 33.5 GM/DL — SIGNIFICANT CHANGE UP (ref 32–36)
MCHC RBC-ENTMCNC: 33.7 PG — SIGNIFICANT CHANGE UP (ref 27–34)
MCV RBC AUTO: 100.6 FL — HIGH (ref 80–100)
MONOCYTES # BLD AUTO: 0.55 K/UL — SIGNIFICANT CHANGE UP (ref 0–0.9)
MONOCYTES NFR BLD AUTO: 6.4 % — SIGNIFICANT CHANGE UP (ref 2–14)
NEUTROPHILS # BLD AUTO: 5.91 K/UL — SIGNIFICANT CHANGE UP (ref 1.8–7.4)
NEUTROPHILS NFR BLD AUTO: 68.9 % — SIGNIFICANT CHANGE UP (ref 43–77)
PHOSPHATE SERPL-MCNC: 3.5 MG/DL — SIGNIFICANT CHANGE UP (ref 2.4–4.7)
PLATELET # BLD AUTO: 202 K/UL — SIGNIFICANT CHANGE UP (ref 150–400)
POTASSIUM SERPL-MCNC: 4.2 MMOL/L — SIGNIFICANT CHANGE UP (ref 3.5–5.3)
POTASSIUM SERPL-MCNC: 4.3 MMOL/L — SIGNIFICANT CHANGE UP (ref 3.5–5.3)
POTASSIUM SERPL-SCNC: 4.2 MMOL/L — SIGNIFICANT CHANGE UP (ref 3.5–5.3)
POTASSIUM SERPL-SCNC: 4.3 MMOL/L — SIGNIFICANT CHANGE UP (ref 3.5–5.3)
PROT SERPL-MCNC: 7.6 G/DL — SIGNIFICANT CHANGE UP (ref 6.6–8.7)
RBC # BLD: 5.01 M/UL — SIGNIFICANT CHANGE UP (ref 4.2–5.8)
RBC # FLD: 13.2 % — SIGNIFICANT CHANGE UP (ref 10.3–14.5)
SODIUM SERPL-SCNC: 138 MMOL/L — SIGNIFICANT CHANGE UP (ref 135–145)
SODIUM SERPL-SCNC: 142 MMOL/L — SIGNIFICANT CHANGE UP (ref 135–145)
WBC # BLD: 8.56 K/UL — SIGNIFICANT CHANGE UP (ref 3.8–10.5)
WBC # FLD AUTO: 8.56 K/UL — SIGNIFICANT CHANGE UP (ref 3.8–10.5)

## 2022-02-27 PROCEDURE — 81003 URINALYSIS AUTO W/O SCOPE: CPT

## 2022-02-27 PROCEDURE — 83880 ASSAY OF NATRIURETIC PEPTIDE: CPT

## 2022-02-27 PROCEDURE — 97163 PT EVAL HIGH COMPLEX 45 MIN: CPT

## 2022-02-27 PROCEDURE — 93005 ELECTROCARDIOGRAM TRACING: CPT

## 2022-02-27 PROCEDURE — 83735 ASSAY OF MAGNESIUM: CPT

## 2022-02-27 PROCEDURE — 80053 COMPREHEN METABOLIC PANEL: CPT

## 2022-02-27 PROCEDURE — 36415 COLL VENOUS BLD VENIPUNCTURE: CPT

## 2022-02-27 PROCEDURE — 80307 DRUG TEST PRSMV CHEM ANLYZR: CPT

## 2022-02-27 PROCEDURE — 85025 COMPLETE CBC W/AUTO DIFF WBC: CPT

## 2022-02-27 PROCEDURE — 72146 MRI CHEST SPINE W/O DYE: CPT

## 2022-02-27 PROCEDURE — 96374 THER/PROPH/DIAG INJ IV PUSH: CPT

## 2022-02-27 PROCEDURE — 99284 EMERGENCY DEPT VISIT MOD MDM: CPT

## 2022-02-27 PROCEDURE — 74177 CT ABD & PELVIS W/CONTRAST: CPT | Mod: ME

## 2022-02-27 PROCEDURE — 84100 ASSAY OF PHOSPHORUS: CPT

## 2022-02-27 PROCEDURE — 71260 CT THORAX DX C+: CPT | Mod: ME

## 2022-02-27 PROCEDURE — 85610 PROTHROMBIN TIME: CPT

## 2022-02-27 PROCEDURE — U0005: CPT

## 2022-02-27 PROCEDURE — G1004: CPT

## 2022-02-27 PROCEDURE — 70450 CT HEAD/BRAIN W/O DYE: CPT | Mod: MG

## 2022-02-27 PROCEDURE — 71045 X-RAY EXAM CHEST 1 VIEW: CPT

## 2022-02-27 PROCEDURE — 99285 EMERGENCY DEPT VISIT HI MDM: CPT

## 2022-02-27 PROCEDURE — 80048 BASIC METABOLIC PNL TOTAL CA: CPT

## 2022-02-27 PROCEDURE — 99283 EMERGENCY DEPT VISIT LOW MDM: CPT

## 2022-02-27 PROCEDURE — U0003: CPT

## 2022-02-27 PROCEDURE — 85730 THROMBOPLASTIN TIME PARTIAL: CPT

## 2022-02-27 PROCEDURE — 72125 CT NECK SPINE W/O DYE: CPT | Mod: MG

## 2022-02-27 PROCEDURE — 99231 SBSQ HOSP IP/OBS SF/LOW 25: CPT | Mod: GC

## 2022-02-27 PROCEDURE — 84484 ASSAY OF TROPONIN QUANT: CPT

## 2022-02-27 RX ORDER — SODIUM CHLORIDE 9 MG/ML
1000 INJECTION INTRAMUSCULAR; INTRAVENOUS; SUBCUTANEOUS ONCE
Refills: 0 | Status: COMPLETED | OUTPATIENT
Start: 2022-02-27 | End: 2022-02-27

## 2022-02-27 RX ADMIN — ENOXAPARIN SODIUM 40 MILLIGRAM(S): 100 INJECTION SUBCUTANEOUS at 11:15

## 2022-02-27 RX ADMIN — Medication 50 MILLIGRAM(S): at 05:39

## 2022-02-27 RX ADMIN — GABAPENTIN 100 MILLIGRAM(S): 400 CAPSULE ORAL at 05:39

## 2022-02-27 RX ADMIN — SODIUM CHLORIDE 1000 MILLILITER(S): 9 INJECTION INTRAMUSCULAR; INTRAVENOUS; SUBCUTANEOUS at 14:45

## 2022-02-27 RX ADMIN — AMLODIPINE BESYLATE 10 MILLIGRAM(S): 2.5 TABLET ORAL at 05:39

## 2022-02-27 RX ADMIN — Medication 1 TABLET(S): at 11:16

## 2022-02-27 RX ADMIN — OXYCODONE HYDROCHLORIDE 15 MILLIGRAM(S): 5 TABLET ORAL at 03:11

## 2022-02-27 RX ADMIN — Medication 1 MILLIGRAM(S): at 11:16

## 2022-02-27 RX ADMIN — Medication 1 PATCH: at 11:17
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
## 2022-02-27 NOTE — ED STATDOCS - CLINICAL SUMMARY MEDICAL DECISION MAKING FREE TEXT BOX
Spoke to surgical resident who is aware of patient. According to them he is only pending some medical equipment to be delivered to his home. If social work can confirm the availability of DME, then he may be suitable for d/c from the ER.

## 2022-02-27 NOTE — ED ADULT NURSE NOTE - AS SC BRADEN FRICTION
December 12, 2017      Bright Graham MD  [address]  [address]  [facility] [address]           [facility] Otorhinol[facility]ology  [address]  [facility] [address]  Phone: [phone]  Fax: [phone]          Patient: Ted Viera   MR Number: [id number]   YOB: 2018   Date of Visit: 2017       Dear Dr. Bright Graham:    Thank you for referring Ted Viera to me for evaluation. Attached you will find relevant portions of my assessment and plan of care.    If you have questions, please do not hesitate to call me. I look forward to following Ted Viera along with you.    Sincerely,    Mague Ch MD    Enclosure  CC:  No Recipients    If you would like to receive this communication electronically, please contact [email or web address] or [phone] to request more information on [employer] Link access.    For providers and/or their staff who would like to refer a patient to [facility], please contact us through our one-stop-shop provider referral line, [facility], at [phone].    If you feel you have received this communication in error or would no longer like to receive these types of communications, please e-mail [email or web address]         
(3) no apparent problem

## 2022-02-27 NOTE — ED STATDOCS - TOBACCO USE
Anesthetic History   No history of anesthetic complications            Review of Systems / Medical History  Patient summary reviewed, nursing notes reviewed and pertinent labs reviewed    Pulmonary            Asthma        Neuro/Psych   Within defined limits           Cardiovascular  Within defined limits                Exercise tolerance: >4 METS     GI/Hepatic/Renal  Within defined limits             Comments: Ulcerative colitis Endo/Other        Arthritis (rheumatoid)     Other Findings   Comments: History of lumbar HNP           Physical Exam    Airway  Mallampati: I  TM Distance: 4 - 6 cm  Neck ROM: normal range of motion   Mouth opening: Normal     Cardiovascular    Rhythm: regular  Rate: normal         Dental    Dentition: Lower dentition intact and Upper dentition intact     Pulmonary  Breath sounds clear to auscultation               Abdominal         Other Findings            Anesthetic Plan    ASA: 2  Anesthesia type: general      Post-op pain plan if not by surgeon: peripheral nerve block single    Induction: Intravenous  Anesthetic plan and risks discussed with: Patient Unknown if ever smoked
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
## 2022-02-27 NOTE — PROGRESS NOTE ADULT - SUBJECTIVE AND OBJECTIVE BOX
HPI/OVERNIGHT EVENTS: Patient seen and examined at bedside this AM. On librium taper, threatened to leave AMA but when got paperwork decided to stay for the night, CIWA up to 12 once received ativan    Vital Signs Last 24 Hrs  T(C): 36.8 (26 Feb 2022 15:00), Max: 37.3 (26 Feb 2022 12:00)  T(F): 98.3 (26 Feb 2022 15:00), Max: 99.2 (26 Feb 2022 12:00)  HR: 100 (26 Feb 2022 20:00) (90 - 112)  BP: 121/83 (26 Feb 2022 20:00) (115/75 - 143/92)  BP(mean): 96 (26 Feb 2022 20:00) (87 - 108)  RR: 18 (26 Feb 2022 20:00) (16 - 20)  SpO2: 93% (26 Feb 2022 20:00) (90% - 96%)    I&O's Detail    25 Feb 2022 07:01  -  26 Feb 2022 07:00  --------------------------------------------------------  IN:    Oral Fluid: 1200 mL  Total IN: 1200 mL    OUT:    Voided (mL): 1500 mL  Total OUT: 1500 mL    Total NET: -300 mL      Physical Exam:  General: alert and oriented, NAD  Resp: airway patent, respirations unlabored  Abdomen: soft, nontender, nondistended  Extremities: no edema, moving all extremities spontaneously  Skin: warm, dry, appropriate color        LABS:                        16.3   8.52  )-----------( 175      ( 25 Feb 2022 03:13 )             48.2     02-25    137  |  102  |  7.4<L>  ----------------------------<  93  3.4<L>   |  23.0  |  0.72    Ca    8.5<L>      25 Feb 2022 03:13  Phos  2.5     02-25  Mg     1.8     02-25            MEDICATIONS  (STANDING):  amLODIPine   Tablet 10 milliGRAM(s) Oral daily  chlordiazePOXIDE 50 milliGRAM(s) Oral every 12 hours  chlordiazePOXIDE   Oral   chlorhexidine 2% Cloths 1 Application(s) Topical daily  enoxaparin Injectable 40 milliGRAM(s) SubCutaneous daily  folic acid 1 milliGRAM(s) Oral daily  gabapentin 100 milliGRAM(s) Oral every 8 hours  influenza   Vaccine 0.5 milliLiter(s) IntraMuscular once  lidocaine   4% Patch 1 Patch Transdermal every 24 hours  multivitamin 1 Tablet(s) Oral daily  nicotine - 21 mG/24Hr(s) Patch 1 patch Transdermal daily  polyethylene glycol 3350 17 Gram(s) Oral daily  senna 2 Tablet(s) Oral at bedtime  thiamine 100 milliGRAM(s) Oral daily    MEDICATIONS  (PRN):  HYDROmorphone  Injectable 0.5 milliGRAM(s) IV Push every 4 hours PRN Breakthrough pain  ketorolac   Injectable 15 milliGRAM(s) IV Push every 6 hours PRN Mild Pain (1 - 3)  LORazepam   Injectable 2 milliGRAM(s) IV Push every 1 hour PRN Symptom-triggered: each CIWA -Ar score 8 or GREATER  oxyCODONE    IR 10 milliGRAM(s) Oral every 4 hours PRN Moderate Pain (4 - 6)  oxyCODONE    IR 15 milliGRAM(s) Oral every 4 hours PRN Severe Pain (7 - 10)      MICRO:   Cultures     STUDIES:   EKG, CXR, U/S, CT, MRI

## 2022-02-27 NOTE — ED STATDOCS - PATIENT PORTAL LINK FT
You can access the FollowMyHealth Patient Portal offered by [facility] by registering at the following website: [email or web address]. By joining [employer]’s FollowMyHealth portal, you will also be able to view your health information using other applications (apps) compatible with our system.

## 2022-02-27 NOTE — ED ADULT NURSE NOTE - SUICIDE SCREENING QUESTION 1
ED Attending Addendum    Patient was seen and evaluated conjunction with the resident physician.  I perform my own history and physical examination.  I reviewed the resident's note.  I agree with findings except as noted below.  I was available and supervised key portions of procedures.  See resident note for further details.        Patient : Josue Pittman Age: 23 year old Sex: male   MRN: [id number] Encounter Date: 3/13/2020      Addendum     • Patient presents today complaining of chest pain and abdominal pain.  He said symptoms on and off for last 2 months.  He was seen the emergency department twice, and also has followed with a primary care physician.  Sounds like it is felt that this is GI in nature.  He was prescribed Pepcid, Bentyl, and also omeprazole.  States omeprazole did help, however, he ran out.  He describes the pain is burning, as well as sharp, sometimes gets a pressure in his left lower chest as well.  Patient denies shortness of breath although states he does get anxious sometimes.  Denies extremity pain, swelling.  No recent travel.  No tobacco use.  No drug use specifically cocaine, or any other drugs.  • On exam patient is nontoxic-appearing.  Heart is regular, no murmur.  Lungs are clear to auscultation.  Abdomen is soft, nondistended, is epigastric left upper quadrant tenderness, no rebound or guarding.  Extremities atraumatic, no swelling.  • COURSE: Patient presents today with above complaint.  Overall nontoxic-appearing.  D-dimer obtained as patient was tachycardic.  D-dimer negative, overall low risk for PE.  Overall suspicion that this is likely peptic ulcer disease versus gastritis as well.  Regardless, patient is safe for discharge home, but needs a follow-up as an outpatient continued.  Patient understands, is agreeable to this plan.  Will prescribe omeprazole once again for him, and also recommend Carafate.  However, understands the absolute need to follow-up with primary care,  eventually GI, and possibly cardiology if symptoms continue.    Heart rate on my evaluation prior to discharge was 81.            Clinical Impression     ED Diagnosis   1. Costochondritis     2. Peptic ulcer disease      possible         Disposition        Discharge 3/13/2020 11:03 AM  Josue Pittman discharge to home/self care.          Naldo Gamino MD   3/13/2020 11:06 AM           Naldo Gamino MD  03/13/20 [id number]     No

## 2022-02-27 NOTE — ED STATDOCS - ATTENDING CONTRIBUTION TO CARE
I, Ruy Stark, performed the initial face to face bedside interview with this patient regarding history of present illness, review of symptoms and relevant past medical, social and family history.  I completed an independent physical examination.  I was the initial provider who evaluated this patient. I have signed out the follow up of any pending tests (i.e. labs, radiological studies) to the ACP.  I have communicated the patient’s plan of care and disposition with the ACP.
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
## 2022-03-31 NOTE — PROCEDURE NOTE - NSTIMEOUT_GEN_A_CORE
Patient's first and last name, , procedure, and correct site confirmed prior to the start of procedure. Tazorac Counseling:  Patient advised that medication is irritating and drying.  Patient may need to apply sparingly and wash off after an hour before eventually leaving it on overnight.  The patient verbalized understanding of the proper use and possible adverse effects of tazorac.  All of the patient's questions and concerns were addressed.

## 2022-09-23 NOTE — ED ADULT NURSE NOTE - TEMPLATE
Wounds Complex Repair And Rotation Flap Text: The defect edges were debeveled with a #15 scalpel blade.  The primary defect was closed partially with a complex linear closure.  Given the location of the remaining defect, shape of the defect and the proximity to free margins a rotation flap was deemed most appropriate for complete closure of the defect.  Using a sterile surgical marker, an appropriate advancement flap was drawn incorporating the defect and placing the expected incisions within the relaxed skin tension lines where possible.    The area thus outlined was incised deep to adipose tissue with a #15 scalpel blade.  The skin margins were undermined to an appropriate distance in all directions utilizing iris scissors.

## 2022-11-03 NOTE — DISCHARGE NOTE PROVIDER - CARE PROVIDERS DIRECT ADDRESSES
,DirectAddress_Unknown,[email or web address]
Please call the office to schedule a follow up appointment in 2 weeks.

## 2023-01-05 ENCOUNTER — EMERGENCY (EMERGENCY)
Facility: HOSPITAL | Age: 48
LOS: 1 days | Discharge: HOME | End: 2023-01-05
Attending: EMERGENCY MEDICINE
Payer: MEDICAID

## 2023-01-05 VITALS
HEART RATE: 67 BPM | SYSTOLIC BLOOD PRESSURE: 128 MMHG | OXYGEN SATURATION: 97 % | DIASTOLIC BLOOD PRESSURE: 80 MMHG | RESPIRATION RATE: 18 BRPM

## 2023-01-05 VITALS
DIASTOLIC BLOOD PRESSURE: 84 MMHG | OXYGEN SATURATION: 95 % | SYSTOLIC BLOOD PRESSURE: 138 MMHG | RESPIRATION RATE: 16 BRPM | HEART RATE: 83 BPM

## 2023-01-05 DIAGNOSIS — Z90.49 ACQUIRED ABSENCE OF OTHER SPECIFIED PARTS OF DIGESTIVE TRACT: Chronic | ICD-10-CM

## 2023-01-05 PROCEDURE — 73130 X-RAY EXAM OF HAND: CPT | Mod: 26,RT

## 2023-01-05 PROCEDURE — 12002 RPR S/N/AX/GEN/TRNK2.6-7.5CM: CPT

## 2023-01-05 PROCEDURE — 99284 EMERGENCY DEPT VISIT MOD MDM: CPT | Mod: 25

## 2023-01-05 PROCEDURE — 99285 EMERGENCY DEPT VISIT HI MDM: CPT | Mod: 25

## 2023-01-05 PROCEDURE — 90471 IMMUNIZATION ADMIN: CPT

## 2023-01-05 PROCEDURE — 90715 TDAP VACCINE 7 YRS/> IM: CPT

## 2023-01-05 PROCEDURE — 73130 X-RAY EXAM OF HAND: CPT

## 2023-01-05 RX ORDER — TETANUS TOXOID, REDUCED DIPHTHERIA TOXOID AND ACELLULAR PERTUSSIS VACCINE, ADSORBED 5; 2.5; 8; 8; 2.5 [IU]/.5ML; [IU]/.5ML; UG/.5ML; UG/.5ML; UG/.5ML
0.5 SUSPENSION INTRAMUSCULAR ONCE
Refills: 0 | Status: COMPLETED | OUTPATIENT
Start: 2023-01-05 | End: 2023-01-05

## 2023-01-05 RX ORDER — ACETAMINOPHEN 500 MG
975 TABLET ORAL ONCE
Refills: 0 | Status: COMPLETED | OUTPATIENT
Start: 2023-01-05 | End: 2023-01-05

## 2023-01-05 RX ADMIN — Medication 975 MILLIGRAM(S): at 09:29

## 2023-01-05 RX ADMIN — TETANUS TOXOID, REDUCED DIPHTHERIA TOXOID AND ACELLULAR PERTUSSIS VACCINE, ADSORBED 0.5 MILLILITER(S): 5; 2.5; 8; 8; 2.5 SUSPENSION INTRAMUSCULAR at 06:01
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
## 2023-01-05 NOTE — ED ADULT NURSE NOTE - NSFALLRSKPASTHIST_ED_ALL_ED
She wishes to decline Mammogram and Colonoscopy at this time. Pap scheduled for 12/1/22 with Siddharth.  Health Maintenance Topics with due status: Overdue       Topic Date Due    DTaP/Tdap/Td Vaccine Never done    Shingles Vaccine Never done    Breast Cancer Screening 05/04/2019    Colorectal Cancer Screen- 11/13/2021    Cervical Cancer Screen 30-64 - 01/05/2022         
yes
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
## 2023-01-05 NOTE — ED PROCEDURE NOTE - CPROC ED TIME OUT STATEMENT1
“Patient's name, , procedure and correct site were confirmed during the [place] Timeout.”
“Patient's name, , procedure and correct site were confirmed during the [place] Timeout.”
“Patient's name, , procedure and correct site were confirmed during the [place] Timeout.”

## 2023-01-05 NOTE — ED PROCEDURE NOTE - ATTENDING CONTRIBUTION TO CARE
Dr. Galo: I personally supervised this procedure.

## 2023-01-05 NOTE — ED PROVIDER NOTE - PHYSICAL EXAMINATION
General: Awake, alert, lying in bed in NAD. Clinically intoxicated  HEENT: EOMI. No scleral icterus or conjunctival injection. Moist mucous membranes.   Neck:. Soft and supple. Trachea midline  Cardiac: Extremities warm and well perfused. No LE edema.  Resp: No respiratory distress or accessory muscle use. Speaking in full sentences.   Abd: Soft, non-distended. No overlying skin changes  Skin: R hand with 4cm laceration to palm, 1cm superficial laceration to pad of 3rd digit, subcentimeter laceration to volar surface of proximal wrist. Multiple glass shards removed. Dried blood in area without active bleeding  Neuro: AO x 4. Moves all extremities symmetrically. Motor strength and sensation grossly intact.  Psych: Appropriate mood and affect

## 2023-01-05 NOTE — ED PROVIDER NOTE - PROGRESS NOTE DETAILS
s/p laceration repairs, see procedure note. Will continue to observe until sober Fito Larios MD Elly SHETTY  Signed out from [facility] . Pt is now AAOx3, cooperative but still looks drunk. Denies any other pain except R hand. MTF. Elly SHETTY.   Pt can walk with stable gait and responds to answer appropriately, looks clinically sober.

## 2023-01-05 NOTE — ED PROVIDER NOTE - NSFOLLOWUPINSTRUCTIONS_ED_ALL_ED_FT
Alcohol Abuse    Alcohol intoxication occurs when the amount of alcohol that a person has consumed impairs his or her ability to mentally and physically function. Chronic alcohol consumption can also lead to a variety of health issues including neurological disease, stomach disease, heart disease, liver disease, etc. Do not drive after drinking alcohol. Drinking enough alcohol to end up in an Emergency Room suggests you may have an alcohol abuse problem. Seek help at a drug addiction center.    SEEK IMMEDIATE MEDICAL CARE IF YOU HAVE ANY OF THE FOLLOWING SYMPTOMS: seizures, vomiting blood, blood in your stool, lightheadedness/dizziness, or becoming shaky to tremulous when you stop drinking. Alcohol Abuse    Alcohol intoxication occurs when the amount of alcohol that a person has consumed impairs his or her ability to mentally and physically function. Chronic alcohol consumption can also lead to a variety of health issues including neurological disease, stomach disease, heart disease, liver disease, etc. Do not drive after drinking alcohol. Drinking enough alcohol to end up in an Emergency Room suggests you may have an alcohol abuse problem. Seek help at a drug addiction center.    SEEK IMMEDIATE MEDICAL CARE IF YOU HAVE ANY OF THE FOLLOWING SYMPTOMS: seizures, vomiting blood, blood in your stool, lightheadedness/dizziness, or becoming shaky to tremulous when you stop drinking.        Laceration Care, Adult    A laceration is a cut that may go through all layers of the skin and into the tissue that is right under the skin. Some lacerations heal on their own. Others need to be closed with stitches (sutures), staples, skin adhesive strips, or skin glue.  Proper care of a laceration reduces the risk for infection, helps the laceration heal better, and may prevent scarring.    General tips    •Keep the wound clean and dry.  • Do not scratch or pick at the wound.  •Wash your hands with soap and water for at least 20 seconds before and after touching your wound or changing your bandage (dressing). If soap and water are not available, use hand .  • Do not usedisinfectants or antiseptics, such as rubbing alcohol, to clean your wound unless told by your health care provider.  •If you were given a dressing, you should change it at least once a day, or as told by your health care provider. You should also change it if it becomes wet or dirty.      How to care for your laceration    If sutures or staples were used:   •Keep the wound completely dry for the first 24 hours, or as told by your health care provider. After that time, you may shower or bathe. Do not soak your wound in water until after the sutures or staples have been removed.  •Clean the wound once each day, or as told by your health care provider. To do this:  •Wash the wound with soap and water.  •Rinse the wound with water to remove all soap.  •Pat the wound dry with a clean towel. Do not rub the wound.  •After cleaning the wound, apply a thin layer of antibiotic ointment, other topical ointments, or a non-adherent dressing as told by your health care provider. This will help prevent infection and keep the dressing from sticking to the wound.  •Have the sutures or staples removed as told by your health care provider. Do not  remove sutures or staples yourself.    If skin adhesive strips were used:   • Do not get the skin adhesive strips wet. You may shower or bathe, but keep the wound dry.  •If the wound gets wet, pat it dry with a clean towel. Do not rub the wound.  •Skin adhesive strips fall off on their own. If adhesive strip edges start to loosen and curl up, you may trim the loose edges. Do not remove adhesive strips completely unless your health care provider tells you to do that.    If skin glue was used:   •You may shower or bathe, but try to keep the wound dry. Do not soak the wound in water.  •After showering or bathing, pat the wound dry with a clean towel. Do not rub the wound.  • Do not do any activities that will make you sweat a lot until the skin glue has fallen off.  • Do not apply liquid, cream, or ointment medicine to the wound while the skin glue is in place. Doing this may loosen the film before the wound has healed.  •If a dressing is placed over the wound, do not apply tape directly over the skin glue. Doing this may cause the glue to be pulled off before the wound has healed.  • Do not pick at the glue. Skin glue usually remains in place for 5–10 days and then falls off the skin.    Follow these instructions at home:    Medicines   •Take over-the-counter and prescription medicines only as told by your health care provider.  •If you were prescribed an antibiotic medicine or ointment, take or apply it as told by your health care provider. Do not stop using it even if your condition improves.    Managing pain and swelling   •If directed, put ice on the injured area. To do this:  •Put ice in a plastic bag.  •Place a towel between your skin and the bag.  •Leave the ice on for 20 minutes, 2–3 times a day.  •Remove the ice if your skin turns bright red. This is very important. If you cannot feel pain, heat, or cold, you have a greater risk of damage to the area.  •Raise (elevate) the injured area above the level of your heart while you are sitting or lying down for the first 24–48 hours after the laceration is repaired.    General instructions   Two wounds closed with skin glue. One is normal. The other is red with pus and infected.   •Avoid any activity that could cause your wound to reopen.  •Check your wound every day for signs of infection. Watch for:  •More redness, swelling, or pain.  •Fluid or blood.  •Warmth.  •Pus or a bad smell.  •Keep all follow-up visits. This is important.    Contact a health care provider if:  •You received a tetanus shot and you have swelling, severe pain, redness, or bleeding at the injection site.  •Your closed wound breaks open.  •You have any of these signs of infection:  •More redness, swelling, or pain around your wound.  •Fluid or blood coming from your wound.  •Warmth coming from your wound.  •Pus or a bad smell coming from your wound.  •A fever  •You notice something coming out of the wound, such as wood or glass.  •Your pain is not controlled with medicine.  •You notice a change in the color of your skin near your wound.  •You need to change the dressing often.  •You develop a new rash.  •You have numbness around the wound.    Get help right away if:  •You develop severe swelling around the wound.  •Your pain suddenly increases and is severe.  •You develop painful lumps near the wound or on skin anywhere else on your body.  •You have a red streak going away from your wound.  •The wound is on your hand or foot, and you cannot properly move a finger or toe.  •The wound is on your hand or foot, and you notice that your fingers or toes look pale or bluish.    Summary    •A laceration is a cut that may go through all layers of the skin and into the tissue that is right under the skin.  •Some lacerations heal on their own. Others need to be closed with stitches (sutures), staples, skin adhesive strips, or skin glue.  •Proper care of a laceration reduces the risk of infection, helps the laceration heal better, and may prevent scarring.    This information is not intended to replace advice given to you by your health care provider. Make sure you discuss any questions you have with your health care provider. Alcohol Abuse    Alcohol intoxication occurs when the amount of alcohol that a person has consumed impairs his or her ability to mentally and physically function. Chronic alcohol consumption can also lead to a variety of health issues including neurological disease, stomach disease, heart disease, liver disease, etc. Do not drive after drinking alcohol. Drinking enough alcohol to end up in an Emergency Room suggests you may have an alcohol abuse problem. Seek help at a drug addiction center.    SEEK IMMEDIATE MEDICAL CARE IF YOU HAVE ANY OF THE FOLLOWING SYMPTOMS: seizures, vomiting blood, blood in your stool, lightheadedness/dizziness, or becoming shaky to tremulous when you stop drinking.      Laceration Care, Adult    A laceration is a cut that may go through all layers of the skin and into the tissue that is right under the skin. Some lacerations heal on their own. Others need to be closed with stitches (sutures), staples, skin adhesive strips, or skin glue.  Proper care of a laceration reduces the risk for infection, helps the laceration heal better, and may prevent scarring.    General tips    •Keep the wound clean and dry.  • Do not scratch or pick at the wound.  •Wash your hands with soap and water for at least 20 seconds before and after touching your wound or changing your bandage (dressing). If soap and water are not available, use hand .  • Do not usedisinfectants or antiseptics, such as rubbing alcohol, to clean your wound unless told by your health care provider.  •If you were given a dressing, you should change it at least once a day, or as told by your health care provider. You should also change it if it becomes wet or dirty.      How to care for your laceration    If sutures or staples were used:   •Keep the wound completely dry for the first 24 hours, or as told by your health care provider. After that time, you may shower or bathe. Do not soak your wound in water until after the sutures or staples have been removed.  •Clean the wound once each day, or as told by your health care provider. To do this:  •Wash the wound with soap and water.  •Rinse the wound with water to remove all soap.  •Pat the wound dry with a clean towel. Do not rub the wound.  •After cleaning the wound, apply a thin layer of antibiotic ointment, other topical ointments, or a non-adherent dressing as told by your health care provider. This will help prevent infection and keep the dressing from sticking to the wound.  •Have the sutures or staples removed as told by your health care provider. Do not  remove sutures or staples yourself.    If skin adhesive strips were used:   • Do not get the skin adhesive strips wet. You may shower or bathe, but keep the wound dry.  •If the wound gets wet, pat it dry with a clean towel. Do not rub the wound.  •Skin adhesive strips fall off on their own. If adhesive strip edges start to loosen and curl up, you may trim the loose edges. Do not remove adhesive strips completely unless your health care provider tells you to do that.    If skin glue was used:   •You may shower or bathe, but try to keep the wound dry. Do not soak the wound in water.  •After showering or bathing, pat the wound dry with a clean towel. Do not rub the wound.  • Do not do any activities that will make you sweat a lot until the skin glue has fallen off.  • Do not apply liquid, cream, or ointment medicine to the wound while the skin glue is in place. Doing this may loosen the film before the wound has healed.  •If a dressing is placed over the wound, do not apply tape directly over the skin glue. Doing this may cause the glue to be pulled off before the wound has healed.  • Do not pick at the glue. Skin glue usually remains in place for 5–10 days and then falls off the skin.    Follow these instructions at home:    Medicines   •Take over-the-counter and prescription medicines only as told by your health care provider.  •If you were prescribed an antibiotic medicine or ointment, take or apply it as told by your health care provider. Do not stop using it even if your condition improves.    Managing pain and swelling   •If directed, put ice on the injured area. To do this:  •Put ice in a plastic bag.  •Place a towel between your skin and the bag.  •Leave the ice on for 20 minutes, 2–3 times a day.  •Remove the ice if your skin turns bright red. This is very important. If you cannot feel pain, heat, or cold, you have a greater risk of damage to the area.  •Raise (elevate) the injured area above the level of your heart while you are sitting or lying down for the first 24–48 hours after the laceration is repaired.    General instructions   Two wounds closed with skin glue. One is normal. The other is red with pus and infected.   •Avoid any activity that could cause your wound to reopen.  •Check your wound every day for signs of infection. Watch for:  •More redness, swelling, or pain.  •Fluid or blood.  •Warmth.  •Pus or a bad smell.  •Keep all follow-up visits. This is important.    Contact a health care provider if:  •You received a tetanus shot and you have swelling, severe pain, redness, or bleeding at the injection site.  •Your closed wound breaks open.  •You have any of these signs of infection:  •More redness, swelling, or pain around your wound.  •Fluid or blood coming from your wound.  •Warmth coming from your wound.  •Pus or a bad smell coming from your wound.  •A fever  •You notice something coming out of the wound, such as wood or glass.  •Your pain is not controlled with medicine.  •You notice a change in the color of your skin near your wound.  •You need to change the dressing often.  •You develop a new rash.  •You have numbness around the wound.    Get help right away if:  •You develop severe swelling around the wound.  •Your pain suddenly increases and is severe.  •You develop painful lumps near the wound or on skin anywhere else on your body.  •You have a red streak going away from your wound.  •The wound is on your hand or foot, and you cannot properly move a finger or toe.  •The wound is on your hand or foot, and you notice that your fingers or toes look pale or bluish.    Summary    •A laceration is a cut that may go through all layers of the skin and into the tissue that is right under the skin.  •Some lacerations heal on their own. Others need to be closed with stitches (sutures), staples, skin adhesive strips, or skin glue.  •Proper care of a laceration reduces the risk of infection, helps the laceration heal better, and may prevent scarring.    This information is not intended to replace advice given to you by your health care provider. Make sure you discuss any questions you have with your health care provider.

## 2023-01-05 NOTE — ED PROVIDER NOTE - PATIENT PORTAL LINK FT
You can access the FollowMyHealth Patient Portal offered by [facility] by registering at the following website: [email or web address]. By joining [email or web address]’s FollowMyHealth portal, you will also be able to view your health information using other applications (apps) compatible with our system.
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
## 2023-01-05 NOTE — ED PROVIDER NOTE - NS ED ROS FT
General: Denies fever, chills  HEENT: Denies sore throat  Neck: Denies neck pain  Resp: Denies coughing, SOB  Cardiovascular: Denies CP, palpitations, LE edema  GI: Denies nausea, vomiting, abdominal pain, diarrhea, constipation, blood in stool  : Denies dysuria, hematuria, frequency, incontinence  MSK: Denies back pain  Neuro: Denies HA, dizziness, numbness, weakness  Skin: R hand lacerations
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
## 2023-01-05 NOTE — ED ADULT NURSE REASSESSMENT NOTE - NS ED NURSE REASSESS COMMENT FT1
pt sitting calm in bed. a and o x3. breathing even and unlabored. gait steady. speech clear. eating and drinking. awaiting reassessment by provider.

## 2023-01-06 NOTE — ED POST DISCHARGE NOTE - DETAILS
I spoke with patient regarding radiologic finding of radiopaque foreign body seen on x-ray.  I had pointed out the shard while performing the laceration repair, and stated that it may surface as his wounds continue to heal.  He expressed understanding, as well as that he will be following up for his suture removal in about a week to a week and a half

## 2023-02-28 ENCOUNTER — NON-APPOINTMENT (OUTPATIENT)
Age: 48
End: 2023-02-28

## 2023-05-17 NOTE — PATIENT PROFILE ADULT - NSPROPTRIGHTSUPPORTPERSON_GEN_A_NUR
Department of Anesthesiology  Preprocedure Note       Name:  Mishel Crandall   Age:  14 y.o.  :  2011                                          MRN:  [id number]         Date:  2023      Surgeon: Justine Dietz):  Adry Willett MD    Procedure: Procedure(s):  EGD ESOPHAGOGASTRODUODENOSCOPY    Medications prior to admission:   Prior to Admission medications    Medication Sig Start Date End Date Taking? Authorizing Provider   albuterol sulfate HFA (PROVENTIL;VENTOLIN;PROAIR) 108 (90 Base) MCG/ACT inhaler Inhale 2 puffs into the lungs every 4 hours as needed 10/7/22   Ar Automatic Reconciliation   cetirizine (ZYRTEC) 10 MG tablet Take 1 tablet by mouth daily 3/8/23   Ar Automatic Reconciliation   EPINEPHrine (EPIPEN) 0.3 MG/0.3ML SOAJ injection Inject 0.3 mg into the muscle as needed  Patient not taking: Reported on 2023 11/10/21   Ar Automatic Reconciliation   fluticasone (FLONASE) 50 MCG/ACT nasal spray 2 sprays by Nasal route daily as needed 3/8/23   Ar Automatic Reconciliation   montelukast (SINGULAIR) 5 MG chewable tablet Take 1 tablet by mouth daily 10/7/22   Ar Automatic Reconciliation       Current medications:    No current facility-administered medications for this encounter. Allergies:     Allergies   Allergen Reactions    Cat Hair Extract Itching    Macadamia Nut Oil Swelling    Molds & Smuts Other (See Comments)     Allergy testing    Shellfish Allergy Swelling    Soy Other (See Comments)     RESOLVED    Wheat Itching     RESOLVED    Lac Bovis Nausea And Vomiting     RESOLVED       Problem List:    Patient Active Problem List   Diagnosis Code    Food allergy Z91.018    Allergic rhinitis J30.9    Asthma J45.909    Refractive error H52.7    Atopic dermatitis J82.4    Eosinophilic esophagitis C98.7    Seasonal and perennial allergic rhinitis J30.89, J30.2    Multiple food allergies Z91.018       Past Medical History:        Diagnosis Date    Acute respiratory
declines

## 2023-06-23 ENCOUNTER — EMERGENCY (EMERGENCY)
Facility: HOSPITAL | Age: 48
LOS: 1 days | Discharge: HOME | End: 2023-06-23
Attending: EMERGENCY MEDICINE
Payer: COMMERCIAL

## 2023-06-23 VITALS
DIASTOLIC BLOOD PRESSURE: 87 MMHG | WEIGHT: 179.9 LBS | TEMPERATURE: 98 F | OXYGEN SATURATION: 98 % | HEART RATE: 96 BPM | RESPIRATION RATE: 18 BRPM | SYSTOLIC BLOOD PRESSURE: 160 MMHG

## 2023-06-23 DIAGNOSIS — Z90.49 ACQUIRED ABSENCE OF OTHER SPECIFIED PARTS OF DIGESTIVE TRACT: Chronic | ICD-10-CM

## 2023-06-23 PROCEDURE — 26770 TREAT FINGER DISLOCATION: CPT | Mod: 54

## 2023-06-23 PROCEDURE — 99284 EMERGENCY DEPT VISIT MOD MDM: CPT | Mod: 57

## 2023-06-24 PROCEDURE — 73140 X-RAY EXAM OF FINGER(S): CPT | Mod: 26,59,RT

## 2023-06-24 PROCEDURE — 99285 EMERGENCY DEPT VISIT HI MDM: CPT | Mod: 25

## 2023-06-24 PROCEDURE — 73130 X-RAY EXAM OF HAND: CPT

## 2023-06-24 PROCEDURE — 26725 TREAT FINGER FRACTURE EACH: CPT | Mod: RT

## 2023-06-24 PROCEDURE — 73130 X-RAY EXAM OF HAND: CPT | Mod: 26,RT

## 2023-06-24 PROCEDURE — 73140 X-RAY EXAM OF FINGER(S): CPT

## 2023-06-24 RX ORDER — OXYCODONE AND ACETAMINOPHEN 5; 325 MG/1; MG/1
1 TABLET ORAL
Qty: 15 | Refills: 0
Start: 2023-06-24

## 2023-06-24 NOTE — ED PROVIDER NOTE - PATIENT PORTAL LINK FT
You can access the FollowMyHealth Patient Portal offered by [facility] by registering at the following website: [email or web address]. By joining [employer]’s FollowMyHealth portal, you will also be able to view your health information using other applications (apps) compatible with our system. You can access the FollowMyHealth Patient Portal offered by [facility] by registering at the following website: [email or web address]. By joining [employer]’s FollowMyHealth portal, you will also be able to view your health information using other applications (apps) compatible with our system.

## 2023-06-24 NOTE — ED PROVIDER NOTE - CARE PROVIDER_API CALL
Breonna Abdul  Plastic Surgery  [address], [address]  [place] [address]  Phone: [phone]  Fax: [phone]  Follow Up Time: 1-3 Days

## 2023-06-24 NOTE — ED ADULT NURSE NOTE - NSFALLASSESSNEED_ED_ALL_ED
Goal Outcome Evaluation:  Plan of Care Reviewed With: (P) patient        Progress: (P) no change  Outcome Evaluation: (P) Pt is a 76 yo M presenting to PT following admission due to sepsis, UTI, and cellulitis BLE. Pt was found lying in bed A/O to self only. Pt would answer questions only after his name was said. Pt reports using walker prior to admission, reports no stairs at home. Pt was found to need extended time for all activities performed this date with many verbal cues needed for initiation of movements. Pt was able to complete bed mobility CGA x1, transfered and ambulated min/mod assist x2. Pt was able to side step 3 ft to HOB but noted to have difficulty weight shifting. At this time PT services may benefit the patient by focusing on balance, safety, gait, functional mobility/endurance. PT currently recommending SNF after D/C from facility.   no

## 2023-06-24 NOTE — ED PROVIDER NOTE - NS ED ROS FT
Constitutional: (-)   Musculoskeletal: (-) neck pain, (-) back pain, (+) joint pain  Integumentary: (-) rash, (+) edema  Neurological: (-)weakness/paresthesia

## 2023-06-24 NOTE — ED ADULT NURSE NOTE - OBJECTIVE STATEMENT
PT A&OX4 Pt complaining of right finger pain.  + deformity.  PT stated that he was braking up a dog fight and his finger got caught in the dogs collar.

## 2023-06-24 NOTE — ED ADULT NURSE NOTE - NSFALLUNIVINTERV_ED_ALL_ED
Bed/Stretcher in lowest position, wheels locked, appropriate side rails in place/Call bell, personal items and telephone in reach/Instruct patient to call for assistance before getting out of bed/chair/stretcher/Non-slip footwear applied when patient is off stretcher/[place] to call system/Physically safe environment - no spills, clutter or unnecessary equipment/Purposeful proactive rounding/Room/bathroom lighting operational, light cord in reach

## 2023-06-24 NOTE — ED PROVIDER NOTE - NS ED ATTENDING STATEMENT MOD
Attending Only This was a shared visit with the JAMAAL. I reviewed and verified the documentation and independently performed the documented:

## 2023-06-24 NOTE — ED PROVIDER NOTE - OBJECTIVE STATEMENT
47yoM; with PMH signif for MS, IBS; now p/w right finger pain s/p getting finger caught in collar while breaking up a dog fight. states he did not get bit by the dogs.  c/o pain to 3rd digit with swelling. occurred about 1 hour ago

## 2023-06-24 NOTE — ED PROVIDER NOTE - NSFOLLOWUPINSTRUCTIONS_ED_ALL_ED_FT
You are advised to please follow up with your primary care doctor within the next 24 hours and return to the Emergency Department for worsening symptoms or any other concerns.  Your doctor may call [phone] to follow up on the specific results of the tests performed today in the emergency department.    Finger or Thumb Dislocation  Finger or thumb dislocation happens when the bones in a joint move out of their normal positions. Dislocations may occur in any joint in the fingers or thumb. Finger or thumb dislocation is a common and serious injury.    What are the causes?  This condition is caused by:  A forceful impact or injury to the hand.  A finger or thumb bending the wrong way (hyperextension).  What increases the risk?  Having injured your hand sometime in the past.  Doing the same motions over and over with your hands (repetitive motions), such as when playing sports or doing heavy labor.  Having poor strength and ease of movement (flexibility) in your hand.  What are the signs or symptoms?  The injured joint may look like it is out of place or at an odd angle (deformed).  The injured area having:  Pain.  Swelling.  Bruising.  Not being able to move your finger or thumb the way that you normally would (limited range of motion).  How is this treated?  A mild dislocation is treated by moving your finger or thumb back into place (reduction). Your doctor may do this:  By hand (manually).  With surgery.  You may need surgery if you have:  A very bad dislocation.  A dislocation that cannot be put back in place by hand.  A broken (fractured) bone.  An open wound.  After your finger or thumb is put back into place, a splint may be put on for up to 6 weeks. This will keep your finger or thumb from moving (immobilized). You may also need to:  Do exercises to help you regain use of your hand (physical therapy).  Go to a doctor who has special training in bone disorders (orthopedist) to help treat your condition.  Follow these instructions at home:  If you have a splint:    Do not put pressure on any part of the splint until it is fully hardened. This may take several hours.  Wear the splint as told by your doctor. Remove it only as told by your doctor.  Loosen the splint if your fingers:  Tingle.  Become numb.  Turn cold and blue.  Keep the splint clean.  If the splint is not waterproof:  Do not let it get wet.  Cover it with a watertight covering when you take a bath or shower.  Managing pain, stiffness, and swelling    A bag of ice on a towel on the skin.  If told, put ice on your hand.  If you have a removable splint, remove it as told by your doctor.  Put ice in a plastic bag.  Place a towel between your skin and the bag.  Leave the ice on for 20 minutes, 2–3 times a day.  Move your fingers often.  Raise (elevate) the injured area above the level of your heart while you are sitting or lying down.  Activity    Return to your normal activities as told by your doctor. Ask your doctor what activities are safe for you.  Rest and limit your hand movement as told by your doctor.  Do exercises as told by your doctor.  Driving    Ask your doctor if the medicine prescribed to you requires you to avoid driving or using heavy machinery.  Ask your doctor when it is safe to drive if you have a splint on your hand.  General instructions    Take over-the-counter and prescription medicines only as told by your doctor.  Do not take baths, swim, or use a hot tub until your doctor approves. Ask your doctor if you may take showers. You may only be allowed to take sponge baths.  Do not use any products that contain nicotine or tobacco, such as cigarettes, e-cigarettes, and chewing tobacco. These can delay bone healing. If you need help quitting, ask your doctor.  Keep all follow-up visits as told by your doctor. This is important.  Contact a doctor if:  You have problems with your splint.  You have pain that gets worse or does not get better with medicine.  You have bruising, swelling, or redness that gets worse.  You have trouble moving your finger or thumb after it heals.  Get help right away if:  You lose feeling in your finger or thumb.  You cannot move your finger or thumb.  Your finger or thumb is pale or cold.  You have very bad pain.  Summary  Finger or thumb dislocation happens when the bones in a joint move out of their normal positions.  This condition is caused by a forceful impact or injury to the hand. It may also be caused by a finger or thumb bending the wrong way.  For mild dislocations, this condition is treated by moving your finger or thumb back into position.  You may need surgery to treat this condition.

## 2023-07-10 NOTE — ED STATDOCS - NS ED ATTENDING STATEMENT MOD
Detail Level: Zone Render Risk Assessment In Note?: no Note Text (......Xxx Chief Complaint.): This diagnosis correlates with the Other (Free Text): Apply nystatin cream at home and top with zeabsorb powder in the morning and at night. I have personally performed a face to face diagnostic evaluation on this patient. I have reviewed the ACP note and agree with the history, exam and plan of care, except as noted.

## 2023-08-14 NOTE — ED ADULT NURSE NOTE - NSFALLRSKHARMRISK_ED_ALL_ED
MEDICARE WELLNESS VISIT NOTE  Here for Medicare wellness.  He is independent in activities in instruments of daily living.  Drives shops cooks for himself runs a 2 bedroom home maintains this well cuts the grass all maintenance himself.  He has been in the home for 12 years.  Gets exercise with weights does his walking brushes teeth twice a day and will be getting eye check    HISTORY OF PRESENT ILLNESS:        Feeling well without any problems follows with cardiology and has some narrowing of the carotids and there was a question according to the patient about doing something.  The report the Jared 70% and he is seeing          His twin sister pays much attention to him to make sure that he is okay.  Good relation.     The review of system is negative.  Decreased hearing no dysphagia hoarseness daily cough sputum production angina exertional dyspnea nausea vomiting diarrhea dysuria hematuria joints are fine.  Informs me that earlier this year his mother is 98 years of age passed away and the sister and he is kind of stated it just was old age.    The physical reveals him to be very cheerful get up and go is normal tympanic membranes normal BRENDA EOM intact conjunctiva negative pharynx clear membranes moist of good color neck is supple no adenopathy or bruit chest clear to auscultation peripheral pulses are excellent range of motion joints is good.    Doing well updated medications reviewed findings from cardiac evaluation of recent day and went over his laboratories with him.  Made a copy of his lipids that he can take to the cardiologist.  Diagnoses and all orders for this visit:  Welcome to Medicare preventive visit  Hearing loss, unspecified hearing loss type, unspecified laterality  Stenosis of carotid artery, unspecified laterality  Hyperlipidemia, unspecified hyperlipidemia type        Yovani Ortiz presents for his Welcome to Medicare Medicare Wellness Visit.   He has no current complaints or  concerns.      Patient Care Team:  MARGAUX Elder MD as PCP - General (Family Practice)        Patient Active Problem List   Diagnosis   • Essential (primary) hypertension   • Hearing loss   • Hyperlipidemia   • Prediabetes   • Widened pulse pressure         Past Medical History:   Diagnosis Date   • Carotid stenosis    • Essential (primary) hypertension 2020   • Hearing loss     age 8 knocked out fall basement,hearing aide made worse has had checked more recently   • Hyperlipidemia    • Prediabetes    • PVC (premature ventricular contraction)    • Widened pulse pressure          History reviewed. No pertinent surgical history.      Social History     Tobacco Use   • Smoking status: Never   • Smokeless tobacco: Never   Substance Use Topics   • Alcohol use: Yes     Comment: berr 2-3 days week   • Drug use: Never     Drug use:    Drug Use:    Never           Family History   Problem Relation Age of Onset   • Parkinsonism Father    • Diabetes Father    • Hypertension Father    • Hypertension Sister    • Hypertension Brother    • Heart disease Maternal Grandfather    • Diabetes Niece        Current Outpatient Medications   Medication Sig Dispense Refill   • rosuvastatin (CRESTOR) 20 MG tablet Take 20 mg by mouth daily.     • carvedilol (COREG) 3.125 MG tablet Take 6.25 mg by mouth daily.     • hydroCHLOROthiazide (HYDRODIURIL) 12.5 MG tablet Take 1 tablet by mouth daily. 90 tablet 3   • amLODIPine (NORVASC) 5 MG tablet Take 1 tablet by mouth daily. 90 tablet 3   • irbesartan (AVAPRO) 300 MG tablet TAKE 1 TABLET BY MOUTH EVERY DAY 90 tablet 3   • Multiple Vitamins-Minerals (vitamin - therapeutic multivitamins w/minerals) tablet Take by mouth daily.       No current facility-administered medications for this visit.        The following items on the Medicare Health Risk Assessment were found to be positive  1.) Do you have an Advance directive, living will, or power of  for health care document that contains  your wishes for end of life care?: No     6 b.) How many servings of High Fiber / Whole Grain Foods to you have each day ( 1 serving = 1 cup cold cereal, 1/2 cup cooked cereal, 1 slice bread): 1 per day     11h.) Problems with your hearing: Often     15.) How confident are you that you can control and manage most of your health problems?: Somewhat confident         Vision and Hearing screens:   Vision Screening    Right eye Left eye Both eyes   Without correction 20/100 20/30 20/30   With correction      Hearing Screening - Comments:: Patient is almost deaf in left and his right ear is not good either    Advance care planning documents on file - no     Cognitive/Functional Status: no evidence of cognitive dysfunction by direct observation    Opioid Review: Yovani Simental PHQ 2/9 Score:    PHQ 2:  PHQ 2 Score Adult PHQ 2 Score Adult PHQ 2 Interpretation Little interest or pleasure in activity?   8/14/2023  10:35 AM 0 No further screening needed 0       PHQ 9:       DEPRESSION ASSESSMENT/PLAN:  Depression screening is negative no further plan needed.     Body mass index is 24.35 kg/m².    BMI ASSESSMENT/PLAN:       Needed Screening/Treatment:   None  Needed follow up:  None    See orders.   See Patient Instructions section.   Return in about 6 months (around 2/14/2024).      no

## 2023-09-23 ENCOUNTER — INPATIENT (INPATIENT)
Facility: HOSPITAL | Age: 48
LOS: 5 days | Discharge: HOME | DRG: 386 | End: 2023-09-29
Attending: STUDENT IN AN ORGANIZED HEALTH CARE EDUCATION/TRAINING PROGRAM | Admitting: HOSPITALIST
Payer: COMMERCIAL

## 2023-09-23 VITALS
TEMPERATURE: 98 F | RESPIRATION RATE: 16 BRPM | WEIGHT: 175.05 LBS | OXYGEN SATURATION: 99 % | SYSTOLIC BLOOD PRESSURE: 162 MMHG | DIASTOLIC BLOOD PRESSURE: 69 MMHG | HEIGHT: 70 IN | HEART RATE: 80 BPM

## 2023-09-23 DIAGNOSIS — E87.6 HYPOKALEMIA: ICD-10-CM

## 2023-09-23 DIAGNOSIS — Z90.49 ACQUIRED ABSENCE OF OTHER SPECIFIED PARTS OF DIGESTIVE TRACT: Chronic | ICD-10-CM

## 2023-09-23 LAB
ALBUMIN SERPL ELPH-MCNC: 4.3 G/DL — SIGNIFICANT CHANGE UP (ref 3.3–5.2)
ALP SERPL-CCNC: 116 U/L — SIGNIFICANT CHANGE UP (ref 40–120)
ALT FLD-CCNC: 133 U/L — HIGH
ANION GAP SERPL CALC-SCNC: 13 MMOL/L — SIGNIFICANT CHANGE UP (ref 5–17)
ANION GAP SERPL CALC-SCNC: 15 MMOL/L — SIGNIFICANT CHANGE UP (ref 5–17)
AST SERPL-CCNC: 421 U/L — HIGH
BASOPHILS # BLD AUTO: 0.05 K/UL — SIGNIFICANT CHANGE UP (ref 0–0.2)
BASOPHILS NFR BLD AUTO: 0.4 % — SIGNIFICANT CHANGE UP (ref 0–2)
BILIRUB SERPL-MCNC: 1.4 MG/DL — SIGNIFICANT CHANGE UP (ref 0.4–2)
BUN SERPL-MCNC: 3.6 MG/DL — LOW (ref 8–20)
BUN SERPL-MCNC: 3.7 MG/DL — LOW (ref 8–20)
CALCIUM SERPL-MCNC: 8.2 MG/DL — LOW (ref 8.4–10.5)
CALCIUM SERPL-MCNC: 9.3 MG/DL — SIGNIFICANT CHANGE UP (ref 8.4–10.5)
CHLORIDE SERPL-SCNC: 88 MMOL/L — LOW (ref 96–108)
CHLORIDE SERPL-SCNC: 92 MMOL/L — LOW (ref 96–108)
CO2 SERPL-SCNC: 38 MMOL/L — HIGH (ref 22–29)
CO2 SERPL-SCNC: 40 MMOL/L — HIGH (ref 22–29)
CREAT SERPL-MCNC: 0.61 MG/DL — SIGNIFICANT CHANGE UP (ref 0.5–1.3)
CREAT SERPL-MCNC: 0.63 MG/DL — SIGNIFICANT CHANGE UP (ref 0.5–1.3)
EGFR: 117 ML/MIN/1.73M2 — SIGNIFICANT CHANGE UP
EGFR: 118 ML/MIN/1.73M2 — SIGNIFICANT CHANGE UP
EOSINOPHIL # BLD AUTO: 0.09 K/UL — SIGNIFICANT CHANGE UP (ref 0–0.5)
EOSINOPHIL NFR BLD AUTO: 0.8 % — SIGNIFICANT CHANGE UP (ref 0–6)
GLUCOSE SERPL-MCNC: 104 MG/DL — HIGH (ref 70–99)
GLUCOSE SERPL-MCNC: 97 MG/DL — SIGNIFICANT CHANGE UP (ref 70–99)
HCT VFR BLD CALC: 49.5 % — SIGNIFICANT CHANGE UP (ref 39–50)
HGB BLD-MCNC: 18.3 G/DL — HIGH (ref 13–17)
IMM GRANULOCYTES NFR BLD AUTO: 0.4 % — SIGNIFICANT CHANGE UP (ref 0–0.9)
LYMPHOCYTES # BLD AUTO: 2.79 K/UL — SIGNIFICANT CHANGE UP (ref 1–3.3)
LYMPHOCYTES # BLD AUTO: 24.7 % — SIGNIFICANT CHANGE UP (ref 13–44)
MAGNESIUM SERPL-MCNC: 1.9 MG/DL — SIGNIFICANT CHANGE UP (ref 1.6–2.6)
MCHC RBC-ENTMCNC: 35.2 PG — HIGH (ref 27–34)
MCHC RBC-ENTMCNC: 37 GM/DL — HIGH (ref 32–36)
MCV RBC AUTO: 95.2 FL — SIGNIFICANT CHANGE UP (ref 80–100)
MONOCYTES # BLD AUTO: 0.89 K/UL — SIGNIFICANT CHANGE UP (ref 0–0.9)
MONOCYTES NFR BLD AUTO: 7.9 % — SIGNIFICANT CHANGE UP (ref 2–14)
NEUTROPHILS # BLD AUTO: 7.45 K/UL — HIGH (ref 1.8–7.4)
NEUTROPHILS NFR BLD AUTO: 65.8 % — SIGNIFICANT CHANGE UP (ref 43–77)
PHOSPHATE SERPL-MCNC: 2.8 MG/DL — SIGNIFICANT CHANGE UP (ref 2.4–4.7)
PLATELET # BLD AUTO: 273 K/UL — SIGNIFICANT CHANGE UP (ref 150–400)
POTASSIUM SERPL-MCNC: <1.5 MMOL/L — CRITICAL LOW (ref 3.5–5.3)
POTASSIUM SERPL-MCNC: <1.5 MMOL/L — CRITICAL LOW (ref 3.5–5.3)
POTASSIUM SERPL-SCNC: <1.5 MMOL/L — CRITICAL LOW (ref 3.5–5.3)
POTASSIUM SERPL-SCNC: <1.5 MMOL/L — CRITICAL LOW (ref 3.5–5.3)
PROT SERPL-MCNC: 7.6 G/DL — SIGNIFICANT CHANGE UP (ref 6.6–8.7)
RBC # BLD: 5.2 M/UL — SIGNIFICANT CHANGE UP (ref 4.2–5.8)
RBC # FLD: 14.6 % — HIGH (ref 10.3–14.5)
SODIUM SERPL-SCNC: 143 MMOL/L — SIGNIFICANT CHANGE UP (ref 135–145)
SODIUM SERPL-SCNC: 143 MMOL/L — SIGNIFICANT CHANGE UP (ref 135–145)
WBC # BLD: 11.31 K/UL — HIGH (ref 3.8–10.5)
WBC # FLD AUTO: 11.31 K/UL — HIGH (ref 3.8–10.5)

## 2023-09-23 PROCEDURE — 99223 1ST HOSP IP/OBS HIGH 75: CPT

## 2023-09-23 PROCEDURE — 93010 ELECTROCARDIOGRAM REPORT: CPT

## 2023-09-23 PROCEDURE — 99285 EMERGENCY DEPT VISIT HI MDM: CPT

## 2023-09-23 RX ORDER — NICOTINE POLACRILEX 2 MG
1 GUM BUCCAL DAILY
Refills: 0 | Status: DISCONTINUED | OUTPATIENT
Start: 2023-09-23 | End: 2023-09-24

## 2023-09-23 RX ORDER — POTASSIUM CHLORIDE 20 MEQ
10 PACKET (EA) ORAL
Refills: 0 | Status: COMPLETED | OUTPATIENT
Start: 2023-09-23 | End: 2023-09-24

## 2023-09-23 RX ORDER — SODIUM CHLORIDE 9 MG/ML
1000 INJECTION INTRAMUSCULAR; INTRAVENOUS; SUBCUTANEOUS ONCE
Refills: 0 | Status: COMPLETED | OUTPATIENT
Start: 2023-09-23 | End: 2023-09-23

## 2023-09-23 RX ORDER — ONDANSETRON 8 MG/1
4 TABLET, FILM COATED ORAL EVERY 8 HOURS
Refills: 0 | Status: DISCONTINUED | OUTPATIENT
Start: 2023-09-23 | End: 2023-09-29

## 2023-09-23 RX ORDER — MAGNESIUM SULFATE 500 MG/ML
2 VIAL (ML) INJECTION ONCE
Refills: 0 | Status: COMPLETED | OUTPATIENT
Start: 2023-09-23 | End: 2023-09-23

## 2023-09-23 RX ORDER — POTASSIUM CHLORIDE 20 MEQ
40 PACKET (EA) ORAL EVERY 4 HOURS
Refills: 0 | Status: COMPLETED | OUTPATIENT
Start: 2023-09-23 | End: 2023-09-24

## 2023-09-23 RX ORDER — SODIUM CHLORIDE 9 MG/ML
1000 INJECTION, SOLUTION INTRAVENOUS
Refills: 0 | Status: DISCONTINUED | OUTPATIENT
Start: 2023-09-23 | End: 2023-09-24

## 2023-09-23 RX ORDER — LANOLIN ALCOHOL/MO/W.PET/CERES
3 CREAM (GRAM) TOPICAL AT BEDTIME
Refills: 0 | Status: DISCONTINUED | OUTPATIENT
Start: 2023-09-23 | End: 2023-09-29

## 2023-09-23 RX ORDER — ACETAMINOPHEN 500 MG
650 TABLET ORAL EVERY 6 HOURS
Refills: 0 | Status: DISCONTINUED | OUTPATIENT
Start: 2023-09-23 | End: 2023-09-29

## 2023-09-23 RX ADMIN — SODIUM CHLORIDE 150 MILLILITER(S): 9 INJECTION, SOLUTION INTRAVENOUS at 22:39

## 2023-09-23 RX ADMIN — Medication 2 GRAM(S): at 20:59

## 2023-09-23 RX ADMIN — Medication 100 GRAM(S): at 20:29

## 2023-09-23 RX ADMIN — Medication 100 MILLIEQUIVALENT(S): at 20:29

## 2023-09-23 RX ADMIN — Medication 40 MILLIEQUIVALENT(S): at 20:27

## 2023-09-23 RX ADMIN — Medication 100 MILLIEQUIVALENT(S): at 22:41

## 2023-09-23 RX ADMIN — SODIUM CHLORIDE 1000 MILLILITER(S): 9 INJECTION INTRAMUSCULAR; INTRAVENOUS; SUBCUTANEOUS at 18:35

## 2023-09-23 NOTE — ED ADULT TRIAGE NOTE - CCC[facility] TRG CHIEF CMPLNT
abnormal lab result You can access the FollowMyHealth Patient Portal offered by [facility] by registering at the following website: [email or web address]. By joining [facility]’s FollowMyHealth portal, you will also be able to view your health information using other applications (apps) compatible with our system.

## 2023-09-23 NOTE — H&P ADULT - ASSESSMENT
48 yo male, hx of IBS-Diarrhea predominant, current smoker, who presents to ED sent from PCP for abnormal labs low potassium, found to have and now admitted for critical hypokalemia unable to be measured on lab testing. Course complicated by b/l calf tenderness and coarse breath sounds on exam.     Admit to Medicine. Dr Rodriguez  Tele Bed  regular diet  vitals per routine  ambulate with assist, fall risk due to weakness     #) Hypokalemia    #) Calf Tenderness    #) Current Smoker      VTE PPX: low risk per assessment    Plan of care discussed with patient, ER RN at bedside.      46 yo male, hx of IBS-Diarrhea predominant, current smoker, who presents to ED sent from PCP for abnormal labs low potassium, found to have and now admitted for critical hypokalemia unable to be measured on lab testing. Course complicated by b/l calf tenderness and coarse breath sounds on exam.     Admit to Medicine. Dr Rodriguez  Tele Bed  regular diet  vitals per routine  ambulate with assist, fall risk due to weakness     #) Hypokalemia  suspected secondary to acute on chronic IBS - Diarrhea predominant type  Critically low, unable to register value in EMR, <1.5  Sent in from outpatient with critical K levels  EKG notable for prolonged qtc, Sinus w/o evidence STEMI  2gram Mag sulfate ordered  40meq KCL x 3 ordered  K riders x3 ordered  LR w/20Meq potassium ordered  trend bmp  telemetry monitoring  GI PCR to eval for infectious etiology of diarrhea on top of baseline IBS    #) Calf Tenderness  notable on exam  bilaterally but L>R  patient with sedentary activity due to weakness and fatigue over last week  r/o DVT    #) Abnormal Breath Sounds  per med recon, was recently on Doxycycline from pcp  patient says PCP was concerned for walking PNA and started given he had diarrhea as well  checking xray chest  procal ordered  RVP ordered and pending     #) Current Smoker  nicotine patch     VTE PPX: low risk per assessment    Plan of care discussed with patient, ER RN at bedside.      46 yo male, hx of IBS-Diarrhea predominant, current smoker, who presents to ED sent from PCP for abnormal labs low potassium, found to have and now admitted for critical hypokalemia unable to be measured on lab testing. Course complicated by b/l calf tenderness and coarse breath sounds on exam.     Admit to Medicine. Dr Rodriguez  Tele Bed  regular diet  vitals per routine  ambulate with assist, fall risk due to weakness     #) Hypokalemia  suspected secondary to acute on chronic IBS - Diarrhea predominant type  Critically low, unable to register value in EMR, <1.5  Sent in from outpatient with critical K levels  EKG notable for prolonged qtc, noted Sinus w/o evidence STEMI  2gram Mag sulfate ordered  40meq KCL x 3 ordered  K riders x3 ordered  LR w/20Meq potassium ordered  trend bmp  telemetry monitoring due to high risk of arrythmia due to low K  GI PCR to eval for infectious etiology of diarrhea on top of baseline IBS    #) Calf Tenderness  notable on exam  bilaterally but L>R  patient with sedentary activity due to weakness and fatigue over last week  r/o DVT    #) Abnormal Breath Sounds  per med recon, was recently on Doxycycline from pcp  patient says PCP was concerned for walking PNA and started given he had diarrhea as well  checking xray chest  procal ordered  RVP ordered and pending     #) Current Smoker  nicotine patch     VTE PPX: low risk per assessment    Plan of care discussed with patient, ER RN at bedside.      48 yo male, hx of IBS-Diarrhea predominant, current smoker, who presents to ED sent from PCP for abnormal labs low potassium, found to have and now admitted for critical hypokalemia unable to be measured on lab testing. Course complicated by b/l calf tenderness and coarse breath sounds on exam.     Admit to Medicine. Dr Rodriguez  Tele Bed  regular diet  vitals per routine  ambulate with assist, fall risk due to weakness     #) Hypokalemia  suspected secondary to acute on chronic IBS - Diarrhea predominant type  Critically low, unable to register value in EMR, <1.5  Sent in from outpatient with critical K levels  EKG notable for prolonged qtc, noted Sinus w/o evidence STEMI  2gram Mag sulfate ordered  40meq KCL x 3 ordered  K riders x3 ordered  LR w/20Meq potassium ordered  trend bmp  telemetry monitoring due to high risk of arrythmia due to low K  GI PCR to eval for infectious etiology of diarrhea on top of baseline IBS    #) Calf Tenderness  notable on exam  bilaterally but L>R  patient with sedentary activity due to weakness and fatigue over last week  r/o DVT    #) Abnormal Breath Sounds  per med recon, was recently on Doxycycline from pcp  patient says PCP was concerned for walking PNA and started given he had diarrhea as well  checking xray chest  procal ordered  RVP ordered and pending     #) Transaminitis  noted elevated AST/ALT  RUQ sono ordered     #) Current Smoker  nicotine patch     VTE PPX: low risk per assessment    Plan of care discussed with patient, ER RN at bedside.

## 2023-09-23 NOTE — H&P ADULT - HISTORY OF PRESENT ILLNESS
48 yo male, hx of IBS-Diarrhea predominant, current smoker, who presents to ED sent from PCP for abnormal labs. Patient says that over last week,   46 yo male, hx of IBS-Diarrhea predominant, current smoker, who presents to ED sent from PCP for abnormal labs. Patient says that over last week, he has been having increased frequency of diarrhea even though he has baseline diarrhea 2/2 IBS. Says that he had been going over a dozen times daily. Denied bloody bowel movements. Says that he has been also having worsening weakness and fatigue, not ambulating much at home over last 1-2 weeks. Opted to go to his PCP who evaluated him with labs, was told he may have walking PNA, treated with oral doxy for which patient took 2 tabs. This AM contacted by PMD advising he needed to go to ER emergently 2/2 low K. Says he last was hospitalized for low K many years ago. Currently not being treated for IBS as he says he gave up on treatment years ago after nothing worked. Denied any recent fevers, chills, headaches, blurry vision, chest pain, palpitations, cough, dyspnea, abdominal pain, n/v, blood stools, dysuria, hematuria. Does complain of calf pain and indicates that he did note that his legs have been swollen at times over last week.     In ED: noted Low K, mag 1.2, wbc mildly elevated >11.3. repletion was ordered by ED. Admit to Medicine called.

## 2023-09-23 NOTE — ED ADULT NURSE NOTE - SUICIDE SCREENING QUESTION 2
COPD EDUCATION by COPD CLINICAL EDUCATOR  11/12/2019 at 7:13 AM by Rodger Wright     Patient reviewed by COPD education team. Patient does not have a history or diagnosis of COPD and is a non-smoker, therefore does not qualify for the COPD program.  
No

## 2023-09-23 NOTE — ED ADULT TRIAGE NOTE - CHIEF COMPLAINT QUOTE
"I got a phone call from my doctor that my potassium was critically low. I don't know what it was. got the tests done because I was heaving muscle pains."

## 2023-09-23 NOTE — ED ADULT NURSE NOTE - OBJECTIVE STATEMENT
A&Ox4, resp even/unlabored, ambulatory, steady gait noted, NAD. Pt presenting to ED c/o abnormal lab result. Presents from doctor's office for hypokalemia. Pt states he's had chronic diarrhea and has felt dehydrated for the past few days. He's also felt muscle pains/aches chronically. Denies all other ROS, fevers, chills, weight loss, HA, dizziness, palpitations, CP, SOB, abd pain, n/v/d/c, or any other complaints. Pt placed on CM, tele box, tele tech made aware.

## 2023-09-23 NOTE — ED ADULT NURSE REASSESSMENT NOTE - NS ED NURSE REASSESS COMMENT FT1
Pt remains resting in bed. Pt admitted @ this time - awaiting bed. IV site assessed - dry intact transparent, flushing well, no abnormalities noted. Pt remains on tele monitor. No change in status @ this time. Pt aware and agreeable to POC @ this time.

## 2023-09-23 NOTE — ED PROVIDER NOTE - PHYSICAL EXAMINATION
General: Awake, alert, lying in bed in NAD  HEENT: Normocephalic, atraumatic. No scleral icterus or conjunctival injection. EOMI. Moist mucous membranes. Oropharynx clear.   Neck:. Soft and supple.  Cardiac: RRR, Peripheral pulses 2+ and symmetric. No LE edema.  Resp: Lungs CTAB. No accessory muscle use  Abd: Soft, non-tender, non-distended. No guarding, rebound, or rigidity. No CVA tenderness.   Back: Spine midline and non-tender.   Skin: Nail clubbing. No rashes, abrasions, or lacerations.  Neuro: AO x 4. Moves all extremities symmetrically. Motor strength and sensation grossly intact.  Psych: Appropriate mood and affect

## 2023-09-23 NOTE — ED ADULT NURSE NOTE - NSFALLUNIVINTERV_ED_ALL_ED
18-Jul-2018 20:08 Bed/Stretcher in lowest position, wheels locked, appropriate side rails in place/Call bell, personal items and telephone in reach/Instruct patient to call for assistance before getting out of bed/chair/stretcher/Non-slip footwear applied when patient is off stretcher/[place] to call system/Physically safe environment - no spills, clutter or unnecessary equipment/Purposeful proactive rounding/Room/bathroom lighting operational, light cord in reach

## 2023-09-23 NOTE — H&P ADULT - NSHPPHYSICALEXAM_GEN_ALL_CORE
Vital Signs Last 24 Hrs  T(F): 98.1 (23 Sep 2023 19:49), Max: 98.1 (23 Sep 2023 17:18)  HR: 75 (23 Sep 2023 19:49) (75 - 80)  BP: 133/78 (23 Sep 2023 19:49) (133/78 - 162/69)  RR: 17 (23 Sep 2023 19:49) (16 - 17)  SpO2: 96% (23 Sep 2023 19:49) (96% - 99%)    Physical Exam:  Constitutional: NAD, lethargic but arousable, resting on ED stretcher   Eyes: EOMI, PERRLA  Mouth: Moist oral mucosa, No erythema or exudates noted.   Neck: Soft and supple, No LAD  Respiratory: coarse b/l breath sounds, no wheezes notable. good air entry   Cardiovascular: +s1/s2 RRR, +2/6 JAMES, trace edema b/l le  Gastrointestinal: soft nt nd bs+  Vascular: 2+ peripheral pulses, + calf tenderness b/l.   Neurological: A/O x 3, no focal deficits  Musculoskeletal: freely moves all 4 extremities, 4/5 strength b/l upper and lower extremities  Skin: warm, dry well perfused. No sacral ulcers noted  Psych: has insight into current condition. normal affect. mood appropriate.

## 2023-09-23 NOTE — ED ADULT NURSE REASSESSMENT NOTE - NS ED NURSE REASSESS COMMENT FT1
Patient received from RN @ shift change. Pt on tele box. Pt remains resting comfortably in bed. IV site assessed - dry intact transparent, flushing well, no abnormalities noted. A&Ox4, HEENT clear, LS equal clear bilat, denies SOB, pt on room air, denies CP, abd SNT. No clinical signs of withdraw @ this time.

## 2023-09-23 NOTE — ED PROVIDER NOTE - ATTENDING CONTRIBUTION TO CARE
I performed a history and physical exam of the patient and discussed their management with the resident. I reviewed the resident's note and agree with the documented findings and plan of care. My medical decision making and observations are found below.     Patient sent in by PMD for hypokalemia, unclear how low. Went to PMD for cramping in muscles. H/o IBS-D. No fevers/chills, takes no meds. Obtain labs, EKG, reassess.

## 2023-09-23 NOTE — ED ADULT NURSE NOTE - BREATHING, MLM
Prescription refill request on:     Disp Refills Start End    VICTOZA 18 MG/3ML pen-injector 27 mL 0 1/14/2020     Sig: INJECT 1.8 MG INTO THE SKIN DAILY    Sent to pharmacy as: Victoza 18 MG/3ML Subcutaneous Solution Pen-injector    Class: Eprescribe    E-Prescribing Status: Receipt confirmed by pharmacy (1/14/2020 11:03 AM CST)       Disp Refills Start End    LANTUS SOLOSTAR 100 UNIT/ML pen-injector 60 mL 0 1/14/2020     Sig: INJECT  60 UNITS SUBCUTANEOUSLY EVERY NIGHT    Sent to pharmacy as: Lantus SoloStar 100 UNIT/ML Subcutaneous Solution Pen-injector    Class: Eprescribe    E-Prescribing Status: Receipt confirmed by pharmacy (1/14/2020 11:03 AM CST)       Disp Refills Start End    sertraline (ZOLOFT) 100 MG tablet 180 tablet 3 3/12/2019     Sig - Route: Take 2 tablets by mouth daily. - Oral    Sent to pharmacy as: Sertraline HCl 100 MG Oral Tablet    Class: Eprescribe    E-Prescribing Status: Receipt confirmed by pharmacy (3/12/2019 12:05 PM CDT)        LOV: 1-  NOV: 6-4-2020    Last A1C: 1-, Value:10.2%    Refilled per protocol.  
Spontaneous, unlabored and symmetrical

## 2023-09-23 NOTE — ED PROVIDER NOTE - CLINICAL SUMMARY MEDICAL DECISION MAKING FREE TEXT BOX
45 yo male with a PMH significant for alcohol abuse, multiple sclerosis (untreated), previous admission for EtOH pancreatitis, IBS-D presents from doctor's office for hypokalemia. Pt states he's had chronic diarrhea and has felt dehydrated for the past few days. He's also felt muscle pains/aches chronically. Denies all other ROS, fevers, chills, weight loss, HA, dizziness, CP, SOB, abd pain, n/v/d/c, denies dysuria hematuria, denies LE swelling/pain, denies blurry vision, neck pain, denies back pain.    Patient sent in by PMD for hypokalemia, unclear how low. Went to PMD for cramping in muscles. H/o IBS-D. No fevers/chills, takes no meds. Obtain labs, EKG, reassess.    Admitted for symptomatic hypokalemia 2/2 diarrhea.

## 2023-09-23 NOTE — ED PROVIDER NOTE - OBJECTIVE STATEMENT
47 yo male with a PMH significant for alcohol abuse, multiple sclerosis (untreated), previous admission for EtOH pancreatitis, IBS-D presents from doctor's office for hypokalemia. Pt states he's had chronic diarrhea and has felt dehydrated for the past few days. He's also felt muscle pains/aches chronically. Denies all other ROS, fevers, chills, weight loss, HA, dizziness, CP, SOB, abd pain, n/v/d/c, denies dysuria hematuria, denies LE swelling/pain, denies blurry vision, neck pain, denies back pain.  NKDA.  Smokes 2.5ppd.

## 2023-09-24 DIAGNOSIS — R19.7 DIARRHEA, UNSPECIFIED: ICD-10-CM

## 2023-09-24 DIAGNOSIS — R79.89 OTHER SPECIFIED ABNORMAL FINDINGS OF BLOOD CHEMISTRY: ICD-10-CM

## 2023-09-24 LAB
ANION GAP SERPL CALC-SCNC: 10 MMOL/L — SIGNIFICANT CHANGE UP (ref 5–17)
ANION GAP SERPL CALC-SCNC: 11 MMOL/L — SIGNIFICANT CHANGE UP (ref 5–17)
ANION GAP SERPL CALC-SCNC: 13 MMOL/L — SIGNIFICANT CHANGE UP (ref 5–17)
ANION GAP SERPL CALC-SCNC: 14 MMOL/L — SIGNIFICANT CHANGE UP (ref 5–17)
ANION GAP SERPL CALC-SCNC: 14 MMOL/L — SIGNIFICANT CHANGE UP (ref 5–17)
BASOPHILS # BLD AUTO: 0.03 K/UL — SIGNIFICANT CHANGE UP (ref 0–0.2)
BASOPHILS NFR BLD AUTO: 0.3 % — SIGNIFICANT CHANGE UP (ref 0–2)
BUN SERPL-MCNC: 3.2 MG/DL — LOW (ref 8–20)
BUN SERPL-MCNC: 3.4 MG/DL — LOW (ref 8–20)
BUN SERPL-MCNC: 3.9 MG/DL — LOW (ref 8–20)
BUN SERPL-MCNC: 4 MG/DL — LOW (ref 8–20)
BUN SERPL-MCNC: 4.1 MG/DL — LOW (ref 8–20)
C DIFF BY PCR RESULT: SIGNIFICANT CHANGE UP
CALCIUM SERPL-MCNC: 7.5 MG/DL — LOW (ref 8.4–10.5)
CALCIUM SERPL-MCNC: 7.8 MG/DL — LOW (ref 8.4–10.5)
CALCIUM SERPL-MCNC: 7.9 MG/DL — LOW (ref 8.4–10.5)
CALCIUM SERPL-MCNC: 8.1 MG/DL — LOW (ref 8.4–10.5)
CALCIUM SERPL-MCNC: 8.2 MG/DL — LOW (ref 8.4–10.5)
CHLORIDE SERPL-SCNC: 101 MMOL/L — SIGNIFICANT CHANGE UP (ref 96–108)
CHLORIDE SERPL-SCNC: 93 MMOL/L — LOW (ref 96–108)
CHLORIDE SERPL-SCNC: 94 MMOL/L — LOW (ref 96–108)
CHLORIDE SERPL-SCNC: 95 MMOL/L — LOW (ref 96–108)
CHLORIDE SERPL-SCNC: 99 MMOL/L — SIGNIFICANT CHANGE UP (ref 96–108)
CHLORIDE UR-SCNC: 53 MMOL/L — SIGNIFICANT CHANGE UP
CO2 SERPL-SCNC: 31 MMOL/L — HIGH (ref 22–29)
CO2 SERPL-SCNC: 33 MMOL/L — HIGH (ref 22–29)
CO2 SERPL-SCNC: 35 MMOL/L — HIGH (ref 22–29)
CO2 SERPL-SCNC: 36 MMOL/L — HIGH (ref 22–29)
CO2 SERPL-SCNC: 37 MMOL/L — HIGH (ref 22–29)
CREAT SERPL-MCNC: 0.55 MG/DL — SIGNIFICANT CHANGE UP (ref 0.5–1.3)
CREAT SERPL-MCNC: 0.55 MG/DL — SIGNIFICANT CHANGE UP (ref 0.5–1.3)
CREAT SERPL-MCNC: 0.57 MG/DL — SIGNIFICANT CHANGE UP (ref 0.5–1.3)
CREAT SERPL-MCNC: 0.59 MG/DL — SIGNIFICANT CHANGE UP (ref 0.5–1.3)
CREAT SERPL-MCNC: 0.71 MG/DL — SIGNIFICANT CHANGE UP (ref 0.5–1.3)
CRP SERPL-MCNC: <4 MG/L — SIGNIFICANT CHANGE UP
EGFR: 113 ML/MIN/1.73M2 — SIGNIFICANT CHANGE UP
EGFR: 120 ML/MIN/1.73M2 — SIGNIFICANT CHANGE UP
EGFR: 121 ML/MIN/1.73M2 — SIGNIFICANT CHANGE UP
EGFR: 122 ML/MIN/1.73M2 — SIGNIFICANT CHANGE UP
EGFR: 122 ML/MIN/1.73M2 — SIGNIFICANT CHANGE UP
EOSINOPHIL # BLD AUTO: 0.09 K/UL — SIGNIFICANT CHANGE UP (ref 0–0.5)
EOSINOPHIL NFR BLD AUTO: 0.9 % — SIGNIFICANT CHANGE UP (ref 0–6)
ERYTHROCYTE [SEDIMENTATION RATE] IN BLOOD: 9 MM/HR — SIGNIFICANT CHANGE UP (ref 0–15)
GI PCR PANEL: SIGNIFICANT CHANGE UP
GLUCOSE SERPL-MCNC: 104 MG/DL — HIGH (ref 70–99)
GLUCOSE SERPL-MCNC: 110 MG/DL — HIGH (ref 70–99)
GLUCOSE SERPL-MCNC: 114 MG/DL — HIGH (ref 70–99)
GLUCOSE SERPL-MCNC: 117 MG/DL — HIGH (ref 70–99)
GLUCOSE SERPL-MCNC: 93 MG/DL — SIGNIFICANT CHANGE UP (ref 70–99)
HCT VFR BLD CALC: 44.3 % — SIGNIFICANT CHANGE UP (ref 39–50)
HGB BLD-MCNC: 16.2 G/DL — SIGNIFICANT CHANGE UP (ref 13–17)
IMM GRANULOCYTES NFR BLD AUTO: 0.5 % — SIGNIFICANT CHANGE UP (ref 0–0.9)
LYMPHOCYTES # BLD AUTO: 2.17 K/UL — SIGNIFICANT CHANGE UP (ref 1–3.3)
LYMPHOCYTES # BLD AUTO: 21.1 % — SIGNIFICANT CHANGE UP (ref 13–44)
MAGNESIUM SERPL-MCNC: 2.3 MG/DL — SIGNIFICANT CHANGE UP (ref 1.6–2.6)
MCHC RBC-ENTMCNC: 35.2 PG — HIGH (ref 27–34)
MCHC RBC-ENTMCNC: 36.6 GM/DL — HIGH (ref 32–36)
MCV RBC AUTO: 96.3 FL — SIGNIFICANT CHANGE UP (ref 80–100)
MONOCYTES # BLD AUTO: 0.71 K/UL — SIGNIFICANT CHANGE UP (ref 0–0.9)
MONOCYTES NFR BLD AUTO: 6.9 % — SIGNIFICANT CHANGE UP (ref 2–14)
NEUTROPHILS # BLD AUTO: 7.23 K/UL — SIGNIFICANT CHANGE UP (ref 1.8–7.4)
NEUTROPHILS NFR BLD AUTO: 70.3 % — SIGNIFICANT CHANGE UP (ref 43–77)
OSMOLALITY UR: 291 MOSM/KG — LOW (ref 300–1000)
PHOSPHATE SERPL-MCNC: 1.4 MG/DL — LOW (ref 2.4–4.7)
PLATELET # BLD AUTO: 207 K/UL — SIGNIFICANT CHANGE UP (ref 150–400)
POTASSIUM SERPL-MCNC: 1.7 MMOL/L — CRITICAL LOW (ref 3.5–5.3)
POTASSIUM SERPL-MCNC: 2.2 MMOL/L — CRITICAL LOW (ref 3.5–5.3)
POTASSIUM SERPL-MCNC: 2.3 MMOL/L — CRITICAL LOW (ref 3.5–5.3)
POTASSIUM SERPL-MCNC: <1.5 MMOL/L — CRITICAL LOW (ref 3.5–5.3)
POTASSIUM SERPL-MCNC: <1.5 MMOL/L — CRITICAL LOW (ref 3.5–5.3)
POTASSIUM SERPL-SCNC: 1.7 MMOL/L — CRITICAL LOW (ref 3.5–5.3)
POTASSIUM SERPL-SCNC: 2.2 MMOL/L — CRITICAL LOW (ref 3.5–5.3)
POTASSIUM SERPL-SCNC: 2.3 MMOL/L — CRITICAL LOW (ref 3.5–5.3)
POTASSIUM SERPL-SCNC: <1.5 MMOL/L — CRITICAL LOW (ref 3.5–5.3)
POTASSIUM SERPL-SCNC: <1.5 MMOL/L — CRITICAL LOW (ref 3.5–5.3)
POTASSIUM UR-SCNC: 6 MMOL/L — SIGNIFICANT CHANGE UP
PROCALCITONIN SERPL-MCNC: 0.12 NG/ML — HIGH (ref 0.02–0.1)
RAPID RVP RESULT: SIGNIFICANT CHANGE UP
RBC # BLD: 4.6 M/UL — SIGNIFICANT CHANGE UP (ref 4.2–5.8)
RBC # FLD: 14.8 % — HIGH (ref 10.3–14.5)
SARS-COV-2 RNA SPEC QL NAA+PROBE: SIGNIFICANT CHANGE UP
SODIUM SERPL-SCNC: 141 MMOL/L — SIGNIFICANT CHANGE UP (ref 135–145)
SODIUM SERPL-SCNC: 143 MMOL/L — SIGNIFICANT CHANGE UP (ref 135–145)
SODIUM SERPL-SCNC: 144 MMOL/L — SIGNIFICANT CHANGE UP (ref 135–145)
SODIUM UR-SCNC: 63 MMOL/L — SIGNIFICANT CHANGE UP
TSH SERPL-MCNC: 3.29 UIU/ML — SIGNIFICANT CHANGE UP (ref 0.27–4.2)
UUN UR-MCNC: 251 MG/DL — SIGNIFICANT CHANGE UP
WBC # BLD: 10.28 K/UL — SIGNIFICANT CHANGE UP (ref 3.8–10.5)
WBC # FLD AUTO: 10.28 K/UL — SIGNIFICANT CHANGE UP (ref 3.8–10.5)

## 2023-09-24 PROCEDURE — 99223 1ST HOSP IP/OBS HIGH 75: CPT

## 2023-09-24 PROCEDURE — 71045 X-RAY EXAM CHEST 1 VIEW: CPT | Mod: 26,76

## 2023-09-24 PROCEDURE — 93970 EXTREMITY STUDY: CPT | Mod: 26

## 2023-09-24 PROCEDURE — 76705 ECHO EXAM OF ABDOMEN: CPT | Mod: 26

## 2023-09-24 RX ORDER — POTASSIUM CHLORIDE 20 MEQ
10 PACKET (EA) ORAL
Refills: 0 | Status: DISCONTINUED | OUTPATIENT
Start: 2023-09-24 | End: 2023-09-24

## 2023-09-24 RX ORDER — POTASSIUM CHLORIDE 20 MEQ
40 PACKET (EA) ORAL EVERY 4 HOURS
Refills: 0 | Status: DISCONTINUED | OUTPATIENT
Start: 2023-09-24 | End: 2023-09-24

## 2023-09-24 RX ORDER — POTASSIUM CHLORIDE 20 MEQ
20 PACKET (EA) ORAL
Refills: 0 | Status: COMPLETED | OUTPATIENT
Start: 2023-09-24 | End: 2023-09-24

## 2023-09-24 RX ORDER — SODIUM CHLORIDE 9 MG/ML
1000 INJECTION, SOLUTION INTRAVENOUS
Refills: 0 | Status: DISCONTINUED | OUTPATIENT
Start: 2023-09-24 | End: 2023-09-26

## 2023-09-24 RX ORDER — POTASSIUM CHLORIDE 20 MEQ
20 PACKET (EA) ORAL
Refills: 0 | Status: COMPLETED | OUTPATIENT
Start: 2023-09-24 | End: 2023-09-25

## 2023-09-24 RX ORDER — MAGNESIUM SULFATE 500 MG/ML
2 VIAL (ML) INJECTION ONCE
Refills: 0 | Status: COMPLETED | OUTPATIENT
Start: 2023-09-24 | End: 2023-09-24

## 2023-09-24 RX ORDER — LISINOPRIL 2.5 MG/1
10 TABLET ORAL DAILY
Refills: 0 | Status: DISCONTINUED | OUTPATIENT
Start: 2023-09-24 | End: 2023-09-29

## 2023-09-24 RX ORDER — SODIUM CHLORIDE 9 MG/ML
1000 INJECTION INTRAMUSCULAR; INTRAVENOUS; SUBCUTANEOUS
Refills: 0 | Status: DISCONTINUED | OUTPATIENT
Start: 2023-09-24 | End: 2023-09-24

## 2023-09-24 RX ORDER — HYDRALAZINE HCL 50 MG
5 TABLET ORAL EVERY 6 HOURS
Refills: 0 | Status: DISCONTINUED | OUTPATIENT
Start: 2023-09-24 | End: 2023-09-26

## 2023-09-24 RX ORDER — POTASSIUM CHLORIDE 20 MEQ
40 PACKET (EA) ORAL EVERY 4 HOURS
Refills: 0 | Status: COMPLETED | OUTPATIENT
Start: 2023-09-24 | End: 2023-09-24

## 2023-09-24 RX ORDER — ENOXAPARIN SODIUM 100 MG/ML
40 INJECTION SUBCUTANEOUS EVERY 24 HOURS
Refills: 0 | Status: DISCONTINUED | OUTPATIENT
Start: 2023-09-24 | End: 2023-09-29

## 2023-09-24 RX ORDER — POTASSIUM PHOSPHATE, MONOBASIC POTASSIUM PHOSPHATE, DIBASIC 236; 224 MG/ML; MG/ML
30 INJECTION, SOLUTION INTRAVENOUS ONCE
Refills: 0 | Status: COMPLETED | OUTPATIENT
Start: 2023-09-24 | End: 2023-09-24

## 2023-09-24 RX ORDER — SODIUM CHLORIDE 9 MG/ML
1000 INJECTION, SOLUTION INTRAVENOUS
Refills: 0 | Status: DISCONTINUED | OUTPATIENT
Start: 2023-09-24 | End: 2023-09-24

## 2023-09-24 RX ORDER — POTASSIUM CHLORIDE 20 MEQ
20 PACKET (EA) ORAL
Refills: 0 | Status: DISCONTINUED | OUTPATIENT
Start: 2023-09-24 | End: 2023-09-24

## 2023-09-24 RX ORDER — NICOTINE POLACRILEX 2 MG
1 GUM BUCCAL DAILY
Refills: 0 | Status: DISCONTINUED | OUTPATIENT
Start: 2023-09-24 | End: 2023-09-29

## 2023-09-24 RX ADMIN — Medication 50 MILLIEQUIVALENT(S): at 17:13

## 2023-09-24 RX ADMIN — Medication 100 MILLIEQUIVALENT(S): at 00:56

## 2023-09-24 RX ADMIN — Medication 1 PATCH: at 19:00

## 2023-09-24 RX ADMIN — Medication 50 MILLIEQUIVALENT(S): at 13:38

## 2023-09-24 RX ADMIN — Medication 650 MILLIGRAM(S): at 17:13

## 2023-09-24 RX ADMIN — Medication 40 MILLIEQUIVALENT(S): at 01:00

## 2023-09-24 RX ADMIN — Medication 50 MILLIEQUIVALENT(S): at 11:38

## 2023-09-24 RX ADMIN — Medication 50 MILLIEQUIVALENT(S): at 18:08

## 2023-09-24 RX ADMIN — LISINOPRIL 10 MILLIGRAM(S): 2.5 TABLET ORAL at 11:37

## 2023-09-24 RX ADMIN — Medication 650 MILLIGRAM(S): at 18:13

## 2023-09-24 RX ADMIN — Medication 100 MILLIEQUIVALENT(S): at 09:14

## 2023-09-24 RX ADMIN — Medication 40 MILLIEQUIVALENT(S): at 13:01

## 2023-09-24 RX ADMIN — Medication 25 GRAM(S): at 09:17

## 2023-09-24 RX ADMIN — Medication 40 MILLIEQUIVALENT(S): at 09:17

## 2023-09-24 RX ADMIN — Medication 50 MILLIEQUIVALENT(S): at 21:30

## 2023-09-24 RX ADMIN — Medication 50 MILLIEQUIVALENT(S): at 12:45

## 2023-09-24 RX ADMIN — SODIUM CHLORIDE 75 MILLILITER(S): 9 INJECTION INTRAMUSCULAR; INTRAVENOUS; SUBCUTANEOUS at 09:12

## 2023-09-24 RX ADMIN — Medication 40 MILLIEQUIVALENT(S): at 17:32

## 2023-09-24 RX ADMIN — Medication 40 MILLIEQUIVALENT(S): at 05:44

## 2023-09-24 RX ADMIN — Medication 50 MILLIEQUIVALENT(S): at 16:17

## 2023-09-24 RX ADMIN — SODIUM CHLORIDE 100 MILLILITER(S): 9 INJECTION, SOLUTION INTRAVENOUS at 13:01

## 2023-09-24 RX ADMIN — POTASSIUM PHOSPHATE, MONOBASIC POTASSIUM PHOSPHATE, DIBASIC 83.33 MILLIMOLE(S): 236; 224 INJECTION, SOLUTION INTRAVENOUS at 21:30

## 2023-09-24 RX ADMIN — ENOXAPARIN SODIUM 40 MILLIGRAM(S): 100 INJECTION SUBCUTANEOUS at 09:14

## 2023-09-24 RX ADMIN — Medication 1 PATCH: at 11:37

## 2023-09-24 NOTE — CONSULT NOTE ADULT - SUBJECTIVE AND OBJECTIVE BOX
Patient is a 47y old  Male who presents with a chief complaint of Hypokalemia (24 Sep 2023 08:42)    BRIEF HOSPITAL COURSE: 48yo M with PMH: MS, IBS diagnosed about age 13, tried dietary changes but never had a medical regimen for this. Stopped trying many years ago. Rarely sees doctors, saw his primary this week because of increased diarrhea and muscle cramps. Labs with low potassium, doctor called him and advised to go to ED.   He admits to being hospitalized for low potassium in the remote past.      Events last 24 hours: ***    PAST MEDICAL & SURGICAL HISTORY:  Multiple sclerosis      Chronic diarrhea      ETOH abuse      S/P cholecystectomy        Allergies    No Known Allergies    Intolerances      FAMILY HISTORY:  FHx: diabetes mellitus  father        Review of Systems:  CONSTITUTIONAL: No fever, chills, or fatigue  EYES: No eye pain, visual disturbances, or discharge  ENMT:  No difficulty hearing, tinnitus, vertigo; No sinus or throat pain  NECK: No pain or stiffness  RESPIRATORY: No cough, wheezing, chills or hemoptysis; No shortness of breath  CARDIOVASCULAR: No chest pain, palpitations, dizziness, or leg swelling  GASTROINTESTINAL: No abdominal or epigastric pain. No nausea, vomiting, or hematemesis; No diarrhea or constipation. No melena or hematochezia.  GENITOURINARY: No dysuria, frequency, hematuria, or incontinence  NEUROLOGICAL: No headaches, memory loss, loss of strength, numbness, or tremors  SKIN: No itching, burning, rashes, or lesions   MUSCULOSKELETAL: No joint pain or swelling; No muscle, back, or extremity pain  PSYCHIATRIC: No depression, anxiety, mood swings, or difficulty sleeping      Medications:    hydrALAZINE Injectable 5 milliGRAM(s) IV Push every 6 hours PRN  lisinopril 10 milliGRAM(s) Oral daily      acetaminophen     Tablet .. 650 milliGRAM(s) Oral every 6 hours PRN  melatonin 3 milliGRAM(s) Oral at bedtime PRN  ondansetron Injectable 4 milliGRAM(s) IV Push every 8 hours PRN      enoxaparin Injectable 40 milliGRAM(s) SubCutaneous every 24 hours          potassium chloride   Powder 40 milliEquivalent(s) Oral every 4 hours  potassium chloride  10 mEq/100 mL IVPB 10 milliEquivalent(s) IV Intermittent every 1 hour  sodium chloride 0.9%. 1000 milliLiter(s) IV Continuous <Continuous>        nicotine -  14 mG/24Hr(s) Patch 1 Patch Transdermal daily  nicotine - 21 mG/24Hr(s) Patch 1 Patch Transdermal daily          ICU Vital Signs Last 24 Hrs  T(C): 36.6 (24 Sep 2023 07:34), Max: 36.8 (23 Sep 2023 22:59)  T(F): 97.8 (24 Sep 2023 07:34), Max: 98.2 (23 Sep 2023 22:59)  HR: 64 (24 Sep 2023 07:34) (64 - 81)  BP: 156/79 (24 Sep 2023 07:34) (133/78 - 162/69)  BP(mean): --  ABP: --  ABP(mean): --  RR: 16 (24 Sep 2023 07:34) (16 - 17)  SpO2: 95% (24 Sep 2023 07:34) (95% - 99%)    O2 Parameters below as of 24 Sep 2023 07:34  Patient On (Oxygen Delivery Method): room air          Vital Signs Last 24 Hrs  T(C): 36.6 (24 Sep 2023 07:34), Max: 36.8 (23 Sep 2023 22:59)  T(F): 97.8 (24 Sep 2023 07:34), Max: 98.2 (23 Sep 2023 22:59)  HR: 64 (24 Sep 2023 07:34) (64 - 81)  BP: 156/79 (24 Sep 2023 07:34) (133/78 - 162/69)  BP(mean): --  RR: 16 (24 Sep 2023 07:34) (16 - 17)  SpO2: 95% (24 Sep 2023 07:34) (95% - 99%)    Parameters below as of 24 Sep 2023 07:34  Patient On (Oxygen Delivery Method): room air            I&O's Detail        LABS:                        16.2   10.28 )-----------( 207      ( 24 Sep 2023 03:10 )             44.3     09-24    144  |  94<L>  |  4.1<L>  ----------------------------<  114<H>  <1.5<LL>   |  36.0<H>  |  0.59    Ca    8.2<L>      24 Sep 2023 03:10  Phos  2.8     09-23  Mg     1.9     09-23    TPro  7.6  /  Alb  4.3  /  TBili  1.4  /  DBili  x   /  AST  421<H>  /  ALT  133<H>  /  AlkPhos  116  09-23          CAPILLARY BLOOD GLUCOSE          Urinalysis Basic - ( 24 Sep 2023 03:10 )    Color: x / Appearance: x / SG: x / pH: x  Gluc: 114 mg/dL / Ketone: x  / Bili: x / Urobili: x   Blood: x / Protein: x / Nitrite: x   Leuk Esterase: x / RBC: x / WBC x   Sq Epi: x / Non Sq Epi: x / Bacteria: x      CULTURES:      Physical Examination:    General: No acute distress.  Alert, oriented, interactive, nonfocal    HEENT: Pupils equal, reactive to light.  Symmetric.    PULM: Clear to auscultation bilaterally, no significant sputum production    CVS: Regular rate and rhythm, no murmurs, rubs, or gallops    ABD: Soft, nondistended, nontender, normoactive bowel sounds, no masses    EXT: No edema, nontender    SKIN: Warm and well perfused, no rashes noted.    RADIOLOGY: ***    CRITICAL CARE TIME SPENT: ***   Patient is a 47y old  Male who presents with a chief complaint of Hypokalemia (24 Sep 2023 08:42)    BRIEF HOSPITAL COURSE: 48yo M with PMH: MS, 2.5ppd smoker x30 years, etoh abuse, heavy during covid, now 7-8/day on weekends, rarely during the week, IBS diagnosed about age 13, tried dietary changes but never had a medical regimen for this. Stopped trying many years ago. Rarely sees doctors, saw his primary this week (last seen 5-10 years ago) because of increased diarrhea and muscle cramps. Labs with low potassium, doctor called him and advised to go to ED.   He admits to being hospitalized for low potassium in the remote past.  Lab results Potassium <1.5, admitted to medicine with aggressive replacement, however continued to be low. ICU consulted for central line access and more aggressive management.    PAST MEDICAL & SURGICAL HISTORY:  Multiple sclerosis  Chronic diarrhea  ETOH abuse  S/P cholecystectomy      Allergies  No Known Allergies    FAMILY HISTORY:  FHx: diabetes mellitus  father    Review of Systems:  CONSTITUTIONAL: No fever, chills, +fatigue  EYES: No eye pain, visual disturbances, or discharge  ENMT:  No difficulty hearing, tinnitus, vertigo; No sinus or throat pain  NECK: No pain or stiffness  RESPIRATORY: No cough, wheezing, chills or hemoptysis; No shortness of breath  CARDIOVASCULAR: No chest pain, palpitations, dizziness, or leg swelling  GASTROINTESTINAL: + abdominal/epigastric pain. No nausea, vomiting, or hematemesis; + diarrhea, No melena or hematochezia.  GENITOURINARY: No dysuria, frequency, hematuria, or incontinence  NEUROLOGICAL: No headaches, memory loss, loss of strength, numbness, or tremors  SKIN: No itching, burning, rashes, or lesions   MUSCULOSKELETAL: + muscle aches/cramps, No joint pain or swelling; No muscle, back, or extremity pain  PSYCHIATRIC: No depression, anxiety, mood swings, or difficulty sleeping    Medications:  hydrALAZINE Injectable 5 milliGRAM(s) IV Push every 6 hours PRN  lisinopril 10 milliGRAM(s) Oral daily    acetaminophen     Tablet .. 650 milliGRAM(s) Oral every 6 hours PRN  melatonin 3 milliGRAM(s) Oral at bedtime PRN  ondansetron Injectable 4 milliGRAM(s) IV Push every 8 hours PRN    enoxaparin Injectable 40 milliGRAM(s) SubCutaneous every 24 hours    potassium chloride   Powder 40 milliEquivalent(s) Oral every 4 hours  potassium chloride  10 mEq/100 mL IVPB 10 milliEquivalent(s) IV Intermittent every 1 hour  sodium chloride 0.9%. 1000 milliLiter(s) IV Continuous <Continuous>    nicotine - 21 mG/24Hr(s) Patch 1 Patch Transdermal daily      ICU Vital Signs Last 24 Hrs  T(C): 36.6 (24 Sep 2023 07:34), Max: 36.8 (23 Sep 2023 22:59)  T(F): 97.8 (24 Sep 2023 07:34), Max: 98.2 (23 Sep 2023 22:59)  HR: 64 (24 Sep 2023 07:34) (64 - 81)  BP: 156/79 (24 Sep 2023 07:34) (133/78 - 162/69)  RR: 16 (24 Sep 2023 07:34) (16 - 17)  SpO2: 95% (24 Sep 2023 07:34) (95% - 99%)    O2 Parameters below as of 24 Sep 2023 07:34  Patient On (Oxygen Delivery Method): room air      I&O's Detail    LABS:                        16.2   10.28 )-----------( 207      ( 24 Sep 2023 03:10 )             44.3     09-24    144  |  94<L>  |  4.1<L>  ----------------------------<  114<H>  <1.5<LL>   |  36.0<H>  |  0.59    9/24 9:23am Potassium 1.7    Ca    8.2<L>      24 Sep 2023 03:10  Phos  2.8     09-23  Mg     1.9     09-23    TPro  7.6  /  Alb  4.3  /  TBili  1.4  /  DBili  x   /  AST  421<H>  /  ALT  133<H>  /  AlkPhos  116  09-23    Procalcitonin 0.12  RVP/COVID negative      Urinalysis Basic - ( 24 Sep 2023 03:10 )  Color: x / Appearance: x / SG: x / pH: x  Gluc: 114 mg/dL / Ketone: x  / Bili: x / Urobili: x   Blood: x / Protein: x / Nitrite: x   Leuk Esterase: x / RBC: x / WBC x   Sq Epi: x / Non Sq Epi: x / Bacteria: x      CULTURES: pending      Physical Examination:  General: No acute distress. Pleasant, Alert, oriented, interactive, nonfocal  HEENT: Pupils equal, reactive to light.  Symmetric.  PULM: Clear to auscultation bilaterally, no significant sputum production  CVS: Regular rate and rhythm, no murmurs, rubs, or gallops  ABD: Soft, nondistended, nontender, hyperactive bowel sounds, no masses  EXT: No edema, nontender  SKIN: Warm and well perfused, no rashes noted.    RADIOLOGY:   9/24 Abdominal Sono RUQ  - Hepatomegaly  - Fatty Liver    9/24 Venous Duplex Bilat legs  - No evidence of DVT in either leg    CRITICAL CARE TIME SPENT: 55 minutes

## 2023-09-24 NOTE — CONSULT NOTE ADULT - ASSESSMENT
47 year old male with PMH of polysubstance abuse (tobacco/ETOH), IBS-diarrhea predominant and MS was sent in by PCP for abnormal outpatient labs. Per patient, he was diagnosed with IBS at the age of 14-15; at baseline ~12 episodes of diarrhea daily. Has had prior episodes of hypokalemia requiring hospitalization twice in the remote past. He recently had increased frequency of diarrhea. Reports muscle cramping as well as muscle weakness. Labs here showed undetectable potassium at < 1.5. Still with persistently undetectable hypokalemia despite IV and oral repletion. Nephrology is consulted for hypokalemia.     Persistently Undetectable Hypokalemia  -Secondary to GI loss from IBS-diarrhea predominant  -Thus far have received a total of 150meQ of potassium chloride   -Still with serum potassium < 1.5  -Currently on potassium chloride 40meQ PO x 3 doses   -Will re-order potassium chloride 10meQ IV per hour x 3 doses  -Will benefit with ICU consult for aggressive IV repletion through central line given profound hypokalemia / high risk of life threatening arrhthymias   -Latest serum magnesium is okay  -Maintain telemetry    Elevated Serum Bicarb  -Secondary to contraction alkalosis from profound diarrhea   -Will start D5NS     ICU was consulted. Plan was discussed with attending of primary team.    Perry Lanier DO  Nephrology               47 year old male with PMH of polysubstance abuse (tobacco/ETOH), IBS-diarrhea predominant and MS was sent in by PCP for abnormal outpatient labs. Per patient, he was diagnosed with IBS at the age of 14-15; at baseline ~12 episodes of diarrhea daily. Has had prior episodes of hypokalemia requiring hospitalization twice in the remote past. He recently had increased frequency of diarrhea. Reports muscle cramping as well as muscle weakness. Labs here showed undetectable potassium at < 1.5. Still with persistently undetectable hypokalemia despite IV and oral repletion. Nephrology is consulted for hypokalemia.     Persistently Undetectable Hypokalemia  -Secondary to GI loss from IBS-diarrhea predominant  -Thus far have received a total of 150meQ of potassium chloride   -Still with serum potassium < 1.5  -Currently on potassium chloride 40meQ PO x 3 doses   -Will re-order potassium chloride 10meQ IV per hour x 3 doses  -Will benefit with ICU consult for aggressive IV repletion through central line given profound hypokalemia / high risk of life threatening arrhthymias   -Latest serum magnesium is okay  -Maintain telemetry    Elevated Serum Bicarb  -Secondary to contraction alkalosis from profound diarrhea   -Will start D5NS     ICU was consulted. Plan was discussed with attending of primary team.    ADDENDUM (09/24/23 at 10am): Currently in MICU and getting central line placed. Will d/c potassium chloride 10meQ/hr x 3 runs (peripherally) and replace with potassium chloride 20meQ/hr (through central line) x 6 runs. Likely will need more given profound hypokalemia. Will need repeat labs this evening.     Perry Lanier, DO  Nephrology

## 2023-09-24 NOTE — CONSULT NOTE ADULT - NS PANP COMMENT GEN_ALL_CORE FT
47-year-old  male with multiple sclerosis tobacco use disorder alcohol abuse chronic diarrhea possibly IBS with acute worsening of diarrhea for 1 week presented with cramps worsening diarrhea and weakness admitted to medical floor for multiple lab abnormality including but not limited to severe hypokalemia which persisted despite of IV and p.o. repletion to undetectably low level for which patient being transferred to ICU as patient is at very high risk of sudden cardiac death in light of persistent diarrhea without obvious cause while ruling out infectious etiology without definite treatment and very active aggressive IV and p.o. potassium replenishment with continuous telemetry monitoring serial EKG monitoring and close lab monitoring.  Since this morning, in ICU, patient would receive 120 mEq of IV potassium with 80 mEq of p.o. potassium  Stool studies: GI PCR, ova and parasite, culture, C. difficile, calprotectin, elastase sent; plan discussed with GI; eventual colonoscopy; unfortunately we will hold off on symptomatic treatment of diarrhea up until above studies finalized

## 2023-09-24 NOTE — PATIENT PROFILE ADULT - [id number]_GEN_A_NUR
How Severe Is Your Skin Lesion?: mild Has Your Skin Lesion Been Treated?: not been treated Is This A New Presentation, Or A Follow-Up?: Skin Lesion Learning behavioral activities to cope with urges.  For example, distraction and changing routines

## 2023-09-24 NOTE — CONSULT NOTE ADULT - ASSESSMENT
ICU ASSESSMENT AND PLAN:   46yo M with PMH: MS, 2.5ppd smoker x30 years, etoh abuse, heavy during covid, now 7-8/day on weekends, rarely during the week, IBS diagnosed about age 13, tried dietary changes but never had a medical regimen for this. Stopped trying many years ago. Rarely sees doctors, saw his primary this week (last seen 5-10 years ago) because of increased diarrhea and muscle cramps. Labs with low potassium, doctor called him and advised to go to ED.   He admits to being hospitalized for low potassium in the remote past.  Lab results Potassium <1.5, admitted to medicine with aggressive replacement, however continued to be low. ICU consulted for central line access and more aggressive management.    Admit to ICU    1- Severe Hypokalemia  2- Worsening bowel movements with IBS for many years, last week worse  3- body aches, muscle cramps, likely related to low K  4- Etoh abuse, no current signs of withdrawal  5- MS, has been on no treatment    Neuro:   Monitor neuro status with severe electrolytes disturbances    Resp:  maintain oxygenation >92%, currently on room air  No intervention necessary    Card:   No history of cardiac disease, high risk for arrythmia in setting of severely low K  Telemetry at all time  Can add

## 2023-09-24 NOTE — CONSULT NOTE ADULT - SUBJECTIVE AND OBJECTIVE BOX
47 year old male with PMH of polysubstance abuse (tobacco/ETOH), IBS-diarrhea predominant and MS was sent in by PCP for abnormal outpatient labs. Per patient, he was diagnosed with IBS at the age of 14-15; at baseline ~12 episodes of diarrhea daily. Has had prior episodes of hypokalemia requiring hospitalization twice in the remote past. He recently had increased frequency of diarrhea. Reports muscle cramping as well as muscle weakness. Labs here showed undetectable potassium at < 1.5. Still with persistently undetectable hypokalemia despite IV and oral repletion. Nephrology is consulted for hypokalemia.     PAST MEDICAL & SURGICAL HISTORY:  Multiple sclerosis      Chronic diarrhea      ETOH abuse      S/P cholecystectomy    Allergies    No Known Allergies    Intolerances    Home Medications:  DOXYCYCLINE MONO 100 MG CAP:  (23 Sep 2023 22:05)    FAMILY HISTORY:  FHx: diabetes mellitus  father    Social History:    ROS:     Vital Signs Last 24 Hrs  T(C): 36.6 (24 Sep 2023 07:34), Max: 36.8 (23 Sep 2023 22:59)  T(F): 97.8 (24 Sep 2023 07:34), Max: 98.2 (23 Sep 2023 22:59)  HR: 64 (24 Sep 2023 07:34) (64 - 81)  BP: 156/79 (24 Sep 2023 07:34) (133/78 - 162/69)  BP(mean): --  RR: 16 (24 Sep 2023 07:34) (16 - 17)  SpO2: 95% (24 Sep 2023 07:34) (95% - 99%)    Parameters below as of 24 Sep 2023 07:34  Patient On (Oxygen Delivery Method): room air    I&O's Summary    Vital Signs Last 24 Hrs  T(C): 36.6 (24 Sep 2023 07:34), Max: 36.8 (23 Sep 2023 22:59)  T(F): 97.8 (24 Sep 2023 07:34), Max: 98.2 (23 Sep 2023 22:59)  HR: 64 (24 Sep 2023 07:34) (64 - 81)  BP: 156/79 (24 Sep 2023 07:34) (133/78 - 162/69)  BP(mean): --  RR: 16 (24 Sep 2023 07:34) (16 - 17)  SpO2: 95% (24 Sep 2023 07:34) (95% - 99%)    Parameters below as of 24 Sep 2023 07:34  Patient On (Oxygen Delivery Method): room air    I&O's Summary    Physical Exam  General: WDWN male in NAD  HEENT: Normocephalic  Cardiac: S1S2 RRR  Respiratory: CTAB  Abdomen: Soft, NT  Extremities: No appreciable edema  Neuro: AAOx3  Psych: Calm     09-24    144  |  94<L>  |  4.1<L>  ----------------------------<  114<H>  <1.5<LL>   |  36.0<H>  |  0.59    Ca    8.2<L>      24 Sep 2023 03:10  Phos  2.8     09-23  Mg     1.9     09-23    TPro  7.6  /  Alb  4.3  /  TBili  1.4  /  DBili  x   /  AST  421<H>  /  ALT  133<H>  /  AlkPhos  116  09-23                        16.2   10.28 )-----------( 207      ( 24 Sep 2023 03:10 )             44.3     MEDICATIONS  (STANDING):  enoxaparin Injectable 40 milliGRAM(s) SubCutaneous every 24 hours  lisinopril 10 milliGRAM(s) Oral daily  magnesium sulfate  IVPB 2 Gram(s) IV Intermittent once  nicotine -  14 mG/24Hr(s) Patch 1 Patch Transdermal daily  potassium chloride   Powder 40 milliEquivalent(s) Oral every 4 hours  potassium chloride  10 mEq/100 mL IVPB 10 milliEquivalent(s) IV Intermittent every 1 hour  sodium chloride 0.9% 1000 milliLiter(s) (150 mL/Hr) IV Continuous <Continuous>    MEDICATIONS  (PRN):  acetaminophen     Tablet .. 650 milliGRAM(s) Oral every 6 hours PRN Temp greater or equal to 38C (100.4F), Mild Pain (1 - 3)  aluminum hydroxide/magnesium hydroxide/simethicone Suspension 30 milliLiter(s) Oral every 4 hours PRN Dyspepsia  hydrALAZINE Injectable 5 milliGRAM(s) IV Push every 6 hours PRN for sbp above 160 mmhg  melatonin 3 milliGRAM(s) Oral at bedtime PRN Insomnia  ondansetron Injectable 4 milliGRAM(s) IV Push every 8 hours PRN Nausea and/or Vomiting

## 2023-09-24 NOTE — PROGRESS NOTE ADULT - SUBJECTIVE AND OBJECTIVE BOX
Patient is a 47y old  Male who presents with a chief complaint of Hypokalemia (24 Sep 2023 09:21)      HPI:  48 yo male, nx of MS, ETOH  7-8 drinkson weekends hx of IBS-Diarrhea predominant, current smoker, who presents to ED sent from PCP for abnormal labs. Severe hypokalemia  Patient states that since the age of 11 he has 12 loose bowel movements a day.  He presumed he had irritable bowel syndrome.  Has tried multiple medications which have not worked.  He has no family history of inflammatory bowel disease or colon cancer in a first-degree relative.  Patient states his diarrhea became acutely worse to the point that he was almost incontinent.  It severe fecal urgency.  He does have some lower abdominal cramping before bowel movement.  Still around 12 loose bowel movements a day.  No rectal bleeding.  Feeling weak and leg pain.  He saw his primary MD and thought he may have "walking pneumonia".  He was given doxycycline.  He took 2 pills.  Labs were done and he was found to have a critically low potassium at less than 1.5.    Last colonoscopy was 10 years ago–she does not recall the results.  Patient has no recent travel history no antibiotics no sick contacts.  No fevers at home.    In the emergency potassium less than 1.5, his , .  Ultrasound showed fatty liver.  Chest x-ray showed some right-sided atelectasis.        REVIEW OF SYSTEMS:  Constitutional: No fever, weight loss or fatigue  ENMT:  No difficulty hearing, tinnitus, vertigo; No sinus or throat pain  Respiratory: No cough, wheezing, chills or hemoptysis  Cardiovascular: No chest pain, palpitations, dizziness or leg swelling  Gastrointestinal: No abdominal or epigastric pain. No nausea, vomiting or hematemesis;  diarrhea o  Skin: No itching, burning, rashes or lesions   Musculoskeletal: No joint pain or swelling; No muscle, back or extremity pain    PAST MEDICAL & SURGICAL HISTORY:  Multiple sclerosis      Chronic diarrhea      ETOH abuse      S/P cholecystectomy          FAMILY HISTORY:  FHx: diabetes mellitus  father        SOCIAL HISTORY:  Smoking Status: [ ] Current, [ ] Former, [ ] Never  Pack Years:  [  ] EtOH-no  [  ] IVDA    MEDICATIONS:  MEDICATIONS  (STANDING):  dextrose 5% + sodium chloride 0.9%. 1000 milliLiter(s) (100 mL/Hr) IV Continuous <Continuous>  enoxaparin Injectable 40 milliGRAM(s) SubCutaneous every 24 hours  lisinopril 10 milliGRAM(s) Oral daily  nicotine - 21 mG/24Hr(s) Patch 1 Patch Transdermal daily  potassium chloride   Powder 40 milliEquivalent(s) Oral every 4 hours  potassium chloride  20 mEq/100 mL IVPB 20 milliEquivalent(s) IV Intermittent every 2 hours  potassium chloride  20 mEq/100 mL IVPB 20 milliEquivalent(s) IV Intermittent every 2 hours    MEDICATIONS  (PRN):  acetaminophen     Tablet .. 650 milliGRAM(s) Oral every 6 hours PRN Temp greater or equal to 38C (100.4F), Mild Pain (1 - 3)  hydrALAZINE Injectable 5 milliGRAM(s) IV Push every 6 hours PRN for sbp above 160 mmhg  melatonin 3 milliGRAM(s) Oral at bedtime PRN Insomnia  ondansetron Injectable 4 milliGRAM(s) IV Push every 8 hours PRN Nausea and/or Vomiting      Allergies    No Known Allergies    Intolerances        Vital Signs Last 24 Hrs  T(C): 36.9 (24 Sep 2023 11:00), Max: 36.9 (24 Sep 2023 11:00)  T(F): 98.4 (24 Sep 2023 11:00), Max: 98.4 (24 Sep 2023 11:00)  HR: 83 (24 Sep 2023 13:00) (64 - 94)  BP: 129/73 (24 Sep 2023 13:00) (129/73 - 162/69)  BP(mean): 90 (24 Sep 2023 13:00) (90 - 108)  RR: 17 (24 Sep 2023 13:00) (13 - 22)  SpO2: 97% (24 Sep 2023 13:00) (94% - 99%)    Parameters below as of 24 Sep 2023 12:00  Patient On (Oxygen Delivery Method): room air        09-24 @ 07:01  -  09-24 @ 15:00  --------------------------------------------------------  IN: 850 mL / OUT: 200 mL / NET: 650 mL          PHYSICAL EXAM:    General:  in no acute distress  HEENT: MMM, conjunctiva and sclera clear  H-RRR  L-CTA  Gastrointestinal: Soft,  non-distended; Normal bowel sounds; No rebound or guarding.Patient does have some tenderness on palpation of the upper abdomen.  Extremities: Normal range of motion, No clubbing, cyanosis or edema  Neurological: Alert and oriented x3  Skin: Warm and dry. No obvious rash      LABS:                        16.2   10.28 )-----------( 207      ( 24 Sep 2023 03:10 )             44.3     24 Sep 2023 09:23    144    |  95     |  4.0    ----------------------------<  104    1.7     |  35.0   |  0.55     Ca    7.9        24 Sep 2023 09:23  Phos  2.8       23 Sep 2023 18:37  Mg     1.9       23 Sep 2023 18:37    TPro  7.6    /  Alb  4.3    /  TBili  1.4    /  DBili  x      /  AST  421    /  ALT  133    /  AlkPhos  116    / Amylase x      /Lipase x      23 Sep 2023 18:37              RADIOLOGY & ADDITIONAL STUDIES:     < from: US Abdomen Upper Quadrant Right (09.24.23 @ 00:31) >  IMPRESSION:  Hepatomegaly.    Fatty liver.    < end of copied text >

## 2023-09-25 ENCOUNTER — TRANSCRIPTION ENCOUNTER (OUTPATIENT)
Age: 48
End: 2023-09-25

## 2023-09-25 DIAGNOSIS — K76.0 FATTY (CHANGE OF) LIVER, NOT ELSEWHERE CLASSIFIED: ICD-10-CM

## 2023-09-25 DIAGNOSIS — F10.10 ALCOHOL ABUSE, UNCOMPLICATED: ICD-10-CM

## 2023-09-25 LAB
ALBUMIN SERPL ELPH-MCNC: 2.9 G/DL — LOW (ref 3.3–5.2)
ALBUMIN SERPL ELPH-MCNC: 3 G/DL — LOW (ref 3.3–5.2)
ALP SERPL-CCNC: 75 U/L — SIGNIFICANT CHANGE UP (ref 40–120)
ALP SERPL-CCNC: 82 U/L — SIGNIFICANT CHANGE UP (ref 40–120)
ALT FLD-CCNC: 107 U/L — HIGH
ALT FLD-CCNC: 97 U/L — HIGH
ANA TITR SER: NEGATIVE — SIGNIFICANT CHANGE UP
ANION GAP SERPL CALC-SCNC: 10 MMOL/L — SIGNIFICANT CHANGE UP (ref 5–17)
ANION GAP SERPL CALC-SCNC: 10 MMOL/L — SIGNIFICANT CHANGE UP (ref 5–17)
ANION GAP SERPL CALC-SCNC: 11 MMOL/L — SIGNIFICANT CHANGE UP (ref 5–17)
ANION GAP SERPL CALC-SCNC: 9 MMOL/L — SIGNIFICANT CHANGE UP (ref 5–17)
AST SERPL-CCNC: 259 U/L — HIGH
AST SERPL-CCNC: 260 U/L — HIGH
BASOPHILS # BLD AUTO: 0.02 K/UL — SIGNIFICANT CHANGE UP (ref 0–0.2)
BASOPHILS # BLD AUTO: 0.04 K/UL — SIGNIFICANT CHANGE UP (ref 0–0.2)
BASOPHILS NFR BLD AUTO: 0.2 % — SIGNIFICANT CHANGE UP (ref 0–2)
BASOPHILS NFR BLD AUTO: 0.4 % — SIGNIFICANT CHANGE UP (ref 0–2)
BILIRUB SERPL-MCNC: 0.7 MG/DL — SIGNIFICANT CHANGE UP (ref 0.4–2)
BILIRUB SERPL-MCNC: 0.7 MG/DL — SIGNIFICANT CHANGE UP (ref 0.4–2)
BUN SERPL-MCNC: 3 MG/DL — LOW (ref 8–20)
BUN SERPL-MCNC: 3.7 MG/DL — LOW (ref 8–20)
BUN SERPL-MCNC: <3 MG/DL — LOW (ref 8–20)
BUN SERPL-MCNC: <3 MG/DL — LOW (ref 8–20)
CALCIUM SERPL-MCNC: 7.1 MG/DL — LOW (ref 8.4–10.5)
CALCIUM SERPL-MCNC: 7.3 MG/DL — LOW (ref 8.4–10.5)
CALCIUM SERPL-MCNC: 7.4 MG/DL — LOW (ref 8.4–10.5)
CALCIUM SERPL-MCNC: 7.5 MG/DL — LOW (ref 8.4–10.5)
CHLORIDE SERPL-SCNC: 103 MMOL/L — SIGNIFICANT CHANGE UP (ref 96–108)
CHLORIDE SERPL-SCNC: 103 MMOL/L — SIGNIFICANT CHANGE UP (ref 96–108)
CHLORIDE SERPL-SCNC: 107 MMOL/L — SIGNIFICANT CHANGE UP (ref 96–108)
CHLORIDE SERPL-SCNC: 107 MMOL/L — SIGNIFICANT CHANGE UP (ref 96–108)
CO2 SERPL-SCNC: 27 MMOL/L — SIGNIFICANT CHANGE UP (ref 22–29)
CO2 SERPL-SCNC: 28 MMOL/L — SIGNIFICANT CHANGE UP (ref 22–29)
CO2 SERPL-SCNC: 30 MMOL/L — HIGH (ref 22–29)
CO2 SERPL-SCNC: 32 MMOL/L — HIGH (ref 22–29)
CREAT SERPL-MCNC: 0.51 MG/DL — SIGNIFICANT CHANGE UP (ref 0.5–1.3)
CREAT SERPL-MCNC: 0.53 MG/DL — SIGNIFICANT CHANGE UP (ref 0.5–1.3)
CREAT SERPL-MCNC: 0.53 MG/DL — SIGNIFICANT CHANGE UP (ref 0.5–1.3)
CREAT SERPL-MCNC: 0.56 MG/DL — SIGNIFICANT CHANGE UP (ref 0.5–1.3)
EGFR: 122 ML/MIN/1.73M2 — SIGNIFICANT CHANGE UP
EGFR: 124 ML/MIN/1.73M2 — SIGNIFICANT CHANGE UP
EGFR: 124 ML/MIN/1.73M2 — SIGNIFICANT CHANGE UP
EGFR: 125 ML/MIN/1.73M2 — SIGNIFICANT CHANGE UP
EOSINOPHIL # BLD AUTO: 0.06 K/UL — SIGNIFICANT CHANGE UP (ref 0–0.5)
EOSINOPHIL # BLD AUTO: 0.09 K/UL — SIGNIFICANT CHANGE UP (ref 0–0.5)
EOSINOPHIL NFR BLD AUTO: 0.6 % — SIGNIFICANT CHANGE UP (ref 0–6)
EOSINOPHIL NFR BLD AUTO: 1 % — SIGNIFICANT CHANGE UP (ref 0–6)
GLUCOSE SERPL-MCNC: 101 MG/DL — HIGH (ref 70–99)
GLUCOSE SERPL-MCNC: 103 MG/DL — HIGH (ref 70–99)
GLUCOSE SERPL-MCNC: 94 MG/DL — SIGNIFICANT CHANGE UP (ref 70–99)
GLUCOSE SERPL-MCNC: 95 MG/DL — SIGNIFICANT CHANGE UP (ref 70–99)
HAV IGM SER-ACNC: SIGNIFICANT CHANGE UP
HBV CORE IGM SER-ACNC: SIGNIFICANT CHANGE UP
HBV SURFACE AG SER-ACNC: SIGNIFICANT CHANGE UP
HCT VFR BLD CALC: 36.4 % — LOW (ref 39–50)
HCT VFR BLD CALC: 38.1 % — LOW (ref 39–50)
HCV AB S/CO SERPL IA: 0.07 S/CO — SIGNIFICANT CHANGE UP (ref 0–0.99)
HCV AB SERPL-IMP: SIGNIFICANT CHANGE UP
HGB BLD-MCNC: 13 G/DL — SIGNIFICANT CHANGE UP (ref 13–17)
HGB BLD-MCNC: 13.8 G/DL — SIGNIFICANT CHANGE UP (ref 13–17)
IMM GRANULOCYTES NFR BLD AUTO: 0.3 % — SIGNIFICANT CHANGE UP (ref 0–0.9)
IMM GRANULOCYTES NFR BLD AUTO: 0.4 % — SIGNIFICANT CHANGE UP (ref 0–0.9)
LYMPHOCYTES # BLD AUTO: 2.13 K/UL — SIGNIFICANT CHANGE UP (ref 1–3.3)
LYMPHOCYTES # BLD AUTO: 2.35 K/UL — SIGNIFICANT CHANGE UP (ref 1–3.3)
LYMPHOCYTES # BLD AUTO: 20.7 % — SIGNIFICANT CHANGE UP (ref 13–44)
LYMPHOCYTES # BLD AUTO: 26.2 % — SIGNIFICANT CHANGE UP (ref 13–44)
MAGNESIUM SERPL-MCNC: 1.8 MG/DL — SIGNIFICANT CHANGE UP (ref 1.8–2.6)
MAGNESIUM SERPL-MCNC: 2.1 MG/DL — SIGNIFICANT CHANGE UP (ref 1.6–2.6)
MAGNESIUM SERPL-MCNC: 2.1 MG/DL — SIGNIFICANT CHANGE UP (ref 1.6–2.6)
MCHC RBC-ENTMCNC: 35.7 GM/DL — SIGNIFICANT CHANGE UP (ref 32–36)
MCHC RBC-ENTMCNC: 35.7 PG — HIGH (ref 27–34)
MCHC RBC-ENTMCNC: 35.9 PG — HIGH (ref 27–34)
MCHC RBC-ENTMCNC: 36.2 GM/DL — HIGH (ref 32–36)
MCV RBC AUTO: 100.6 FL — HIGH (ref 80–100)
MCV RBC AUTO: 98.4 FL — SIGNIFICANT CHANGE UP (ref 80–100)
MONOCYTES # BLD AUTO: 0.6 K/UL — SIGNIFICANT CHANGE UP (ref 0–0.9)
MONOCYTES # BLD AUTO: 0.72 K/UL — SIGNIFICANT CHANGE UP (ref 0–0.9)
MONOCYTES NFR BLD AUTO: 6.7 % — SIGNIFICANT CHANGE UP (ref 2–14)
MONOCYTES NFR BLD AUTO: 7 % — SIGNIFICANT CHANGE UP (ref 2–14)
MRSA PCR RESULT.: SIGNIFICANT CHANGE UP
NEUTROPHILS # BLD AUTO: 5.89 K/UL — SIGNIFICANT CHANGE UP (ref 1.8–7.4)
NEUTROPHILS # BLD AUTO: 7.32 K/UL — SIGNIFICANT CHANGE UP (ref 1.8–7.4)
NEUTROPHILS NFR BLD AUTO: 65.6 % — SIGNIFICANT CHANGE UP (ref 43–77)
NEUTROPHILS NFR BLD AUTO: 70.9 % — SIGNIFICANT CHANGE UP (ref 43–77)
NIGHT BLUE STAIN TISS: SIGNIFICANT CHANGE UP
PH STL: 7.7 — SIGNIFICANT CHANGE UP (ref 5.6–14)
PHOSPHATE SERPL-MCNC: 1.9 MG/DL — LOW (ref 2.4–4.7)
PHOSPHATE SERPL-MCNC: 2 MG/DL — LOW (ref 2.4–4.7)
PHOSPHATE SERPL-MCNC: 3.7 MG/DL — SIGNIFICANT CHANGE UP (ref 2.4–4.7)
PLATELET # BLD AUTO: 181 K/UL — SIGNIFICANT CHANGE UP (ref 150–400)
PLATELET # BLD AUTO: 201 K/UL — SIGNIFICANT CHANGE UP (ref 150–400)
POTASSIUM SERPL-MCNC: 2.8 MMOL/L — CRITICAL LOW (ref 3.5–5.3)
POTASSIUM SERPL-MCNC: 3.2 MMOL/L — LOW (ref 3.5–5.3)
POTASSIUM SERPL-SCNC: 2.8 MMOL/L — CRITICAL LOW (ref 3.5–5.3)
POTASSIUM SERPL-SCNC: 3.2 MMOL/L — LOW (ref 3.5–5.3)
PROT SERPL-MCNC: 5.5 G/DL — LOW (ref 6.6–8.7)
PROT SERPL-MCNC: 5.7 G/DL — LOW (ref 6.6–8.7)
RBC # BLD: 3.62 M/UL — LOW (ref 4.2–5.8)
RBC # BLD: 3.87 M/UL — LOW (ref 4.2–5.8)
RBC # FLD: 15.3 % — HIGH (ref 10.3–14.5)
RBC # FLD: 15.3 % — HIGH (ref 10.3–14.5)
S AUREUS DNA NOSE QL NAA+PROBE: SIGNIFICANT CHANGE UP
SODIUM SERPL-SCNC: 141 MMOL/L — SIGNIFICANT CHANGE UP (ref 135–145)
SODIUM SERPL-SCNC: 144 MMOL/L — SIGNIFICANT CHANGE UP (ref 135–145)
SODIUM SERPL-SCNC: 145 MMOL/L — SIGNIFICANT CHANGE UP (ref 135–145)
SODIUM SERPL-SCNC: 147 MMOL/L — HIGH (ref 135–145)
SPECIMEN SOURCE: SIGNIFICANT CHANGE UP
WBC # BLD: 10.31 K/UL — SIGNIFICANT CHANGE UP (ref 3.8–10.5)
WBC # BLD: 8.98 K/UL — SIGNIFICANT CHANGE UP (ref 3.8–10.5)
WBC # FLD AUTO: 10.31 K/UL — SIGNIFICANT CHANGE UP (ref 3.8–10.5)
WBC # FLD AUTO: 8.98 K/UL — SIGNIFICANT CHANGE UP (ref 3.8–10.5)

## 2023-09-25 PROCEDURE — 99232 SBSQ HOSP IP/OBS MODERATE 35: CPT

## 2023-09-25 RX ORDER — POTASSIUM CHLORIDE 20 MEQ
40 PACKET (EA) ORAL EVERY 6 HOURS
Refills: 0 | Status: DISCONTINUED | OUTPATIENT
Start: 2023-09-25 | End: 2023-09-28

## 2023-09-25 RX ORDER — SODIUM CHLORIDE 9 MG/ML
1000 INJECTION, SOLUTION INTRAVENOUS
Refills: 0 | Status: DISCONTINUED | OUTPATIENT
Start: 2023-09-25 | End: 2023-09-26

## 2023-09-25 RX ORDER — POTASSIUM CHLORIDE 20 MEQ
20 PACKET (EA) ORAL
Refills: 0 | Status: COMPLETED | OUTPATIENT
Start: 2023-09-25 | End: 2023-09-25

## 2023-09-25 RX ORDER — MAGNESIUM SULFATE 500 MG/ML
2 VIAL (ML) INJECTION ONCE
Refills: 0 | Status: COMPLETED | OUTPATIENT
Start: 2023-09-25 | End: 2023-09-25

## 2023-09-25 RX ORDER — CHLORHEXIDINE GLUCONATE 213 G/1000ML
1 SOLUTION TOPICAL DAILY
Refills: 0 | Status: DISCONTINUED | OUTPATIENT
Start: 2023-09-25 | End: 2023-09-29

## 2023-09-25 RX ORDER — POTASSIUM CHLORIDE 20 MEQ
40 PACKET (EA) ORAL ONCE
Refills: 0 | Status: COMPLETED | OUTPATIENT
Start: 2023-09-25 | End: 2023-09-25

## 2023-09-25 RX ORDER — POTASSIUM CHLORIDE 20 MEQ
40 PACKET (EA) ORAL EVERY 4 HOURS
Refills: 0 | Status: COMPLETED | OUTPATIENT
Start: 2023-09-25 | End: 2023-09-25

## 2023-09-25 RX ORDER — POTASSIUM CHLORIDE 20 MEQ
40 PACKET (EA) ORAL EVERY 4 HOURS
Refills: 0 | Status: DISCONTINUED | OUTPATIENT
Start: 2023-09-25 | End: 2023-09-25

## 2023-09-25 RX ORDER — POTASSIUM PHOSPHATE, MONOBASIC POTASSIUM PHOSPHATE, DIBASIC 236; 224 MG/ML; MG/ML
15 INJECTION, SOLUTION INTRAVENOUS ONCE
Refills: 0 | Status: COMPLETED | OUTPATIENT
Start: 2023-09-25 | End: 2023-09-25

## 2023-09-25 RX ADMIN — Medication 1 PATCH: at 10:24

## 2023-09-25 RX ADMIN — Medication 50 MILLIEQUIVALENT(S): at 14:37

## 2023-09-25 RX ADMIN — Medication 650 MILLIGRAM(S): at 22:45

## 2023-09-25 RX ADMIN — Medication 40 MILLIEQUIVALENT(S): at 10:10

## 2023-09-25 RX ADMIN — Medication 1 PATCH: at 10:10

## 2023-09-25 RX ADMIN — Medication 650 MILLIGRAM(S): at 21:44

## 2023-09-25 RX ADMIN — CHLORHEXIDINE GLUCONATE 1 APPLICATION(S): 213 SOLUTION TOPICAL at 10:18

## 2023-09-25 RX ADMIN — SODIUM CHLORIDE 150 MILLILITER(S): 9 INJECTION, SOLUTION INTRAVENOUS at 10:09

## 2023-09-25 RX ADMIN — Medication 50 MILLIEQUIVALENT(S): at 12:31

## 2023-09-25 RX ADMIN — Medication 25 GRAM(S): at 22:54

## 2023-09-25 RX ADMIN — Medication 1 PATCH: at 07:12

## 2023-09-25 RX ADMIN — POTASSIUM PHOSPHATE, MONOBASIC POTASSIUM PHOSPHATE, DIBASIC 62.5 MILLIMOLE(S): 236; 224 INJECTION, SOLUTION INTRAVENOUS at 22:55

## 2023-09-25 RX ADMIN — SODIUM CHLORIDE 150 MILLILITER(S): 9 INJECTION, SOLUTION INTRAVENOUS at 17:34

## 2023-09-25 RX ADMIN — Medication 50 MILLIEQUIVALENT(S): at 02:00

## 2023-09-25 RX ADMIN — Medication 50 MILLIEQUIVALENT(S): at 08:46

## 2023-09-25 RX ADMIN — Medication 50 MILLIEQUIVALENT(S): at 04:51

## 2023-09-25 RX ADMIN — Medication 50 MILLIEQUIVALENT(S): at 18:07

## 2023-09-25 RX ADMIN — Medication 40 MILLIEQUIVALENT(S): at 12:30

## 2023-09-25 RX ADMIN — Medication 50 MILLIEQUIVALENT(S): at 07:51

## 2023-09-25 RX ADMIN — Medication 50 MILLIEQUIVALENT(S): at 17:34

## 2023-09-25 RX ADMIN — Medication 50 MILLIEQUIVALENT(S): at 00:00

## 2023-09-25 RX ADMIN — SODIUM CHLORIDE 150 MILLILITER(S): 9 INJECTION, SOLUTION INTRAVENOUS at 22:54

## 2023-09-25 RX ADMIN — ENOXAPARIN SODIUM 40 MILLIGRAM(S): 100 INJECTION SUBCUTANEOUS at 10:24

## 2023-09-25 RX ADMIN — LISINOPRIL 10 MILLIGRAM(S): 2.5 TABLET ORAL at 05:36

## 2023-09-25 RX ADMIN — Medication 50 MILLIEQUIVALENT(S): at 13:26

## 2023-09-25 RX ADMIN — Medication 40 MILLIEQUIVALENT(S): at 17:13

## 2023-09-25 RX ADMIN — Medication 50 MILLIEQUIVALENT(S): at 19:45

## 2023-09-25 NOTE — PROGRESS NOTE ADULT - SUBJECTIVE AND OBJECTIVE BOX
[facility] DIVISION OF KIDNEY DISEASES AND HYPERTENSION -- FOLLOW UP NOTE  --------------------------------------------------------------------------------  Chief Complaint: hypokalemia    24 hour events/subjective:  [facility]  Pt seen and examined; feels well      PAST HISTORY  --------------------------------------------------------------------------------  No significant changes to PMH, PSH, FHx, SHx, unless otherwise noted    ALLERGIES & MEDICATIONS  --------------------------------------------------------------------------------  Allergies    No Known Allergies        Standing Inpatient Medications  chlorhexidine 2% Cloths 1 Application(s) Topical daily  dextrose 5% + sodium chloride 0.9%. 1000 milliLiter(s) IV Continuous <Continuous>  enoxaparin Injectable 40 milliGRAM(s) SubCutaneous every 24 hours  lactated ringers. 1000 milliLiter(s) IV Continuous <Continuous>  lisinopril 10 milliGRAM(s) Oral daily  nicotine - 21 mG/24Hr(s) Patch 1 Patch Transdermal daily  potassium chloride   Powder 40 milliEquivalent(s) Oral every 4 hours  potassium chloride  20 mEq/100 mL IVPB 20 milliEquivalent(s) IV Intermittent every 2 hours    PRN Inpatient Medications  acetaminophen     Tablet .. 650 milliGRAM(s) Oral every 6 hours PRN  hydrALAZINE Injectable 5 milliGRAM(s) IV Push every 6 hours PRN  melatonin 3 milliGRAM(s) Oral at bedtime PRN  ondansetron Injectable 4 milliGRAM(s) IV Push every 8 hours PRN      REVIEW OF SYSTEMS  --------------------------------------------------------------------------------  Gen: No weight changes, fatigue, fevers/chills, weakness  Skin: No rashes  Head/Eyes/Ears/Mouth: No headache; Normal hearing; Normal vision w/o blurriness; No sinus pain/discomfort, sore throat  Respiratory: No dyspnea, cough, wheezing, hemoptysis  CV: No chest pain, PND, orthopnea  GI: No abdominal pain, diarrhea, constipation, nausea, vomiting, melena, hematochezia  : No increased frequency, dysuria, hematuria, nocturia  MSK: No joint pain/swelling; no back pain; no edema  Neuro: No dizziness/lightheadedness, weakness, seizures, numbness, tingling  Heme: No easy bruising or bleeding  Endo: No heat/cold intolerance  Psych: No significant nervousness, anxiety, stress, depression    All other systems were reviewed and are negative, except as noted.    VITALS/PHYSICAL EXAM  --------------------------------------------------------------------------------  T(C): 36.7 (09-25-23 @ 11:50), Max: 36.8 (09-24-23 @ 23:30)  HR: 86 (09-25-23 @ 13:00) (68 - 91)  BP: 147/90 (09-25-23 @ 13:00) (114/69 - 160/111)  RR: 20 (09-25-23 @ 13:00) (12 - 29)  SpO2: 98% (09-25-23 @ 13:00) (93% - 100%)  Wt(kg): --  Height (cm): 177.8 (09-23-23 @ 17:18)  Weight (kg): 79.4 (09-23-23 @ 17:18)  BMI (kg/m2): 25.1 (09-23-23 @ 17:18)  BSA (m2): 1.97 (09-23-23 @ 17:18)      09-24-23 @ 07:01  -  09-25-23 @ 07:00  --------------------------------------------------------  IN: 4048 mL / OUT: 1150 mL / NET: 2898 mL    09-25-23 @ 07:01  -  09-25-23 @ 13:52  --------------------------------------------------------  IN: 1250 mL / OUT: 850 mL / NET: 400 mL      Physical Exam:  	Gen: NAD  	HEENT: supple neck,   	Pulm: CTA B/L  	CV: RRR, S1S2; no rub  	Back: No spinal or CVA tenderness; no sacral edema  	Abd: +BS, soft, nontender/nondistended  	UE; no edema  	LE: Warm, no edema  	Neuro: No focal deficit  	Psych: Normal affect and mood  	Skin: Warm  LABS/STUDIES  --------------------------------------------------------------------------------              13.8   8.98  >-----------<  201      [09-25-23 @ 04:00]              38.1     147  |  107  |  <3.0  ----------------------------<  103      [09-25-23 @ 10:00]  3.2   |  30.0  |  0.56        Ca     7.4     [09-25-23 @ 10:00]      Mg     2.1     [09-25-23 @ 10:00]      Phos  1.9     [09-25-23 @ 10:00]    TPro  5.5  /  Alb  2.9  /  TBili  0.7  /  DBili  x   /  AST  259  /  ALT  97  /  AlkPhos  75  [09-25-23 @ 04:00]          Creatinine Trend:  SCr 0.56 [09-25 @ 10:00]  SCr 0.53 [09-25 @ 04:00]  SCr 0.71 [09-24 @ 20:00]  SCr 0.57 [09-24 @ 15:00]  SCr 0.55 [09-24 @ 09:23]    Urinalysis - [09-25-23 @ 10:00]      Color  / Appearance  / SG  / pH       Gluc 103 / Ketone   / Bili  / Urobili        Blood  / Protein  / Leuk Est  / Nitrite       RBC  / WBC  / Hyaline  / Gran  / Sq Epi  / Non Sq Epi  / Bacteria     Urine Sodium 63      [09-24-23 @ 11:00]  Urine Urea Nitrogen 251      [09-24-23 @ 11:00]  Urine Potassium 6      [09-24-23 @ 11:00]  Urine Chloride 53      [09-24-23 @ 11:00]  Urine Osmolality 291      [09-24-23 @ 11:00]    HbA1c 5.0      [03-31-19 @ 09:49]  TSH 3.29      [09-24-23 @ 15:00]       [facility] DIVISION OF KIDNEY DISEASES AND HYPERTENSION -- FOLLOW UP NOTE  --------------------------------------------------------------------------------  Chief Complaint: hypokalemia    24 hour events/subjective:  [facility]  Pt seen and examined; continues to have diarrhea.      PAST HISTORY  --------------------------------------------------------------------------------  No significant changes to PMH, PSH, FHx, SHx, unless otherwise noted    ALLERGIES & MEDICATIONS  --------------------------------------------------------------------------------  Allergies    No Known Allergies        Standing Inpatient Medications  chlorhexidine 2% Cloths 1 Application(s) Topical daily  dextrose 5% + sodium chloride 0.9%. 1000 milliLiter(s) IV Continuous <Continuous>  enoxaparin Injectable 40 milliGRAM(s) SubCutaneous every 24 hours  lactated ringers. 1000 milliLiter(s) IV Continuous <Continuous>  lisinopril 10 milliGRAM(s) Oral daily  nicotine - 21 mG/24Hr(s) Patch 1 Patch Transdermal daily  potassium chloride   Powder 40 milliEquivalent(s) Oral every 4 hours  potassium chloride  20 mEq/100 mL IVPB 20 milliEquivalent(s) IV Intermittent every 2 hours    PRN Inpatient Medications  acetaminophen     Tablet .. 650 milliGRAM(s) Oral every 6 hours PRN  hydrALAZINE Injectable 5 milliGRAM(s) IV Push every 6 hours PRN  melatonin 3 milliGRAM(s) Oral at bedtime PRN  ondansetron Injectable 4 milliGRAM(s) IV Push every 8 hours PRN      REVIEW OF SYSTEMS  --------------------------------------------------------------------------------  Gen: No weight changes, fatigue, fevers/chills, weakness  Skin: No rashes  Head/Eyes/Ears/Mouth: No headache; Normal hearing; Normal vision w/o blurriness; No sinus pain/discomfort, sore throat  Respiratory: No dyspnea, cough, wheezing, hemoptysis  CV: No chest pain, PND, orthopnea  GI: No abdominal pain, diarrhea+  : No increased frequency, dysuria, hematuria, nocturia  MSK: No joint pain/swelling; no back pain; no edema  Neuro: No dizziness/lightheadedness, weakness, seizures, numbness, tingling  Heme: No easy bruising or bleeding  Endo: No heat/cold intolerance  Psych: No significant nervousness, anxiety, stress, depression    All other systems were reviewed and are negative, except as noted.    VITALS/PHYSICAL EXAM  --------------------------------------------------------------------------------  T(C): 36.7 (09-25-23 @ 11:50), Max: 36.8 (09-24-23 @ 23:30)  HR: 86 (09-25-23 @ 13:00) (68 - 91)  BP: 147/90 (09-25-23 @ 13:00) (114/69 - 160/111)  RR: 20 (09-25-23 @ 13:00) (12 - 29)  SpO2: 98% (09-25-23 @ 13:00) (93% - 100%)  Wt(kg): --  Height (cm): 177.8 (09-23-23 @ 17:18)  Weight (kg): 79.4 (09-23-23 @ 17:18)  BMI (kg/m2): 25.1 (09-23-23 @ 17:18)  BSA (m2): 1.97 (09-23-23 @ 17:18)      09-24-23 @ 07:01  -  09-25-23 @ 07:00  --------------------------------------------------------  IN: 4048 mL / OUT: 1150 mL / NET: 2898 mL    09-25-23 @ 07:01  -  09-25-23 @ 13:52  --------------------------------------------------------  IN: 1250 mL / OUT: 850 mL / NET: 400 mL      Physical Exam:  	Gen: NAD  	HEENT: supple neck,   	Pulm: CTA B/L  	CV: RRR, S1S2; no rub  	Back: No spinal or CVA tenderness; no sacral edema  	Abd: +BS, soft, nontender/nondistended  	UE; no edema  	LE: Warm, no edema  	Neuro: No focal deficit  	Psych: Normal affect and mood  	Skin: Warm  LABS/STUDIES  --------------------------------------------------------------------------------              13.8   8.98  >-----------<  201      [09-25-23 @ 04:00]              38.1     147  |  107  |  <3.0  ----------------------------<  103      [09-25-23 @ 10:00]  3.2   |  30.0  |  0.56        Ca     7.4     [09-25-23 @ 10:00]      Mg     2.1     [09-25-23 @ 10:00]      Phos  1.9     [09-25-23 @ 10:00]    TPro  5.5  /  Alb  2.9  /  TBili  0.7  /  DBili  x   /  AST  259  /  ALT  97  /  AlkPhos  75  [09-25-23 @ 04:00]          Creatinine Trend:  SCr 0.56 [09-25 @ 10:00]  SCr 0.53 [09-25 @ 04:00]  SCr 0.71 [09-24 @ 20:00]  SCr 0.57 [09-24 @ 15:00]  SCr 0.55 [09-24 @ 09:23]    Urinalysis - [09-25-23 @ 10:00]      Color  / Appearance  / SG  / pH       Gluc 103 / Ketone   / Bili  / Urobili        Blood  / Protein  / Leuk Est  / Nitrite       RBC  / WBC  / Hyaline  / Gran  / Sq Epi  / Non Sq Epi  / Bacteria     Urine Sodium 63      [09-24-23 @ 11:00]  Urine Urea Nitrogen 251      [09-24-23 @ 11:00]  Urine Potassium 6      [09-24-23 @ 11:00]  Urine Chloride 53      [09-24-23 @ 11:00]  Urine Osmolality 291      [09-24-23 @ 11:00]    HbA1c 5.0      [03-31-19 @ 09:49]  TSH 3.29      [09-24-23 @ 15:00]

## 2023-09-25 NOTE — PROGRESS NOTE ADULT - NS ATTEND AMEND GEN_ALL_CORE FT
48 y/o M with PMHx IBS-Diarrhea predominant, current smoker, who presents to ED sent from PCP for abnormal labs and increased diarrhea   Plan:  Agree with pending labs (fecal fat, calpro, celiac, [place])   severe hypokalemia, better now though not in normal range so can't sedate for procedures  suspect EtOH use contributes somewhat to diarrhea (also to chronicity and lack of details re: prior evals) but still frequency/electrolyte inbalance make inpt duo bx and TI/colon bx necessary   timing TBD based on K  Might not even need prep, so please keep NPO after midnight tonight in case nl K tomorrow.

## 2023-09-25 NOTE — DIETITIAN INITIAL EVALUATION ADULT - ORAL INTAKE PTA/DIET HISTORY
Pt reports having persistent diarrhea his whole life; has tried many diets; supplements and nothing helps. Pt denies any recent weight loss and UBW ~175lbs.  Pt is currently tolerating clear liquids with fair to good intake of liquids.

## 2023-09-25 NOTE — CHART NOTE - NSCHARTNOTEFT_GEN_A_CORE
Patient stable for downgrade out of MICU.  Sign out given to Hospitalist, Dr. Garcia. Transfer to Telemetry.  D/w Intensivist, Dr. Campbell.

## 2023-09-25 NOTE — DIETITIAN INITIAL EVALUATION ADULT - PERTINENT LABORATORY DATA
09-25    144  |  103  |  <3.0<L>  ----------------------------<  101<H>  2.8<LL>   |  32.0<H>  |  0.53    Ca    7.1<L>      25 Sep 2023 04:00  Phos  3.7     09-25  Mg     2.1     09-25    TPro  5.5<L>  /  Alb  2.9<L>  /  TBili  0.7  /  DBili  x   /  AST  259<H>  /  ALT  97<H>  /  AlkPhos  75  09-25

## 2023-09-25 NOTE — PROGRESS NOTE ADULT - ASSESSMENT
48 y/o M with PMHx IBS-Diarrhea predominant, current smoker, who presents to ED sent from PCP for abnormal labs and increased diarrhea

## 2023-09-25 NOTE — PROGRESS NOTE ADULT - PROBLEM SELECTOR PLAN 3
Prior ETOH abuse (Now 7-8 drinks/weekend now)    - MVI, thiamine and folic acid. Optimize nutrition. Alcohol cessation counseling. CIWA protocol.  _________________________________________________________________  Assessment and recommendations are final when note is signed by the attending physician.

## 2023-09-25 NOTE — PROGRESS NOTE ADULT - SUBJECTIVE AND OBJECTIVE BOX
Chief Complaint:  Patient is a 47y old  Male who presents with a chief complaint of Hypokalemia     (25 Sep 2023 08:25)      HPI/ 24 hr events: Patient seen and examined at bedside. He is still reporting ~12 epiosdes of loose stools in the past 24 hours. He is able to tolerate oral intake. He denies nausea, vomiting, hematemesis, hematochezia, melena. He does get cramping prior to BM. Vitals are overall stable, no leukocytosis, ESR/CRP not elevated, TSH normal, +hypokalemia. C diff and GI PCR negative.       REVIEW OF SYSTEMS:   General: Negative  HEENT: Negative  CV: Negative  Respiratory: Negative  GI: See HPI  : Negative  MSK: Negative  Hematologic: Negative  Skin: Negative    MEDICATIONS:   MEDICATIONS  (STANDING):  chlorhexidine 2% Cloths 1 Application(s) Topical daily  dextrose 5% + sodium chloride 0.9%. 1000 milliLiter(s) (100 mL/Hr) IV Continuous <Continuous>  enoxaparin Injectable 40 milliGRAM(s) SubCutaneous every 24 hours  lactated ringers. 1000 milliLiter(s) (150 mL/Hr) IV Continuous <Continuous>  lisinopril 10 milliGRAM(s) Oral daily  nicotine - 21 mG/24Hr(s) Patch 1 Patch Transdermal daily  potassium chloride   Powder 40 milliEquivalent(s) Oral every 4 hours    MEDICATIONS  (PRN):  acetaminophen     Tablet .. 650 milliGRAM(s) Oral every 6 hours PRN Temp greater or equal to 38C (100.4F), Mild Pain (1 - 3)  hydrALAZINE Injectable 5 milliGRAM(s) IV Push every 6 hours PRN for sbp above 160 mmhg  melatonin 3 milliGRAM(s) Oral at bedtime PRN Insomnia  ondansetron Injectable 4 milliGRAM(s) IV Push every 8 hours PRN Nausea and/or Vomiting            DIET:  Diet, Clear Liquid (09-24-23 @ 15:19) [Active]          ALLERGIES:   Allergies    No Known Allergies    Intolerances        VITAL SIGNS:   Vital Signs Last 24 Hrs  T(C): 36.7 (25 Sep 2023 07:26), Max: 36.8 (24 Sep 2023 23:30)  T(F): 98 (25 Sep 2023 07:26), Max: 98.2 (24 Sep 2023 23:30)  HR: 91 (25 Sep 2023 10:00) (68 - 94)  BP: 160/111 (25 Sep 2023 10:00) (114/69 - 160/111)  BP(mean): 126 (25 Sep 2023 10:00) (76 - 126)  RR: 17 (25 Sep 2023 10:00) (12 - 29)  SpO2: 98% (25 Sep 2023 10:00) (93% - 99%)    Parameters below as of 25 Sep 2023 08:00  Patient On (Oxygen Delivery Method): room air      I&O's Summary    24 Sep 2023 07:01  -  25 Sep 2023 07:00  --------------------------------------------------------  IN: 4048 mL / OUT: 1150 mL / NET: 2898 mL    25 Sep 2023 07:01  -  25 Sep 2023 11:09  --------------------------------------------------------  IN: 750 mL / OUT: 850 mL / NET: -100 mL        PHYSICAL EXAM:   GENERAL:  No acute distress  HEENT:  NC/AT, conjunctiva clear, sclera anicteric  CHEST:  No increased effort  HEART:  Regular rate  ABDOMEN:  Soft, non-tender, non-distended, normoactive bowel sounds, no rebound or guarding  EXTREMITIES: No edema  SKIN:  Warm, dry  NEURO:  Calm, cooperative    LABS:                        13.8   8.98  )-----------( 201      ( 25 Sep 2023 04:00 )             38.1       09-25    147<H>  |  107  |  <3.0<L>  ----------------------------<  103<H>  3.2<L>   |  30.0<H>  |  0.56    Ca    7.4<L>      25 Sep 2023 10:00  Phos  1.9     09-25  Mg     2.1     09-25    TPro  5.5<L>  /  Alb  2.9<L>  /  TBili  0.7  /  DBili  x   /  AST  259<H>  /  ALT  97<H>  /  AlkPhos  75  09-25    LIVER FUNCTIONS - ( 25 Sep 2023 04:00 )  Alb: 2.9 g/dL / Pro: 5.5 g/dL / ALK PHOS: 75 U/L / ALT: 97 U/L / AST: 259 U/L / GGT: x                 C-Reactive Protein, Serum: <4 mg/L (09-24-23 @ 20:00)          C Diff by PCR Result: NotDetec: The results of this test should be interpreted with consideration of  clinical context.  Not Detected result indicates the absence of C. difficile or that the  number of organisms is below the assay limit of detection.  Detected result indicates the presence of C. difficile nucleic acid.  Indeterminate result may indicate the presence of amplification  inhibitors in specimen; presence or absence of organisms cannot be  determined. Consider collecting new specimen if further testing is still  needed.  This qualitative PCR assay detects the toxin B gene; it is FDA-approved,  and its performance was established by [facility], [place], [place]. (09.24.23 @ 11:11)      GI PCR Panel: NotDete (09-24-23 @ 11:11)      O&P  Culture Results:   Testing in progress (09.24.23 @ 11:11)                          RADIOLOGY & ADDITIONAL STUDIES:      -- -- --   ACC: [id number] EXAM:  US ABDOMEN RT UPR QUADRANT   ORDERED BY: LYNN PARKER     PROCEDURE DATE:  09/24/2023          INTERPRETATION:  CLINICAL INFORMATION: Transaminitis.    COMPARISON: 4/17/2021.    TECHNIQUE: Sonography of the right upper quadrant.    FINDINGS:  Liver: Increased echogenicity. Hepatomegaly.  Bile ducts: Normal caliber. Common bile duct measures 3 mm.  Gallbladder: Cholecystectomy.  Pancreas: Not visualized secondary to overlying bowel gas.  Right kidney: 12.0 cm. No hydronephrosis.  Ascites: None.  IVC: Visualized portions are within normal limits.    IMPRESSION:  Hepatomegaly.    Fatty liver.        --- End of Report ---            CLARKE PORTILLO MD; Attending Radiologist  This document has been electronically signed. Sep 24 2023  1:38AM             Chief Complaint:  Patient is a 47y old  Male who presents with a chief complaint of Hypokalemia     (25 Sep 2023 08:25)      HPI/ 24 hr events: Patient seen and examined at bedside. He is still reporting ~12 epiosdes of loose stools in the past 24 hours. He is able to tolerate oral intake. He denies nausea, vomiting, hematemesis, hematochezia, melena. He does get cramping prior to BM. Vitals are overall stable, no leukocytosis, ESR/CRP not elevated, TSH normal, +hypokalemia. C diff and GI PCR negative.       REVIEW OF SYSTEMS:   General: Negative  HEENT: Negative  CV: Negative  Respiratory: Negative  GI: See HPI  : Negative  MSK: Negative  Hematologic: Negative  Skin: Negative    MEDICATIONS:   MEDICATIONS  (STANDING):  chlorhexidine 2% Cloths 1 Application(s) Topical daily  dextrose 5% + sodium chloride 0.9%. 1000 milliLiter(s) (100 mL/Hr) IV Continuous <Continuous>  enoxaparin Injectable 40 milliGRAM(s) SubCutaneous every 24 hours  lactated ringers. 1000 milliLiter(s) (150 mL/Hr) IV Continuous <Continuous>  lisinopril 10 milliGRAM(s) Oral daily  nicotine - 21 mG/24Hr(s) Patch 1 Patch Transdermal daily  potassium chloride   Powder 40 milliEquivalent(s) Oral every 4 hours    MEDICATIONS  (PRN):  acetaminophen     Tablet .. 650 milliGRAM(s) Oral every 6 hours PRN Temp greater or equal to 38C (100.4F), Mild Pain (1 - 3)  hydrALAZINE Injectable 5 milliGRAM(s) IV Push every 6 hours PRN for sbp above 160 mmhg  melatonin 3 milliGRAM(s) Oral at bedtime PRN Insomnia  ondansetron Injectable 4 milliGRAM(s) IV Push every 8 hours PRN Nausea and/or Vomiting            DIET:  Diet, Clear Liquid (09-24-23 @ 15:19) [Active]          ALLERGIES:   Allergies    No Known Allergies    Intolerances        VITAL SIGNS:   Vital Signs Last 24 Hrs  T(C): 36.7 (25 Sep 2023 07:26), Max: 36.8 (24 Sep 2023 23:30)  T(F): 98 (25 Sep 2023 07:26), Max: 98.2 (24 Sep 2023 23:30)  HR: 91 (25 Sep 2023 10:00) (68 - 94)  BP: 160/111 (25 Sep 2023 10:00) (114/69 - 160/111)  BP(mean): 126 (25 Sep 2023 10:00) (76 - 126)  RR: 17 (25 Sep 2023 10:00) (12 - 29)  SpO2: 98% (25 Sep 2023 10:00) (93% - 99%)    Parameters below as of 25 Sep 2023 08:00  Patient On (Oxygen Delivery Method): room air      I&O's Summary    24 Sep 2023 07:01  -  25 Sep 2023 07:00  --------------------------------------------------------  IN: 4048 mL / OUT: 1150 mL / NET: 2898 mL    25 Sep 2023 07:01  -  25 Sep 2023 11:09  --------------------------------------------------------  IN: 750 mL / OUT: 850 mL / NET: -100 mL        PHYSICAL EXAM:   GENERAL:  No acute distress  HEENT:  NC/AT, conjunctiva clear, sclera anicteric  CHEST:  No increased effort  HEART:  Regular rate  ABDOMEN:  Soft, non-tender, non-distended, normoactive bowel sounds, no rebound or guarding  EXTREMITIES: No edema  SKIN:  Warm, dry  NEURO:  Calm, cooperative    LABS:                        13.8   8.98  )-----------( 201      ( 25 Sep 2023 04:00 )             38.1       09-25    147<H>  |  107  |  <3.0<L>  ----------------------------<  103<H>  3.2<L>   |  30.0<H>  |  0.56    Ca    7.4<L>      25 Sep 2023 10:00  Phos  1.9     09-25  Mg     2.1     09-25    TPro  5.5<L>  /  Alb  2.9<L>  /  TBili  0.7  /  DBili  x   /  AST  259<H>  /  ALT  97<H>  /  AlkPhos  75  09-25    LIVER FUNCTIONS - ( 25 Sep 2023 04:00 )  Alb: 2.9 g/dL / Pro: 5.5 g/dL / ALK PHOS: 75 U/L / ALT: 97 U/L / AST: 259 U/L / GGT: x                 C-Reactive Protein, Serum: <4 mg/L (09-24-23 @ 20:00)          C Diff by PCR Result: NotDetec: The results of this test should be interpreted with consideration of  clinical context.  Not Detected result indicates the absence of C. difficile or that the  number of organisms is below the assay limit of detection.  Detected result indicates the presence of C. difficile nucleic acid.  Indeterminate result may indicate the presence of amplification  inhibitors in specimen; presence or absence of organisms cannot be  determined. Consider collecting new specimen if further testing is still  needed.  This qualitative PCR assay detects the toxin B gene; it is FDA-approved,  and its performance was established by [facility], [place], [place]. (09.24.23 @ 11:11)      GI PCR Panel: NotDete (09-24-23 @ 11:11)      O&P  Culture Results:   Testing in progress (09.24.23 @ 11:11)                          RADIOLOGY & ADDITIONAL STUDIES:      -- -- --   ACC: [id number] EXAM:  US ABDOMEN RT UPR QUADRANT   ORDERED BY: LYNN PARKER     PROCEDURE DATE:  09/24/2023          INTERPRETATION:  CLINICAL INFORMATION: Transaminitis.    COMPARISON: 4/17/2021.    TECHNIQUE: Sonography of the right upper quadrant.    FINDINGS:  Liver: Increased echogenicity. Hepatomegaly.  Bile ducts: Normal caliber. Common bile duct measures 3 mm.  Gallbladder: Cholecystectomy.  Pancreas: Not visualized secondary to overlying bowel gas.  Right kidney: 12.0 cm. No hydronephrosis.  Ascites: None.  IVC: Visualized portions are within normal limits.    IMPRESSION:  Hepatomegaly.    Fatty liver.        --- End of Report ---            CLARKE PORTILLO MD; Attending Radiologist  This document has been electronically signed. Sep 24 2023  1:38AM

## 2023-09-25 NOTE — DIETITIAN INITIAL EVALUATION ADULT - PERTINENT MEDS FT
MEDICATIONS  (STANDING):  dextrose 5% + sodium chloride 0.9%. 1000 milliLiter(s) (100 mL/Hr) IV Continuous <Continuous>  enoxaparin Injectable 40 milliGRAM(s) SubCutaneous every 24 hours  lisinopril 10 milliGRAM(s) Oral daily  nicotine - 21 mG/24Hr(s) Patch 1 Patch Transdermal daily  potassium chloride   Powder 40 milliEquivalent(s) Oral every 4 hours  potassium chloride  20 mEq/100 mL IVPB 20 milliEquivalent(s) IV Intermittent every 2 hours    MEDICATIONS  (PRN):  acetaminophen     Tablet .. 650 milliGRAM(s) Oral every 6 hours PRN Temp greater or equal to 38C (100.4F), Mild Pain (1 - 3)  hydrALAZINE Injectable 5 milliGRAM(s) IV Push every 6 hours PRN for sbp above 160 mmhg  melatonin 3 milliGRAM(s) Oral at bedtime PRN Insomnia  ondansetron Injectable 4 milliGRAM(s) IV Push every 8 hours PRN Nausea and/or Vomiting

## 2023-09-25 NOTE — PROGRESS NOTE ADULT - PROBLEM SELECTOR PLAN 1
Reported ?IBS/diarrhea since age 13  Electrolyte imbalance  GI PCR (-), C diff (-)     - Recommend CT A/P for further assessment  - Will need eventual colonoscopy once medically optimized for a prep  - Can advance to full liquid diet (No lactose, low fiber, low fat) as tolerated  - Can add Imodium   - Calprotectin, celiac cascade, stool culture, O&P, fecal elastase, stool fat are pending  - Trend CBC; Trend electrolytes, replete as needed   - Monitor for fever  - Stool count  Continue care per ICU team

## 2023-09-25 NOTE — PROGRESS NOTE ADULT - PROBLEM SELECTOR PLAN 2
US Abdomen Upper Quadrant Right (09.24.23)- Hepatomegaly. Fatty liver.  Transaminitis    - Acute hepatitis panel pending  - Trend LFTs  - Maintain normal BMI  - Alcohol abstinence  - Low fat diet  - Follow up with hepatologist Dr. Douglas for further assessment and monitoring
Patient with elevated liver function test.  History of EtOH, fatty liver  We will do liver serologies

## 2023-09-25 NOTE — PROGRESS NOTE ADULT - SUBJECTIVE AND OBJECTIVE BOX
Patient is a 47y old  Male who presents with a chief complaint of Hypokalemia (24 Sep 2023 15:00)      BRIEF HOSPITAL COURSE: 47M with MS (no tx), active 2.5ppd smoker, prior ETOH abuse (only 7-8 drinks/weekend now), IBS since age 13 but never on medical regimen sent in by PCP iso increased diarrhea and muscle cramps. Labs notable for K<1.5, Cl 88, bicarb 40, , , WBC 11k. Originally admitted to medicine however required upgrade to ICU for more aggressive K supplementation via central venous access given high risk of sudden cardiac death in light of severe hypokalemia.    Events last 24 hours: K downtrended to 2.2. Ordered for K 20mEq x3 as well as KPhos 30mmol x1 given that Phos dropped to 1.4 overnight.    PAST MEDICAL & SURGICAL HISTORY:  Multiple sclerosis      Chronic diarrhea      ETOH abuse      S/P cholecystectomy          Review of Systems:  CONSTITUTIONAL: No fever, chills, or fatigue  EYES: No eye pain, visual disturbances, or discharge  ENMT:  No difficulty hearing, tinnitus, vertigo; No sinus or throat pain  NECK: No pain or stiffness  RESPIRATORY: No cough, wheezing, chills or hemoptysis; No shortness of breath  CARDIOVASCULAR: No chest pain, palpitations, dizziness, or leg swelling  GASTROINTESTINAL: No abdominal or epigastric pain. No nausea, vomiting, or hematemesis; No diarrhea or constipation. No melena or hematochezia.  GENITOURINARY: No dysuria, frequency, hematuria, or incontinence  NEUROLOGICAL: No headaches, memory loss, loss of strength, numbness, or tremors  SKIN: No itching, burning, rashes, or lesions   MUSCULOSKELETAL: +muscle cramps. No joint pain or swelling; No muscle, back, or extremity pain  PSYCHIATRIC: No depression, anxiety, mood swings, or difficulty sleeping      Medications:    hydrALAZINE Injectable 5 milliGRAM(s) IV Push every 6 hours PRN  lisinopril 10 milliGRAM(s) Oral daily      acetaminophen     Tablet .. 650 milliGRAM(s) Oral every 6 hours PRN  melatonin 3 milliGRAM(s) Oral at bedtime PRN  ondansetron Injectable 4 milliGRAM(s) IV Push every 8 hours PRN      enoxaparin Injectable 40 milliGRAM(s) SubCutaneous every 24 hours          dextrose 5% + sodium chloride 0.9%. 1000 milliLiter(s) IV Continuous <Continuous>  potassium chloride  20 mEq/100 mL IVPB 20 milliEquivalent(s) IV Intermittent every 2 hours  potassium phosphate IVPB 30 milliMole(s) IV Intermittent once        nicotine - 21 mG/24Hr(s) Patch 1 Patch Transdermal daily          ICU Vital Signs Last 24 Hrs  T(C): 36.8 (24 Sep 2023 23:30), Max: 36.9 (24 Sep 2023 11:00)  T(F): 98.2 (24 Sep 2023 23:30), Max: 98.4 (24 Sep 2023 11:00)  HR: 72 (24 Sep 2023 22:00) (64 - 94)  BP: 121/74 (24 Sep 2023 22:00) (121/74 - 159/91)  BP(mean): 87 (24 Sep 2023 22:00) (87 - 108)  ABP: --  ABP(mean): --  RR: 17 (24 Sep 2023 22:00) (13 - 22)  SpO2: 93% (24 Sep 2023 22:00) (93% - 99%)    O2 Parameters below as of 24 Sep 2023 20:00  Patient On (Oxygen Delivery Method): room air                I&O's Detail    24 Sep 2023 07:01  -  25 Sep 2023 00:55  --------------------------------------------------------  IN:    dextrose 5% + sodium chloride 0.9%: 1400 mL    IV PiggyBack: 50 mL    IV PiggyBack: 700 mL  Total IN: 2150 mL    OUT:    Voided (mL): 200 mL  Total OUT: 200 mL    Total NET: 1950 mL            LABS:                        16.2   10.28 )-----------( 207      ( 24 Sep 2023 03:10 )             44.3     09-24    143  |  99  |  3.2<L>  ----------------------------<  110<H>  2.2<LL>   |  31.0<H>  |  0.71    Ca    7.5<L>      24 Sep 2023 20:00  Phos  1.4     09-24  Mg     2.3     09-24    TPro  7.6  /  Alb  4.3  /  TBili  1.4  /  DBili  x   /  AST  421<H>  /  ALT  133<H>  /  AlkPhos  116  09-23          CAPILLARY BLOOD GLUCOSE          Urinalysis Basic - ( 24 Sep 2023 20:00 )    Color: x / Appearance: x / SG: x / pH: x  Gluc: 110 mg/dL / Ketone: x  / Bili: x / Urobili: x   Blood: x / Protein: x / Nitrite: x   Leuk Esterase: x / RBC: x / WBC x   Sq Epi: x / Non Sq Epi: x / Bacteria: x      CULTURES:  C Diff by PCR Result: NotDetec (09-24-23 @ 11:11)  Rapid RVP Result: NotDetec (09-23-23 @ 22:30)      Physical Examination:    General: No acute distress.  Alert, oriented, interactive, nonfocal, following simple commands and moving all extremities     HEENT: Pupils equal, reactive to light.  Symmetric.    PULM: Breathing comfortabley, good BS B/L with no Rales or Rhonchi, no significant sputum production    CVS: Regular rate and rhythm, no murmurs, rubs, or gallops    ABD: BS+ Soft, nondistended, nontender, no masses    EXT: No edema, nontender    SKIN: Warm and well perfused, no rashes noted.    RADIOLOGY:   < from: Xray Chest 1 View- PORTABLE-Urgent (Xray Chest 1 View- PORTABLE-Urgent .) (09.24.23 @ 11:45) >  IMPRESSION: Right jugular line inserted. Developing atelectasis off right   lower hilum.    < end of copied text >  < from: US Abdomen Upper Quadrant Right (09.24.23 @ 00:31) >  IMPRESSION:  Hepatomegaly.      < end of copied text >  < from: US Duplex Venous Lower Ext Complete, Bilateral (09.24.23 @ 00:07) >  IMPRESSION:    No evidence of DVT in either lower extremity.      < end of copied text >      Time spent on this patient encoutner, which includes documenting this note in the EMR, was 50 minutes including assessing the presenting problems with associated risks, reviewing the medical record to prepare for the encounter, and meeting face to face with the patient to obtain additional history. I have also performed and interpreted diagnostic studies as documented. To improve communication and patient safety, I have coordinated with the multidisciplinary team including the bedside nurse, appropriate attending of record, and consultants as needed.

## 2023-09-25 NOTE — CHART NOTE - NSCHARTNOTEFT_GEN_A_CORE
HPI:  47M with MS (no tx), active 2.5ppd smoker, prior ETOH abuse (only 7-8 drinks/weekend now), IBS since age 13 but never on medical regimen sent in by PCP iso increased diarrhea and muscle cramps. Labs notable for K<1.5, Cl 88, bicarb 40, , , WBC 11k. Originally admitted to medicine however required upgrade to ICU for more aggressive K supplementation via central venous access given high risk of sudden cardiac death in light of severe hypokalemia.    Interval Events:   - Still reporting episodes of loose stools in past 24 hours.     CONSTITUTIONAL: No fever, chills, or fatigue  EYES: No eye pain, visual disturbances, or discharge  ENMT:  No difficulty hearing, tinnitus, vertigo; No sinus or throat pain  NECK: No pain or stiffness  RESPIRATORY: No cough, wheezing, chills or hemoptysis; No shortness of breath  CARDIOVASCULAR: No chest pain, palpitations, dizziness, or leg swelling  GASTROINTESTINAL: No abdominal or epigastric pain. No nausea, vomiting, or hematemesis; No diarrhea or constipation. No melena or hematochezia.  GENITOURINARY: No dysuria, frequency, hematuria, or incontinence  NEUROLOGICAL: No headaches, memory loss, loss of strength, numbness, or tremors  SKIN: No itching, burning, rashes, or lesions   MUSCULOSKELETAL: +muscle cramps. No joint pain or swelling; No muscle, back, or extremity pain  PSYCHIATRIC: No depression, anxiety, mood swings, or difficulty sleeping    Medications:  hydrALAZINE Injectable 5 milliGRAM(s) IV Push every 6 hours PRN  lisinopril 10 milliGRAM(s) Oral daily  acetaminophen     Tablet .. 650 milliGRAM(s) Oral every 6 hours PRN  melatonin 3 milliGRAM(s) Oral at bedtime PRN  ondansetron Injectable 4 milliGRAM(s) IV Push every 8 hours PRN  enoxaparin Injectable 40 milliGRAM(s) SubCutaneous every 24 hours  dextrose 5% + sodium chloride 0.9%. 1000 milliLiter(s) IV Continuous <Continuous>  potassium chloride  20 mEq/100 mL IVPB 20 milliEquivalent(s) IV Intermittent every 2 hours  potassium phosphate IVPB 30 milliMole(s) IV Intermittent once  nicotine - 21 mG/24Hr(s) Patch 1 Patch Transdermal daily      ICU Vital Signs Last 24 Hrs  T(C): 36.8 (24 Sep 2023 23:30), Max: 36.9 (24 Sep 2023 11:00)  T(F): 98.2 (24 Sep 2023 23:30), Max: 98.4 (24 Sep 2023 11:00)  HR: 72 (24 Sep 2023 22:00) (64 - 94)  BP: 121/74 (24 Sep 2023 22:00) (121/74 - 159/91)  BP(mean): 87 (24 Sep 2023 22:00) (87 - 108)  ABP: --  ABP(mean): --  RR: 17 (24 Sep 2023 22:00) (13 - 22)  SpO2: 93% (24 Sep 2023 22:00) (93% - 99%)    I&O's Detail    24 Sep 2023 07:01  -  25 Sep 2023 00:55  --------------------------------------------------------  IN:    dextrose 5% + sodium chloride 0.9%: 1400 mL    IV PiggyBack: 50 mL    IV PiggyBack: 700 mL  Total IN: 2150 mL    OUT:    Voided (mL): 200 mL  Total OUT: 200 mL    Total NET: 1950 mL            LABS:                        16.2   10.28 )-----------( 207      ( 24 Sep 2023 03:10 )             44.3     09-24    143  |  99  |  3.2<L>  ----------------------------<  110<H>  2.2<LL>   |  31.0<H>  |  0.71    Ca    7.5<L>      24 Sep 2023 20:00  Phos  1.4     09-24  Mg     2.3     09-24    TPro  7.6  /  Alb  4.3  /  TBili  1.4  /  DBili  x   /  AST  421<H>  /  ALT  133<H>  /  AlkPhos  116  09-23      CAPILLARY BLOOD GLUCOSE      Urinalysis Basic - ( 24 Sep 2023 20:00 )    Color: x / Appearance: x / SG: x / pH: x  Gluc: 110 mg/dL / Ketone: x  / Bili: x / Urobili: x   Blood: x / Protein: x / Nitrite: x   Leuk Esterase: x / RBC: x / WBC x   Sq Epi: x / Non Sq Epi: x / Bacteria: x      CULTURES:  C Diff by PCR Result: NotDetec (09-24-23 @ 11:11)  Rapid RVP Result: NotDetec (09-23-23 @ 22:30)      Physical Examination:  General: No acute distress.  Alert, oriented, interactive, nonfocal, following simple commands and moving all extremities   HEENT: Pupils equal, reactive to light.  Symmetric.  PULM: Breathing comfortably, good BS B/L with no Rales or Rhonchi, no significant sputum production  CVS: Regular rate and rhythm, no murmurs, rubs, or gallops  ABD: BS+ Soft, nondistended, nontender, no masses  EXT: No edema, nontender  SKIN: Warm and well perfused, no rashes noted.    RADIOLOGY:   < from: Xray Chest 1 View- PORTABLE-Urgent (Xray Chest 1 View- PORTABLE-Urgent .) (09.24.23 @ 11:45) >  IMPRESSION: Right jugular line inserted. Developing atelectasis off right   lower hilum.  < end of copied text >    < from: US Abdomen Upper Quadrant Right (09.24.23 @ 00:31) >  IMPRESSION:  Hepatomegaly.  < end of copied text >    < from: US Duplex Venous Lower Ext Complete, Bilateral (09.24.23 @ 00:07) >  IMPRESSION:  No evidence of DVT in either lower extremity.  < end of copied text >    Assessment:   47M with MS (no tx), active 2.5ppd smoker, prior ETOH abuse (only 7-8 drinks/weekend now), IBS since age 13 but never on medical regimen sent in by PCP iso increased diarrhea and muscle cramps. Labs notable for K<1.5, Cl 88, bicarb 40, , , WBC 11k. Originally admitted to medicine however required upgrade to ICU for more aggressive K supplementation via central venous access given high risk of sudden cardiac death in light of severe hypokalemia.    Severe Hypokalemia  Diarrhea on unknown etiology      Plan:  NEURO: Mentation intact. No signs of ETOH withdrawal, continue to monitor for now with low threshold to initiate CIWA protocol. Patient has not been on tx for hx MS.  CARDIAC: Remains at high risk of arrhythmia / sudden cardiac death given ongoing diarrhea and severe hypokalemia. MAPs currently stable, HR 80s. Goal MAP>65.  RESPIRATORY: Satting 90s on RA. Goal SpO2>92%.  GI: Clear liquids. GI following, if stool studies negative will likely need colonoscopy.  : Maintenance IVF with LR solution, infuse at 150 mL/hr. Trend BMP q4-6h. Aggressively repleting for K>4, Mg>2, P>3. K downtrended to 2.2. Ordered for K 20mEq x3 as well as KPhos 30mmol x1 given that Phos dropped to 1.4 overnight. K is improving to 3.2 now. Continue PO KCl and IV supplementation.  ENDO: No active issues.   ID: Afebrile, WBC 10k, procalcitonin 0.12. No current indication for abx. GI PCR and C. difficile negative. Stool studies sent:, ova and parasite, culture, calprotectin, elastase.  HEME: Lovenox for DVT ppx. SCDs. MICU DOWN GRADE NOTE      Patient is a 47y old  Male who presents with a chief complaint of Hypokalemia (25 Sep 2023 13:52)  HPI:  47M with MS (no tx), active 2.5ppd smoker, prior ETOH abuse (only 7-8 drinks/weekend now), IBS since age 13 but never on medical regimen sent in by PCP iso increased diarrhea and muscle cramps. Labs notable for K<1.5, Cl 88, bicarb 40, , , WBC 11k. Originally admitted to medicine however required upgrade to ICU for more aggressive K supplementation via central venous access given high risk of sudden cardiac death in light of severe hypokalemia.    Interval HPI:  - Still reporting episodes of loose stools in past 24 hours.     REVIEW OF SYSTEMS:  CONSTITUTIONAL: No fever, chills  HEENT:  No blurry vision No sinus or throat pain  NECK: No pain or stiffness  RESPIRATORY: No cough, wheezing, chills or hemoptysis; No shortness of breath  CARDIOVASCULAR: No chest pain, palpitations  GASTROINTESTINAL: No abdominal pain. No nausea, vomiting, or diarrhea  GENITOURINARY: No dysuria  NEUROLOGICAL: No HA, No focal weakness  SKIN: No itching, burning, rashes, or lesions   MUSCULOSKELETAL: No joint pain or swelling; No muscle, back, or extremity pain    MEDICATIONS:  acetaminophen     Tablet .. 650 milliGRAM(s) Oral every 6 hours PRN  chlorhexidine 2% Cloths 1 Application(s) Topical daily  dextrose 5% + sodium chloride 0.9%. 1000 milliLiter(s) IV Continuous <Continuous>  enoxaparin Injectable 40 milliGRAM(s) SubCutaneous every 24 hours  hydrALAZINE Injectable 5 milliGRAM(s) IV Push every 6 hours PRN  lactated ringers. 1000 milliLiter(s) IV Continuous <Continuous>  lisinopril 10 milliGRAM(s) Oral daily  melatonin 3 milliGRAM(s) Oral at bedtime PRN  nicotine - 21 mG/24Hr(s) Patch 1 Patch Transdermal daily  ondansetron Injectable 4 milliGRAM(s) IV Push every 8 hours PRN  potassium chloride   Powder 40 milliEquivalent(s) Oral every 4 hours      T(C): 36.7 (09-25-23 @ 11:50), Max: 36.8 (09-24-23 @ 23:30)  HR: 82 (09-25-23 @ 14:00) (68 - 91)  BP: 122/108 (09-25-23 @ 14:00) (114/69 - 160/111)  RR: 16 (09-25-23 @ 14:00) (12 - 29)  SpO2: 100% (09-25-23 @ 14:00) (93% - 100%)  Wt(kg): --Vital Signs Last 24 Hrs  T(C): 36.7 (25 Sep 2023 11:50), Max: 36.8 (24 Sep 2023 23:30)  T(F): 98.1 (25 Sep 2023 11:50), Max: 98.2 (24 Sep 2023 23:30)  HR: 82 (25 Sep 2023 14:00) (68 - 91)  BP: 122/108 (25 Sep 2023 14:00) (114/69 - 160/111)  BP(mean): 114 (25 Sep 2023 14:00) (76 - 126)  RR: 16 (25 Sep 2023 14:00) (12 - 29)  SpO2: 100% (25 Sep 2023 14:00) (93% - 100%)    Parameters below as of 25 Sep 2023 12:00  Patient On (Oxygen Delivery Method): room air        PHYSICAL EXAM:  GENERAL: NAD, well-groomed, well-developed  HEAD:  Atraumatic, Normocephalic  EYES: EOMI, PERRLA, conjunctiva and sclera clear  ENMT:  Moist mucous membranes  NECK: Supple, No JVD,  CHEST/LUNG: Clear to auscultation bilaterally; No rales, rhonchi, wheezing, or rubs  HEART: Regular rate and rhythm; No murmurs, rubs, or gallops  ABDOMEN: Soft, Nontender, Nondistended; Bowel sounds present  NEURO: Alert & Oriented X3  EXTREMITIES: No LE edema, no calf tenderness  LYMPH: No lymphadenopathy noted  SKIN: No rashes or lesions    Consultant(s) Notes Reviewed:  [x ] YES  [ ] NO  Care Discussed with Consultants/Other Providers [ x] YES  [ ] NO    LABS:                        13.8   8.98  )-----------( 201      ( 25 Sep 2023 04:00 )             38.1     09-25    147<H>  |  107  |  <3.0<L>  ----------------------------<  103<H>  3.2<L>   |  30.0<H>  |  0.56    Ca    7.4<L>      25 Sep 2023 10:00  Phos  1.9     09-25  Mg     2.1     09-25    TPro  5.5<L>  /  Alb  2.9<L>  /  TBili  0.7  /  DBili  x   /  AST  259<H>  /  ALT  97<H>  /  AlkPhos  75  09-25      Urinalysis Basic - ( 25 Sep 2023 10:00 )    Color: x / Appearance: x / SG: x / pH: x  Gluc: 103 mg/dL / Ketone: x  / Bili: x / Urobili: x   Blood: x / Protein: x / Nitrite: x   Leuk Esterase: x / RBC: x / WBC x   Sq Epi: x / Non Sq Epi: x / Bacteria: x      CAPILLARY BLOOD GLUCOSE            Urinalysis Basic - ( 25 Sep 2023 10:00 )    Color: x / Appearance: x / SG: x / pH: x  Gluc: 103 mg/dL / Ketone: x  / Bili: x / Urobili: x   Blood: x / Protein: x / Nitrite: x   Leuk Esterase: x / RBC: x / WBC x   Sq Epi: x / Non Sq Epi: x / Bacteria: x        RADIOLOGY & ADDITIONAL TESTS:  < from: Xray Chest 1 View- PORTABLE-Urgent (Xray Chest 1 View- PORTABLE-Urgent .) (09.24.23 @ 11:45) >  IMPRESSION: Right jugular line inserted. Developing atelectasis off right   lower hilum.    < end of copied text >  < from: US Abdomen Upper Quadrant Right (09.24.23 @ 00:31) >  IMPRESSION:  Hepatomegaly.      < end of copied text >  < from: US Duplex Venous Lower Ext Complete, Bilateral (09.24.23 @ 00:07) >  IMPRESSION:    No evidence of DVT in either lower extremity.      < end of copied text >      Imaging Personally Reviewed:  [x ] YES  [ ] NO      Assessment:   47M with MS (no tx), active 2.5ppd smoker, prior ETOH abuse (only 7-8 drinks/weekend now), IBS since age 13 but never on medical regimen sent in by PCP iso increased diarrhea and muscle cramps. Labs notable for K<1.5, Cl 88, bicarb 40, , , WBC 11k. Originally admitted to medicine however required upgrade to ICU for more aggressive K supplementation via central venous access given high risk of sudden cardiac death in light of severe hypokalemia.    Severe Hypokalemia  Diarrhea on unknown etiology      Plan:  NEURO: Mentation intact. No signs of ETOH withdrawal, continue to monitor for now with low threshold to initiate CIWA protocol. Patient has not been on tx for hx MS.  CARDIAC: Remains at high risk of arrhythmia / sudden cardiac death given ongoing diarrhea and severe hypokalemia. MAPs currently stable, HR 80s. Goal MAP>65.  RESPIRATORY: Satting 90s on RA. Goal SpO2>92%.  GI: Clear liquids. GI following, if stool studies negative will likely need colonoscopy.  : Maintenance IVF with LR solution, infuse at 150 mL/hr. Trend BMP q4-6h. Aggressively repleting for K>4, Mg>2, P>3. K downtrended to 2.2. Ordered for K 20mEq x3 as well as KPhos 30mmol x1 given that Phos dropped to 1.4 overnight. K is improving to 3.2 now. Continue PO KCl and IV supplementation.  ENDO: No active issues.   ID: Afebrile, WBC 10k, procalcitonin 0.12. No current indication for abx. GI PCR and C. difficile negative. Stool studies sent:, ova and parasite, culture, calprotectin, elastase.  HEME: Lovenox for DVT ppx. SCDs.

## 2023-09-25 NOTE — PROGRESS NOTE ADULT - ASSESSMENT
47M with MS (no tx), active 2.5ppd smoker, prior ETOH abuse (only 7-8 drinks/weekend now), IBS since age 13 but never on medical regimen sent in by PCP iso increased diarrhea and muscle cramps. Labs notable for K<1.5, Cl 88, bicarb 40, , , WBC 11k. Originally admitted to medicine however required upgrade to ICU for more aggressive K supplementation via central venous access given high risk of sudden cardiac death in light of severe hypokalemia.    Severe Hypokalemia  Diarrhea on unknown etiology    Plan:  NEURO: Mentation intact. No signs of ETOH withdrawal, continue to monitor for now with low threshold to initiate CIWA protocol. Patient has not been on tx for hx MS.  CARDIAC: Remains at high risk of arrhythmia / sudden cardiac death given ongoing diarrhea and severe hypokalemia. MAPs currently stable, HR 80s. Goal MAP>65.  RESPIRATORY: Satting high 90s on RA. Goal SpO2>92%.  GI: Clear liquids. GI following, if stool studies negative will likely need colonoscopy.  : Maintenance IVF with D5NS @100cc/hr. Trend BMP q4-6h. Aggressively repleting for K>4, Mg>2, P>3. K downtrended to 2.2. Ordered for K 20mEq x3 as well as KPhos 30mmol x1 given that Phos dropped to 1.4 overnight. Nephrology following.  ENDO: No active issues.   ID: Afebrile, WBC 10k, procalcitonin 0.12. No current indication for abx. Stool studies sent: GI PCR, ova and parasite, culture, C. difficile, calprotectin, elastase.  HEME: Lovenox for DVT ppx. SCDs.    DISPO: Continue ICU level care and aggressive K supplementation via CVC.    Case discussed with ICU Attending Dr Campbell.

## 2023-09-25 NOTE — DIETITIAN INITIAL EVALUATION ADULT - OTHER INFO
Pt is a 47 year old M with MS (no tx), active 2.5ppd smoker, prior ETOH abuse (only 7-8 drinks/weekend now), IBS since age 13 but never on medical regimen sent in by PCP iso increased diarrhea and muscle cramps. Labs notable for K<1.5, Cl 88, bicarb 40, , , WBC 11k. Originally admitted to medicine however required upgrade to ICU for more aggressive K supplementation via central venous access given high risk of sudden cardiac death in light of severe hypokalemia.

## 2023-09-25 NOTE — PROGRESS NOTE ADULT - ASSESSMENT
47 year old male with PMH of polysubstance abuse (tobacco/ETOH), IBS-diarrhea predominant and MS was sent in by PCP for abnormal outpatient labs. Per patient, he was diagnosed with IBS at the age of 14-15; at baseline ~12 episodes of diarrhea daily. Has had prior episodes of hypokalemia requiring hospitalization twice in the remote past. He recently had increased frequency of diarrhea. Reports muscle cramping as well as muscle weakness. Labs here showed undetectable potassium at < 1.5. Still with persistently undetectable hypokalemia despite IV and oral repletion. Nephrology is consulted for hypokalemia.     Severe Hypokalemia  -Secondary to GI loss from IBS-diarrhea predominant  - s/p aggressive K+ repletion  - K+ levels now improved to 3.2  - Continue PO KCl and IV supplementation        Elevated Serum Bicarb  -Secondary to contraction alkalosis from profound diarrhea   -improving with hydration

## 2023-09-26 LAB
ALBUMIN SERPL ELPH-MCNC: 3.2 G/DL — LOW (ref 3.3–5.2)
ALP SERPL-CCNC: 92 U/L — SIGNIFICANT CHANGE UP (ref 40–120)
ALT FLD-CCNC: 114 U/L — HIGH
ANION GAP SERPL CALC-SCNC: 11 MMOL/L — SIGNIFICANT CHANGE UP (ref 5–17)
ANION GAP SERPL CALC-SCNC: 18 MMOL/L — HIGH (ref 5–17)
APTT BLD: 28.2 SEC — SIGNIFICANT CHANGE UP (ref 24.5–35.6)
AST SERPL-CCNC: 256 U/L — HIGH
BILIRUB SERPL-MCNC: 0.8 MG/DL — SIGNIFICANT CHANGE UP (ref 0.4–2)
BLD GP AB SCN SERPL QL: SIGNIFICANT CHANGE UP
BUN SERPL-MCNC: 3.8 MG/DL — LOW (ref 8–20)
BUN SERPL-MCNC: <3 MG/DL — LOW (ref 8–20)
CALCIUM SERPL-MCNC: 7.9 MG/DL — LOW (ref 8.4–10.5)
CALCIUM SERPL-MCNC: 8 MG/DL — LOW (ref 8.4–10.5)
CHLORIDE SERPL-SCNC: 109 MMOL/L — HIGH (ref 96–108)
CHLORIDE SERPL-SCNC: 113 MMOL/L — HIGH (ref 96–108)
CO2 SERPL-SCNC: 23 MMOL/L — SIGNIFICANT CHANGE UP (ref 22–29)
CO2 SERPL-SCNC: 28 MMOL/L — SIGNIFICANT CHANGE UP (ref 22–29)
CREAT SERPL-MCNC: 0.56 MG/DL — SIGNIFICANT CHANGE UP (ref 0.5–1.3)
CREAT SERPL-MCNC: 0.57 MG/DL — SIGNIFICANT CHANGE UP (ref 0.5–1.3)
CULTURE RESULTS: SIGNIFICANT CHANGE UP
CULTURE RESULTS: SIGNIFICANT CHANGE UP
EGFR: 121 ML/MIN/1.73M2 — SIGNIFICANT CHANGE UP
EGFR: 122 ML/MIN/1.73M2 — SIGNIFICANT CHANGE UP
GLUCOSE SERPL-MCNC: 106 MG/DL — HIGH (ref 70–99)
GLUCOSE SERPL-MCNC: 85 MG/DL — SIGNIFICANT CHANGE UP (ref 70–99)
HCT VFR BLD CALC: 43.8 % — SIGNIFICANT CHANGE UP (ref 39–50)
HGB BLD-MCNC: 15.4 G/DL — SIGNIFICANT CHANGE UP (ref 13–17)
INR BLD: 1.15 RATIO — SIGNIFICANT CHANGE UP (ref 0.85–1.18)
MAGNESIUM SERPL-MCNC: 2.2 MG/DL — SIGNIFICANT CHANGE UP (ref 1.6–2.6)
MAGNESIUM SERPL-MCNC: 2.3 MG/DL — SIGNIFICANT CHANGE UP (ref 1.6–2.6)
MCHC RBC-ENTMCNC: 35.2 GM/DL — SIGNIFICANT CHANGE UP (ref 32–36)
MCHC RBC-ENTMCNC: 35.9 PG — HIGH (ref 27–34)
MCV RBC AUTO: 102.1 FL — HIGH (ref 80–100)
PHOSPHATE SERPL-MCNC: 3.1 MG/DL — SIGNIFICANT CHANGE UP (ref 2.4–4.7)
PLATELET # BLD AUTO: 207 K/UL — SIGNIFICANT CHANGE UP (ref 150–400)
POTASSIUM SERPL-MCNC: 3.1 MMOL/L — LOW (ref 3.5–5.3)
POTASSIUM SERPL-MCNC: 3.3 MMOL/L — LOW (ref 3.5–5.3)
POTASSIUM SERPL-SCNC: 3.1 MMOL/L — LOW (ref 3.5–5.3)
POTASSIUM SERPL-SCNC: 3.3 MMOL/L — LOW (ref 3.5–5.3)
PROT SERPL-MCNC: 6.2 G/DL — LOW (ref 6.6–8.7)
PROTHROM AB SERPL-ACNC: 12.7 SEC — SIGNIFICANT CHANGE UP (ref 9.5–13)
RBC # BLD: 4.29 M/UL — SIGNIFICANT CHANGE UP (ref 4.2–5.8)
RBC # FLD: 15.3 % — HIGH (ref 10.3–14.5)
SODIUM SERPL-SCNC: 150 MMOL/L — HIGH (ref 135–145)
SODIUM SERPL-SCNC: 152 MMOL/L — HIGH (ref 135–145)
SPECIMEN SOURCE: SIGNIFICANT CHANGE UP
SPECIMEN SOURCE: SIGNIFICANT CHANGE UP
WBC # BLD: 8.42 K/UL — SIGNIFICANT CHANGE UP (ref 3.8–10.5)
WBC # FLD AUTO: 8.42 K/UL — SIGNIFICANT CHANGE UP (ref 3.8–10.5)

## 2023-09-26 PROCEDURE — 88305 TISSUE EXAM BY PATHOLOGIST: CPT | Mod: 26

## 2023-09-26 PROCEDURE — 99233 SBSQ HOSP IP/OBS HIGH 50: CPT

## 2023-09-26 PROCEDURE — 45390 COLONOSCOPY W/RESECTION: CPT

## 2023-09-26 DEVICE — CLIP RESOLUTION 360 235CM: Type: IMPLANTABLE DEVICE | Status: FUNCTIONAL

## 2023-09-26 DEVICE — CLIP HEMOSTASIS DURACLIP 16MM: Type: IMPLANTABLE DEVICE | Status: FUNCTIONAL

## 2023-09-26 DEVICE — CLIP ASSURANCE 11ML: Type: IMPLANTABLE DEVICE | Status: FUNCTIONAL

## 2023-09-26 RX ORDER — LOPERAMIDE HCL 2 MG
2 TABLET ORAL
Refills: 0 | Status: DISCONTINUED | OUTPATIENT
Start: 2023-09-26 | End: 2023-09-27

## 2023-09-26 RX ORDER — SODIUM CHLORIDE 9 MG/ML
1000 INJECTION, SOLUTION INTRAVENOUS
Refills: 0 | Status: DISCONTINUED | OUTPATIENT
Start: 2023-09-26 | End: 2023-09-29

## 2023-09-26 RX ORDER — POTASSIUM CHLORIDE 20 MEQ
10 PACKET (EA) ORAL
Refills: 0 | Status: COMPLETED | OUTPATIENT
Start: 2023-09-26 | End: 2023-09-27

## 2023-09-26 RX ORDER — FOLIC ACID 0.8 MG
1 TABLET ORAL DAILY
Refills: 0 | Status: DISCONTINUED | OUTPATIENT
Start: 2023-09-26 | End: 2023-09-29

## 2023-09-26 RX ORDER — THIAMINE MONONITRATE (VIT B1) 100 MG
100 TABLET ORAL DAILY
Refills: 0 | Status: DISCONTINUED | OUTPATIENT
Start: 2023-09-26 | End: 2023-09-29

## 2023-09-26 RX ADMIN — Medication 40 MILLIEQUIVALENT(S): at 01:38

## 2023-09-26 RX ADMIN — Medication 40 MILLIEQUIVALENT(S): at 05:04

## 2023-09-26 RX ADMIN — LISINOPRIL 10 MILLIGRAM(S): 2.5 TABLET ORAL at 05:04

## 2023-09-26 RX ADMIN — SODIUM CHLORIDE 150 MILLILITER(S): 9 INJECTION, SOLUTION INTRAVENOUS at 01:37

## 2023-09-26 RX ADMIN — CHLORHEXIDINE GLUCONATE 1 APPLICATION(S): 213 SOLUTION TOPICAL at 17:16

## 2023-09-26 RX ADMIN — Medication 40 MILLIEQUIVALENT(S): at 23:57

## 2023-09-26 RX ADMIN — Medication 40 MILLIEQUIVALENT(S): at 17:17

## 2023-09-26 RX ADMIN — Medication 100 MILLIEQUIVALENT(S): at 22:35

## 2023-09-26 NOTE — PROGRESS NOTE ADULT - SUBJECTIVE AND OBJECTIVE BOX
Hospitalist Acceptance Note addended where needed. Plan discussed with  Hospitalist Acceptance Note [facility] Division of Hospital Medicine    Chief Complaint:  Persistent diarrhea and electrolyte abnormalities     SUBJECTIVE / OVERNIGHT EVENTS:  Patient seen and examined at bedside. No acute events overnight. Patient states he has had multiple episodes of diarrhea "over 20 to 30", unable to feel comfortable and has his commode at bedside. States he has been living like this his whole life and is frustrated with everything that has happened during the hospitalization. Prior interventions and colonoscopy was over ten years ago. And attributes illness to IBS. Aside from this also became tearful admitting his fiancee had passed of alcohol abuse and that he himself was also drinking excessively during the pandemic (about 1 pint vodka daily and he recently cut this down to a few beers). He understands diagnosis of fatty liver and counselling for alcohol abuse provided. Also discussed the need for colonoscopy today and he is in agreement. Aside from this he reports that he does not want central line removed until he knows his electrolytes are stable. D/w him repeat lytes ordered and that he has been maintained over potassium 3 and will be replaced orally now and that the line will be removed. After discussion he was amenable to this.   Patient denies chest pain, SOB, abd pain, N/V, fever, chills, dysuria or any other complaints. All remainder ROS negative.     MEDICATIONS  (STANDING):  chlorhexidine 2% Cloths 1 Application(s) Topical daily  enoxaparin Injectable 40 milliGRAM(s) SubCutaneous every 24 hours  folic acid 1 milliGRAM(s) Oral daily  lactated ringers. 1000 milliLiter(s) (150 mL/Hr) IV Continuous <Continuous>  lisinopril 10 milliGRAM(s) Oral daily  multivitamin 1 Tablet(s) Oral daily  nicotine - 21 mG/24Hr(s) Patch 1 Patch Transdermal daily  potassium chloride    Tablet ER 40 milliEquivalent(s) Oral every 6 hours  thiamine 100 milliGRAM(s) Oral daily    MEDICATIONS  (PRN):  acetaminophen     Tablet .. 650 milliGRAM(s) Oral every 6 hours PRN Temp greater or equal to 38C (100.4F), Mild Pain (1 - 3)  hydrALAZINE Injectable 5 milliGRAM(s) IV Push every 6 hours PRN for sbp above 160 mmhg  loperamide 2 milliGRAM(s) Oral four times a day PRN Diarrhea  melatonin 3 milliGRAM(s) Oral at bedtime PRN Insomnia  ondansetron Injectable 4 milliGRAM(s) IV Push every 8 hours PRN Nausea and/or Vomiting      I&O's Summary    25 Sep 2023 07:01  -  26 Sep 2023 07:00  --------------------------------------------------------  IN: 4550 mL / OUT: 4375 mL / NET: 175 mL        PHYSICAL EXAM:  Vital Signs Last 24 Hrs  T(C): 37.1 (26 Sep 2023 10:08), Max: 37.1 (26 Sep 2023 04:03)  T(F): 98.8 (26 Sep 2023 10:08), Max: 98.8 (26 Sep 2023 10:08)  HR: 71 (26 Sep 2023 10:08) (71 - 98)  BP: 151/90 (26 Sep 2023 10:08) (122/108 - 158/101)  BP(mean): 85 (26 Sep 2023 00:00) (85 - 117)  RR: 18 (26 Sep 2023 10:08) (11 - 26)  SpO2: 95% (26 Sep 2023 10:08) (94% - 100%)    Parameters below as of 26 Sep 2023 10:08  Patient On (Oxygen Delivery Method): room air      CONSTITUTIONAL: NAD, well groomed  RESPIRATORY: Normal respiratory effort; lungs are clear to auscultation bilaterally  CARDIOVASCULAR: Regular rate and rhythm, normal S1 and S2, no murmur/rub/gallop; No lower extremity edema  ABDOMEN: soft, tenderness to palpation epigastric region, bs +normoactive bowel sounds   PSYCH: A+O to person, place, and time; affect appropriate  NEUROLOGY: CN 2-12 are intact and symmetric; no gross sensory deficits;   SKIN: red bumps noted throughout the torso and back - folliculitis  Commode at bedside with noted liquid watery stool     LABS:                        15.4   8.42  )-----------( 207      ( 26 Sep 2023 06:51 )             43.8     09-26    152<H>  |  113<H>  |  <3.0<L>  ----------------------------<  106<H>  3.3<L>   |  28.0  |  0.57    Ca    8.0<L>      26 Sep 2023 06:51  Phos  3.1     09-26  Mg     2.3     09-26    TPro  6.2<L>  /  Alb  3.2<L>  /  TBili  0.8  /  DBili  x   /  AST  256<H>  /  ALT  114<H>  /  AlkPhos  92  09-26    PT/INR - ( 26 Sep 2023 06:51 )   PT: 12.7 sec;   INR: 1.15 ratio         PTT - ( 26 Sep 2023 06:51 )  PTT:28.2 sec      Urinalysis Basic - ( 26 Sep 2023 06:51 )    Color: x / Appearance: x / SG: x / pH: x  Gluc: 106 mg/dL / Ketone: x  / Bili: x / Urobili: x   Blood: x / Protein: x / Nitrite: x   Leuk Esterase: x / RBC: x / WBC x   Sq Epi: x / Non Sq Epi: x / Bacteria: x      Culture - Acid Fast - Stool w/Smear (collected 24 Sep 2023 20:00)  Source: .Stool Stool    Culture - Stool (collected 24 Sep 2023 11:11)  Source: .Stool Feces  Preliminary Report (25 Sep 2023 19:50):    No enteric pathogens to date: Final culture pending    RADIOLOGY & ADDITIONAL TESTS:  Results Reviewed:   Imaging Personally Reviewed:  Electrocardiogram Personally Reviewed:

## 2023-09-26 NOTE — CHART NOTE - NSCHARTNOTEFT_GEN_A_CORE
Patient is a 47y old  Male who presents with a chief complaint of Hypokalemia (25 Sep 2023 13:52)    BRIEF HOSPITAL COURSE:   47M with MS (no tx), active 2.5ppd smoker, prior ETOH abuse (only 7-8 drinks/weekend now), IBS since age 13 but never on medical regimen sent in by PCP iso increased diarrhea and muscle cramps. Labs notable for K<1.5, Cl 88, bicarb 40, , , WBC 11k. Originally admitted to medicine however required upgrade to ICU for more aggressive K supplementation via central venous access given high risk of sudden cardiac death in light of severe hypokalemia.    Events last 24 hours:   Potassium continues to improve. Repeat K 3.2 tonight, started on KCl 40mEq q6h for aggressive potassium supplementation.   Phos 2.0, Mg 1.8 -- KPhos 15mM IVPB, MgSulfate 2g IVPB x1 ordered.   Still w/ loose BM.     PAST MEDICAL & SURGICAL HISTORY:  Multiple sclerosis  Chronic diarrhea  ETOH abuse  S/P cholecystectomy    Review of Systems:  CONSTITUTIONAL: No fever, chills, or fatigue  EYES: No eye pain, visual disturbances, or discharge  ENMT:  No difficulty hearing, tinnitus, vertigo; No sinus or throat pain  NECK: No pain or stiffness  RESPIRATORY: No cough, wheezing, chills or hemoptysis; No shortness of breath  CARDIOVASCULAR: No chest pain, palpitations, dizziness, or leg swelling  GASTROINTESTINAL: No abdominal or epigastric pain. No nausea, vomiting, or hematemesis; No diarrhea or constipation. No melena or hematochezia.  GENITOURINARY: No dysuria, frequency, hematuria, or incontinence  NEUROLOGICAL: No headaches, memory loss, loss of strength, numbness, or tremors  SKIN: No itching, burning, rashes, or lesions   MUSCULOSKELETAL: No joint pain or swelling; No muscle, back, or extremity pain  PSYCHIATRIC: No depression, anxiety, mood swings, or difficulty sleeping    Medications:  hydrALAZINE Injectable 5 milliGRAM(s) IV Push every 6 hours PRN  lisinopril 10 milliGRAM(s) Oral daily  acetaminophen     Tablet .. 650 milliGRAM(s) Oral every 6 hours PRN  melatonin 3 milliGRAM(s) Oral at bedtime PRN  ondansetron Injectable 4 milliGRAM(s) IV Push every 8 hours PRN  enoxaparin Injectable 40 milliGRAM(s) SubCutaneous every 24 hours  dextrose 5% + sodium chloride 0.9%. 1000 milliLiter(s) IV Continuous <Continuous>  lactated ringers. 1000 milliLiter(s) IV Continuous <Continuous>  potassium chloride    Tablet ER 40 milliEquivalent(s) Oral every 6 hours  chlorhexidine 2% Cloths 1 Application(s) Topical daily  nicotine - 21 mG/24Hr(s) Patch 1 Patch Transdermal daily    ICU Vital Signs Last 24 Hrs  T(C): 37 (26 Sep 2023 01:26), Max: 37 (26 Sep 2023 00:13)  T(F): 98.6 (26 Sep 2023 01:26), Max: 98.6 (26 Sep 2023 00:13)  HR: 73 (26 Sep 2023 01:26) (68 - 98)  BP: 154/86 (26 Sep 2023 01:26) (114/69 - 160/111)  BP(mean): 85 (26 Sep 2023 00:00) (76 - 126)  ABP: --  ABP(mean): --  RR: 18 (26 Sep 2023 01:26) (11 - 26)  SpO2: 98% (26 Sep 2023 01:26) (94% - 100%)  O2 Parameters below as of 26 Sep 2023 01:26  Patient On (Oxygen Delivery Method): room air    I&O's Detail  24 Sep 2023 07:01  -  25 Sep 2023 07:00  --------------------------------------------------------  IN:    dextrose 5% + sodium chloride 0.9%: 2200 mL    IV PiggyBack: 1300 mL    IV PiggyBack: 548 mL  Total IN: 4048 mL  OUT:    Voided (mL): 1150 mL  Total OUT: 1150 mL  Total NET: 2898 mL    25 Sep 2023 07:01  -  26 Sep 2023 01:53  --------------------------------------------------------  IN:    dextrose 5% + sodium chloride 0.9%: 100 mL    IV PiggyBack: 700 mL    IV PiggyBack: 50 mL    IV PiggyBack: 250 mL    Lactated Ringers: 2400 mL  Total IN: 3500 mL  OUT:    Voided (mL): 1450 mL  Total OUT: 1450 mL  Total NET: 2050 mL    LABS:                      13.0   10.31 )-----------( 181      ( 25 Sep 2023 21:00 )             36.4     09-25  141  |  103  |  3.7<L>  ----------------------------<  94  3.2<L>   |  28.0  |  0.53  Ca    7.5<L>      25 Sep 2023 21:00  Phos  2.0     09-25  Mg     1.8     09-25  TPro  5.7<L>  /  Alb  3.0<L>  /  TBili  0.7  /  DBili  x   /  AST  260<H>  /  ALT  107<H>  /  AlkPhos  82  09-25    CAPILLARY BLOOD GLUCOSE    Urinalysis Basic - ( 25 Sep 2023 21:00 )  Color: x / Appearance: x / SG: x / pH: x  Gluc: 94 mg/dL / Ketone: x  / Bili: x / Urobili: x   Blood: x / Protein: x / Nitrite: x   Leuk Esterase: x / RBC: x / WBC x   Sq Epi: x / Non Sq Epi: x / Bacteria: x    CULTURES:  C Diff by PCR Result: [place] (09-24-23 @ 11:11)  Culture Results:   No enteric pathogens to date: Final culture pending (09-24-23 @ 11:11)  Culture Results:   Testing in progress (09-24-23 @ 11:11)  Rapid RVP Result: NotDetec (09-23-23 @ 22:30)    Physical Examination:  General: Alert, oriented, interactive, nonfocal.  HEENT: PERRL.  NECK: Supple.   PULM: Clear to auscultation bilaterally.  CVS: s1/s2.  ABD: Soft, nondistended, nontender, normoactive bowel sounds.  EXT: No edema, nontender.  SKIN: Warm.    RADIOLOGY:   < from: Xray Chest 1 View- PORTABLE-Urgent (Xray Chest 1 View- PORTABLE-Urgent .) (09.24.23 @ 11:45) >  ACC: [id number] EXAM:  XR CHEST PORTABLE URGENT 1V   ORDERED BY: CY FERNANDEZ   PROCEDURE DATE:  09/24/2023    IMPRESSION: Right jugular line inserted. Developing atelectasis off right   lower hilum.    47M with MS (no tx), active 2.5ppd smoker, prior ETOH abuse (only 7-8 drinks/weekend now), IBS since age 13 but never on medical regimen sent in by PCP iso increased diarrhea and muscle cramps. Labs notable for K<1.5, Cl 88, bicarb 40, , , WBC 11k. Originally admitted to medicine however required upgrade to ICU for more aggressive K supplementation via central venous access given high risk of sudden cardiac death in light of severe hypokalemia.    Plan:  NEURO: Monitor mental status closely, avoid neurosuppresants. Serial neurologic assessments.   CV: Maintain goal MAP >65. Keep K~4 and Mg>2 for optimal arrhythmia suppression. Lisinopril QD. Hydralazine IVP PRN.   PULM: Satting well on RA, will utilize supplemental O2 PRN to maintain goal SpO2 >88%. Incentive spirometry, Chest PT/Pulmonary toilet, HOB >30'. Albuterol PRN.   GI: NPO AMN for possible scope w/ GI in AM.   RENAL: Renal function currently WNL. Trend lytes/Scr daily with BMP, Strict I's and O's, goal UOP 0.5 cc/kg/hr, renal dose meds and avoid nephrotoxins. Potassium improving - started KCl 40mEq q6h tonight, also given KPhos 15mM IVPB and MgSulfate 2g IVPB x1 tonight. Serial metabolic panels for strict electrolyte repletion.   ENDO: Aggressive glycemic control to limit FS glucose to <180mg/dl. Keep Euthyroid.   ID: Afebrile, no leukocytosis. Stool studies sent. Monitor off abx.   HEME: Pharmacologic DVT Prophylaxis in addition to SCD's w/ Lovenox.     GOC: Full Code.   DISPO: D/w Intensivist, Dr. Campbell. Stable for downgrade out of MICU. Sign out given to hospitalist, Dr. Garcia. Downgraded to Telemetry. Ok to transfer out of MICU w/ RIJ CVC due to ongoing aggressive electrolyte repletion requirements.     Time spent on this patient encounter, which includes documenting this note in the electronic medical record, was >35 minutes including assessing the presenting problems with associated risks, reviewing the medical record to prepare for the encounter, and meeting face to face with the patient to obtain additional history. I have also performed an appropriate physical exam, made interventions listed and ordered and interpreted appropriate diagnostic studies as documented. To improve communication and patient safety, I have coordinated care with the multidisciplinary team including the bedside nurse, appropriate attending of record and consultants as needed. This time is independent of any procedures performed.

## 2023-09-26 NOTE — PROGRESS NOTE ADULT - ASSESSMENT
47M with MS (no tx), active 2.5ppd smoker, prior ETOH abuse (only 7-8 drinks/weekend now), IBS since age 13 but never on medical regimen sent in by PCP iso increased diarrhea and muscle cramps. Labs notable for K<1.5, Cl 88, bicarb 40, , , WBC 11k. Originally admitted to medicine however required upgrade to ICU for more aggressive K supplementation via central venous access for severe hypokalemia  47 y.o. M with MS (no tx), active 2.5ppd smoker, alcohol abuse and IBS who was sent in by PCP for increased diarrhea and muscle cramps. Labs were notable for severe hypokalemia, hypomagnesemia. Pt initially admitted to the hospitalist service for diarrhea concern for IBS exacerbation (infectious etiology ruled out), but after evaluation by GI and nephrology recs for increased aggressive potassium and mg supplementation needed. Pt was upgraded to MICU on 9/24, central line was placed and pt received aggressive potassium supplementation Pt now with resolution of hypomg and improving hypokalemia >3 but ongoing persistent diarrhea. Imodium added. Remainder of stool studies still pending and plan for pt to have colonoscopy performed today.          Diarrhea with severe electrolyte abnormalities- hypokalemia/ hypomg   suspected secondary to acute on chronic IBS - Diarrhea predominant type  - hypomagnesemia resolved  - hypokalemia improving to 3.2 today    - also noted with contraction alkalosis - elevated bicarb    - hypernatremia  152   - repeat potassium and mg level ordered for 2pm   -c/w potassium chloride 40meq q6hrs routine  (will re evaluate every day based on potassium value)   - central to be removed - pt has two peripheral IV accesses   - will c/w KCL IV ryders as needed and c/w checking bmp/ mg twice daily   -d/c IVF   - will give fluid holiday for a few hours and based on repeat bmp - will restart IVF thereafter  -C/ diff and GI PCR negative   -f/u stool studies:  Calprotectin, celiac cascade, stool culture, O&P, fecal elastase, stool fat are pending  -c/w stool count   - added Imodium prn for diarrhea  - npo for planned colonoscopy today   - per GI post colonoscopy will advance diet to full liquid diet (No lactose, low fiber, low fat) as tolerated  - GI f/u noted and appreciated      Fatty liver likely secondary to chronic alcohol use   - noted Transaminitis  - US abdomen noted with fatty liver     Alcohol abuse  - pt reports improvement in amount of alcohol intake - was much worse in the pandemic now he takes a few beers a day  - no evidence of any withdrawal during the hospitalization  - started MVI, thiamine and folic   - pt counselled on alcohol cessation ~ 3 mins     Tobacco dependence- active Smoker  -c/w nicotine patch   - pt counselled smoking cessation ~ 3 mins     H/o MS   - currently not on any tx or management   - pt to f/u outpt with pcp     DVT ppx   -c/w lovenox sq qd resumed tomorrow - hold today as pt pending procedure   - pt is ambulating independently     Activity level: Increase as tolerated     Dispo: pt remains medically acute with ongoing diarrhea and electrolyte losses             Addendum   S/p colonoscopy :    Segmental erythema, congestion and edema with no bleeding was noted in the rectosigmoid region from ~35-20 cm from anal verge. Multiple cold forceps biopsies were performed for histology.    Protruding lesions A single sessile 7 mm polyp of benign appearance was found in the ascending colon.A single-piece polypectomy was performed using a cold snare. The polyp was completely removed.  A single sessile 15 mm polyp of benign appearance was found in the ascendingcolon.A piece-meal polypectomy was performed using a hot snare in the ascending colon. The polyp was completely removed. Polyp was lifted with Everlift. Thepolyp was removed with hot snare and hot biopsy forceps. The resection margin was treated with soft coagulation using snare tip. Three clips were placed over polypectomy site.    Two sessile polyps of benign appearance ranging in size from 5 mm to 9 mm werefound in the descending colon. A single-piece polypectomy was performed using a cold snare. The polyps were completely removed. One polypectomy site withbleeding. One clip was placed over the site.    Impressions:  Polyp (7 mm) in the ascending colon. (Polypectomy).  Polyp (15 mm) in the ascending colon. (EMR, Polypectomy).  Normal mucosa in the cecum, ascending colon, transverse colon, descending  colon and proximal sigmoid colon. (Biopsy).  Polyps (5 mm to 9 mm) in the descending colon. (Polypectomy).  Prominent in the ileocecal valve. (Biopsy).  Sigmoid colitis -Erythema, congestion and edema in the colon. (Biopsy).    Plan    Colonoscopy in 6 months for evaluation of ascending piecemeal resection site.  Await pathology results.          Advancing diet to full liquid  and re checking bmp/ mg stat  d/w ACP to pull central line after results return    47 y.o. M with MS (no tx), active 2.5ppd smoker, alcohol abuse and IBS who was sent in by PCP for increased diarrhea and muscle cramps. Labs were notable for severe hypokalemia, hypomagnesemia. Pt initially admitted to the hospitalist service for diarrhea concern for IBS exacerbation (infectious etiology ruled out), but after evaluation by GI and nephrology recs for increased aggressive potassium and mg supplementation needed. Pt was upgraded to MICU on 9/24, central line was placed and pt received aggressive potassium supplementation Pt now with resolution of hypomg and improving hypokalemia >3 but ongoing persistent diarrhea. Imodium added. Remainder of stool studies still pending and plan for pt to have colonoscopy performed today.          Diarrhea with severe electrolyte abnormalities- hypokalemia/ hypomg   suspected secondary to acute on chronic IBS - Diarrhea predominant type  - hypomagnesemia resolved  - hypokalemia improving to 3.2 today    - also noted with contraction alkalosis - elevated bicarb    - hypernatremia  152   - repeat potassium and mg level ordered for 2pm   -c/w potassium chloride 40meq q6hrs routine  (will re evaluate every day based on potassium value)   - central to be removed - pt has two peripheral IV accesses   - will c/w KCL IV ryders as needed and c/w checking bmp/ mg twice daily   -d/c IVF   - will give fluid holiday for a few hours and based on repeat bmp - will restart IVF thereafter  -C/ diff and GI PCR negative   -f/u stool studies:  Calprotectin, celiac cascade, stool culture, O&P, fecal elastase, stool fat are pending  -c/w stool count   - added Imodium prn for diarrhea  - npo for planned colonoscopy today   - per GI post colonoscopy will advance diet to full liquid diet (No lactose, low fiber, low fat) as tolerated  - GI f/u noted and appreciated      Fatty liver likely secondary to chronic alcohol use   - noted Transaminitis  - US abdomen noted with fatty liver     Hypertension  - noted sbp 150-160 range  - c/w lisinopril 10mg po qd  - c/w monitoring bp - will up titrate lisinopril if needed     Alcohol abuse  - pt reports improvement in amount of alcohol intake - was much worse in the pandemic now he takes a few beers a day  - no evidence of any withdrawal during the hospitalization  - started MVI, thiamine and folic   - pt counselled on alcohol cessation ~ 3 mins     Tobacco dependence- active Smoker  -c/w nicotine patch   - pt counselled smoking cessation ~ 3 mins     H/o MS   - currently not on any tx or management   - pt to f/u outpt with pcp     DVT ppx   -c/w lovenox sq qd resumed tomorrow - hold today as pt pending procedure   - pt is ambulating independently     Activity level: Increase as tolerated     Dispo: pt remains medically acute with ongoing diarrhea and electrolyte losses             Addendum   S/p colonoscopy :    Segmental erythema, congestion and edema with no bleeding was noted in the rectosigmoid region from ~35-20 cm from anal verge. Multiple cold forceps biopsies were performed for histology.    Protruding lesions A single sessile 7 mm polyp of benign appearance was found in the ascending colon.A single-piece polypectomy was performed using a cold snare. The polyp was completely removed.  A single sessile 15 mm polyp of benign appearance was found in the ascendingcolon.A piece-meal polypectomy was performed using a hot snare in the ascending colon. The polyp was completely removed. Polyp was lifted with Everlift. Thepolyp was removed with hot snare and hot biopsy forceps. The resection margin was treated with soft coagulation using snare tip. Three clips were placed over polypectomy site.    Two sessile polyps of benign appearance ranging in size from 5 mm to 9 mm werefound in the descending colon. A single-piece polypectomy was performed using a cold snare. The polyps were completely removed. One polypectomy site withbleeding. One clip was placed over the site.    Impressions:  Polyp (7 mm) in the ascending colon. (Polypectomy).  Polyp (15 mm) in the ascending colon. (EMR, Polypectomy).  Normal mucosa in the cecum, ascending colon, transverse colon, descending  colon and proximal sigmoid colon. (Biopsy).  Polyps (5 mm to 9 mm) in the descending colon. (Polypectomy).  Prominent in the ileocecal valve. (Biopsy).  Sigmoid colitis -Erythema, congestion and edema in the colon. (Biopsy).    Plan    Colonoscopy in 6 months for evaluation of ascending piecemeal resection site.  Await pathology results.          Advancing diet to full liquid  and re checking bmp/ mg stat  d/w ACP to pull central line after results return    47 y.o. M with MS (no tx), active 2.5ppd smoker, alcohol abuse and IBS who was sent in by PCP for increased diarrhea and muscle cramps. Labs were notable for severe hypokalemia, hypomagnesemia. Pt initially admitted to the hospitalist service for diarrhea concern for IBS exacerbation (infectious etiology ruled out), but after evaluation by GI and nephrology recs for increased aggressive potassium and mg supplementation needed. Pt was upgraded to MICU on 9/24, central line was placed and pt received aggressive potassium supplementation Pt now with resolution of hypomg and improving hypokalemia >3 but ongoing persistent diarrhea. Imodium added. Remainder of stool studies still pending and plan for pt to have colonoscopy performed today.          Diarrhea with severe electrolyte abnormalities- hypokalemia/ hypomg   suspected secondary to acute on chronic IBS - Diarrhea predominant type  - still some diffuse abd pain- also some epigastric pain   - hypomagnesemia resolved  - hypokalemia improving to 3.2 today    - also noted with contraction alkalosis - elevated bicarb    - hypernatremia  152   - repeat potassium and mg level ordered for 2pm and in the am    - goal k+>4  and mg> 2    -c/w potassium chloride 40meq q6hrs routine  (will re evaluate every day based on potassium value)    - will c/w KCL IV ryders as needed and c/w checking bmp/ mg twice daily   -d/c IVF   - will give fluid holiday for a few hours and based on repeat bmp - will restart IVF thereafter- likely d5w given hypernatremia  and post colonoscopy pt educated to orally hydrate   -C/ diff and GI PCR negative   -f/u stool studies:  Calprotectin, celiac cascade, stool culture, O&P, fecal elastase, stool fat are pending  -c/w stool count   - added Imodium prn for diarrhea  - npo for planned colonoscopy today   - per GI post colonoscopy will advance diet to full liquid diet (No lactose, low fiber, low fat) as tolerated  - should also proceed with ct abd/pelvis- will order tomorrow as pt to go for colonoscopy today  - also will order lipase - given hx of concomitant alcohol abuse epigastric pain - would want to ensure no evidence of pancreatitis (pt has been on a clear liquid diet/ npo and had aggressive fluid resuscitation since admission regardless)   - GI f/u noted and appreciated   - nephrology f/u noted and appreciated      Fatty liver likely secondary to chronic alcohol use   - noted Transaminitis  - trending lfts thus far have remained unchanged   - US abdomen noted with fatty liver     Hypertension  - noted sbp 130-150 range  - c/w lisinopril 10mg po qd  - c/w monitoring bp - will up titrate lisinopril if needed     Alcohol abuse  - pt reports improvement in amount of alcohol intake - was much worse in the pandemic now he takes a few beers a day  - no evidence of any withdrawal during the hospitalization  - started MVI, thiamine and folic   - pt counselled on alcohol cessation ~ 3 mins     Tobacco dependence- active Smoker  -c/w nicotine patch   - pt counselled smoking cessation ~ 3 mins     H/o MS   - currently not on any tx or management   - noted outpt documentation in [facility]   - pt to f/u outpt with pcp - has not seen neurology in years     DVT ppx   -c/w lovenox sq qd resumed tomorrow - hold today as pt pending procedure   - pt is ambulating independently     Activity level: Increase as tolerated     Dispo: pt remains medically acute with ongoing diarrhea and electrolyte losses         Addendum   Pt S/p colonoscopy  with below results. Advancing diet to full liquid  and re checking bmp/ mg stat    Addendum . Noted low potassium 3.1   - in addition to routine po kcl added kcl 10meq 3 ryders - mg remains wnl   sodium remains high 150. Started d5 IVF @ 75ml/hr x 12 hrs - recheck bmp in the am   ACP d/w [facility] removed central line       Colonoscopy results:   Segmental erythema, congestion and edema with no bleeding was noted in the rectosigmoid region from ~35-20 cm from anal verge. Multiple cold forceps biopsies were performed for histology.  Protruding lesions A single sessile 7 mm polyp of benign appearance was found in the ascending colon.A single-piece polypectomy was performed using a cold snare. The polyp was completely removed.  A single sessile 15 mm polyp of benign appearance was found in the ascendingcolon.A piece-meal polypectomy was performed using a hot snare in the ascending colon. The polyp was completely removed. Polyp was lifted with Everlift. Thepolyp was removed with hot snare and hot biopsy forceps. The resection margin was treated with soft coagulation using snare tip. Three clips were placed over polypectomy site.  Two sessile polyps of benign appearance ranging in size from 5 mm to 9 mm werefound in the descending colon. A single-piece polypectomy was performed using a cold snare. The polyps were completely removed. One polypectomy site withbleeding. One clip was placed over the site.    Impressions:  Polyp (7 mm) in the ascending colon. (Polypectomy).  Polyp (15 mm) in the ascending colon. (EMR, Polypectomy).  Normal mucosa in the cecum, ascending colon, transverse colon, descending  colon and proximal sigmoid colon. (Biopsy).  Polyps (5 mm to 9 mm) in the descending colon. (Polypectomy).  Prominent in the ileocecal valve. (Biopsy).  Sigmoid colitis -Erythema, congestion and edema in the colon. (Biopsy).    Plan Colonoscopy in 6 months for evaluation of ascending piecemeal resection site. Await pathology results.

## 2023-09-26 NOTE — CHART NOTE - NSCHARTNOTEFT_GEN_A_CORE
Medicine PA Central Line Removal Note    PROCEDURE NOTE:  Central Line removal    Site:    RIJ Triple Lumen Catheter placement    Requested by PMD to remove the Central line. Explained the procedure. Placed in Trendelenburg. Catheter removed and tip intact. Pressure applied for greater than 5 mins, no hematoma or bleeding noted. Patient tolerated procedure well. A dry sterile dressing applied, instructed to keep patient supine for 20-30 mins. RN aware.

## 2023-09-27 LAB
ANION GAP SERPL CALC-SCNC: 11 MMOL/L — SIGNIFICANT CHANGE UP (ref 5–17)
ANION GAP SERPL CALC-SCNC: 11 MMOL/L — SIGNIFICANT CHANGE UP (ref 5–17)
ANION GAP SERPL CALC-SCNC: 12 MMOL/L — SIGNIFICANT CHANGE UP (ref 5–17)
ANION GAP SERPL CALC-SCNC: 13 MMOL/L — SIGNIFICANT CHANGE UP (ref 5–17)
BUN SERPL-MCNC: 5 MG/DL — LOW (ref 8–20)
BUN SERPL-MCNC: 5 MG/DL — LOW (ref 8–20)
BUN SERPL-MCNC: 5.3 MG/DL — LOW (ref 8–20)
BUN SERPL-MCNC: 8.1 MG/DL — SIGNIFICANT CHANGE UP (ref 8–20)
CALCIUM SERPL-MCNC: 8.2 MG/DL — LOW (ref 8.4–10.5)
CALCIUM SERPL-MCNC: 8.8 MG/DL — SIGNIFICANT CHANGE UP (ref 8.4–10.5)
CALCIUM SERPL-MCNC: 8.9 MG/DL — SIGNIFICANT CHANGE UP (ref 8.4–10.5)
CALCIUM SERPL-MCNC: 9 MG/DL — SIGNIFICANT CHANGE UP (ref 8.4–10.5)
CHLORIDE SERPL-SCNC: 101 MMOL/L — SIGNIFICANT CHANGE UP (ref 96–108)
CHLORIDE SERPL-SCNC: 97 MMOL/L — SIGNIFICANT CHANGE UP (ref 96–108)
CO2 SERPL-SCNC: 27 MMOL/L — SIGNIFICANT CHANGE UP (ref 22–29)
CO2 SERPL-SCNC: 27 MMOL/L — SIGNIFICANT CHANGE UP (ref 22–29)
CO2 SERPL-SCNC: 28 MMOL/L — SIGNIFICANT CHANGE UP (ref 22–29)
CO2 SERPL-SCNC: 29 MMOL/L — SIGNIFICANT CHANGE UP (ref 22–29)
CREAT SERPL-MCNC: 0.52 MG/DL — SIGNIFICANT CHANGE UP (ref 0.5–1.3)
CREAT SERPL-MCNC: 0.6 MG/DL — SIGNIFICANT CHANGE UP (ref 0.5–1.3)
CREAT SERPL-MCNC: 0.63 MG/DL — SIGNIFICANT CHANGE UP (ref 0.5–1.3)
CREAT SERPL-MCNC: 0.76 MG/DL — SIGNIFICANT CHANGE UP (ref 0.5–1.3)
EGFR: 111 ML/MIN/1.73M2 — SIGNIFICANT CHANGE UP
EGFR: 117 ML/MIN/1.73M2 — SIGNIFICANT CHANGE UP
EGFR: 119 ML/MIN/1.73M2 — SIGNIFICANT CHANGE UP
EGFR: 124 ML/MIN/1.73M2 — SIGNIFICANT CHANGE UP
GLUCOSE SERPL-MCNC: 100 MG/DL — HIGH (ref 70–99)
GLUCOSE SERPL-MCNC: 109 MG/DL — HIGH (ref 70–99)
GLUCOSE SERPL-MCNC: 150 MG/DL — HIGH (ref 70–99)
GLUCOSE SERPL-MCNC: 78 MG/DL — SIGNIFICANT CHANGE UP (ref 70–99)
LIDOCAIN IGE QN: 17 U/L — LOW (ref 22–51)
MAGNESIUM SERPL-MCNC: 1.8 MG/DL — SIGNIFICANT CHANGE UP (ref 1.6–2.6)
MAGNESIUM SERPL-MCNC: 1.9 MG/DL — SIGNIFICANT CHANGE UP (ref 1.8–2.6)
MELD SCORE WITH DIALYSIS: 21 POINTS — SIGNIFICANT CHANGE UP
MELD SCORE WITHOUT DIALYSIS: 8 POINTS — SIGNIFICANT CHANGE UP
PHOSPHATE SERPL-MCNC: 2.4 MG/DL — SIGNIFICANT CHANGE UP (ref 2.4–4.7)
PHOSPHATE SERPL-MCNC: 2.9 MG/DL — SIGNIFICANT CHANGE UP (ref 2.4–4.7)
POTASSIUM SERPL-MCNC: 2.7 MMOL/L — CRITICAL LOW (ref 3.5–5.3)
POTASSIUM SERPL-MCNC: 2.8 MMOL/L — CRITICAL LOW (ref 3.5–5.3)
POTASSIUM SERPL-MCNC: 2.9 MMOL/L — CRITICAL LOW (ref 3.5–5.3)
POTASSIUM SERPL-MCNC: 3.2 MMOL/L — LOW (ref 3.5–5.3)
POTASSIUM SERPL-SCNC: 2.7 MMOL/L — CRITICAL LOW (ref 3.5–5.3)
POTASSIUM SERPL-SCNC: 2.8 MMOL/L — CRITICAL LOW (ref 3.5–5.3)
POTASSIUM SERPL-SCNC: 2.9 MMOL/L — CRITICAL LOW (ref 3.5–5.3)
POTASSIUM SERPL-SCNC: 3.2 MMOL/L — LOW (ref 3.5–5.3)
SODIUM SERPL-SCNC: 135 MMOL/L — SIGNIFICANT CHANGE UP (ref 135–145)
SODIUM SERPL-SCNC: 137 MMOL/L — SIGNIFICANT CHANGE UP (ref 135–145)
SODIUM SERPL-SCNC: 138 MMOL/L — SIGNIFICANT CHANGE UP (ref 135–145)
SODIUM SERPL-SCNC: 140 MMOL/L — SIGNIFICANT CHANGE UP (ref 135–145)

## 2023-09-27 PROCEDURE — 99233 SBSQ HOSP IP/OBS HIGH 50: CPT

## 2023-09-27 PROCEDURE — 74176 CT ABD & PELVIS W/O CONTRAST: CPT | Mod: 26

## 2023-09-27 RX ORDER — LOPERAMIDE HCL 2 MG
2 TABLET ORAL THREE TIMES A DAY
Refills: 0 | Status: DISCONTINUED | OUTPATIENT
Start: 2023-09-27 | End: 2023-09-29

## 2023-09-27 RX ORDER — POTASSIUM CHLORIDE 20 MEQ
10 PACKET (EA) ORAL
Refills: 0 | Status: DISCONTINUED | OUTPATIENT
Start: 2023-09-27 | End: 2023-09-29

## 2023-09-27 RX ADMIN — Medication 100 MILLIGRAM(S): at 10:55

## 2023-09-27 RX ADMIN — Medication 100 MILLIEQUIVALENT(S): at 00:32

## 2023-09-27 RX ADMIN — ENOXAPARIN SODIUM 40 MILLIGRAM(S): 100 INJECTION SUBCUTANEOUS at 08:19

## 2023-09-27 RX ADMIN — Medication 40 MILLIEQUIVALENT(S): at 10:55

## 2023-09-27 RX ADMIN — Medication 2 MILLIGRAM(S): at 23:29

## 2023-09-27 RX ADMIN — Medication 1 MILLIGRAM(S): at 10:56

## 2023-09-27 RX ADMIN — SODIUM CHLORIDE 75 MILLILITER(S): 9 INJECTION, SOLUTION INTRAVENOUS at 01:04

## 2023-09-27 RX ADMIN — Medication 2 MILLIGRAM(S): at 13:12

## 2023-09-27 RX ADMIN — Medication 40 MILLIEQUIVALENT(S): at 17:11

## 2023-09-27 RX ADMIN — Medication 40 MILLIEQUIVALENT(S): at 23:29

## 2023-09-27 RX ADMIN — Medication 1 TABLET(S): at 10:56

## 2023-09-27 RX ADMIN — LISINOPRIL 10 MILLIGRAM(S): 2.5 TABLET ORAL at 05:00

## 2023-09-27 RX ADMIN — Medication 1 PATCH: at 10:55

## 2023-09-27 RX ADMIN — Medication 1 PATCH: at 20:38

## 2023-09-27 RX ADMIN — Medication 40 MILLIEQUIVALENT(S): at 05:00

## 2023-09-27 RX ADMIN — CHLORHEXIDINE GLUCONATE 1 APPLICATION(S): 213 SOLUTION TOPICAL at 10:59

## 2023-09-27 RX ADMIN — Medication 100 MILLIEQUIVALENT(S): at 08:17

## 2023-09-27 NOTE — PROGRESS NOTE ADULT - SUBJECTIVE AND OBJECTIVE BOX
[facility] Division of Hospital Medicine  Skyler Kilpatrick MD    Chief Complaint:  Patient is a 47y old  Male who presents with a chief complaint of Hypokalemia (27 Sep 2023 09:06)      SUBJECTIVE / OVERNIGHT EVENTS:  Patient seen and examined at bedside. Complaining of LLE pain due to K rider. Still with ongoing diarrhea.     MEDICATIONS  (STANDING):  chlorhexidine 2% Cloths 1 Application(s) Topical daily  dextrose 5%. 1000 milliLiter(s) (75 mL/Hr) IV Continuous <Continuous>  enoxaparin Injectable 40 milliGRAM(s) SubCutaneous every 24 hours  folic acid 1 milliGRAM(s) Oral daily  lisinopril 10 milliGRAM(s) Oral daily  loperamide 2 milliGRAM(s) Oral three times a day  multivitamin 1 Tablet(s) Oral daily  nicotine - 21 mG/24Hr(s) Patch 1 Patch Transdermal daily  potassium chloride    Tablet ER 40 milliEquivalent(s) Oral every 6 hours  potassium chloride  10 mEq/100 mL IVPB 10 milliEquivalent(s) IV Intermittent every 1 hour  thiamine 100 milliGRAM(s) Oral daily    MEDICATIONS  (PRN):  acetaminophen     Tablet .. 650 milliGRAM(s) Oral every 6 hours PRN Temp greater or equal to 38C (100.4F), Mild Pain (1 - 3)  melatonin 3 milliGRAM(s) Oral at bedtime PRN Insomnia  ondansetron Injectable 4 milliGRAM(s) IV Push every 8 hours PRN Nausea and/or Vomiting        I&O's Summary    26 Sep 2023 07:01  -  27 Sep 2023 07:00  --------------------------------------------------------  IN: 0 mL / OUT: 675 mL / NET: -675 mL    27 Sep 2023 07:01  -  27 Sep 2023 10:49  --------------------------------------------------------  IN: 360 mL / OUT: 400 mL / NET: -40 mL        PHYSICAL EXAM:  Vital Signs Last 24 Hrs  T(C): 37.1 (27 Sep 2023 04:24), Max: 37.1 (27 Sep 2023 04:24)  T(F): 98.8 (27 Sep 2023 04:24), Max: 98.8 (27 Sep 2023 04:24)  HR: 65 (27 Sep 2023 04:24) (65 - 85)  BP: 124/74 (27 Sep 2023 04:24) (124/74 - 159/84)  BP(mean): --  RR: 18 (27 Sep 2023 04:24) (18 - 18)  SpO2: 97% (27 Sep 2023 04:24) (94% - 97%)    Parameters below as of 27 Sep 2023 04:24  Patient On (Oxygen Delivery Method): room air            CONSTITUTIONAL: In severe distress due to LLE pain  HEENT: NC/AT, PERRL, no JVD  RESPIRATORY: CTA bilaterally, normal effort  CARDIOVASCULAR: RRR, S1/S2+, no m/g/r  ABDOMEN: Nontender to palpation, normoactive bowel sounds, no rebound/guarding  MUSCULOSKELETAL: No edema, cyanosis or deformities.  PSYCH: Calm, affect appropriate.  NEUROLOGY: Awake, alert, no focal neurological deficits.   SKIN: No rashes; no palpable lesions  VASC: Distal pulses palpable    LABS:                        15.4   8.42  )-----------( 207      ( 26 Sep 2023 06:51 )             43.8     09-27    140  |  101  |  5.3<L>  ----------------------------<  150<H>  2.7<LL>   |  27.0  |  0.60    Ca    8.2<L>      27 Sep 2023 04:50  Phos  2.4     09-27  Mg     1.9     09-27    TPro  6.2<L>  /  Alb  3.2<L>  /  TBili  0.8  /  DBili  x   /  AST  256<H>  /  ALT  114<H>  /  AlkPhos  92  09-26    PT/INR - ( 26 Sep 2023 06:51 )   PT: 12.7 sec;   INR: 1.15 ratio         PTT - ( 26 Sep 2023 06:51 )  PTT:28.2 sec      Urinalysis Basic - ( 27 Sep 2023 04:50 )    Color: x / Appearance: x / SG: x / pH: x  Gluc: 150 mg/dL / Ketone: x  / Bili: x / Urobili: x   Blood: x / Protein: x / Nitrite: x   Leuk Esterase: x / RBC: x / WBC x   Sq Epi: x / Non Sq Epi: x / Bacteria: x        Culture - Acid Fast - Stool w/Smear (collected 24 Sep 2023 20:00)  Source: .Stool Stool    Culture - Stool (collected 24 Sep 2023 11:11)  Source: .Stool Feces  Final Report (26 Sep 2023 19:54):    No enteric pathogens isolated.    (Stool culture examined for Salmonella,    Shigella, Campylobacter, Aeromonas, Plesiomonas,    Vibrio, E.coli O157 and Yersinia)      CAPILLARY BLOOD GLUCOSE            RADIOLOGY & ADDITIONAL TESTS:  Results Reviewed:   Imaging Personally Reviewed:  Electrocardiogram Personally Reviewed:

## 2023-09-27 NOTE — PROGRESS NOTE ADULT - NS ATTEST RISK PROBLEM GEN_ALL_CORE FT
Patient with profuse diarrhea causing severe hypotension kalemia  Patient requires MICU monitoring due to the severe hypokalemia.  Requires telemetry    I reviewed labs.  Potassium 1.5.  I ordered stool studies    I reviewed the ultrasound images–fatty liver is my interpretation  I spoke to the ICU attending regarding plan for stool studies and eventual colonoscopy
chronic diarrhea, hypokalemia     I reviewed labs. K 2.7   chem 7 ordered for tomorrow  U/S reviewed - my interpretation- fatty liver   I reviewed hospitalist note. Pt with ETOH abuse, MVI, thiamine and folate started here.

## 2023-09-27 NOTE — CHART NOTE - NSCHARTNOTEFT_GEN_A_CORE
K 3.2  Pt refusing IV potassium  Potassium chloride po ordered for q 6 hrs  Pt to receive po potassium  BMP in am  VSS, pt without complaints  Continue to monitor

## 2023-09-27 NOTE — PROGRESS NOTE ADULT - ASSESSMENT
47 y.o. M with MS (no tx), active 2.5ppd smoker, alcohol abuse and IBS who was sent in by PCP for increased diarrhea and muscle cramps 2/2 diarrhea. Now s/p colonoscopy with several polyps s/p polypectomy, prominence in the IC valve, sigmoid colitis. Stool GI PCR negative. remainder of stool studies pending. c/w imodium added.

## 2023-09-27 NOTE — PROVIDER CONTACT NOTE (CRITICAL VALUE NOTIFICATION) - ACTION/TREATMENT ORDERED:
MD stated to give patient the PO dose already ordered at 0600 and repeat BMP at 0900
no new orders, is to receive potassium supplement this evening as ordered
KARINA chew

## 2023-09-27 NOTE — PROGRESS NOTE ADULT - PROBLEM SELECTOR PLAN 1
advance diet to regular, lactose restricted   change 2mg imodium to standing TID   f/u CT abd/plv   f/u pathology   f/u Calprotectin, celiac cascade, fecal elastase, stool fat advance diet to regular, lactose restricted   change 2mg imodium to standing TID     f/u pathology   f/u Calprotectin, celiac cascade, fecal elastase, stool fat

## 2023-09-27 NOTE — PROGRESS NOTE ADULT - SUBJECTIVE AND OBJECTIVE BOX
Pt seen and examined, s/p colonoscopy with several polyps s/p polypectomy, prominence in the IC valve, sigmoid colitis. Patient reports over 10 small liquid BM last night since the procedure. Tolerating diet. ROS otherwise negative.     REVIEW OF SYSTEMS:  Constitutional: No fever, weight loss or fatigue  Cardiovascular: No chest pain, palpitations, dizziness or leg swelling  Gastrointestinal: No abdominal or epigastric pain. No nausea, vomiting or hematemesis; No diarrhea or constipation. No melena or hematochezia.  Skin: No itching, burning, rashes or lesions     MEDICATIONS:  MEDICATIONS  (STANDING):  chlorhexidine 2% Cloths 1 Application(s) Topical daily  dextrose 5%. 1000 milliLiter(s) (75 mL/Hr) IV Continuous <Continuous>  enoxaparin Injectable 40 milliGRAM(s) SubCutaneous every 24 hours  folic acid 1 milliGRAM(s) Oral daily  lisinopril 10 milliGRAM(s) Oral daily  multivitamin 1 Tablet(s) Oral daily  nicotine - 21 mG/24Hr(s) Patch 1 Patch Transdermal daily  potassium chloride    Tablet ER 40 milliEquivalent(s) Oral every 6 hours  potassium chloride  10 mEq/100 mL IVPB 10 milliEquivalent(s) IV Intermittent every 1 hour  thiamine 100 milliGRAM(s) Oral daily    MEDICATIONS  (PRN):  acetaminophen     Tablet .. 650 milliGRAM(s) Oral every 6 hours PRN Temp greater or equal to 38C (100.4F), Mild Pain (1 - 3)  loperamide 2 milliGRAM(s) Oral four times a day PRN Diarrhea  melatonin 3 milliGRAM(s) Oral at bedtime PRN Insomnia  ondansetron Injectable 4 milliGRAM(s) IV Push every 8 hours PRN Nausea and/or Vomiting      Allergies    No Known Allergies    Intolerances        Vital Signs Last 24 Hrs  T(C): 37.1 (27 Sep 2023 04:24), Max: 37.1 (26 Sep 2023 10:08)  T(F): 98.8 (27 Sep 2023 04:24), Max: 98.8 (26 Sep 2023 10:08)  HR: 65 (27 Sep 2023 04:24) (65 - 85)  BP: 124/74 (27 Sep 2023 04:24) (124/74 - 159/84)  BP(mean): --  RR: 18 (27 Sep 2023 04:24) (18 - 18)  SpO2: 97% (27 Sep 2023 04:24) (94% - 97%)    Parameters below as of 27 Sep 2023 04:24  Patient On (Oxygen Delivery Method): room air        09-26 @ 07:01 - 09-27 @ 07:00  --------------------------------------------------------  IN: 0 mL / OUT: 675 mL / NET: -675 mL    09-27 @ 07:01 - 09-27 @ 09:07  --------------------------------------------------------  IN: 360 mL / OUT: 400 mL / NET: -40 mL    PHYSICAL EXAM:    General: Well developed; well nourished; in no acute distress  HEENT: MMM, conjunctiva and sclera clear  Gastrointestinal: Soft non-tender non-distended; Normal bowel sounds; No hepatosplenomegaly  Skin: Warm and dry. No obvious rash    LABS:      CBC Full  -  ( 26 Sep 2023 06:51 )  WBC Count : 8.42 K/uL  RBC Count : 4.29 M/uL  Hemoglobin : 15.4 g/dL  Hematocrit : 43.8 %  Platelet Count - Automated : 207 K/uL  Mean Cell Volume : 102.1 fl  Mean Cell Hemoglobin : 35.9 pg  Mean Cell Hemoglobin Concentration : 35.2 gm/dL  Auto Neutrophil # : x  Auto Lymphocyte # : x  Auto Monocyte # : x  Auto Eosinophil # : x  Auto Basophil # : x  Auto Neutrophil % : x  Auto Lymphocyte % : x  Auto Monocyte % : x  Auto Eosinophil % : x  Auto Basophil % : x    09-27    140  |  101  |  5.3<L>  ----------------------------<  150<H>  2.7<LL>   |  27.0  |  0.60    Ca    8.2<L>      27 Sep 2023 04:50  Phos  2.4     09-27  Mg     1.9     09-27    TPro  6.2<L>  /  Alb  3.2<L>  /  TBili  0.8  /  DBili  x   /  AST  256<H>  /  ALT  114<H>  /  AlkPhos  92  09-26    PT/INR - ( 26 Sep 2023 06:51 )   PT: 12.7 sec;   INR: 1.15 ratio         PTT - ( 26 Sep 2023 06:51 )  PTT:28.2 sec      Urinalysis Basic - ( 27 Sep 2023 04:50 )    Color: x / Appearance: x / SG: x / pH: x  Gluc: 150 mg/dL / Ketone: x  / Bili: x / Urobili: x   Blood: x / Protein: x / Nitrite: x   Leuk Esterase: x / RBC: x / WBC x   Sq Epi: x / Non Sq Epi: x / Bacteria: x                RADIOLOGY & ADDITIONAL STUDIES (The following images were personally reviewed): Pt seen and examined, s/p colonoscopy with several polyps s/p polypectomy, prominence in the IC valve, sigmoid erythema Patient reports over 10 small liquid BM last night since the procedure. Tolerating diet. ROS otherwise negative.     REVIEW OF SYSTEMS:  Constitutional: No fever, weight loss or fatigue  Cardiovascular: No chest pain, palpitations, dizziness or leg swelling  Gastrointestinal: No abdominal or epigastric pain. No nausea, vomiting or hematemesis; No diarrhea or constipation. No melena or hematochezia.  Skin: No itching, burning, rashes or lesions     MEDICATIONS:  MEDICATIONS  (STANDING):  chlorhexidine 2% Cloths 1 Application(s) Topical daily  dextrose 5%. 1000 milliLiter(s) (75 mL/Hr) IV Continuous <Continuous>  enoxaparin Injectable 40 milliGRAM(s) SubCutaneous every 24 hours  folic acid 1 milliGRAM(s) Oral daily  lisinopril 10 milliGRAM(s) Oral daily  multivitamin 1 Tablet(s) Oral daily  nicotine - 21 mG/24Hr(s) Patch 1 Patch Transdermal daily  potassium chloride    Tablet ER 40 milliEquivalent(s) Oral every 6 hours  potassium chloride  10 mEq/100 mL IVPB 10 milliEquivalent(s) IV Intermittent every 1 hour  thiamine 100 milliGRAM(s) Oral daily    MEDICATIONS  (PRN):  acetaminophen     Tablet .. 650 milliGRAM(s) Oral every 6 hours PRN Temp greater or equal to 38C (100.4F), Mild Pain (1 - 3)  loperamide 2 milliGRAM(s) Oral four times a day PRN Diarrhea  melatonin 3 milliGRAM(s) Oral at bedtime PRN Insomnia  ondansetron Injectable 4 milliGRAM(s) IV Push every 8 hours PRN Nausea and/or Vomiting      Allergies    No Known Allergies    Intolerances        Vital Signs Last 24 Hrs  T(C): 37.1 (27 Sep 2023 04:24), Max: 37.1 (26 Sep 2023 10:08)  T(F): 98.8 (27 Sep 2023 04:24), Max: 98.8 (26 Sep 2023 10:08)  HR: 65 (27 Sep 2023 04:24) (65 - 85)  BP: 124/74 (27 Sep 2023 04:24) (124/74 - 159/84)  BP(mean): --  RR: 18 (27 Sep 2023 04:24) (18 - 18)  SpO2: 97% (27 Sep 2023 04:24) (94% - 97%)    Parameters below as of 27 Sep 2023 04:24  Patient On (Oxygen Delivery Method): room air        09-26 @ 07:01  -  09-27 @ 07:00  --------------------------------------------------------  IN: 0 mL / OUT: 675 mL / NET: -675 mL    09-27 @ 07:01 - 09-27 @ 09:07  --------------------------------------------------------  IN: 360 mL / OUT: 400 mL / NET: -40 mL    PHYSICAL EXAM:    General: Well developed; well nourished; in no acute distress  HEENT: MMM, conjunctiva and sclera clear  Gastrointestinal: Soft non-tender non-distended; Normal bowel sounds; No hepatosplenomegaly  Skin: Warm and dry. No obvious rash    LABS:      CBC Full  -  ( 26 Sep 2023 06:51 )  WBC Count : 8.42 K/uL  RBC Count : 4.29 M/uL  Hemoglobin : 15.4 g/dL  Hematocrit : 43.8 %  Platelet Count - Automated : 207 K/uL  Mean Cell Volume : 102.1 fl  Mean Cell Hemoglobin : 35.9 pg  Mean Cell Hemoglobin Concentration : 35.2 gm/dL  Auto Neutrophil # : x  Auto Lymphocyte # : x  Auto Monocyte # : x  Auto Eosinophil # : x  Auto Basophil # : x  Auto Neutrophil % : x  Auto Lymphocyte % : x  Auto Monocyte % : x  Auto Eosinophil % : x  Auto Basophil % : x    09-27    140  |  101  |  5.3<L>  ----------------------------<  150<H>  2.7<LL>   |  27.0  |  0.60    Ca    8.2<L>      27 Sep 2023 04:50  Phos  2.4     09-27  Mg     1.9     09-27    TPro  6.2<L>  /  Alb  3.2<L>  /  TBili  0.8  /  DBili  x   /  AST  256<H>  /  ALT  114<H>  /  AlkPhos  92  09-26    PT/INR - ( 26 Sep 2023 06:51 )   PT: 12.7 sec;   INR: 1.15 ratio         PTT - ( 26 Sep 2023 06:51 )  PTT:28.2 sec      Urinalysis Basic - ( 27 Sep 2023 04:50 )    Color: x / Appearance: x / SG: x / pH: x  Gluc: 150 mg/dL / Ketone: x  / Bili: x / Urobili: x   Blood: x / Protein: x / Nitrite: x   Leuk Esterase: x / RBC: x / WBC x   Sq Epi: x / Non Sq Epi: x / Bacteria: x                RADIOLOGY & ADDITIONAL STUDIES (The following images were personally reviewed):    Patient is a 47y old  Male who presents with a chief complaint of Hypokalemia (27 Sep 2023 10:46)      HPI:  46 yo male, hx of IBS-Diarrhea predominant, current smoker, who presents to ED sent from PCP for abnormal labs. Patient says that over last week, he has been having increased frequency of diarrhea even though he has baseline diarrhea 2/2 IBS. Says that he had been going over a dozen times daily. Denied bloody bowel movements. Says that he has been also having worsening weakness and fatigue, not ambulating much at home over last 1-2 weeks. Opted to go to his PCP who evaluated him with labs, was told he may have walking PNA, treated with oral doxy for which patient took 2 tabs. This AM contacted by PMD advising he needed to go to ER emergently 2/2 low K. Says he last was hospitalized for low K many years ago. Currently not being treated for IBS as he says he gave up on treatment years ago after nothing worked. Denied any recent fevers, chills, headaches, blurry vision, chest pain, palpitations, cough, dyspnea, abdominal pain, n/v, blood stools, dysuria, hematuria. Does complain of calf pain and indicates that he did note that his legs have been swollen at times over last week.     In ED: noted Low K, mag 1.2, wbc mildly elevated >11.3. repletion was ordered by ED. Admit to Medicine called.  (23 Sep 2023 21:32)      REVIEW OF SYSTEMS:  Constitutional: No fever, weight loss or fatigue  ENMT:  No difficulty hearing, tinnitus, vertigo; No sinus or throat pain  Respiratory: No cough, wheezing, chills or hemoptysis  Cardiovascular: No chest pain, palpitations, dizziness or leg swelling  Gastrointestinal: No abdominal or epigastric pain. No nausea, vomiting or hematemesis; No diarrhea or constipation. No melena or hematochezia.  Skin: No itching, burning, rashes or lesions   Musculoskeletal: No joint pain or swelling; No muscle, back or extremity pain    PAST MEDICAL & SURGICAL HISTORY:  Multiple sclerosis      Chronic diarrhea      ETOH abuse      S/P cholecystectomy          FAMILY HISTORY:  FHx: diabetes mellitus  father        SOCIAL HISTORY:  Smoking Status: [ ] Current, [ ] Former, [ ] Never  Pack Years:  [  ] EtOH  [  ] IVDA    MEDICATIONS:  MEDICATIONS  (STANDING):  chlorhexidine 2% Cloths 1 Application(s) Topical daily  dextrose 5%. 1000 milliLiter(s) (75 mL/Hr) IV Continuous <Continuous>  enoxaparin Injectable 40 milliGRAM(s) SubCutaneous every 24 hours  folic acid 1 milliGRAM(s) Oral daily  lisinopril 10 milliGRAM(s) Oral daily  loperamide 2 milliGRAM(s) Oral three times a day  multivitamin 1 Tablet(s) Oral daily  nicotine - 21 mG/24Hr(s) Patch 1 Patch Transdermal daily  potassium chloride    Tablet ER 40 milliEquivalent(s) Oral every 6 hours  potassium chloride  10 mEq/100 mL IVPB 10 milliEquivalent(s) IV Intermittent every 1 hour  thiamine 100 milliGRAM(s) Oral daily    MEDICATIONS  (PRN):  acetaminophen     Tablet .. 650 milliGRAM(s) Oral every 6 hours PRN Temp greater or equal to 38C (100.4F), Mild Pain (1 - 3)  melatonin 3 milliGRAM(s) Oral at bedtime PRN Insomnia  ondansetron Injectable 4 milliGRAM(s) IV Push every 8 hours PRN Nausea and/or Vomiting      Allergies    No Known Allergies    Intolerances        Vital Signs Last 24 Hrs  T(C): 36.9 (27 Sep 2023 11:50), Max: 37.1 (27 Sep 2023 04:24)  T(F): 98.4 (27 Sep 2023 11:50), Max: 98.8 (27 Sep 2023 04:24)  HR: 63 (27 Sep 2023 11:50) (63 - 85)  BP: 141/88 (27 Sep 2023 11:50) (124/74 - 159/84)  BP(mean): --  RR: 18 (27 Sep 2023 11:50) (18 - 18)  SpO2: 98% (27 Sep 2023 11:50) (94% - 98%)    Parameters below as of 27 Sep 2023 11:50  Patient On (Oxygen Delivery Method): room air        09-26 @ 07:01  -  09-27 @ 07:00  --------------------------------------------------------  IN: 0 mL / OUT: 675 mL / NET: -675 mL    09-27 @ 07:01 - 09-27 @ 14:14  --------------------------------------------------------  IN: 640 mL / OUT: 400 mL / NET: 240 mL          PHYSICAL EXAM:    General:  in no acute distress  HEENT: MMM, conjunctiva and sclera clear  H-RRR  L-CTA  Gastrointestinal: Soft, non-tender non-distended; Normal bowel sounds; No rebound or guarding  Extremities: Normal range of motion, No clubbing, cyanosis or edema  Neurological: Alert and oriented x3  Skin: Warm and dry. No obvious rash      LABS:                        15.4   8.42  )-----------( 207      ( 26 Sep 2023 06:51 )             43.8     27 Sep 2023 04:50    140    |  101    |  5.3    ----------------------------<  150    2.7     |  27.0   |  0.60     Ca    8.2        27 Sep 2023 04:50  Phos  2.4       27 Sep 2023 04:50  Mg     1.9       27 Sep 2023 04:50    TPro  x      /  Alb  x      /  TBili  x      /  DBili  x      /  AST  x      /  ALT  x      /  AlkPhos  x      / Amylase x      /Lipase 17     27 Sep 2023 04:50              RADIOLOGY & ADDITIONAL STUDIES:     < from: US Abdomen Upper Quadrant Right (09.24.23 @ 00:31) >  MPRESSION:  Hepatomegaly.    Fatty liver.        < end of copied text >

## 2023-09-27 NOTE — PROGRESS NOTE ADULT - ASSESSMENT
47 y.o. M with MS (no tx), active 2.5ppd smoker, alcohol abuse and IBS who was sent in by PCP for increased diarrhea and muscle cramps. Labs were notable for severe hypokalemia, hypomagnesemia. Pt initially admitted to the hospitalist service for diarrhea concern for IBS exacerbation (infectious etiology ruled out), but after evaluation by GI and nephrology recs for increased aggressive potassium and mg supplementation needed. Pt was upgraded to MICU on 9/24, central line was placed and pt received aggressive potassium supplementation Pt now with resolution of hypomg and improving hypokalemia >3 but ongoing persistent diarrhea. Imodium added. Remainder of stool studies still pending and plan for pt to have colonoscopy performed today.     Diarrhea with severe electrolyte abnormalities- hypokalemia/ hypomg. Diarrhea suspected secondary to acute on chronic IBS - Diarrhea predominant type  - Monitor electrolytes aggressively.   - c/w potassium chloride 40meq q6hrs routine  (will re evaluate every day based on potassium value)  - Nephro following  - K riders cancelled as patient refusing due to pain.   - C/ diff and GI PCR negative   - f/u stool studies:  Calprotectin, celiac cascade, stool culture, O&P, fecal elastase, stool fat are pending  - c/w stool count   - GI following  - S/p colonoscopy - polpys and sigmoid colitis.   - CT A/P pending    Fatty liver likely secondary to chronic alcohol use   - noted Transaminitis  - trending lfts thus far have remained unchanged   - US abdomen noted with fatty liver     Hypertension  - noted sbp 130-150 range  - c/w lisinopril 10mg po qd  - c/w monitoring bp - will up titrate lisinopril if needed     Alcohol abuse  - pt reports improvement in amount of alcohol intake - was much worse in the pandemic now he takes a few beers a day  - no evidence of any withdrawal during the hospitalization  - started MVI, thiamine and folic   - pt counselled on alcohol cessation ~ 3 mins     Tobacco dependence- active Smoker  -c/w nicotine patch   - pt counselled smoking cessation ~ 3 mins     H/o MS   - currently not on any tx or management   - noted outpt documentation in [facility]   - pt to f/u outpt with pcp - has not seen neurology in years     DVT ppx   -c/w lovenox sq qd  - pt is ambulating independently     Activity level: Increase as tolerated     Dispo: pt remains medically acute with ongoing diarrhea and electrolyte losses

## 2023-09-27 NOTE — PROGRESS NOTE ADULT - ATTENDING COMMENTS
Patient continues to have chronic diarrhea  k 2.7 today   colon showed sigmoid erythema - random bx pending        A- BS, soft non tender    A/P  diarrhea  normal CRP, ESR  fecal elastase pending

## 2023-09-28 LAB
ALBUMIN SERPL ELPH-MCNC: 3.3 G/DL — SIGNIFICANT CHANGE UP (ref 3.3–5.2)
ALP SERPL-CCNC: 82 U/L — SIGNIFICANT CHANGE UP (ref 40–120)
ALT FLD-CCNC: 85 U/L — HIGH
ANION GAP SERPL CALC-SCNC: 12 MMOL/L — SIGNIFICANT CHANGE UP (ref 5–17)
ANION GAP SERPL CALC-SCNC: 13 MMOL/L — SIGNIFICANT CHANGE UP (ref 5–17)
AST SERPL-CCNC: 91 U/L — HIGH
BILIRUB SERPL-MCNC: 0.6 MG/DL — SIGNIFICANT CHANGE UP (ref 0.4–2)
BUN SERPL-MCNC: 7.2 MG/DL — LOW (ref 8–20)
BUN SERPL-MCNC: 7.6 MG/DL — LOW (ref 8–20)
CALCIUM SERPL-MCNC: 8.8 MG/DL — SIGNIFICANT CHANGE UP (ref 8.4–10.5)
CALCIUM SERPL-MCNC: 9.5 MG/DL — SIGNIFICANT CHANGE UP (ref 8.4–10.5)
CALPROTECTIN STL-MCNT: 88 UG/G — SIGNIFICANT CHANGE UP (ref 0–120)
CELIAC DISEASE INTERPRETATION: SIGNIFICANT CHANGE UP
CELIAC GENE PAIRS PRESENT: NO — SIGNIFICANT CHANGE UP
CHLORIDE SERPL-SCNC: 101 MMOL/L — SIGNIFICANT CHANGE UP (ref 96–108)
CHLORIDE SERPL-SCNC: 99 MMOL/L — SIGNIFICANT CHANGE UP (ref 96–108)
CO2 SERPL-SCNC: 25 MMOL/L — SIGNIFICANT CHANGE UP (ref 22–29)
CO2 SERPL-SCNC: 31 MMOL/L — HIGH (ref 22–29)
CREAT SERPL-MCNC: 0.65 MG/DL — SIGNIFICANT CHANGE UP (ref 0.5–1.3)
CREAT SERPL-MCNC: 0.72 MG/DL — SIGNIFICANT CHANGE UP (ref 0.5–1.3)
DQ ALPHA 1: SIGNIFICANT CHANGE UP
DQ BETA 1: SIGNIFICANT CHANGE UP
EGFR: 113 ML/MIN/1.73M2 — SIGNIFICANT CHANGE UP
EGFR: 116 ML/MIN/1.73M2 — SIGNIFICANT CHANGE UP
FAT STL QN: NORMAL — SIGNIFICANT CHANGE UP
FAT STL QN: NORMAL — SIGNIFICANT CHANGE UP
GLUCOSE SERPL-MCNC: 64 MG/DL — LOW (ref 70–99)
GLUCOSE SERPL-MCNC: 97 MG/DL — SIGNIFICANT CHANGE UP (ref 70–99)
HCT VFR BLD CALC: 43.7 % — SIGNIFICANT CHANGE UP (ref 39–50)
HGB BLD-MCNC: 15.1 G/DL — SIGNIFICANT CHANGE UP (ref 13–17)
IMMUNOGLOBULIN A (IGA), S: 341 MG/DL — SIGNIFICANT CHANGE UP (ref 61–356)
MCHC RBC-ENTMCNC: 34.6 GM/DL — SIGNIFICANT CHANGE UP (ref 32–36)
MCHC RBC-ENTMCNC: 36.4 PG — HIGH (ref 27–34)
MCV RBC AUTO: 105.3 FL — HIGH (ref 80–100)
PLATELET # BLD AUTO: 193 K/UL — SIGNIFICANT CHANGE UP (ref 150–400)
POTASSIUM SERPL-MCNC: 3.4 MMOL/L — LOW (ref 3.5–5.3)
POTASSIUM SERPL-MCNC: 4 MMOL/L — SIGNIFICANT CHANGE UP (ref 3.5–5.3)
POTASSIUM SERPL-SCNC: 3.4 MMOL/L — LOW (ref 3.5–5.3)
POTASSIUM SERPL-SCNC: 4 MMOL/L — SIGNIFICANT CHANGE UP (ref 3.5–5.3)
PROT SERPL-MCNC: 5.9 G/DL — LOW (ref 6.6–8.7)
RBC # BLD: 4.15 M/UL — LOW (ref 4.2–5.8)
RBC # FLD: 14.1 % — SIGNIFICANT CHANGE UP (ref 10.3–14.5)
SODIUM SERPL-SCNC: 139 MMOL/L — SIGNIFICANT CHANGE UP (ref 135–145)
SODIUM SERPL-SCNC: 142 MMOL/L — SIGNIFICANT CHANGE UP (ref 135–145)
WBC # BLD: 8.16 K/UL — SIGNIFICANT CHANGE UP (ref 3.8–10.5)
WBC # FLD AUTO: 8.16 K/UL — SIGNIFICANT CHANGE UP (ref 3.8–10.5)

## 2023-09-28 PROCEDURE — 99232 SBSQ HOSP IP/OBS MODERATE 35: CPT

## 2023-09-28 RX ADMIN — Medication 40 MILLIEQUIVALENT(S): at 05:13

## 2023-09-28 RX ADMIN — Medication 1 PATCH: at 19:00

## 2023-09-28 RX ADMIN — Medication 1 MILLIGRAM(S): at 11:18

## 2023-09-28 RX ADMIN — Medication 2 MILLIGRAM(S): at 21:15

## 2023-09-28 RX ADMIN — Medication 1 PATCH: at 11:16

## 2023-09-28 RX ADMIN — ENOXAPARIN SODIUM 40 MILLIGRAM(S): 100 INJECTION SUBCUTANEOUS at 09:41

## 2023-09-28 RX ADMIN — Medication 2 MILLIGRAM(S): at 05:13

## 2023-09-28 RX ADMIN — Medication 1 PATCH: at 06:47

## 2023-09-28 RX ADMIN — CHLORHEXIDINE GLUCONATE 1 APPLICATION(S): 213 SOLUTION TOPICAL at 11:17

## 2023-09-28 RX ADMIN — Medication 100 MILLIGRAM(S): at 11:18

## 2023-09-28 RX ADMIN — Medication 2 MILLIGRAM(S): at 13:32

## 2023-09-28 RX ADMIN — LISINOPRIL 10 MILLIGRAM(S): 2.5 TABLET ORAL at 05:13

## 2023-09-28 RX ADMIN — Medication 1 TABLET(S): at 11:18

## 2023-09-28 NOTE — PROGRESS NOTE ADULT - ASSESSMENT
47 y.o. M with MS (no tx), active 2.5ppd smoker, alcohol abuse and IBS who was sent in by PCP for increased diarrhea and muscle cramps. Labs were notable for severe hypokalemia, hypomagnesemia. Pt initially admitted to the hospitalist service for diarrhea concern for IBS exacerbation (infectious etiology ruled out), but after evaluation by GI and nephrology recs for increased aggressive potassium and mg supplementation needed. Pt was upgraded to MICU on 9/24, central line was placed and pt received aggressive potassium supplementation Pt now with resolution of hypomg and improving hypokalemia >3 but ongoing persistent diarrhea. Imodium added. Remainder of stool studies still pending and plan for pt to have colonoscopy performed today.     Diarrhea with severe electrolyte abnormalities- hypokalemia/ hypomg. Diarrhea suspected secondary to acute on chronic IBS - Diarrhea predominant type  - Monitor electrolytes aggressively.   - Nephro following  - C/ diff and GI PCR negative   - f/u stool studies:  Calprotectin, celiac cascade, stool culture, O&P, fecal elastase, stool fat are pending  - c/w stool count   - GI following  - S/p colonoscopy - polpys and sigmoid colitis.   - CT A/P reviewed - sigmoid colitis  - Diarrhea much improved today.  - K 3.4 this morning, will check in PM and adjust potassium accordingly    Fatty liver likely secondary to chronic alcohol use   - noted Transaminitis  - trending lfts thus far have remained unchanged   - US abdomen noted with fatty liver     Hypertension  - noted sbp 130-150 range  - c/w lisinopril 10mg po qd  - c/w monitoring bp - will up titrate lisinopril if needed     Alcohol abuse  - pt reports improvement in amount of alcohol intake - was much worse in the pandemic now he takes a few beers a day  - no evidence of any withdrawal during the hospitalization  - started MVI, thiamine and folic   - pt counselled on alcohol cessation ~ 3 mins     Tobacco dependence- active Smoker  -c/w nicotine patch   - pt counselled smoking cessation ~ 3 mins     H/o MS   - currently not on any tx or management   - noted outpt documentation in [facility]   - pt to f/u outpt with pcp - has not seen neurology in years     DVT ppx   - c/w lovenox sq qd  - pt is ambulating independently     Activity level: Increase as tolerated     Dispo: Potential discharge in 24-48 hours.

## 2023-09-28 NOTE — PROGRESS NOTE ADULT - SUBJECTIVE AND OBJECTIVE BOX
Chief Complaint:  Patient is a 47y old  Male who presents with a chief complaint of Hypokalemia (27 Sep 2023 10:46)    HPI/ 24 hr events: Patient seen and examined at bedside. Feeling better today. Reports more formed stools in the past 24 hrs as he is now taking the Imodium and eating a solid diet.    REVIEW OF SYSTEMS:   General: Negative  HEENT: Negative  CV: Negative  Respiratory: Negative  GI: See HPI  : Negative  MSK: Negative  Hematologic: Negative  Skin: Negative    MEDICATIONS:   MEDICATIONS  (STANDING):  chlorhexidine 2% Cloths 1 Application(s) Topical daily  dextrose 5%. 1000 milliLiter(s) (75 mL/Hr) IV Continuous <Continuous>  enoxaparin Injectable 40 milliGRAM(s) SubCutaneous every 24 hours  folic acid 1 milliGRAM(s) Oral daily  lisinopril 10 milliGRAM(s) Oral daily  loperamide 2 milliGRAM(s) Oral three times a day  multivitamin 1 Tablet(s) Oral daily  nicotine - 21 mG/24Hr(s) Patch 1 Patch Transdermal daily  potassium chloride    Tablet ER 40 milliEquivalent(s) Oral every 6 hours  potassium chloride  10 mEq/100 mL IVPB 10 milliEquivalent(s) IV Intermittent every 1 hour  thiamine 100 milliGRAM(s) Oral daily    MEDICATIONS  (PRN):  acetaminophen     Tablet .. 650 milliGRAM(s) Oral every 6 hours PRN Temp greater or equal to 38C (100.4F), Mild Pain (1 - 3)  melatonin 3 milliGRAM(s) Oral at bedtime PRN Insomnia  ondansetron Injectable 4 milliGRAM(s) IV Push every 8 hours PRN Nausea and/or Vomiting      DIET:  Diet, Regular:   Low Fat (LOWFAT)  Lactose Restricted (Milk Sugar Intoler.) (23 @ 10:46) [Active]      ALLERGIES:   Allergies    No Known Allergies    Intolerances      VITAL SIGNS:   Vital Signs Last 24 Hrs  T(C): 37 (28 Sep 2023 05:00), Max: 37 (28 Sep 2023 05:00)  T(F): 98.6 (28 Sep 2023 05:00), Max: 98.6 (28 Sep 2023 05:00)  HR: 76 (28 Sep 2023 05:00) (63 - 76)  BP: 147/80 (28 Sep 2023 05:00) (135/86 - 147/80)  BP(mean): --  RR: 18 (28 Sep 2023 05:00) (18 - 18)  SpO2: 97% (28 Sep 2023 05:00) (92% - 98%)    Parameters below as of 28 Sep 2023 05:00  Patient On (Oxygen Delivery Method): room air      I&O's Summary    27 Sep 2023 07:01  -  28 Sep 2023 07:00  --------------------------------------------------------  IN: 640 mL / OUT: 400 mL / NET: 240 mL    28 Sep 2023 07:01  -  28 Sep 2023 09:46  --------------------------------------------------------  IN: 340 mL / OUT: 0 mL / NET: 340 mL        PHYSICAL EXAM:   GENERAL:  No acute distress  HEENT:  NC/AT, conjunctiva clear, sclera anicteric  CHEST:  No increased effort  HEART:  Regular rate  ABDOMEN:  Soft, non-tender, non-distended, normoactive bowel sounds, no rebound or guarding  EXTREMITIES: No edema  SKIN:  Warm, dry  NEURO:  Calm, cooperative    LABS:                        15.1   8.16  )-----------( 193      ( 28 Sep 2023 04:39 )             43.7     Hemoglobin: 15.1 g/dL (23 @ 04:39)  Hemoglobin: 15.4 g/dL (23 @ 06:51)  Hemoglobin: 13.0 g/dL (23 @ 21:00)        139  |  101  |  7.2<L>  ----------------------------<  97  3.4<L>   |  25.0  |  0.65    Ca    8.8      28 Sep 2023 04:39  Phos  2.9       Mg     1.8         TPro  5.9<L>  /  Alb  3.3  /  TBili  0.6  /  DBili  x   /  AST  91<H>  /  ALT  85<H>  /  AlkPhos  82      LIVER FUNCTIONS - ( 28 Sep 2023 04:39 )  Alb: 3.3 g/dL / Pro: 5.9 g/dL / ALK PHOS: 82 U/L / ALT: 85 U/L / AST: 91 U/L / GGT: x             Hepatitis A IgM Antibody: Nonreact (23 @ 10:00)  Hepatitis B Core IgM Antibody: Nonreact (23 @ 10:00)  Hepatitis B Surface Antigen: Nonreact (23 @ 10:00)  Hepatitis C Virus S/CO Ratio: 0.07 S/CO (23 @ 10:00)      RADIOLOGY & ADDITIONAL STUDIES:      Colonoscopy Report        Date: 2023        Patient Name: AMARI FERREIRA        MRN: [id number]        Account Number:        [id number]        Gender: Male         (age): 1975 (47)        Instrument(s):        [facility] [id number]        Attending/Fellow:        MOOSE FREY MD                Procedure Room #:        PROCEDURE ROOM 1                        ASA Class:        P3 - 2023 1:34 PM MOOSE FREY MD        History of Present Illness:        Acute on chronic diarrhea        Administered Medications:        As per Anesthesiology Record        Indications:        Diarrhea: 787.91 - R19.7        Procedure:        undergoing a diagnostic colonoscopy.        The procedure, indications, preparation and potential complications were    explained to the patient, who indicated understanding and signed the    corresponding consent forms. MAC was administered by the anesthesiologist.    Continuous pulse oximetry and blood pressure monitoring were used throughout the    procedure. Supplemental oxygen was used. The quality of preparation was 6-9 on    the BBP scale/adequate. Patient was placed in left lateral decubitus position.    Digital exam was normal. The colonoscope was introduced through the rectum and    advancedunder direct visualization until cecum and terminal ileum was reached.    The appendiceal orifice and the ileocecal valve were identified. The terminal    ileum was identified. Careful visualization was performed as the instrument was    withdrawn. Retroflexion in the rectum was performed successfully and there were    no remarkable findings. Patient tolerance to procedure was excellent. The    procedure was not difficult.        [place] Bowel Preparation Score:        Using the [place] Bowel Preparation Score, each segment of the colon was graded    as follows:        Left Colon: Excellent, 3 | Transverse Colon: Excellent, 3  | Right Colon: Good,    2        Limitations/Complications:        There were no apparent limitations or complications        Findings:        Mucosa Normal mucosa was noted in the cecum, ascending colon, transverse colon,    descending colon and proximal sigmoid colon.Multiple cold forceps biopsies were    performed for histology.        Ileocecal valve appeared prominent. Cold forceps biopsy was performed for    histology.        Segmental erythema, congestion and edema with no bleeding was noted in the    rectosigmoid region from ~35-20 cm from anal verge. Multiple cold forceps    biopsies were performed for histology.        Protruding lesions A single sessile 7 mm polyp of benign appearance was found in    the ascending colon.A single-piece polypectomy was performed using a cold snare.    The polyp was completely removed.        A single sessile 15 mm polyp of benign appearance was found in the ascending    colon.A piece-meal polypectomy was performed using a hot snare in the ascending    colon. The polyp was completely removed. Polyp was lifted with Everlift. The    polyp was removed with hot snare and hot biopsy forceps. The resection margin    was treated with soft coagulation using snare tip. Three clips were placed over    polypectomy site.        Two sessile polyps of benign appearance ranging in size from 5 mm to 9 mm were    found in the descending colon. A single-piece polypectomy was performed using a    cold snare. The polyps were completely removed. One polypectomy site with    bleeding. One clip was placed over the site.        Impressions:        Polyp (7 mm) in the ascending colon. (Polypectomy).        Polyp (15 mm) in the ascending colon. (EMR, Polypectomy).        Normal mucosa in the cecum, ascending colon, transverse colon, descending    colon and proximal sigmoid colon. (Biopsy).        Polyps (5 mm to 9 mm) in the descending colon. (Polypectomy).        Prominent in the ileocecal valve. (Biopsy).        Sigmoid colitis -Erythema, congestion and edema in the colon. (Biopsy).        Plan:        Colonoscopy in 6 months for evaluation of ascending piecemeal resectionsite.        Return to floor for further management        Await pathology results.        MOOSE FREY MD        Version 1, Electronically signed on 2023 2:09:08 PM by MOOSE FREY MD

## 2023-09-28 NOTE — PROGRESS NOTE ADULT - REASON FOR ADMISSION
Hypokalemia

## 2023-09-28 NOTE — PROGRESS NOTE ADULT - ATTENDING COMMENTS
I agree with assessment and plan as above. Continue imodium which is working well. Continue low fiber diet. Can follow up pathology and stools studies in process on outpatient follow up. GI signing off, please call back as needed.

## 2023-09-28 NOTE — PROGRESS NOTE ADULT - PROVIDER SPECIALTY LIST ADULT
Critical Care
Gastroenterology
Hospitalist
Nephrology
Gastroenterology

## 2023-09-28 NOTE — PROGRESS NOTE ADULT - SUBJECTIVE AND OBJECTIVE BOX
[facility] Division of Hospital Medicine  Skyler Kilpatrick MD    Chief Complaint:  Patient is a 47y old  Male who presents with a chief complaint of Hypokalemia (28 Sep 2023 09:46)      SUBJECTIVE / OVERNIGHT EVENTS:  Patient seen and examined at bedside. No acute events reported overnight. No new complaints. Diarrhea much improved.     MEDICATIONS  (STANDING):  chlorhexidine 2% Cloths 1 Application(s) Topical daily  dextrose 5%. 1000 milliLiter(s) (75 mL/Hr) IV Continuous <Continuous>  enoxaparin Injectable 40 milliGRAM(s) SubCutaneous every 24 hours  folic acid 1 milliGRAM(s) Oral daily  lisinopril 10 milliGRAM(s) Oral daily  loperamide 2 milliGRAM(s) Oral three times a day  multivitamin 1 Tablet(s) Oral daily  nicotine - 21 mG/24Hr(s) Patch 1 Patch Transdermal daily  potassium chloride  10 mEq/100 mL IVPB 10 milliEquivalent(s) IV Intermittent every 1 hour  thiamine 100 milliGRAM(s) Oral daily    MEDICATIONS  (PRN):  acetaminophen     Tablet .. 650 milliGRAM(s) Oral every 6 hours PRN Temp greater or equal to 38C (100.4F), Mild Pain (1 - 3)  melatonin 3 milliGRAM(s) Oral at bedtime PRN Insomnia  ondansetron Injectable 4 milliGRAM(s) IV Push every 8 hours PRN Nausea and/or Vomiting        I&O's Summary    27 Sep 2023 07:01  -  28 Sep 2023 07:00  --------------------------------------------------------  IN: 640 mL / OUT: 400 mL / NET: 240 mL    28 Sep 2023 07:01  -  28 Sep 2023 10:46  --------------------------------------------------------  IN: 340 mL / OUT: 0 mL / NET: 340 mL        PHYSICAL EXAM:  Vital Signs Last 24 Hrs  T(C): 36.9 (28 Sep 2023 10:44), Max: 37 (28 Sep 2023 05:00)  T(F): 98.4 (28 Sep 2023 10:44), Max: 98.6 (28 Sep 2023 05:00)  HR: 71 (28 Sep 2023 10:44) (63 - 76)  BP: 143/88 (28 Sep 2023 10:44) (135/86 - 147/80)  BP(mean): --  RR: 18 (28 Sep 2023 10:44) (18 - 18)  SpO2: 98% (28 Sep 2023 10:44) (92% - 98%)    Parameters below as of 28 Sep 2023 10:44  Patient On (Oxygen Delivery Method): room air            CONSTITUTIONAL: NAD  HEENT: NC/AT, PERRL, no JVD  RESPIRATORY: CTA bilaterally, normal effort  CARDIOVASCULAR: RRR, S1/S2+, no m/g/r  ABDOMEN: Nontender to palpation, normoactive bowel sounds, no rebound/guarding  MUSCULOSKELETAL: No edema, cyanosis or deformities.  PSYCH: Calm, affect appropriate.  NEUROLOGY: Awake, alert, no focal neurological deficits.   SKIN: No rashes; no palpable lesions  VASC: Distal pulses palpable    LABS:                        15.1   8.16  )-----------( 193      ( 28 Sep 2023 04:39 )             43.7     09-28    139  |  101  |  7.2<L>  ----------------------------<  97  3.4<L>   |  25.0  |  0.65    Ca    8.8      28 Sep 2023 04:39  Phos  2.9     09-27  Mg     1.8     09-27    TPro  5.9<L>  /  Alb  3.3  /  TBili  0.6  /  DBili  x   /  AST  91<H>  /  ALT  85<H>  /  AlkPhos  82  09-28          Urinalysis Basic - ( 28 Sep 2023 04:39 )    Color: x / Appearance: x / SG: x / pH: x  Gluc: 97 mg/dL / Ketone: x  / Bili: x / Urobili: x   Blood: x / Protein: x / Nitrite: x   Leuk Esterase: x / RBC: x / WBC x   Sq Epi: x / Non Sq Epi: x / Bacteria: x        CAPILLARY BLOOD GLUCOSE            RADIOLOGY & ADDITIONAL TESTS:  Results Reviewed:   Imaging Personally Reviewed:  Electrocardiogram Personally Reviewed:

## 2023-09-28 NOTE — PROGRESS NOTE ADULT - PROBLEM SELECTOR PLAN 1
Significant improvement, OK for DC home today. Would continue Imodium on DC. Follow pathology from colonoscopy as well as Calprotectin, celiac cascade, fecal elastase, stool fat. Outpatient follow up.

## 2023-09-29 ENCOUNTER — TRANSCRIPTION ENCOUNTER (OUTPATIENT)
Age: 48
End: 2023-09-29

## 2023-09-29 VITALS
TEMPERATURE: 98 F | SYSTOLIC BLOOD PRESSURE: 141 MMHG | RESPIRATION RATE: 18 BRPM | OXYGEN SATURATION: 98 % | HEART RATE: 87 BPM | DIASTOLIC BLOOD PRESSURE: 85 MMHG

## 2023-09-29 LAB
ANION GAP SERPL CALC-SCNC: 13 MMOL/L — SIGNIFICANT CHANGE UP (ref 5–17)
BUN SERPL-MCNC: 9.2 MG/DL — SIGNIFICANT CHANGE UP (ref 8–20)
CALCIUM SERPL-MCNC: 8.7 MG/DL — SIGNIFICANT CHANGE UP (ref 8.4–10.5)
CHLORIDE SERPL-SCNC: 100 MMOL/L — SIGNIFICANT CHANGE UP (ref 96–108)
CO2 SERPL-SCNC: 27 MMOL/L — SIGNIFICANT CHANGE UP (ref 22–29)
CREAT SERPL-MCNC: 0.67 MG/DL — SIGNIFICANT CHANGE UP (ref 0.5–1.3)
EGFR: 115 ML/MIN/1.73M2 — SIGNIFICANT CHANGE UP
GLUCOSE SERPL-MCNC: 93 MG/DL — SIGNIFICANT CHANGE UP (ref 70–99)
POTASSIUM SERPL-MCNC: 3.3 MMOL/L — LOW (ref 3.5–5.3)
POTASSIUM SERPL-SCNC: 3.3 MMOL/L — LOW (ref 3.5–5.3)
SODIUM SERPL-SCNC: 139 MMOL/L — SIGNIFICANT CHANGE UP (ref 135–145)

## 2023-09-29 PROCEDURE — 80053 COMPREHEN METABOLIC PANEL: CPT

## 2023-09-29 PROCEDURE — 36415 COLL VENOUS BLD VENIPUNCTURE: CPT

## 2023-09-29 PROCEDURE — 86038 ANTINUCLEAR ANTIBODIES: CPT

## 2023-09-29 PROCEDURE — 82436 ASSAY OF URINE CHLORIDE: CPT

## 2023-09-29 PROCEDURE — 85730 THROMBOPLASTIN TIME PARTIAL: CPT

## 2023-09-29 PROCEDURE — 83935 ASSAY OF URINE OSMOLALITY: CPT

## 2023-09-29 PROCEDURE — 86901 BLOOD TYPING SEROLOGIC RH(D): CPT

## 2023-09-29 PROCEDURE — 82653 EL-1 FECAL QUANTITATIVE: CPT

## 2023-09-29 PROCEDURE — 80048 BASIC METABOLIC PNL TOTAL CA: CPT

## 2023-09-29 PROCEDURE — 0225U NFCT DS DNA&RNA 21 SARSCOV2: CPT

## 2023-09-29 PROCEDURE — 96365 THER/PROPH/DIAG IV INF INIT: CPT

## 2023-09-29 PROCEDURE — 74176 CT ABD & PELVIS W/O CONTRAST: CPT

## 2023-09-29 PROCEDURE — 71045 X-RAY EXAM CHEST 1 VIEW: CPT

## 2023-09-29 PROCEDURE — 87641 MR-STAPH DNA AMP PROBE: CPT

## 2023-09-29 PROCEDURE — 82705 FATS/LIPIDS FECES QUAL: CPT

## 2023-09-29 PROCEDURE — 88305 TISSUE EXAM BY PATHOLOGIST: CPT

## 2023-09-29 PROCEDURE — 86850 RBC ANTIBODY SCREEN: CPT

## 2023-09-29 PROCEDURE — 87015 SPECIMEN INFECT AGNT CONCNTJ: CPT

## 2023-09-29 PROCEDURE — 84133 ASSAY OF URINE POTASSIUM: CPT

## 2023-09-29 PROCEDURE — 76705 ECHO EXAM OF ABDOMEN: CPT

## 2023-09-29 PROCEDURE — 83993 ASSAY FOR CALPROTECTIN FECAL: CPT

## 2023-09-29 PROCEDURE — C1889: CPT

## 2023-09-29 PROCEDURE — 87507 IADNA-DNA/RNA PROBE TQ 12-25: CPT

## 2023-09-29 PROCEDURE — 84300 ASSAY OF URINE SODIUM: CPT

## 2023-09-29 PROCEDURE — 84443 ASSAY THYROID STIM HORMONE: CPT

## 2023-09-29 PROCEDURE — 96375 TX/PRO/DX INJ NEW DRUG ADDON: CPT

## 2023-09-29 PROCEDURE — 83690 ASSAY OF LIPASE: CPT

## 2023-09-29 PROCEDURE — 87046 STOOL CULTR AEROBIC BACT EA: CPT

## 2023-09-29 PROCEDURE — 87640 STAPH A DNA AMP PROBE: CPT

## 2023-09-29 PROCEDURE — 83986 ASSAY PH BODY FLUID NOS: CPT

## 2023-09-29 PROCEDURE — 85610 PROTHROMBIN TIME: CPT

## 2023-09-29 PROCEDURE — 86140 C-REACTIVE PROTEIN: CPT

## 2023-09-29 PROCEDURE — 85025 COMPLETE CBC W/AUTO DIFF WBC: CPT

## 2023-09-29 PROCEDURE — 84540 ASSAY OF URINE/UREA-N: CPT

## 2023-09-29 PROCEDURE — 86900 BLOOD TYPING SEROLOGIC ABO: CPT

## 2023-09-29 PROCEDURE — 81376 HLA II TYPING 1 LOCUS LR: CPT

## 2023-09-29 PROCEDURE — 84100 ASSAY OF PHOSPHORUS: CPT

## 2023-09-29 PROCEDURE — 87077 CULTURE AEROBIC IDENTIFY: CPT

## 2023-09-29 PROCEDURE — 87206 SMEAR FLUORESCENT/ACID STAI: CPT

## 2023-09-29 PROCEDURE — 82784 ASSAY IGA/IGD/IGG/IGM EACH: CPT

## 2023-09-29 PROCEDURE — 83735 ASSAY OF MAGNESIUM: CPT

## 2023-09-29 PROCEDURE — 84145 PROCALCITONIN (PCT): CPT

## 2023-09-29 PROCEDURE — 87045 FECES CULTURE AEROBIC BACT: CPT

## 2023-09-29 PROCEDURE — 99239 HOSP IP/OBS DSCHRG MGMT >30: CPT

## 2023-09-29 PROCEDURE — 99285 EMERGENCY DEPT VISIT HI MDM: CPT

## 2023-09-29 PROCEDURE — 80074 ACUTE HEPATITIS PANEL: CPT

## 2023-09-29 PROCEDURE — 85652 RBC SED RATE AUTOMATED: CPT

## 2023-09-29 PROCEDURE — 93970 EXTREMITY STUDY: CPT

## 2023-09-29 PROCEDURE — 85027 COMPLETE CBC AUTOMATED: CPT

## 2023-09-29 PROCEDURE — 87177 OVA AND PARASITES SMEARS: CPT

## 2023-09-29 PROCEDURE — 87116 MYCOBACTERIA CULTURE: CPT

## 2023-09-29 PROCEDURE — 93005 ELECTROCARDIOGRAM TRACING: CPT

## 2023-09-29 PROCEDURE — 87493 C DIFF AMPLIFIED PROBE: CPT

## 2023-09-29 RX ORDER — LOPERAMIDE HCL 2 MG
1 TABLET ORAL
Qty: 21 | Refills: 0
Start: 2023-09-29 | End: 2023-10-05

## 2023-09-29 RX ORDER — POTASSIUM CHLORIDE 20 MEQ
1 PACKET (EA) ORAL
Qty: 30 | Refills: 0
Start: 2023-09-29 | End: 2023-10-28

## 2023-09-29 RX ORDER — POTASSIUM CHLORIDE 20 MEQ
40 PACKET (EA) ORAL EVERY 4 HOURS
Refills: 0 | Status: COMPLETED | OUTPATIENT
Start: 2023-09-29 | End: 2023-09-29

## 2023-09-29 RX ORDER — LISINOPRIL 2.5 MG/1
1 TABLET ORAL
Qty: 30 | Refills: 0
Start: 2023-09-29 | End: 2023-10-28

## 2023-09-29 RX ADMIN — Medication 1 PATCH: at 07:32

## 2023-09-29 RX ADMIN — Medication 2 MILLIGRAM(S): at 13:15

## 2023-09-29 RX ADMIN — Medication 100 MILLIGRAM(S): at 09:08

## 2023-09-29 RX ADMIN — CHLORHEXIDINE GLUCONATE 1 APPLICATION(S): 213 SOLUTION TOPICAL at 11:29

## 2023-09-29 RX ADMIN — LISINOPRIL 10 MILLIGRAM(S): 2.5 TABLET ORAL at 05:26

## 2023-09-29 RX ADMIN — Medication 2 MILLIGRAM(S): at 05:27

## 2023-09-29 RX ADMIN — Medication 1 TABLET(S): at 09:08

## 2023-09-29 RX ADMIN — Medication 3 MILLIGRAM(S): at 00:22

## 2023-09-29 RX ADMIN — Medication 1 MILLIGRAM(S): at 09:08

## 2023-09-29 RX ADMIN — Medication 40 MILLIEQUIVALENT(S): at 13:15

## 2023-09-29 RX ADMIN — Medication 1 PATCH: at 11:47

## 2023-09-29 RX ADMIN — Medication 40 MILLIEQUIVALENT(S): at 09:08

## 2023-09-29 NOTE — DISCHARGE NOTE PROVIDER - NSDCMRMEDTOKEN_GEN_ALL_CORE_FT
lisinopril 10 mg oral tablet: 1 tab(s) orally once a day  loperamide 2 mg oral capsule: 1 cap(s) orally 3 times a day as needed for  constipation  nicotine 14 mg/24 hr transdermal film, extended release: 1 patch transdermal once a day   Potassium Chloride (Eqv-Klor-Con M20) 20 mEq oral tablet, extended release: 1 tab(s) orally once a day (at bedtime)

## 2023-09-29 NOTE — DISCHARGE NOTE PROVIDER - PROVIDER TOKENS
PROVIDER:[TOKEN:[[id number]],FOLLOWUP:[2 weeks]],FREE:[LAST:[PMD],PHONE:[(   )    -],FAX:[(   )    -],FOLLOWUP:[1 week]]

## 2023-09-29 NOTE — DISCHARGE NOTE NURSING/CASE MANAGEMENT/SOCIAL WORK - PATIENT PORTAL LINK FT
You can access the FollowMyHealth Patient Portal offered by [facility] by registering at the following website: [email or web address]. By joining [employer]’s FollowMyHealth portal, you will also be able to view your health information using other applications (apps) compatible with our system.

## 2023-09-29 NOTE — DISCHARGE NOTE NURSING/CASE MANAGEMENT/SOCIAL WORK - NSDCPEFALRISK_GEN_ALL_CORE
For information on Fall & Injury Prevention, visit: [email or web address] OR  [email or web address] OR  https://www.cdc.gov/steadi/patient.html

## 2023-09-29 NOTE — DISCHARGE NOTE PROVIDER - HOSPITAL COURSE
47 y.o. M with MS (no tx), active 2.5ppd smoker, alcohol abuse and IBS who was sent in by PCP for increased diarrhea and muscle cramps. Labs were notable for severe hypokalemia, hypomagnesemia. Pt initially admitted to the hospitalist service for diarrhea concern for IBS exacerbation (infectious etiology ruled out), but after evaluation by GI and nephrology recs for increased aggressive potassium and mg supplementation needed. Pt was upgraded to MICU on 9/24, central line was placed and pt received aggressive potassium supplementation Pt now with resolution of hypomg and improving hypokalemia >3 but ongoing persistent diarrhea. Imodium added. Colonoscopy was done which was significant for pancolitis and polyps, s/p biopsy. Patient's diarrhea resolved. C. diff and GI PCR was negative. Hypokalemia improved.    Pt no longer requires inpatient hospitilization and is stable for discharge w/ outpt follow up.

## 2023-09-29 NOTE — DISCHARGE NOTE PROVIDER - ATTENDING DISCHARGE PHYSICAL EXAMINATION:
Vital Signs Last 24 Hrs  T(F): 98.2 (29 Sep 2023 05:08), Max: 98.5 (28 Sep 2023 21:10)  HR: 67 (29 Sep 2023 05:08) (67 - 79)  BP: 121/75 (29 Sep 2023 05:08) (121/75 - 135/83)  RR: 18 (29 Sep 2023 05:08) (18 - 18)  SpO2: 96% (29 Sep 2023 05:08) (96% - 97%)    Physical Exam:  Constitutional: Appears stated aged, not- chronically ill-appearing, laying comfortably in bed in NAD  HEENT: NC/AT, PERRL, EOMI, trachea midline, no JVD  Respiratory: CTA bilaterally, symmetrical chest rise  Cardiovascular: RRR, no m/g/r  Gastrointestinal: Soft, NT/ND, BS+  Vascular: 2+ peripheral pulses  Neurological: A/O x 3, no focal neurological deficits  Psych: Fair mood/affect  Musculoskeletal: No edema, cyanosis, deformities. ROM normal  Skin: No obvious rash, lesions. No jaundice.

## 2023-09-29 NOTE — CHART NOTE - NSCHARTNOTEFT_GEN_A_CORE
Patient seen at bedside for nutrition education prior to discharge.  Patient currently prescribed a low fat diet due to GI intolerance.  RD reviewed appropriate foods.  Written materials provided.  Patient expressed understanding.

## 2023-09-29 NOTE — DISCHARGE NOTE PROVIDER - NSDCCPCAREPLAN_GEN_ALL_CORE_FT
PRINCIPAL DISCHARGE DIAGNOSIS  Diagnosis: Hypokalemia  Assessment and Plan of Treatment: Improved. Secondary to diarrhea in setting of pancolitis. Take medications as prescribed. Follow up with your PMD within 1 week for repeat blood work.      SECONDARY DISCHARGE DIAGNOSES  Diagnosis: Colitis  Assessment and Plan of Treatment: Follow up with GI for biopsy results.

## 2023-09-29 NOTE — DISCHARGE NOTE PROVIDER - CARE PROVIDER_API CALL
Jesus Lagunas  Gastroenterology  [address]  [place] [address]  Phone: [phone]  Fax: [phone]  Follow Up Time: 2 weeks    PMD,   Phone: (   )    -  Fax: (   )    -  Follow Up Time: 1 week

## 2023-10-01 LAB — ELASTASE PANC STL-MCNT: 365 CD:794062645 — SIGNIFICANT CHANGE UP

## 2023-10-02 LAB — SURGICAL PATHOLOGY STUDY: SIGNIFICANT CHANGE UP

## 2023-10-23 NOTE — SBIRT NOTE ADULT - NSSBIRTOFTENALCOHOL_GEN_A_CORE
4 or more times a week
“Patient's name, , procedure and correct site were confirmed during the [place] Timeout.”

## 2023-10-23 NOTE — CHART NOTE - NSCHARTNOTEFT_GEN_A_CORE
Called patient to discuss abnormal test results including AFB in the stool positive. Patient reports symptoms have resolved. Also discussed for him to f/u with GI- pt reports cancelled appt and that he is currently rescheduling. Counselled to follow up as soon as he can for test results.

## 2023-10-26 NOTE — ED PROVIDER NOTE - CROS ED NEURO ALL NEG
negative... Stelara Counseling:  I discussed with the patient the risks of ustekinumab including but not limited to immunosuppression, malignancy, posterior leukoencephalopathy syndrome, and serious infections.  The patient understands that monitoring is required including a PPD at baseline and must alert us or the primary physician if symptoms of infection or other concerning signs are noted.

## 2023-10-30 DIAGNOSIS — Z12.11 ENCOUNTER FOR SCREENING FOR MALIGNANT NEOPLASM OF COLON: ICD-10-CM

## 2023-10-31 ENCOUNTER — APPOINTMENT (OUTPATIENT)
Dept: GASTROENTEROLOGY | Facility: CLINIC | Age: 48
End: 2023-10-31
Payer: COMMERCIAL

## 2023-10-31 VITALS
SYSTOLIC BLOOD PRESSURE: 138 MMHG | RESPIRATION RATE: 16 BRPM | BODY MASS INDEX: 25.05 KG/M2 | HEIGHT: 70 IN | DIASTOLIC BLOOD PRESSURE: 72 MMHG | OXYGEN SATURATION: 98 % | WEIGHT: 175 LBS | HEART RATE: 70 BPM

## 2023-10-31 DIAGNOSIS — Z09 ENCOUNTER FOR FOLLOW-UP EXAMINATION AFTER COMPLETED TREATMENT FOR CONDITIONS OTHER THAN MALIGNANT NEOPLASM: ICD-10-CM

## 2023-10-31 DIAGNOSIS — Z86.010 PERSONAL HISTORY OF COLONIC POLYPS: ICD-10-CM

## 2023-10-31 DIAGNOSIS — K51.311 ULCERATIVE (CHRONIC) RECTOSIGMOIDITIS WITH RECTAL BLEEDING: ICD-10-CM

## 2023-10-31 PROCEDURE — 99214 OFFICE O/P EST MOD 30 MIN: CPT

## 2023-10-31 RX ORDER — LISINOPRIL 10 MG/1
10 TABLET ORAL
Qty: 30 | Refills: 0 | Status: ACTIVE | COMMUNITY
Start: 2023-09-29

## 2023-10-31 RX ORDER — POTASSIUM CHLORIDE 1500 MG/1
20 TABLET, EXTENDED RELEASE ORAL
Qty: 30 | Refills: 0 | Status: ACTIVE | COMMUNITY
Start: 2023-09-29

## 2023-10-31 RX ORDER — MESALAMINE 400 MG/1
400 CAPSULE, DELAYED RELEASE ORAL
Qty: 120 | Refills: 0 | Status: ACTIVE | COMMUNITY
Start: 2023-10-11

## 2023-10-31 RX ORDER — DICYCLOMINE HYDROCHLORIDE 10 MG/1
10 CAPSULE ORAL
Qty: 180 | Refills: 0 | Status: ACTIVE | COMMUNITY
Start: 2023-10-11

## 2023-10-31 RX ORDER — MESALAMINE 1.2 G/1
1.2 TABLET, DELAYED RELEASE ORAL
Qty: 120 | Refills: 5 | Status: ACTIVE | COMMUNITY
Start: 2023-10-31 | End: 1900-01-01

## 2023-10-31 RX ORDER — LOPERAMIDE HYDROCHLORIDE 2 MG/1
2 CAPSULE ORAL
Qty: 21 | Refills: 0 | Status: ACTIVE | COMMUNITY
Start: 2023-09-29

## 2023-10-31 RX ORDER — DOXYCYCLINE 100 MG/1
100 CAPSULE ORAL
Qty: 20 | Refills: 0 | Status: ACTIVE | COMMUNITY
Start: 2023-09-21

## 2023-11-02 LAB
NIGHT BLUE STAIN TISS: ABNORMAL
NIGHT BLUE STAIN TISS: ABNORMAL

## 2023-11-24 LAB
CULTURE RESULTS: ABNORMAL
CULTURE RESULTS: ABNORMAL
SPECIMEN SOURCE: SIGNIFICANT CHANGE UP
SPECIMEN SOURCE: SIGNIFICANT CHANGE UP

## 2023-12-08 NOTE — PATIENT PROFILE ADULT - FOOD INSECURITY
History of intellectual disability with epilepsy  Recent seizure activity  PTA medications currently on hold due to dislodged G-tube  Will monitor closely no

## 2024-01-01 NOTE — ED ADULT TRIAGE NOTE - SPO2 (%)
Onset: 2024    Location/description: patient mother states that she is having concerns about umbilical cord +yellow discharge, noted on onsie. Some blood in the discharge.      Associated Symptoms: yellow discharge from umbilical cord    What improves/worsens symptoms: not assessed    Symptom specific medications: not assessed    Intake and Output: Eating fine, 2130 last wet diaper.     Activity level: Normal     Temperature (route and time): Denies 96.9 Axillary  retook temptrature with proper instruction axillary and it was 97.1    Weight:   Wt Readings from Last 1 Encounters:   No data found for Wt        Recent visits (last 3-4 weeks) for same reason or recent surgery:  None in chart review    PLAN:  Provider's office  See Provider within 24 hour  Patient/Caller agrees to follow recommendations.    Unable to schedule for this provider, mother will call office in the AM .     Reason for Disposition   Lots of drainage from navel (urine, mucus, pus, etc.)    Protocols used: Umbilical Cord - Discharge or Infected-P-AH    
Regarding: umbilicial area discharge  ----- Message from Florence RU Worthington sent at 2024 10:05 PM CST -----  Patient Name: Wenceslao Randhawa    Specialist or PCP Name: Rebecca Lescher    Symptoms: umbilical area discharge    Pregnant (females aged 13-60. If Yes, how long?) : na    Call Back # : [phone]    Which State are you currently located in?: [place]    Name of Clinic Site / Acct# : [facility]    Use following scripting for patients waiting for a callback:   \"Nurse callback times vary based on call volumes; please be aware the return phone call may come from an unidentified or out of state phone number. If your symptoms worsen or become life threatening while waiting, you should seek immediate assistance by calling 911 or going to the ER for evaluation.\"    
97

## 2024-01-02 ENCOUNTER — APPOINTMENT (OUTPATIENT)
Dept: GASTROENTEROLOGY | Facility: GI CENTER | Age: 49
End: 2024-01-02

## 2024-01-08 ENCOUNTER — NON-APPOINTMENT (OUTPATIENT)
Age: 49
End: 2024-01-08

## 2024-01-18 ENCOUNTER — NON-APPOINTMENT (OUTPATIENT)
Age: 49
End: 2024-01-18

## 2024-01-31 ENCOUNTER — APPOINTMENT (OUTPATIENT)
Dept: PULMONOLOGY | Facility: CLINIC | Age: 49
End: 2024-01-31

## 2024-01-31 ENCOUNTER — APPOINTMENT (OUTPATIENT)
Dept: PULMONOLOGY | Facility: CLINIC | Age: 49
End: 2024-01-31
Payer: COMMERCIAL

## 2024-01-31 VITALS
DIASTOLIC BLOOD PRESSURE: 72 MMHG | BODY MASS INDEX: 27.2 KG/M2 | RESPIRATION RATE: 16 BRPM | OXYGEN SATURATION: 97 % | SYSTOLIC BLOOD PRESSURE: 122 MMHG | HEART RATE: 80 BPM | WEIGHT: 190 LBS | HEIGHT: 70 IN

## 2024-01-31 DIAGNOSIS — G35 MULTIPLE SCLEROSIS: ICD-10-CM

## 2024-01-31 DIAGNOSIS — F17.200 NICOTINE DEPENDENCE, UNSPECIFIED, UNCOMPLICATED: ICD-10-CM

## 2024-01-31 PROCEDURE — 99496 TRANSJ CARE MGMT HIGH F2F 7D: CPT

## 2024-01-31 PROCEDURE — 99406 BEHAV CHNG SMOKING 3-10 MIN: CPT

## 2024-01-31 RX ORDER — UMECLIDINIUM BROMIDE AND VILANTEROL TRIFENATATE 62.5; 25 UG/1; UG/1
62.5-25 POWDER RESPIRATORY (INHALATION)
Qty: 1 | Refills: 5 | Status: ACTIVE | COMMUNITY
Start: 1900-01-01 | End: 1900-01-01

## 2024-01-31 RX ORDER — ASPIRIN 81 MG
81 TABLET, DELAYED RELEASE (ENTERIC COATED) ORAL
Refills: 0 | Status: ACTIVE | COMMUNITY

## 2024-01-31 RX ORDER — CHOLESTYRAMINE
POWDER (GRAM) MISCELLANEOUS
Refills: 0 | Status: ACTIVE | COMMUNITY

## 2024-01-31 NOTE — REVIEW OF SYSTEMS
[Nasal Congestion] : nasal congestion [Postnasal Drip] : postnasal drip [Sinus Problems] : sinus problems [Cough] : cough [Chest Tightness] : chest tightness [SOB on Exertion] : sob on exertion [Negative] : Endocrine

## 2024-01-31 NOTE — CONSULT LETTER
[Dear  ___] : Dear  [unfilled], [Consult Letter:] : I had the pleasure of evaluating your patient, [unfilled]. [Please see my note below.] : Please see my note below. [Consult Closing:] : Thank you very much for allowing me to participate in the care of this patient.  If you have any questions, please do not hesitate to contact me. [Sincerely,] : Sincerely, [FreeTextEntry3] : Marco Antonio Yoder MD, FCCP, D. [facility] Pulmonary and Sleep Medicine [facility]monary [facility] Sleep Medicine at [place]

## 2024-01-31 NOTE — REASON FOR VISIT
[Initial] : an initial visit [Shortness of Breath] : shortness of breath [Nicotine Dependence] : nicotine dependence

## 2024-01-31 NOTE — PHYSICAL EXAM
[No Acute Distress] : no acute distress [Low Lying Soft Palate] : low lying soft palate [Enlarged Base of the Tongue] : enlarged base of the tongue [II] : Mallampati Class: II [Normal Appearance] : normal appearance [Supple] : supple [Normal Rate/Rhythm] : normal rate/rhythm [Normal S1, S2] : normal s1, s2 [No Murmurs] : no murmurs [No Resp Distress] : no resp distress [No Acc Muscle Use] : no acc muscle use [Normal Rhythm and Effort] : normal rhythm and effort [Clear to Auscultation Bilaterally] : clear to auscultation bilaterally [No Abnormalities] : no abnormalities [Benign] : benign [No Clubbing] : no clubbing [No Edema] : no edema [No Focal Deficits] : no focal deficits [Oriented x3] : oriented x3 [TextBox_11] : Mild to moderate oropharyngeal crowding.

## 2024-01-31 NOTE — HISTORY OF PRESENT ILLNESS
[On ___] : performed on [unfilled] [Patient] : the patient [Indication ___] : for an indication of [unfilled] [None] : no new symptoms reported [TextBox_4] : Current smoker 2-3 ppd since age 15. S/p Covid infection in 2021 with mild symptoms and did not require therapy. Pt hospitalized at [facility] from 1/22-1/25/24 for pneumonia. Pt improved with abx and d/c'd with pulm f/u. Pt with mild residual SOB and cough. He is on Albuterol and Anoro currently. Pt denies significant sleep complaints.  [FreeTextEntry9] : Chest CT [FreeTextEntry8] : emphysema with ? GGO and atelectasis/bronchiolitis

## 2024-01-31 NOTE — END OF VISIT
[Time Spent: ___ minutes] : I have spent [unfilled] minutes of time on the encounter. [TextEntry] : Discussed with pt at length regarding smoking hx, soboe, lung emphysema, prior Covid infection, abnormal CT; reviewed w/u with pt as above.

## 2024-01-31 NOTE — DISCUSSION/SUMMARY
[FreeTextEntry1] :  #1. Prior PFTs were normal but will repeat. #2. Currently on Anoro with Albuterol as needed. Will continue pending f/u PFTs. #3. Diet and exercise for weight loss. #4. SOBOE is likely at least somewhat related to weight or deconditioning.  #5. Chest CT with emphysema and bronchiolitis. Consider repeat in 5/2024 for 4-month f/u. Eventual lung cancer screening age 49. #6. Pt denies significant sleep complaints.  #7. Stressed smoking cessation. #8. S/p Covid infection. #9. F/u in ASAP with PFTs to determine if any adjustments are required to his BD regimen.  The patient expressed understanding and agreement with the above recommendations/plan and accepts responsibility to be compliant with recommended testing, therapies, and f/u visits. All relevant questions and concerns were addressed.

## 2024-03-01 NOTE — H&P ADULT - ASSESSMENT
Pt is a 46yo M w PMH of EtOH, chronic diarrhea, MS not on medication present to ED with 1 day history of sever epigastric pain. Pt states since Covid pandemic he got laid off from work and has been drinking half bottle of Whiskey almost daily, last drink was yesterday at noon. Pt states he started having 8/10 sharp and dull epigastric pain that occasional radiating to the back. Pt had nausea, no vomiting. No changes in chronic diarrhea. Pt states he also has hx of fatty liver disease since childhood. Pt denies of any fever, chills, recent sickness or travel.     *Acute alcohol induced pancreatitis   admit to medicine   CT showed stranding around tail of pancrease, no evidence of necrosis  s/p Famotidine, Zofran, Morphine and Toradol at ED   s/p 2L NS at ED  GI consult appreciated   Cont clear liquid diet   Morphine PRN for pain   cont Zofran for nausea  cont  cc/hr    *ETOH  CIWA protocol with ativan felicia  Cont Folic acid, Thiamine, Multivitamin   Seizure and fall precaution     *Enterocolitis/chronic diarrhea   Cont Protonix   f/u GI stool PCR   acidophilus     *smoking   Nicotine patch     *Hypokalemia  s/p repletion   f/u in the am     *DVT ppx   Heparin SQ     *Code status   Full code        Pt is a 44yo M w PMH of EtOH, chronic diarrhea, MS not on medication present to ED with 1 day history of sever epigastric pain. Pt states since Covid pandemic he got laid off from work and has been drinking half bottle of Whiskey almost daily, last drink was yesterday at noon. Pt states he started having 8/10 sharp and dull epigastric pain that occasional radiating to the back. Pt had nausea, no vomiting. No changes in chronic diarrhea. Pt states he also has hx of fatty liver disease since childhood. Pt denies of any fever, chills, recent sickness or travel.     *Acute alcohol induced pancreatitis   admit to medicine   CT showed stranding around tail of pancrease, no evidence of necrosis  s/p Famotidine, Zofran, Morphine and Toradol at ED   s/p 2L NS at ED  GI consult appreciated   Cont clear liquid diet   Morphine PRN for pain   cont Zofran for nausea  cont  cc/hr    *ETOH  CIWA protocol with ativan felicia  Cont Folic acid, Thiamine, Multivitamin   Seizure and fall precaution     *Elevated LFT   f/u CMP in the am   f/u acute hepatic panel     *Enterocolitis/chronic diarrhea   Cont Protonix   f/u GI stool PCR   acidophilus     *smoking   Nicotine patch     *Hypokalemia  s/p repletion   f/u in the am     *DVT ppx   Heparin SQ     *Code status   Full code        No

## 2024-03-27 ENCOUNTER — APPOINTMENT (OUTPATIENT)
Dept: PULMONOLOGY | Facility: CLINIC | Age: 49
End: 2024-03-27
Payer: COMMERCIAL

## 2024-03-27 VITALS
SYSTOLIC BLOOD PRESSURE: 140 MMHG | DIASTOLIC BLOOD PRESSURE: 86 MMHG | HEART RATE: 82 BPM | OXYGEN SATURATION: 97 % | RESPIRATION RATE: 16 BRPM

## 2024-03-27 VITALS — HEIGHT: 67.5 IN | BODY MASS INDEX: 31.02 KG/M2 | WEIGHT: 200 LBS

## 2024-03-27 PROCEDURE — 94010 BREATHING CAPACITY TEST: CPT

## 2024-03-27 PROCEDURE — 85018 HEMOGLOBIN: CPT | Mod: QW

## 2024-03-27 PROCEDURE — 99406 BEHAV CHNG SMOKING 3-10 MIN: CPT

## 2024-03-27 PROCEDURE — 94727 GAS DIL/WSHOT DETER LNG VOL: CPT

## 2024-03-27 PROCEDURE — 94729 DIFFUSING CAPACITY: CPT

## 2024-03-27 PROCEDURE — 99214 OFFICE O/P EST MOD 30 MIN: CPT | Mod: 25

## 2024-03-27 RX ORDER — ALBUTEROL SULFATE 90 UG/1
108 (90 BASE) INHALANT RESPIRATORY (INHALATION)
Qty: 1 | Refills: 3 | Status: ACTIVE | COMMUNITY
Start: 1900-01-01 | End: 1900-01-01

## 2024-03-27 NOTE — CONSULT LETTER
[Dear  ___] : Dear  [unfilled], [Consult Letter:] : I had the pleasure of evaluating your patient, [unfilled]. [Please see my note below.] : Please see my note below. [Consult Closing:] : Thank you very much for allowing me to participate in the care of this patient.  If you have any questions, please do not hesitate to contact me. [Sincerely,] : Sincerely, [FreeTextEntry3] : Marco Antonio Yoder MD, FCCP, D. [facility] at [place]

## 2024-03-27 NOTE — DISCUSSION/SUMMARY
[FreeTextEntry1] :  #1. Prior PFTs were normal. Repeat again normal but reduced from prior. #2. Currently on Anoro though only uses on occasion with Albuterol as needed. Given normal lung function, will observe pt off chronic Anoro therapy and evaluate response. #3. Diet and exercise for weight loss. #4. SOBOE is likely at least somewhat related to weight or deconditioning.  #5. Chest CT from 1/24/24 with emphysema and bronchiolitis. Consider lung cancer screening CT in 11/2024 when pt is eligible. #6. Pt denies significant sleep complaints.  #7. Stressed smoking cessation. #8. S/p Covid infection. #9. F/u in 3 months with jodi and obtain CT in 11/2024 for lung cancer screening.  The patient expressed understanding and agreement with the above recommendations/plan and accepts responsibility to be compliant with recommended testing, therapies, and f/u visits. All relevant questions and concerns were addressed.

## 2024-03-27 NOTE — REASON FOR VISIT
[Nicotine Dependence] : nicotine dependence [Shortness of Breath] : shortness of breath [Follow-Up] : a follow-up visit

## 2024-03-27 NOTE — HISTORY OF PRESENT ILLNESS
[On ___] : performed on [unfilled] [Indication ___] : for an indication of [unfilled] [Patient] : the patient [Follow-Up - Routine Clinic] : a routine clinic follow-up of [Excess Weight] : excess weight [Currently Experiencing] : The patient is currently experiencing symptoms. [Dyspnea] : dyspnea [Fair Compliance] : fair compliance with treatment [Low Calorie Diet] : low calorie diet [Fair Tolerance] : fair tolerance of treatment [Poor Symptom Control] : poor symptom control [High] : high [Low Calorie] : low calorie [Well Balanced Diet] : well balanced meals [None] : The patient does not exercise [TextBox_4] : Current smoker 2-3 ppd since age 15. S/p Covid infection in 2021 with mild symptoms and did not require therapy. Pt hospitalized at [facility] from 1/22-1/25/24 for pneumonia. Pt improved with abx and d/c'd with pulm f/u. Pt with mild residual SOB and cough. He is on Albuterol and occ Anoro currently. Overall, he feels he is breathing well with occ wheeze but reports no improvement with Anoro. Pt denies significant sleep complaints.  H/o colitis. [FreeTextEntry9] : Chest CT [FreeTextEntry8] : emphysema with ? GGO and atelectasis/bronchiolitis

## 2024-03-27 NOTE — PROCEDURE
[FreeTextEntry1] : PFTs from 10/20/16 were normal. PFTs from 3/27/24 were normal though reduced from prior.

## 2024-03-27 NOTE — PHYSICAL EXAM
[Low Lying Soft Palate] : low lying soft palate [No Acute Distress] : no acute distress [Enlarged Base of the Tongue] : enlarged base of the tongue [II] : Mallampati Class: II [Normal Appearance] : normal appearance [Supple] : supple [Normal Rate/Rhythm] : normal rate/rhythm [Normal S1, S2] : normal s1, s2 [No Murmurs] : no murmurs [No Resp Distress] : no resp distress [No Acc Muscle Use] : no acc muscle use [Normal Rhythm and Effort] : normal rhythm and effort [Clear to Auscultation Bilaterally] : clear to auscultation bilaterally [No Abnormalities] : no abnormalities [No Clubbing] : no clubbing [Benign] : benign [No Edema] : no edema [Oriented x3] : oriented x3 [No Focal Deficits] : no focal deficits [TextBox_11] : Mild to moderate oropharyngeal crowding.

## 2024-03-27 NOTE — REVIEW OF SYSTEMS
[Postnasal Drip] : postnasal drip [Nasal Congestion] : nasal congestion [Cough] : cough [Sinus Problems] : sinus problems [Chest Tightness] : chest tightness [SOB on Exertion] : sob on exertion [Negative] : Endocrine

## 2024-05-07 NOTE — PROCEDURE NOTE - NSTOLERANCE_GEN_A_CORE
Patient tolerated procedure well.
Go for blood tests as directed. Your doctor will do lab tests at regular visits to monitor the effects of this medicine. Please follow up with your doctor and keep your health care provider appointments.

## 2024-06-07 ENCOUNTER — APPOINTMENT (OUTPATIENT)
Dept: PULMONOLOGY | Facility: CLINIC | Age: 49
End: 2024-06-07
Payer: COMMERCIAL

## 2024-06-07 VITALS — BODY MASS INDEX: 29.96 KG/M2 | WEIGHT: 200 LBS | HEIGHT: 68.5 IN

## 2024-06-07 VITALS
SYSTOLIC BLOOD PRESSURE: 132 MMHG | DIASTOLIC BLOOD PRESSURE: 78 MMHG | HEART RATE: 80 BPM | RESPIRATION RATE: 16 BRPM | OXYGEN SATURATION: 98 %

## 2024-06-07 DIAGNOSIS — R05.9 COUGH, UNSPECIFIED: ICD-10-CM

## 2024-06-07 DIAGNOSIS — F17.200 NICOTINE DEPENDENCE, UNSPECIFIED, UNCOMPLICATED: ICD-10-CM

## 2024-06-07 DIAGNOSIS — F17.210 NICOTINE DEPENDENCE, CIGARETTES, UNCOMPLICATED: ICD-10-CM

## 2024-06-07 DIAGNOSIS — R06.02 SHORTNESS OF BREATH: ICD-10-CM

## 2024-06-07 DIAGNOSIS — E66.9 OBESITY, UNSPECIFIED: ICD-10-CM

## 2024-06-07 DIAGNOSIS — Z86.16 PERSONAL HISTORY OF COVID-19: ICD-10-CM

## 2024-06-07 DIAGNOSIS — J43.2 CENTRILOBULAR EMPHYSEMA: ICD-10-CM

## 2024-06-07 PROCEDURE — G0296 VISIT TO DETERM LDCT ELIG: CPT

## 2024-06-07 PROCEDURE — 94010 BREATHING CAPACITY TEST: CPT

## 2024-06-07 PROCEDURE — 99214 OFFICE O/P EST MOD 30 MIN: CPT | Mod: 25

## 2024-06-07 PROCEDURE — 99406 BEHAV CHNG SMOKING 3-10 MIN: CPT

## 2024-06-07 NOTE — CONSULT LETTER
[Dear  ___] : Dear  [unfilled], [Consult Letter:] : I had the pleasure of evaluating your patient, [unfilled]. [Please see my note below.] : Please see my note below. [Consult Closing:] : Thank you very much for allowing me to participate in the care of this patient.  If you have any questions, please do not hesitate to contact me. [Sincerely,] : Sincerely, [FreeTextEntry3] : Marco Antonio Yoder MD, FCCP, D. [facility] at [place]

## 2024-06-07 NOTE — REASON FOR VISIT
[Follow-Up] : a follow-up visit [Shortness of Breath] : shortness of breath [Nicotine Dependence] : nicotine dependence

## 2024-06-07 NOTE — COUNSELING
[Cessation strategies including cessation program discussed] : Cessation strategies including cessation program discussed [Use of nicotine replacement therapies and other medications discussed] : Use of nicotine replacement therapies and other medications discussed [Encouraged to pick a quit date and identify support needed to quit] : Encouraged to pick a quit date and identify support needed to quit [Potential consequences of obesity discussed] : Potential consequences of obesity discussed [Benefits of weight loss discussed] : Benefits of weight loss discussed [Encouraged to increase physical activity] : Encouraged to increase physical activity [ - Annual Lung Cancer Screening/Share Decision Making Discussion] : Annual Lung Cancer Screening/Share Decision Making Discussion. (I have advised this patient to have a Low Dose CT (LDCT) scan of the lungs and have discussed the following with the patient in a shared decision making discussion:   Benefits of Detection and Early Treatment: There is adequate evidence that annual screening for lung cancer with LDCT in a population of high-risk persons can prevent a substantial number of lung cancer-related deaths. The magnitude of benefit depends on the individual patient's risk for lung cancer, as those who are at highest risk are most likely to benefit. Screening cannot prevent most lung cancer-related deaths, and does not replace smoking cessation. Harms of Detection and Early Intervention and Treatment: The harms associated with LDCT screening include false-negative and false-positive results, incidental findings, over diagnosis, and radiation exposure. False-positive LDCT results occur in a substantial proportion of screened persons; 95% of all positive results do not lead to a diagnosis of cancer. In a high-quality screening program, further imaging can resolve most false-positive results; however, some patients may require invasive procedures. Radiation harms, including cancer resulting from cumulative exposure to radiation, vary depending on the age at the start of screening; the number of scans received; and the person's exposure to other sources of radiation, particularly other medical imaging.) [FreeTextEntry1] : 4

## 2024-06-07 NOTE — DISCUSSION/SUMMARY
[FreeTextEntry1] :  #1. Prior PFTs were normal. Repeat again normal but reduced from prior. Repeat jodi remains normal off of Anoro. #2. Was on Anoro though only used on occasion and now stopped though has Albuterol as needed. Given normal lung function, will observe pt off chronic Anoro therapy as pt did not have a decline in lung function or increase in symptoms off therapy. #3. Diet and exercise for weight loss. #4. SOBOE is likely at least somewhat related to weight or deconditioning.  #5. Chest CT from 1/24/24 with emphysema and bronchiolitis. Consider lung cancer screening CT in 11/2024 when pt is eligible. #6. Pt denies significant sleep complaints.  #7. Stressed smoking cessation. #8. S/p Covid infection. #9. F/u in 6 months with chest CT.  The patient expressed understanding and agreement with the above recommendations/plan and accepts responsibility to be compliant with recommended testing, therapies, and f/u visits. All relevant questions and concerns were addressed.

## 2024-06-07 NOTE — HISTORY OF PRESENT ILLNESS
[Follow-Up - Routine Clinic] : a routine clinic follow-up of [Excess Weight] : excess weight [Currently Experiencing] : The patient is currently experiencing symptoms. [Dyspnea] : dyspnea [Low Calorie Diet] : low calorie diet [Fair Compliance] : fair compliance with treatment [Fair Tolerance] : fair tolerance of treatment [Poor Symptom Control] : poor symptom control [High] : high [Low Calorie] : low calorie [Well Balanced Diet] : well balanced meals [On ___] : performed on [unfilled] [Patient] : the patient [Indication ___] : for an indication of [unfilled] [None] : no new symptoms reported [TextBox_4] : Current smoker 2-3 ppd since age 15. S/p Covid infection in 2021 with mild symptoms and did not require therapy. Pt hospitalized at [facility] from 1/22-1/25/24 for pneumonia. Pt improved with abx and d/c'd with pulm f/u. Pt with mild residual SOB and cough. He is on Albuterol and occ Anoro currently. Overall, he feels he is breathing well with occ wheeze but reports no improvement with Anoro. Pt denies significant sleep complaints.  H/o colitis. [FreeTextEntry9] : Chest CT [FreeTextEntry8] : emphysema with ? GGO and atelectasis/bronchiolitis

## 2024-11-22 ENCOUNTER — APPOINTMENT (OUTPATIENT)
Dept: PULMONOLOGY | Facility: CLINIC | Age: 49
End: 2024-11-22

## 2024-12-25 ENCOUNTER — NON-APPOINTMENT (OUTPATIENT)
Age: 49
End: 2024-12-25

## 2025-02-12 NOTE — ED ADULT NURSE REASSESSMENT NOTE - NS ED NURSE REASSESS COMMENT FT1
Upon discharge pt became agitated, threw walker on the floor and causing a scene. Code grey called. Pt walked out the ED with a steady gait then returned back to get walker. Security and MD at main entrance to ED escorting pt out. no...

## 2025-03-20 NOTE — ED ADULT TRIAGE NOTE - TEMPERATURE IN CELSIUS (DEGREES C)
Spoke to patient and inform him that his sleep specialist should be the one to complete all of his c pap supply orders. He said he will contact [facility] to contact his sleep specialist from [facility]. Completing task and closing encounter.   37.2

## (undated) DEVICE — MASK PROC EAR LOOP

## (undated) DEVICE — FORCEP RADIAL JAW 4 240CM DISP

## (undated) DEVICE — SYR LUER SLIP TIP 50CC

## (undated) DEVICE — SYR IV FLUSH SALINE 10ML 30/TY

## (undated) DEVICE — PACK IV START WITH CHG

## (undated) DEVICE — TUBING ALARIS PUMP MODULE NON-DEHP

## (undated) DEVICE — SNARE HOT COLD 15MM DISP

## (undated) DEVICE — SYR SLIP 10CC

## (undated) DEVICE — LIFTER SYR EVERLIFT AGENT SUBMUCOSAL 5ML

## (undated) DEVICE — TUBING IV EXTENSION MACRO W CLAVE 7"

## (undated) DEVICE — Device

## (undated) DEVICE — SOL IRR BAG H2O 1000ML

## (undated) DEVICE — GOWN IMPERV XL

## (undated) DEVICE — FORCEP RADIAL JAW 4 W NDL 2.4MM 2.8MM 240CM ORANGE DISP

## (undated) DEVICE — DRSG CURITY GAUZE SPONGE 4 X 4" 12-PLY NON-STERILE

## (undated) DEVICE — CATH IV SAFE BC 22G X 1" (BLUE)

## (undated) DEVICE — DRSG 2X2

## (undated) DEVICE — SNARE MINI-MICRO OVAL

## (undated) DEVICE — UNDERPAD LINEN SAVER 23 X 36"

## (undated) DEVICE — SENSOR O2 FINGER ADULT

## (undated) DEVICE — SNARE EXACTO COLD 9MMX230CM

## (undated) DEVICE — SOL BAG NS 0.9% 1000ML

## (undated) DEVICE — DENTURE CUP PINK